# Patient Record
Sex: FEMALE | Race: ASIAN | NOT HISPANIC OR LATINO | URBAN - METROPOLITAN AREA
[De-identification: names, ages, dates, MRNs, and addresses within clinical notes are randomized per-mention and may not be internally consistent; named-entity substitution may affect disease eponyms.]

---

## 2018-05-24 VITALS
WEIGHT: 197.09 LBS | SYSTOLIC BLOOD PRESSURE: 116 MMHG | HEART RATE: 111 BPM | RESPIRATION RATE: 18 BRPM | HEIGHT: 64 IN | DIASTOLIC BLOOD PRESSURE: 59 MMHG | TEMPERATURE: 100 F | OXYGEN SATURATION: 97 %

## 2018-05-24 PROBLEM — Z00.00 ENCOUNTER FOR PREVENTIVE HEALTH EXAMINATION: Status: ACTIVE | Noted: 2018-05-24

## 2018-05-24 LAB
ALBUMIN SERPL ELPH-MCNC: 3.6 G/DL — SIGNIFICANT CHANGE UP (ref 3.3–5)
ALP SERPL-CCNC: 141 U/L — HIGH (ref 40–120)
ALT FLD-CCNC: 110 U/L — HIGH (ref 10–45)
ANION GAP SERPL CALC-SCNC: 15 MMOL/L — SIGNIFICANT CHANGE UP (ref 5–17)
APTT BLD: 29.9 SEC — SIGNIFICANT CHANGE UP (ref 27.5–37.4)
AST SERPL-CCNC: 92 U/L — HIGH (ref 10–40)
BILIRUB SERPL-MCNC: 3.4 MG/DL — HIGH (ref 0.2–1.2)
BUN SERPL-MCNC: 19 MG/DL — SIGNIFICANT CHANGE UP (ref 7–23)
CALCIUM SERPL-MCNC: 9.4 MG/DL — SIGNIFICANT CHANGE UP (ref 8.4–10.5)
CHLORIDE SERPL-SCNC: 96 MMOL/L — SIGNIFICANT CHANGE UP (ref 96–108)
CO2 SERPL-SCNC: 25 MMOL/L — SIGNIFICANT CHANGE UP (ref 22–31)
CREAT SERPL-MCNC: 0.77 MG/DL — SIGNIFICANT CHANGE UP (ref 0.5–1.3)
GLUCOSE SERPL-MCNC: 152 MG/DL — HIGH (ref 70–99)
HCT VFR BLD CALC: 27.1 % — LOW (ref 34.5–45)
HGB BLD-MCNC: 8.3 G/DL — LOW (ref 11.5–15.5)
INR BLD: 1.59 — HIGH (ref 0.88–1.16)
LACTATE SERPL-SCNC: 2.2 MMOL/L — HIGH (ref 0.5–2)
LIDOCAIN IGE QN: 30 U/L — SIGNIFICANT CHANGE UP (ref 7–60)
LYMPHOCYTES # BLD AUTO: 3 % — LOW (ref 13–44)
MCHC RBC-ENTMCNC: 21.2 PG — LOW (ref 27–34)
MCHC RBC-ENTMCNC: 30.6 G/DL — LOW (ref 32–36)
MCV RBC AUTO: 69.3 FL — LOW (ref 80–100)
MONOCYTES NFR BLD AUTO: 7 % — SIGNIFICANT CHANGE UP (ref 2–14)
NEUTROPHILS NFR BLD AUTO: 85 % — HIGH (ref 43–77)
PLATELET # BLD AUTO: 407 K/UL — HIGH (ref 150–400)
POTASSIUM SERPL-MCNC: 3.4 MMOL/L — LOW (ref 3.5–5.3)
POTASSIUM SERPL-SCNC: 3.4 MMOL/L — LOW (ref 3.5–5.3)
PROT SERPL-MCNC: 7.2 G/DL — SIGNIFICANT CHANGE UP (ref 6–8.3)
PROTHROM AB SERPL-ACNC: 17.8 SEC — HIGH (ref 9.8–12.7)
RBC # BLD: 3.91 M/UL — SIGNIFICANT CHANGE UP (ref 3.8–5.2)
RBC # FLD: 26.1 % — HIGH (ref 10.3–16.9)
SODIUM SERPL-SCNC: 136 MMOL/L — SIGNIFICANT CHANGE UP (ref 135–145)
WBC # BLD: 15 K/UL — HIGH (ref 3.8–10.5)
WBC # FLD AUTO: 15 K/UL — HIGH (ref 3.8–10.5)

## 2018-05-24 PROCEDURE — 93010 ELECTROCARDIOGRAM REPORT: CPT

## 2018-05-24 PROCEDURE — 99285 EMERGENCY DEPT VISIT HI MDM: CPT | Mod: 25

## 2018-05-24 PROCEDURE — 74177 CT ABD & PELVIS W/CONTRAST: CPT | Mod: 26

## 2018-05-24 PROCEDURE — 76705 ECHO EXAM OF ABDOMEN: CPT | Mod: 26

## 2018-05-24 RX ORDER — SODIUM CHLORIDE 9 MG/ML
1000 INJECTION INTRAMUSCULAR; INTRAVENOUS; SUBCUTANEOUS ONCE
Qty: 0 | Refills: 0 | Status: COMPLETED | OUTPATIENT
Start: 2018-05-24 | End: 2018-05-24

## 2018-05-24 RX ORDER — MORPHINE SULFATE 50 MG/1
4 CAPSULE, EXTENDED RELEASE ORAL ONCE
Qty: 0 | Refills: 0 | Status: DISCONTINUED | OUTPATIENT
Start: 2018-05-24 | End: 2018-05-24

## 2018-05-24 RX ORDER — IOHEXOL 300 MG/ML
50 INJECTION, SOLUTION INTRAVENOUS ONCE
Qty: 0 | Refills: 0 | Status: COMPLETED | OUTPATIENT
Start: 2018-05-24 | End: 2018-05-24

## 2018-05-24 RX ADMIN — SODIUM CHLORIDE 2000 MILLILITER(S): 9 INJECTION INTRAMUSCULAR; INTRAVENOUS; SUBCUTANEOUS at 17:45

## 2018-05-24 RX ADMIN — MORPHINE SULFATE 4 MILLIGRAM(S): 50 CAPSULE, EXTENDED RELEASE ORAL at 19:52

## 2018-05-24 RX ADMIN — MORPHINE SULFATE 4 MILLIGRAM(S): 50 CAPSULE, EXTENDED RELEASE ORAL at 21:54

## 2018-05-24 RX ADMIN — MORPHINE SULFATE 4 MILLIGRAM(S): 50 CAPSULE, EXTENDED RELEASE ORAL at 20:59

## 2018-05-24 RX ADMIN — IOHEXOL 50 MILLILITER(S): 300 INJECTION, SOLUTION INTRAVENOUS at 17:45

## 2018-05-24 NOTE — CONSULT NOTE ADULT - SUBJECTIVE AND OBJECTIVE BOX
HPI:  67F recently diagnosed (via colonoscopy 2 weeks prior) with near obstructing ascending colon cancer with bi-lobe liver and peritoneal metastases (diagnosed with PET-CT earlier this week). Otherwise hx of anemia. Now presenting to the [facility] ED from her medical oncologist’s office with worsening persistent RLQ sharp abdominal pain and fever. Pain has worsened over the past 3 days and last evening was associated with one episode of vomiting (nonbloody, questionable bilious content). She reports ample flatus with no belching.  Last BM was 2 days prior. She denies any associated chills, chest pain/discomfort/pressure, SOB/dyspnea, dysuria, diarrhea, hematochezia.     PMH: Metastatic colon cancer, anemia  Meds: None, however recently had an IV Fe infusion at heme office  NKDA  PSH: No prior intra-abdominal surgery.  Social: Remote hx of cigarette use, social ETOH, denies IVDA  Family: Sister with Breast Ca.    Vital Signs Last 24 Hrs  T(C): 37.1 (24 May 2018 19:54), Max: 37.9 (24 May 2018 16:09)  T(F): 98.7 (24 May 2018 19:54), Max: 100.3 (24 May 2018 16:09)  HR: 98 (24 May 2018 19:54) (96 - 111)  BP: 134/76 (24 May 2018 19:54) (116/59 - 134/76)  BP(mean): --  RR: 16 (24 May 2018 19:54) (16 - 18)  SpO2: 96% (24 May 2018 19:54) (96% - 97%)    PHYSICAL EXAM:    Gen: Age appropriate obese female in NAD  CV: tachycardic  Resp: No increased work of breathing  Abd: Softly distended, diffusely tender with a focus in the lower abdomen, associated with involuntary guarding, questionable rebound, no rigidity.  Ext: WWP with mild b/l LE edema      LABS:                        8.3    15.0  )-----------( 407      ( 24 May 2018 17:22 )             27.1     05-24    136  |  96  |  19  ----------------------------<  152<H>  3.4<L>   |  25  |  0.77    Ca    9.4      24 May 2018 17:22    TPro  7.2  /  Alb  3.6  /  TBili  3.4<H>  /  DBili  x   /  AST  92<H>  /  ALT  110<H>  /  AlkPhos  141<H>  05-24    PT/INR - ( 24 May 2018 17:24 )   PT: 17.8 sec;   INR: 1.59          PTT - ( 24 May 2018 17:24 )  PTT:29.9 sec      RADIOLOGY & ADDITIONAL STUDIES:  < from: CT Abdomen and Pelvis w/ Oral Cont and w/ IV Cont (05.24.18 @ 20:52) >    ******PRELIMINARY REPORT******    ******PRELIMINARY REPORT******            EXAM:  CT ABDOMEN AND PELVIS OC IC                          PROCEDURE DATE:  05/24/2018    ******PRELIMINARY REPORT******    ******PRELIMINARY REPORT******              INTERPRETATION:  CT of the ABDOMEN and PELVIS with intravenous contrast   dated 5/24/2018 8:52 PM    INDICATION: Abdominal pain. N/V, hx metastatic colon ca w/ liver mets and   peritoneal carcinomatosis.       TECHNIQUE: CT of the abdomen and pelvis was performed using oral and   intravenous contrast. Axial, coronal, and sagittal images were produced   and reviewed.    PRIOR STUDIES: None.    FINDINGS: Images of the lower chest demonstrate dependent atelectasis   bilaterally.    There is hepatic steatosis. Multiple hypodense lesions are seen in the   liver, consistent with the patient's known liver metastases. There is   cholelithiasis. The gallbladder wall is thickened and there is trace   pericholecystic fluid. These findings are consistent with acute   cholecystitis. The pancreas is normal in appearance.  No splenic   abnormalities are seen. There is a central filling defect in the portal   vein, best seen on axial image 52 series 3, consistent with portal venous   thrombosis.    The adrenal glands are unremarkable. There are a few subcentimeter   hypodensities which are too small to characterize in each kidney. There   is no hydronephrosis or hydroureter. No renal stones are seen.     There is a small amount of calcified atherosclerotic plaque in the   abdominal aorta. No abdominal aortic aneurysm is seen. No lymphadenopathy   is seen. Multiple nonenlarged mesenteric lymph nodes are noted.    Evaluation of the bowel demonstrates colonic diverticulosis without   evidence of acute diverticulitis. There may be suture material in the   ascending colon from prior surgery. Correlate with surgical history.   There is no bowel obstruction or free air. The appendix is not identified   with certainty however there are no inflammatory changes in the right   lower quadrant to suggest acute appendicitis. There is a small amount of   ascites. There are a few soft tissue density peritoneal implants seen   along the anterior abdominal wall, particularly in the right mid abdomen   (as seenon axial images 61 through 67, series 3), consistent with   peritoneal carcinomatosis.    Images of the pelvis demonstrate the uterus and adnexa to be normal in   appearance. There are no filling defects in the urinary bladder.    Evaluation of the osseous structures demonstrates mild degenerative   changes of the spine. No lytic or sclerotic osseous lesions are seen.      IMPRESSION:  1.  Findings consistent with acute cholecystitis.  2.  Portal vein thrombosis.  3.  Hepatic metastatic disease.  4.  Peritoneal carcinomatosis.  5.  Small ascites.              "Thank you for the opportunity to participate in the care of this   patient."  ******PRELIMINARY REPORT******    ******PRELIMINARY REPORT******          EULALIO VARELA M.D., RADIOLOGY RESIDENT

## 2018-05-24 NOTE — ED ADULT NURSE REASSESSMENT NOTE - NS ED NURSE REASSESS COMMENT FT1
Patient care and endorsement received from previous RN.  Patient c/o of body aches, Morphine 4mg IVP adminsitered.  NSS bolus ongoing.  Vital signs stable,  Brought to CT scan in stable condition.

## 2018-05-24 NOTE — ED CLERICAL - NS ED CLERK NOTE PRE-ARRIVAL INFORMATION; ADDITIONAL PRE-ARRIVAL INFORMATION
PILLO JACKMAN 67F FROM [facility] WITH SEPSIS.  FEVER 102, .  VOMITING.  HAS METASTATIC COLON CANCER.  NO TREATMENT YET.  DR. GARZA [phone] W QUESTIONS (DR. KAMARA)

## 2018-05-24 NOTE — ED PROVIDER NOTE - CARE PLAN
Principal Discharge DX:	Portal vein thrombosis  Secondary Diagnosis:	Colon cancer  Secondary Diagnosis:	Cholecystitis

## 2018-05-24 NOTE — ED PROVIDER NOTE - PROGRESS NOTE DETAILS
surgery feels reative biliary pathology and has no surgical plans -> may get IR care if LFT's increase -> added Hep GTT for thrombosis and antibiotics at  this time and admission placed

## 2018-05-24 NOTE — ED ADULT NURSE REASSESSMENT NOTE - NS ED NURSE REASSESS COMMENT FT1
Patient back from CT scan, c/o of return of abdominal pain,  2nd dose Morphine 4mg IVP adminsitered w/ good effects.  No nausea/vomitting.  Vital signs stable. Results and disposition pending.

## 2018-05-24 NOTE — ED PROVIDER NOTE - PHYSICAL EXAMINATION
CONSTITUTIONAL: tired appearing, in moderate distress  HEAD: Normocephalic; atraumatic.   EYES:  conjunctiva and sclera clear  ENT: normal nose; no rhinorrhea; normal pharynx with no erythema or lesions. dry lips and tongue  NECK: Supple; non-tender;   CARDIOVASCULAR: Normal S1, S2; no murmurs, rubs, or gallops. Regular rate and rhythm.   RESPIRATORY: Breathing easily; breath sounds clear and equal bilaterally; no wheezes, rhonchi, or rales.  GI: Soft; non-distended; +diffuse tenderness,  no fluid wave.   EXT: No cyanosis or edema; N/V intact  SKIN: Normal for age and race; warm; dry; good turgor; no apparent lesions or rash.   NEURO: A & O x 3; face symmetric; grossly unremarkable.   PSYCHOLOGICAL: The patient’s mood and manner are appropriate.

## 2018-05-24 NOTE — ED ADULT NURSE NOTE - OBJECTIVE STATEMENT
PT came to ED complaining of bilateral abd pain for several days. Pt has colon CA with mets to liver. Pt reports nausea and vomiting over the last several days.  Pt denies chest pain, SOB,  symptoms.  Pt denies fever, chills. Pt is alert and oriented x3.

## 2018-05-24 NOTE — ED PROVIDER NOTE - OBJECTIVE STATEMENT
68yo F hx of newly diagnosed colon ca w/ liver mets and peritoneal carcinomatosis (follows w/ dr colon but not yet started on any therapy), here w/ concern for intra abdominal infection from clinic. Pt had fever to 101, complaining of N/V and diffuse abdominal pain, so sent to the ED for workup. Dr. Marci Narvaez was made aware of surgery.

## 2018-05-24 NOTE — ED PROVIDER NOTE - MEDICAL DECISION MAKING DETAILS
here w/ symptoms concerning for sepsis, plan for ct scan w/ po and iv contrast to assess. covered w/ zosyn while awaiting workup. surgery consult saw pt and following

## 2018-05-24 NOTE — ED ADULT NURSE NOTE - CHPI ED SYMPTOMS NEG
no abdominal distension/no diarrhea/no burning urination/no hematuria/no fever/no blood in stool/no chills/no dysuria

## 2018-05-25 ENCOUNTER — INPATIENT (INPATIENT)
Facility: HOSPITAL | Age: 68
LOS: 17 days | Discharge: NURSING, REHAB OR LONG-TERM CARE | DRG: 871 | End: 2018-06-12
Attending: STUDENT IN AN ORGANIZED HEALTH CARE EDUCATION/TRAINING PROGRAM | Admitting: STUDENT IN AN ORGANIZED HEALTH CARE EDUCATION/TRAINING PROGRAM
Payer: MEDICARE

## 2018-05-25 DIAGNOSIS — C18.2 MALIGNANT NEOPLASM OF ASCENDING COLON: ICD-10-CM

## 2018-05-25 DIAGNOSIS — K81.9 CHOLECYSTITIS, UNSPECIFIED: ICD-10-CM

## 2018-05-25 DIAGNOSIS — I81 PORTAL VEIN THROMBOSIS: ICD-10-CM

## 2018-05-25 DIAGNOSIS — Z29.9 ENCOUNTER FOR PROPHYLACTIC MEASURES, UNSPECIFIED: ICD-10-CM

## 2018-05-25 DIAGNOSIS — R63.8 OTHER SYMPTOMS AND SIGNS CONCERNING FOOD AND FLUID INTAKE: ICD-10-CM

## 2018-05-25 DIAGNOSIS — R06.02 SHORTNESS OF BREATH: ICD-10-CM

## 2018-05-25 DIAGNOSIS — A41.9 SEPSIS, UNSPECIFIED ORGANISM: ICD-10-CM

## 2018-05-25 LAB
ALBUMIN SERPL ELPH-MCNC: 3.1 G/DL — LOW (ref 3.3–5)
ALP SERPL-CCNC: 105 U/L — SIGNIFICANT CHANGE UP (ref 40–120)
ALT FLD-CCNC: 70 U/L — HIGH (ref 10–45)
ANION GAP SERPL CALC-SCNC: 13 MMOL/L — SIGNIFICANT CHANGE UP (ref 5–17)
ANION GAP SERPL CALC-SCNC: 15 MMOL/L — SIGNIFICANT CHANGE UP (ref 5–17)
ANION GAP SERPL CALC-SCNC: 16 MMOL/L — SIGNIFICANT CHANGE UP (ref 5–17)
APTT BLD: 130.5 SEC — CRITICAL HIGH (ref 27.5–37.4)
AST SERPL-CCNC: 43 U/L — HIGH (ref 10–40)
BASE EXCESS BLDA CALC-SCNC: -1.8 MMOL/L — SIGNIFICANT CHANGE UP (ref -2–3)
BILIRUB SERPL-MCNC: 2.5 MG/DL — HIGH (ref 0.2–1.2)
BUN SERPL-MCNC: 13 MG/DL — SIGNIFICANT CHANGE UP (ref 7–23)
BUN SERPL-MCNC: 14 MG/DL — SIGNIFICANT CHANGE UP (ref 7–23)
BUN SERPL-MCNC: 14 MG/DL — SIGNIFICANT CHANGE UP (ref 7–23)
CALCIUM SERPL-MCNC: 8 MG/DL — LOW (ref 8.4–10.5)
CALCIUM SERPL-MCNC: 8.2 MG/DL — LOW (ref 8.4–10.5)
CALCIUM SERPL-MCNC: 8.4 MG/DL — SIGNIFICANT CHANGE UP (ref 8.4–10.5)
CHLORIDE SERPL-SCNC: 94 MMOL/L — LOW (ref 96–108)
CHLORIDE SERPL-SCNC: 96 MMOL/L — SIGNIFICANT CHANGE UP (ref 96–108)
CHLORIDE SERPL-SCNC: 97 MMOL/L — SIGNIFICANT CHANGE UP (ref 96–108)
CO2 SERPL-SCNC: 20 MMOL/L — LOW (ref 22–31)
CO2 SERPL-SCNC: 22 MMOL/L — SIGNIFICANT CHANGE UP (ref 22–31)
CO2 SERPL-SCNC: 22 MMOL/L — SIGNIFICANT CHANGE UP (ref 22–31)
CREAT SERPL-MCNC: 0.47 MG/DL — LOW (ref 0.5–1.3)
CREAT SERPL-MCNC: 0.5 MG/DL — SIGNIFICANT CHANGE UP (ref 0.5–1.3)
CREAT SERPL-MCNC: 0.53 MG/DL — SIGNIFICANT CHANGE UP (ref 0.5–1.3)
GAS PNL BLDA: SIGNIFICANT CHANGE UP
GLUCOSE SERPL-MCNC: 136 MG/DL — HIGH (ref 70–99)
GLUCOSE SERPL-MCNC: 143 MG/DL — HIGH (ref 70–99)
GLUCOSE SERPL-MCNC: 161 MG/DL — HIGH (ref 70–99)
HCO3 BLDA-SCNC: 23 MMOL/L — SIGNIFICANT CHANGE UP (ref 21–28)
HCT VFR BLD CALC: 22.3 % — LOW (ref 34.5–45)
HCT VFR BLD CALC: 25.5 % — LOW (ref 34.5–45)
HCT VFR BLD CALC: 27.6 % — LOW (ref 34.5–45)
HGB BLD-MCNC: 7 G/DL — CRITICAL LOW (ref 11.5–15.5)
HGB BLD-MCNC: 8.2 G/DL — LOW (ref 11.5–15.5)
HGB BLD-MCNC: 8.5 G/DL — LOW (ref 11.5–15.5)
LACTATE SERPL-SCNC: 1.6 MMOL/L — SIGNIFICANT CHANGE UP (ref 0.5–2)
LACTATE SERPL-SCNC: 1.7 MMOL/L — SIGNIFICANT CHANGE UP (ref 0.5–2)
MAGNESIUM SERPL-MCNC: 1.9 MG/DL — SIGNIFICANT CHANGE UP (ref 1.6–2.6)
MCHC RBC-ENTMCNC: 21.4 PG — LOW (ref 27–34)
MCHC RBC-ENTMCNC: 21.9 PG — LOW (ref 27–34)
MCHC RBC-ENTMCNC: 22.2 PG — LOW (ref 27–34)
MCHC RBC-ENTMCNC: 30.8 G/DL — LOW (ref 32–36)
MCHC RBC-ENTMCNC: 31.4 G/DL — LOW (ref 32–36)
MCHC RBC-ENTMCNC: 32.2 G/DL — SIGNIFICANT CHANGE UP (ref 32–36)
MCV RBC AUTO: 68.2 FL — LOW (ref 80–100)
MCV RBC AUTO: 68.9 FL — LOW (ref 80–100)
MCV RBC AUTO: 71.1 FL — LOW (ref 80–100)
PCO2 BLDA: 38 MMHG — SIGNIFICANT CHANGE UP (ref 32–45)
PH BLDA: 7.4 — SIGNIFICANT CHANGE UP (ref 7.35–7.45)
PLATELET # BLD AUTO: 312 K/UL — SIGNIFICANT CHANGE UP (ref 150–400)
PLATELET # BLD AUTO: 329 K/UL — SIGNIFICANT CHANGE UP (ref 150–400)
PLATELET # BLD AUTO: 360 K/UL — SIGNIFICANT CHANGE UP (ref 150–400)
PO2 BLDA: 56 MMHG — CRITICAL LOW (ref 83–108)
POTASSIUM SERPL-MCNC: 3 MMOL/L — LOW (ref 3.5–5.3)
POTASSIUM SERPL-MCNC: 3.2 MMOL/L — LOW (ref 3.5–5.3)
POTASSIUM SERPL-MCNC: 3.4 MMOL/L — LOW (ref 3.5–5.3)
POTASSIUM SERPL-SCNC: 3 MMOL/L — LOW (ref 3.5–5.3)
POTASSIUM SERPL-SCNC: 3.2 MMOL/L — LOW (ref 3.5–5.3)
POTASSIUM SERPL-SCNC: 3.4 MMOL/L — LOW (ref 3.5–5.3)
PROT SERPL-MCNC: 6.1 G/DL — SIGNIFICANT CHANGE UP (ref 6–8.3)
RBC # BLD: 3.24 M/UL — LOW (ref 3.8–5.2)
RBC # BLD: 3.27 M/UL — LOW (ref 3.8–5.2)
RBC # BLD: 3.7 M/UL — LOW (ref 3.8–5.2)
RBC # BLD: 3.88 M/UL — SIGNIFICANT CHANGE UP (ref 3.8–5.2)
RBC # FLD: 25.8 % — HIGH (ref 10.3–16.9)
RBC # FLD: 26 % — HIGH (ref 10.3–16.9)
RBC # FLD: 26.2 % — HIGH (ref 10.3–16.9)
RETICS/RBC NFR: 3.6 % — HIGH (ref 0.5–2.5)
SAO2 % BLDA: 90 % — LOW (ref 95–100)
SODIUM SERPL-SCNC: 130 MMOL/L — LOW (ref 135–145)
SODIUM SERPL-SCNC: 132 MMOL/L — LOW (ref 135–145)
SODIUM SERPL-SCNC: 133 MMOL/L — LOW (ref 135–145)
WBC # BLD: 11.5 K/UL — HIGH (ref 3.8–10.5)
WBC # BLD: 8.4 K/UL — SIGNIFICANT CHANGE UP (ref 3.8–10.5)
WBC # BLD: 8.6 K/UL — SIGNIFICANT CHANGE UP (ref 3.8–10.5)
WBC # FLD AUTO: 11.5 K/UL — HIGH (ref 3.8–10.5)
WBC # FLD AUTO: 8.4 K/UL — SIGNIFICANT CHANGE UP (ref 3.8–10.5)
WBC # FLD AUTO: 8.6 K/UL — SIGNIFICANT CHANGE UP (ref 3.8–10.5)

## 2018-05-25 PROCEDURE — 86078 PHYS BLOOD BANK SERV REACTJ: CPT

## 2018-05-25 PROCEDURE — 47490 INCISION OF GALLBLADDER: CPT

## 2018-05-25 PROCEDURE — 99152 MOD SED SAME PHYS/QHP 5/>YRS: CPT

## 2018-05-25 PROCEDURE — 99223 1ST HOSP IP/OBS HIGH 75: CPT | Mod: GC

## 2018-05-25 PROCEDURE — 71045 X-RAY EXAM CHEST 1 VIEW: CPT | Mod: 26

## 2018-05-25 PROCEDURE — 99291 CRITICAL CARE FIRST HOUR: CPT

## 2018-05-25 RX ORDER — ACETAMINOPHEN 500 MG
650 TABLET ORAL EVERY 6 HOURS
Qty: 0 | Refills: 0 | Status: DISCONTINUED | OUTPATIENT
Start: 2018-05-25 | End: 2018-06-12

## 2018-05-25 RX ORDER — HYDROMORPHONE HYDROCHLORIDE 2 MG/ML
1 INJECTION INTRAMUSCULAR; INTRAVENOUS; SUBCUTANEOUS ONCE
Qty: 0 | Refills: 0 | Status: DISCONTINUED | OUTPATIENT
Start: 2018-05-25 | End: 2018-05-25

## 2018-05-25 RX ORDER — IPRATROPIUM/ALBUTEROL SULFATE 18-103MCG
3 AEROSOL WITH ADAPTER (GRAM) INHALATION ONCE
Qty: 0 | Refills: 0 | Status: COMPLETED | OUTPATIENT
Start: 2018-05-25 | End: 2018-05-25

## 2018-05-25 RX ORDER — ACETAMINOPHEN 500 MG
325 TABLET ORAL ONCE
Qty: 0 | Refills: 0 | Status: COMPLETED | OUTPATIENT
Start: 2018-05-25 | End: 2018-05-25

## 2018-05-25 RX ORDER — PIPERACILLIN AND TAZOBACTAM 4; .5 G/20ML; G/20ML
3.38 INJECTION, POWDER, LYOPHILIZED, FOR SOLUTION INTRAVENOUS EVERY 6 HOURS
Qty: 0 | Refills: 0 | Status: DISCONTINUED | OUTPATIENT
Start: 2018-05-25 | End: 2018-05-25

## 2018-05-25 RX ORDER — HEPARIN SODIUM 5000 [USP'U]/ML
7000 INJECTION INTRAVENOUS; SUBCUTANEOUS ONCE
Qty: 0 | Refills: 0 | Status: COMPLETED | OUTPATIENT
Start: 2018-05-25 | End: 2018-05-25

## 2018-05-25 RX ORDER — IPRATROPIUM/ALBUTEROL SULFATE 18-103MCG
3 AEROSOL WITH ADAPTER (GRAM) INHALATION EVERY 4 HOURS
Qty: 0 | Refills: 0 | Status: DISCONTINUED | OUTPATIENT
Start: 2018-05-25 | End: 2018-06-03

## 2018-05-25 RX ORDER — SODIUM CHLORIDE 9 MG/ML
1000 INJECTION INTRAMUSCULAR; INTRAVENOUS; SUBCUTANEOUS
Qty: 0 | Refills: 0 | Status: DISCONTINUED | OUTPATIENT
Start: 2018-05-25 | End: 2018-05-25

## 2018-05-25 RX ORDER — HYDROMORPHONE HYDROCHLORIDE 2 MG/ML
1 INJECTION INTRAMUSCULAR; INTRAVENOUS; SUBCUTANEOUS EVERY 4 HOURS
Qty: 0 | Refills: 0 | Status: DISCONTINUED | OUTPATIENT
Start: 2018-05-25 | End: 2018-05-29

## 2018-05-25 RX ORDER — POTASSIUM CHLORIDE 20 MEQ
10 PACKET (EA) ORAL
Qty: 0 | Refills: 0 | Status: COMPLETED | OUTPATIENT
Start: 2018-05-25 | End: 2018-05-25

## 2018-05-25 RX ORDER — HEPARIN SODIUM 5000 [USP'U]/ML
1300 INJECTION INTRAVENOUS; SUBCUTANEOUS
Qty: 25000 | Refills: 0 | Status: DISCONTINUED | OUTPATIENT
Start: 2018-05-25 | End: 2018-05-25

## 2018-05-25 RX ORDER — KETOROLAC TROMETHAMINE 30 MG/ML
15 SYRINGE (ML) INJECTION ONCE
Qty: 0 | Refills: 0 | Status: DISCONTINUED | OUTPATIENT
Start: 2018-05-25 | End: 2018-05-25

## 2018-05-25 RX ORDER — CEFTRIAXONE 500 MG/1
1000 INJECTION, POWDER, FOR SOLUTION INTRAMUSCULAR; INTRAVENOUS EVERY 12 HOURS
Qty: 0 | Refills: 0 | Status: DISCONTINUED | OUTPATIENT
Start: 2018-05-26 | End: 2018-05-26

## 2018-05-25 RX ORDER — METRONIDAZOLE 500 MG
500 TABLET ORAL EVERY 8 HOURS
Qty: 0 | Refills: 0 | Status: DISCONTINUED | OUTPATIENT
Start: 2018-05-25 | End: 2018-05-26

## 2018-05-25 RX ORDER — HEPARIN SODIUM 5000 [USP'U]/ML
3500 INJECTION INTRAVENOUS; SUBCUTANEOUS EVERY 6 HOURS
Qty: 0 | Refills: 0 | Status: DISCONTINUED | OUTPATIENT
Start: 2018-05-25 | End: 2018-05-25

## 2018-05-25 RX ORDER — CEFTRIAXONE 500 MG/1
1 INJECTION, POWDER, FOR SOLUTION INTRAMUSCULAR; INTRAVENOUS EVERY 12 HOURS
Qty: 0 | Refills: 0 | Status: DISCONTINUED | OUTPATIENT
Start: 2018-05-25 | End: 2018-05-25

## 2018-05-25 RX ORDER — PIPERACILLIN AND TAZOBACTAM 4; .5 G/20ML; G/20ML
3.38 INJECTION, POWDER, LYOPHILIZED, FOR SOLUTION INTRAVENOUS ONCE
Qty: 0 | Refills: 0 | Status: COMPLETED | OUTPATIENT
Start: 2018-05-25 | End: 2018-05-25

## 2018-05-25 RX ORDER — POTASSIUM CHLORIDE 20 MEQ
40 PACKET (EA) ORAL EVERY 4 HOURS
Qty: 0 | Refills: 0 | Status: DISCONTINUED | OUTPATIENT
Start: 2018-05-25 | End: 2018-05-25

## 2018-05-25 RX ORDER — POLYETHYLENE GLYCOL 3350 17 G/17G
17 POWDER, FOR SOLUTION ORAL DAILY
Qty: 0 | Refills: 0 | Status: DISCONTINUED | OUTPATIENT
Start: 2018-05-25 | End: 2018-05-29

## 2018-05-25 RX ORDER — ENOXAPARIN SODIUM 100 MG/ML
90 INJECTION SUBCUTANEOUS EVERY 12 HOURS
Qty: 0 | Refills: 0 | Status: DISCONTINUED | OUTPATIENT
Start: 2018-05-25 | End: 2018-05-28

## 2018-05-25 RX ORDER — SODIUM CHLORIDE 9 MG/ML
500 INJECTION INTRAMUSCULAR; INTRAVENOUS; SUBCUTANEOUS ONCE
Qty: 0 | Refills: 0 | Status: COMPLETED | OUTPATIENT
Start: 2018-05-25 | End: 2018-05-25

## 2018-05-25 RX ORDER — FUROSEMIDE 40 MG
20 TABLET ORAL ONCE
Qty: 0 | Refills: 0 | Status: COMPLETED | OUTPATIENT
Start: 2018-05-25 | End: 2018-05-25

## 2018-05-25 RX ORDER — SENNA PLUS 8.6 MG/1
2 TABLET ORAL AT BEDTIME
Qty: 0 | Refills: 0 | Status: DISCONTINUED | OUTPATIENT
Start: 2018-05-25 | End: 2018-05-29

## 2018-05-25 RX ORDER — HEPARIN SODIUM 5000 [USP'U]/ML
7000 INJECTION INTRAVENOUS; SUBCUTANEOUS EVERY 6 HOURS
Qty: 0 | Refills: 0 | Status: DISCONTINUED | OUTPATIENT
Start: 2018-05-25 | End: 2018-05-25

## 2018-05-25 RX ORDER — HYDROMORPHONE HYDROCHLORIDE 2 MG/ML
0.5 INJECTION INTRAMUSCULAR; INTRAVENOUS; SUBCUTANEOUS EVERY 4 HOURS
Qty: 0 | Refills: 0 | Status: DISCONTINUED | OUTPATIENT
Start: 2018-05-25 | End: 2018-05-29

## 2018-05-25 RX ORDER — HEPARIN SODIUM 5000 [USP'U]/ML
INJECTION INTRAVENOUS; SUBCUTANEOUS
Qty: 25000 | Refills: 0 | Status: DISCONTINUED | OUTPATIENT
Start: 2018-05-25 | End: 2018-05-25

## 2018-05-25 RX ADMIN — HYDROMORPHONE HYDROCHLORIDE 0.5 MILLIGRAM(S): 2 INJECTION INTRAMUSCULAR; INTRAVENOUS; SUBCUTANEOUS at 23:48

## 2018-05-25 RX ADMIN — Medication 100 MILLIEQUIVALENT(S): at 15:46

## 2018-05-25 RX ADMIN — Medication 3 MILLILITER(S): at 18:22

## 2018-05-25 RX ADMIN — Medication 100 MILLIEQUIVALENT(S): at 21:04

## 2018-05-25 RX ADMIN — Medication 15 MILLIGRAM(S): at 08:55

## 2018-05-25 RX ADMIN — HYDROMORPHONE HYDROCHLORIDE 0.5 MILLIGRAM(S): 2 INJECTION INTRAMUSCULAR; INTRAVENOUS; SUBCUTANEOUS at 23:34

## 2018-05-25 RX ADMIN — SODIUM CHLORIDE 100 MILLILITER(S): 9 INJECTION INTRAMUSCULAR; INTRAVENOUS; SUBCUTANEOUS at 07:45

## 2018-05-25 RX ADMIN — Medication 100 MILLIGRAM(S): at 15:46

## 2018-05-25 RX ADMIN — PIPERACILLIN AND TAZOBACTAM 25 GRAM(S): 4; .5 INJECTION, POWDER, LYOPHILIZED, FOR SOLUTION INTRAVENOUS at 07:25

## 2018-05-25 RX ADMIN — HYDROMORPHONE HYDROCHLORIDE 1 MILLIGRAM(S): 2 INJECTION INTRAMUSCULAR; INTRAVENOUS; SUBCUTANEOUS at 19:48

## 2018-05-25 RX ADMIN — Medication 15 MILLIGRAM(S): at 09:32

## 2018-05-25 RX ADMIN — SODIUM CHLORIDE 1000 MILLILITER(S): 9 INJECTION INTRAMUSCULAR; INTRAVENOUS; SUBCUTANEOUS at 19:24

## 2018-05-25 RX ADMIN — HEPARIN SODIUM 7000 UNIT(S): 5000 INJECTION INTRAVENOUS; SUBCUTANEOUS at 01:55

## 2018-05-25 RX ADMIN — Medication 100 MILLIEQUIVALENT(S): at 16:31

## 2018-05-25 RX ADMIN — HYDROMORPHONE HYDROCHLORIDE 1 MILLIGRAM(S): 2 INJECTION INTRAMUSCULAR; INTRAVENOUS; SUBCUTANEOUS at 20:32

## 2018-05-25 RX ADMIN — Medication 100 MILLIEQUIVALENT(S): at 22:00

## 2018-05-25 RX ADMIN — Medication 650 MILLIGRAM(S): at 19:00

## 2018-05-25 RX ADMIN — ENOXAPARIN SODIUM 90 MILLIGRAM(S): 100 INJECTION SUBCUTANEOUS at 18:22

## 2018-05-25 RX ADMIN — Medication 100 MILLIGRAM(S): at 21:06

## 2018-05-25 RX ADMIN — Medication 20 MILLIGRAM(S): at 16:42

## 2018-05-25 RX ADMIN — CEFTRIAXONE 100 GRAM(S): 500 INJECTION, POWDER, FOR SOLUTION INTRAMUSCULAR; INTRAVENOUS at 16:45

## 2018-05-25 RX ADMIN — HYDROMORPHONE HYDROCHLORIDE 1 MILLIGRAM(S): 2 INJECTION INTRAMUSCULAR; INTRAVENOUS; SUBCUTANEOUS at 05:27

## 2018-05-25 RX ADMIN — HEPARIN SODIUM 1600 UNIT(S)/HR: 5000 INJECTION INTRAVENOUS; SUBCUTANEOUS at 02:29

## 2018-05-25 RX ADMIN — PIPERACILLIN AND TAZOBACTAM 200 GRAM(S): 4; .5 INJECTION, POWDER, LYOPHILIZED, FOR SOLUTION INTRAVENOUS at 01:10

## 2018-05-25 RX ADMIN — Medication 3 MILLILITER(S): at 16:42

## 2018-05-25 RX ADMIN — Medication 100 MILLIEQUIVALENT(S): at 09:31

## 2018-05-25 RX ADMIN — Medication 3 MILLILITER(S): at 22:06

## 2018-05-25 RX ADMIN — Medication 3 MILLILITER(S): at 16:24

## 2018-05-25 NOTE — H&P ADULT - PROBLEM SELECTOR PLAN 1
-patient with acute onset RUQ abdominal pain and CT A/P findings c/w acute cholecytisis; RUQ U/S showing mobile stones which is indicative of cholecystitis being reactive sequelae of colon ca?   -patient currently being medically managed with zosyn 3.375 q6  -plan for percutaneous cholecystostomy if LFTs continue to uptrend  -ctm  -morphine for pain control  -on mIVF at 100 ccs/hr -patient with acute onset RUQ abdominal pain and CT A/P findings c/w acute cholecytisis; RUQ U/S showing mobile stones which is indicative of cholecystitis being reactive sequelae of colon ca?   -patient currently being medically managed with zosyn 3.375 q6  -plan for percutaneous cholecystostomy if LFTs continue to uptrend  -ctm  -morphine for pain control  -on mIVF at 100 ccs/hr  -if LFTs worsening, or patient is clinically worsening, please consider ICU consult and reconsulting surgery for acute surgical intervention vs IR for percutaenous cholecystostomy placement

## 2018-05-25 NOTE — CHART NOTE - NSCHARTNOTEFT_GEN_A_CORE
Around 4pm, called to bedside as pt returned from IR s/p perc cholecystostomy with removal of 80 cc purulent fluid aspirated and sent for culture. Pt had increased WOB with SOB and tachypnea to RR 30s. Other vitals showing afebrile at T 98.8, tachy to 110s, SBP 180s, and hypoxic to 83% on NC. On exam, pt with wheezing and tight airways. No crackles or rhonchi auscultated. Of note, pt had Hb of 7 this AM likely 2/2 large tumor burden of asencding colon and was transfused 1 unit PRBC. Transfusion initiated on RMF and completed while patient in IR. This was patient's first transfusion in her life.      Given tight airways, pt given duonebs x2. Concern for possible fluid overload vs. transfusion reaction given within 4-hr window of transfusion completion. CXR showing no pleural effusions but some pulmonary vascular congestion. Given lasix 20 IVP. Pt reporting improvement in breathing. BiPAP placed. WOB improving and pt satting 100% on BiPAP. Wheezing resolved. Transfusion reaction labs sent to blood bank.    Around 6pm, notified by RN about T 103.5 rectally. Rectal tylenol given. Pt assessed at bedside. No increased WOB, rigors, chills. On exam pt on BiPAP with no wheezing, crackles, or rhonchi. Pt reporting improvement in her breathing and denying subjective fevers. Noting continued pain at perc choley site. Vitals significant for tachy to low 100s. Still 100% on BiPAP, SBP 140s, RR 20s-30s. Surgery resident also at bedside.    ICU consulted for tachypnea and possible severe sepsis requiring high level of monitoring. Blood cultures, lactate, BMP, CBC drawn. 500 cc bolus NS given. ICU consult team examined patient and deemed not requiring high level of care at this time. Continued on current ABX regimen and BiPAP. Pt reporting understanding and cooperation. Pt reporting she is comfortable and feels her breathing is much improved.    Will continue to closely monitor overnight. Around 4pm, called to bedside as pt returned from IR s/p perc cholecystostomy with removal of 80 cc purulent fluid aspirated and sent for culture. Pt had increased WOB with SOB and tachypnea to RR 30s. Other vitals showing afebrile at T 98.8, tachy to 110s, SBP 180s, and hypoxic to 83% on NC. On exam, pt with wheezing and tight airways. No crackles or rhonchi auscultated. Of note, pt had Hb of 7 this AM likely 2/2 large tumor burden of asencding colon and was transfused 1 unit PRBC. Transfusion initiated on RMF and completed while patient in IR. This was patient's first transfusion in her life.      Given tight airways, pt given duonebs x2. Concern for possible fluid overload vs. transfusion reaction given within 4-hr window of transfusion completion. CXR showing no pleural effusions but some pulmonary vascular congestion. ABG showing pH 7.4, PCO2 38, PO2 56, SaO2 90. Given lasix 20 IVP. Pt reporting improvement in breathing. BiPAP placed. WOB improving and pt satting 100% on BiPAP. Wheezing resolved. Transfusion reaction labs sent to blood bank.    Around 6pm, notified by RN about T 103.5 rectally. Rectal tylenol given. Pt assessed at bedside. No increased WOB, rigors, chills. On exam pt on BiPAP with no wheezing, crackles, or rhonchi. Pt reporting improvement in her breathing and denying subjective fevers. Noting continued pain at perc choley site. Vitals significant for tachy to low 100s. Still 100% on BiPAP, SBP 140s, RR 20s-30s. Surgery resident also at bedside.    ICU consulted for tachypnea and possible severe sepsis requiring high level of monitoring. Blood cultures, lactate, BMP, CBC drawn. 500 cc bolus NS given. ICU consult team examined patient and deemed not requiring high level of care at this time. Continued on current ABX regimen and BiPAP. Pt reporting understanding and cooperation. Pt reporting she is comfortable and feels her breathing is much improved.    Will continue to closely monitor overnight.

## 2018-05-25 NOTE — H&P ADULT - PROBLEM SELECTOR PLAN 4
-on hepari gtt -acute anemia most likely 2/2 active colon malignancy  -was getting IV iron infusions o/p  -active type and screen in face of potential impending surgery vs need for blood transfusion

## 2018-05-25 NOTE — H&P ADULT - PROBLEM SELECTOR PLAN 3
- stage 4 metastatic colon cancer with liver mets and peritoneal carcinomatosis  - per patient, plan per Dr Landry is to put chemoport in [place] -CTA A/P s/f hepatic vein thrombosis  -most likely 2/2 hypercoagulable state in the setting of active untreated malignancy  -initiated on heparin gtt at 16/hr  -f/u 5:30 am PTT

## 2018-05-25 NOTE — PROGRESS NOTE ADULT - SUBJECTIVE AND OBJECTIVE BOX
Patient seen and examined at bedside. C/o RUQ pain for the last 3 days with nausea, no vomiting, controlled with pain medication. This morning she is uncomfortable and anxious, although pleasant, she would like pain medication and wants to just feel better. +anorexia/1 episode of vomiting last night non-bilious non-bloody. admits to still passing flatus, no bowel movement since admission.      Overnight no acute events, patient has remained afebrile, HD stable.     cefTRIAXone   IVPB 1  metroNIDAZOLE  IVPB 500  cefTRIAXone   IVPB 1  enoxaparin Injectable 90  metroNIDAZOLE  IVPB 500        Vital Signs Last 24 Hrs  T(C): 37 (25 May 2018 15:40), Max: 37.8 (25 May 2018 00:34)  T(F): 98.6 (25 May 2018 15:40), Max: 100 (25 May 2018 00:34)  HR: 103 (25 May 2018 17:27) (83 - 105)  BP: 181/100 (25 May 2018 15:40) (114/76 - 181/100)  BP(mean): --  RR: 30 (25 May 2018 17:27) (16 - 30)  SpO2: 100% (25 May 2018 17:27) (90% - 100%)  I&O's Detail    24 May 2018 07:01  -  25 May 2018 07:00  --------------------------------------------------------  IN:    heparin  Infusion.: 32 mL    sodium chloride 0.9%: 250 mL    Solution: 100 mL  Total IN: 382 mL    OUT:  Total OUT: 0 mL    Total NET: 382 mL      25 May 2018 07:01  -  25 May 2018 17:40  --------------------------------------------------------  IN:    sodium chloride 0.9%: 100 mL  Total IN: 100 mL    OUT:  Total OUT: 0 mL    Total NET: 100 mL          Physical Exam:  General: Pleasant but anxious appearing, asking for pain medication and noticeably uncomfortably.   Pulmonary: Nonlabored, rapid breathing.   Cardiovascular: NSR  Abdominal: soft, ND, moderately TTP in RUQ with slight RUQ guarding, no rebound.   Extremities: WWP  Pulses: 2+ DP and radial pulses bilaterally       LABS:                        7.0    11.5  )-----------( 312      ( 25 May 2018 06:24 )             22.3     05-25    132<L>  |  97  |  13  ----------------------------<  143<H>  3.0<L>   |  22  |  0.50    Ca    8.0<L>      25 May 2018 06:23  Mg     1.9     05-25    TPro  6.1  /  Alb  3.1<L>  /  TBili  2.5<H>  /  DBili  x   /  AST  43<H>  /  ALT  70<H>  /  AlkPhos  105  05-25    PT/INR - ( 24 May 2018 17:24 )   PT: 17.8 sec;   INR: 1.59          PTT - ( 25 May 2018 06:23 )  PTT:130.5 sec    Radiology and Additional Studies:      < from: CT Abdomen and Pelvis w/ Oral Cont and w/ IV Cont (05.24.18 @ 20:52) >    EXAM:  CT ABDOMEN AND PELVIS OC IC                          PROCEDURE DATE:  05/24/2018          INTERPRETATION:  CT of the ABDOMEN and PELVIS with intravenous contrast   dated 5/24/2018 8:52 PM    INDICATION: Abdominal pain. N/V, hx metastatic colon ca w/ liver mets and   peritoneal carcinomatosis.       TECHNIQUE: CT of the abdomen and pelvis was performed using oral and   intravenous contrast. Axial, coronal, and sagittal images were produced   and reviewed.    PRIOR STUDIES: None.    FINDINGS: Images of the lower chest demonstrate dependent atelectasis   bilaterally.    There is hepatic steatosis. Few hypodense lesions are seen in the liver,   consistent with the patient's known liver metastases. In addition, a cyst   is seen in the right hepatic lobe measuring approximately 3.7 cm. There   is cholelithiasis. The gallbladder wall is thickened and there is trace   pericholecystic fluid. These findings are consistent with acute   cholecystitis. The pancreas is normal in appearance.  No splenic   abnormalities are seen. There is a central filling defect in the superior   mesenteric vein, best seen on axial image 52 series 3, consistent with   thrombosis. Thrombosis is also suspected in the main portal vein in the   region of the josh hepatis. Several probable collateralized vessels are   seen in this region.    The adrenal glands are unremarkable. There are a few subcentimeter   hypodensities which are too small to characterize in each kidney. There   is no hydronephrosis or hydroureter. No renal stones are seen.     There is a small amount of calcified atherosclerotic plaque in the   abdominal aorta. No abdominal aortic aneurysm is seen. No lymphadenopathy   is seen. Multiple nonenlarged mesenteric lymph nodes are noted.    Evaluation of the bowel demonstrates colonic diverticulosis without   evidence of acute diverticulitis. There may be suture material in the   ascending colon from prior surgery. Correlate with surgical history.   Several mildly distended loops of small bowel are seen in the left mid   abdomen without a defined transition point; however, several relatively   collapsed loops of small bowel are seen in the lower abdomen and pelvis   and therefore an early partial small bowel obstruction cannot be entirely   excluded. The appendix is not identified with certainty however there are   no inflammatory changes in the right lower quadrant to suggest acute   appendicitis. There is a small amount of ascites. There are scattered   areas of wall thickening noted throughout the colon which may be related   to underdistention versus colitis. There are a few soft tissue density   peritoneal implants seen along the anterior abdominal wall, particularly   in the right mid abdomen (as seen on axial [id number] through 67, series   3), consistent with peritoneal carcinomatosis. No pneumatosis or free air.    Images of the pelvis demonstrate the uterus and adnexa to be normal in   appearance. There are no filling defects in the urinary bladder.    Evaluation of the osseous structures demonstrates mild degenerative   changes of the spine. No lytic or sclerotic osseous lesions are seen.      IMPRESSION:  1.  Findings consistent with acute cholecystitis.  2.  Several mildly dilated loops of small bowel in the left midabdomen   without defined transition point. Early partial small bowel obstruction   cannot be excluded.  3.  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  4.  Hepatic metastatic disease and cyst.  5.  Peritoneal carcinomatosis.  6.  Scattered areas of colonic wall thickening which may be related to   underdistention versus colitis.  7.  Small ascites.              "Thank you for the opportunity to participate in the care of this   patient."    EULALIO VARELA M.D., RADIOLOGY RESIDENT  This document has been electronically signed.  ANSELMO GILES M.D., ATTENDING RADIOLOGIST  This document has been electronically signed. May 25 2018 12:16AM                  < end of copied text >      Assessment and Plan:     67F with newly diagnosed metastatic colon cancer.  - CT A/P with evidence of nearly obstructing ascending colon lesion with liver and peritoneal mets and evidence of acute cholecystitis as well as SMV thrombosis.   - RUQ US correlates with CT findings of acute cholecystitis, however, extent of inflammation and patients symptoms is also likely attributed to carcinomatosis.   - Patient is a poor surgical candidate but with evidence of GB inflammation/infection would likely benefit from IR percutaneous cholecystostomy drainage.   - Anticoagulation for SMV thrombosis.   - Continue IV Zosyn  - Discussed with Chief and Dr. Narvaez

## 2018-05-25 NOTE — H&P ADULT - NSHPPHYSICALEXAM_GEN_ALL_CORE
.  VITAL SIGNS:  T(F): 100 (05-25-18 @ 00:34), Max: 100.3 (05-24-18 @ 16:09)  HR: 97 (05-25-18 @ 00:34) (96 - 111)  BP: 116/73 (05-25-18 @ 00:34) (116/59 - 134/76)  BP(mean): --  RR: 16 (05-25-18 @ 00:34) (16 - 18)  SpO2: 96% (05-25-18 @ 00:34) (96% - 97%)    PHYSICAL EXAM:    Constitutional: WDWN resting comfortably in bed; NAD  HEENT: NC/AT, PERRL, EOMI, anicteric sclera, no nasal discharge; uvula midline, no oropharyngeal erythema or exudates; MMM  Neck: supple; no JVD or thyromegaly  Respiratory: CTA B/L; no W/R/R, no retractions  Cardiac: +S1/S2; RRR; no M/R/G; PMI non-displaced  Gastrointestinal: soft, TTP in RUQ; hypoactive bowel sounds  Back: spine midline, no bony tenderness or step-offs; no CVAT B/L  Extremities: WWP, no clubbing or cyanosis; no peripheral edema  Musculoskeletal: NROM x4; no joint swelling, tenderness or erythema  Vascular: 2+ radial, femoral, DP/PT pulses B/L  Dermatologic: skin warm, dry and intact; no rashes, wounds, or scars  Lymphatic: no submandibular or cervical LAD  Neurologic: AAOx3; CNII-XII grossly intact; no focal deficits  Psychiatric: affect and characteristics of appearance, verbalizations, behaviors are appropriate, denies SI/HI/AH/VH

## 2018-05-25 NOTE — CONSULT NOTE ADULT - SUBJECTIVE AND OBJECTIVE BOX
ICU CONSULT NOTE    HPI: 67F PMHx metastatic colon cancer diagnosed 2 weeks ago presented with acute cholecystitis. Deemed not a surgical candidate by the surgical service, so admitted to the medicine service and de-escalated from Zosyn to ceftriaxone and Flagyl. Patient went for percutaneous cholecystostomy today, and upon arrival back to the floor was tachypneic and tachycardic. She was placed on BiPap, given Duonebs x2, and 20mg IV Lasix. Patient's respiratory status improved, however she then developed a fever to 103.5 with tachycardia to the 110s. ICU consulted for possible need for a higher level of care.     ROS: +exhaustion, RUQ pain that is much improved from admission. Denies any confusion, lightheadedness, cough, chest pressure, nausea, vomiting since returning from IR.     PMH:     PSH:    SOCIAL HISTORY:    ALLERGIES:    HOME MEDICATIONS:    VITAL SIGNS:  T(C): 39.7 (05-25-18 @ 18:19), Max: 39.7 (05-25-18 @ 18:19)  T(F): 103.5 (05-25-18 @ 18:19), Max: 103.5 (05-25-18 @ 18:19)  HR: 109 (05-25-18 @ 18:19) (83 - 109)  BP: 146/81 (05-25-18 @ 18:19) (114/76 - 181/100)  BP(mean): --  RR: 24 (05-25-18 @ 18:19) (16 - 30)  SpO2: 100% (05-25-18 @ 18:19) (90% - 100%)  Wt(kg): --    PHYSICAL EXAM:    Constitutional: WDWN resting comfortably in bed; NAD  Head: NC/AT  Eyes: PERRL, EOMI, clear conjunctiva  ENT: no nasal discharge; uvula midline, no oropharyngeal erythema or exudates; MMM  Neck: supple; no JVD or thyromegaly  Respiratory: CTA B/L; no W/R/R, no retractions  Cardiac: +S1/S2; RRR; no M/R/G; PMI non-displaced  Gastrointestinal: soft, NT/ND; no rebound or guarding; +BSx4  Genitourinary: normal external genitalia  Back: spine midline, no bony tenderness or step-offs; no CVAT B/L  Extremities: WWP, no clubbing or cyanosis; no peripheral edema  Musculoskeletal: NROM x4; no joint swelling, tenderness or erythema  Vascular: 2+ radial, femoral, DP/PT pulses B/L  Dermatologic: skin warm, dry and intact; no rashes, wounds, or scars  Lymphatic: no submandibular or cervical LAD  Neurologic: AAOx3; CNII-XII grossly intact; no focal deficits  Psychiatric: affect and characteristics of appearance, verbalizations, behaviors are appropriate    .  LABS:                         8.5    8.6   )-----------( 360      ( 25 May 2018 17:56 )             27.6     05-25    132<L>  |  97  |  13  ----------------------------<  143<H>  3.0<L>   |  22  |  0.50    Ca    8.0<L>      25 May 2018 06:23  Mg     1.9     05-25    TPro  6.1  /  Alb  3.1<L>  /  TBili  2.5<H>  /  DBili  x   /  AST  43<H>  /  ALT  70<H>  /  AlkPhos  105  05-25    PT/INR - ( 24 May 2018 17:24 )   PT: 17.8 sec;   INR: 1.59          PTT - ( 25 May 2018 06:23 )  PTT:130.5 sec              RADIOLOGY, EKG & ADDITIONAL TESTS: Reviewed. ICU CONSULT NOTE    HPI: 67F PMHx metastatic colon cancer diagnosed 2 weeks ago presented with acute cholecystitis. Deemed not a surgical candidate by the surgical service, so admitted to the medicine service and de-escalated from Zosyn to ceftriaxone and Flagyl. Patient went for percutaneous cholecystostomy today, and upon arrival back to the floor was tachypneic and tachycardic. She was placed on BiPap, given Duonebs x2, and 20mg IV Lasix. Patient's respiratory status improved, however she then developed a fever to 103.5 with tachycardia to the 110s. ICU consulted for possible need for a higher level of care.     ROS: +exhaustion, RUQ pain that is much improved from admission. Denies any confusion, lightheadedness, cough, chest pressure, nausea, vomiting since returning from IR.     PMH: metastatic colon cancer diagnosed 2 weeks ago    PSH: None    SOCIAL HISTORY: Denies alcohol, illicit drugs, smoking    ALLERGIES: NKDA    HOME MEDICATIONS: None    VITAL SIGNS:  T(C): 39.7 (05-25-18 @ 18:19), Max: 39.7 (05-25-18 @ 18:19)  T(F): 103.5 (05-25-18 @ 18:19), Max: 103.5 (05-25-18 @ 18:19)  HR: 109 (05-25-18 @ 18:19) (83 - 109)  BP: 146/81 (05-25-18 @ 18:19) (114/76 - 181/100)  BP(mean): --  RR: 24 (05-25-18 @ 18:19) (16 - 30)  SpO2: 100% (05-25-18 @ 18:19) (90% - 100%)  Wt(kg): --    PHYSICAL EXAM:  Constitutional: WDWN resting comfortably in bed; NAD  Head: NC/AT  Eyes: PERRL, clear conjunctiva  ENT: no nasal discharge; uvula midline, no oropharyngeal erythema or exudates; dry mucus membranes   Neck: supple; no JVD or thyromegaly  Respiratory: Decreased breath sounds b/l, no crackles or wheezes  Cardiac: +S1/S2; Regular rhythm, tachycardic; no M/R/G; PMI non-displaced  Gastrointestinal: soft, +BS, mildly distended, tender to palpation in the RUQ and epigastric region, cholecystostomy tube in place with serosanguinous drainage   Back: spine midline, no bony tenderness or step-offs; no CVAT B/L  Extremities: WWP, no clubbing or cyanosis; no peripheral edema  Musculoskeletal: NROM x4; no joint swelling, tenderness or erythema  Lymphatic: no submandibular or cervical LAD  Neurologic: AAOx3; CNII-XII grossly intact; no focal deficits  Psychiatric: affect and characteristics of appearance, verbalizations, behaviors are appropriate    LABS:                         8.5    8.6   )-----------( 360      ( 25 May 2018 17:56 )             27.6     05-25    132<L>  |  97  |  13  ----------------------------<  143<H>  3.0<L>   |  22  |  0.50    Ca    8.0<L>      25 May 2018 06:23  Mg     1.9     05-25    TPro  6.1  /  Alb  3.1<L>  /  TBili  2.5<H>  /  DBili  x   /  AST  43<H>  /  ALT  70<H>  /  AlkPhos  105  05-25    PT/INR - ( 24 May 2018 17:24 )   PT: 17.8 sec;   INR: 1.59          PTT - ( 25 May 2018 06:23 )  PTT:130.5 sec      RADIOLOGY, EKG & ADDITIONAL TESTS:   EKG: Sinus tachycardia  CXR: mild left lower lobe haziness

## 2018-05-25 NOTE — PROVIDER CONTACT NOTE (CHANGE IN STATUS NOTIFICATION) - SITUATION
Patient returned from IR after laparoscopic procedure cholecystectomy, VS temp. 98.6 F, , RR 29, oxyg.  90% on 2L NC, visible tachypneic and wheezing.

## 2018-05-25 NOTE — PROGRESS NOTE ADULT - SUBJECTIVE AND OBJECTIVE BOX
Hem/Onc    PILLO PEDRAZA  MRN-[id number]    HPI: 67 y.o woman with newly diagnosed metastatic colon cancer- we were completing outpatient work up and were planning systemic treatment- the patient received IV iron last week, she had staging PET/CT this week and was scheduled for Infusaport placement. She presented to our office on 5/24 with fever/vomiting and abdominal pain> She was sent to the ER with suspicion for an obstruction (known large R sided/obstructing lesion).  Work up at [facility] with CT scan showed findings c/w acute cholecystitis and SMV and portal vein thrombosis. Acute cholecystitis confirmed by US.     ROS:  + nausea/vomiting  + fevers and chills  No night sweats.   + fatigue, no adaches/dizziness.    + abdominal pain/no diarrhea  No melena or hematochezia.    No dysuria/hematuria.  No history of easy bruising/bleeding.     No leg pain or leg swelling.    ROS is otherwise negative.    PMH/PSH:  PAST MEDICAL & SURGICAL HISTORY:  Colon cancer      Medications:  MEDICATIONS  (STANDING):  heparin  Infusion.  Unit(s)/Hr (16 mL/Hr) IV Continuous <Continuous>  piperacillin/tazobactam IVPB. 3.375 Gram(s) IV Intermittent every 6 hours  sodium chloride 0.9%. 1000 milliLiter(s) (100 mL/Hr) IV Continuous <Continuous>    MEDICATIONS  (PRN):  heparin  Injectable 7000 Unit(s) IV Push every 6 hours PRN For aPTT less than 40  heparin  Injectable 3500 Unit(s) IV Push every 6 hours PRN For aPTT between 40 - 57    heparin  Infusion.  Unit(s)/Hr IV Continuous <Continuous>  heparin  Injectable 7000 Unit(s) IV Push every 6 hours PRN  heparin  Injectable 3500 Unit(s) IV Push every 6 hours PRN          Allergies    allergic to cats (Rash)  No Known Drug Allergies    Intolerances        Exam:  Vital Signs Last 24 Hrs  T(C): 37.2 (05-25-18 @ 05:02), Max: 37.9 (05-24-18 @ 16:09)  T(F): 99 (05-25-18 @ 05:02), Max: 100.3 (05-24-18 @ 16:09)  HR: 100 (05-25-18 @ 05:02) (92 - 111)  BP: 151/90 (05-25-18 @ 05:02) (116/59 - 151/90)  BP(mean): --  RR: 16 (05-25-18 @ 05:02) (16 - 18)  SpO2: 98% (05-25-18 @ 05:02) (96% - 98%)  HEENT: pale mucosae, anicteric sclerae  No palpable peripheral lymphadenopathy  COR: regular rhythm rate, no murmurs, rubs or gallops  PULMO: clear to auscultation B/L  ABD: soft, distended, + RUQ pain to palpation    EXT: no edema  NEURO: intact  SKIN: no lesions on visible skin    Labs:  CBC Full  -  ( 24 May 2018 17:22 )  WBC Count : 15.0 K/uL  Hemoglobin : 8.3 g/dL  Hematocrit : 27.1 %  Platelet Count - Automated : 407 K/uL  Mean Cell Volume : 69.3 fL  Mean Cell Hemoglobin : 21.2 pg  Mean Cell Hemoglobin Concentration : 30.6 g/dL  Auto Neutrophil # : x  Auto Lymphocyte # : x  Auto Monocyte # : x  Auto Eosinophil # : x  Auto Basophil # : x  Auto Neutrophil % : 85.0 %  Auto Lymphocyte % : 3.0 %  Auto Monocyte % : 7.0 %  Auto Eosinophil % : x  Auto Basophil % : x    PT/INR - ( 24 May 2018 17:24 )   PT: 17.8 sec;   INR: 1.59          PTT - ( 24 May 2018 17:24 )  PTT:29.9 sec    05-24    136  |  96  |  19  ----------------------------<  152<H>  3.4<L>   |  25  |  0.77    Ca    9.4      24 May 2018 17:22    TPro  7.2  /  Alb  3.6  /  TBili  3.4<H>  /  DBili  x   /  AST  92<H>  /  ALT  110<H>  /  AlkPhos  141<H>  05-24      Pertinent imaging studies: reviewed    Assessment:    Metastatic colon cancer  Severe iron deficiency anemia  Acute cholecystitis  SMV/portal vein thrombosis    Plan:    Findings c/w acute cholecystitis  On IV abx, will d/w Dr. Narvaez  SMV/Portal vein thrombosis- on IV Heparin, to eventually be transitioned to oral anticoagulant  Newly diagnosed colon cancer-we are still awaiting molecular studies  Systemic treatment to follow when she recovers from this acute episode  She will need Infusaport placed- this will be done as outpatient  Severe YESSICA-she received parenteral iron as outpatient  PRBC as clinically indicated      Thank you    Estefania Lundy MD  [phone]

## 2018-05-25 NOTE — CONSULT NOTE ADULT - PROBLEM SELECTOR RECOMMENDATION 9
Possible inflammatory response v bacteremia 2/2 gallbladder instrumentation.   - Cautious fluid repletion, would start with 500cc NS  - Obtain blood cultures, lactate  - Continue with ceftriaxone and Flagyl

## 2018-05-25 NOTE — H&P ADULT - ATTENDING COMMENTS
Pt seen and examined at bedside on 5/25/2018 @ 5 am    Agree with HPI, ROS as above.     VS, Labs, FH, SH, allergies, medications, imaging reviewed. Agree with physical exam as above     A/P: 67 yr old male with a PMHx of recently diagnosed colon cancer presenting from oncologist's office with acute onset abdominal pain and fever, found to have acute cholecystitis and hepatic vein thrombosis, deemed poor surgical candidate 2/2 active metastic colon Ca, admitted to [facility] for medical management.     **Sepsis  -Pt meeting 3/4 SIRS criteria on admission with cholecystitis on CT a/p as well as RUQ U/S  -Surgery consulted in ED - no acute surgical intervention at this time - think patient is a poor surgical candidate regardless and will need IR percutaneous drainage  -Will c/w Zosyn at this time  -F/u cultures  -LA resolved s/p 30 cc/kg of IVF resuscitation    **Thrombosis  -Likely in the setting malignancy  -C/w heparin gtt    Plan otherwise as outlined above.....

## 2018-05-25 NOTE — H&P ADULT - PROBLEM SELECTOR PLAN 5
-regular diet - stage 4 metastatic colon cancer with liver mets and peritoneal carcinomatosis  - per patient, plan per Dr Landry is to put chemoport in [place] - stage 4 metastatic colon cancer with liver mets and peritoneal carcinomatosis  - per patient, plan per Dr Landry is to put chemoport in June  -if oncology has not spoken with patient regarding prognosis, would recommend palliative care consult   -f/u heme onc recs

## 2018-05-25 NOTE — H&P ADULT - HISTORY OF PRESENT ILLNESS
68 y/o female with a PMHx of recently dx metastatic colon cancer (dx 2 weeks ago) that presents from Dr. Lundy's office with acute onset RUQ abdominal pain, fever and weakness for the past 2 days. Patient has known liver mets from her colon cancer 68 y/o female with a PMHx of recently dx metastatic ascending colon cancer (dx 2 weeks ago w/ mets to the liver and peritoneal carcinomatosis) that presents from Dr. Lundy's office with acute onset RUQ abdominal pain, fever and weakness for the past 3 days. Pain has worsened over the past 3 days and last evening was associated with one episode of vomiting (nonbloody, questionable bilious content).  Last BM was 2 days prior to presentation. She denies any associated chills, chest pain/discomfort/pressure, SOB/dyspnea, dysuria, diarrhea, hematochezia. Had CT A/P in ED which was s/f acute cholecystitis and hepatic vein thrombosis. 66 y/o female with a PMHx of recently dx metastatic ascending colon cancer (dx 2 weeks ago w/ mets to the liver and peritoneal carcinomatosis) that presents from Dr. Lundy's office with acute onset RUQ abdominal pain, fever and weakness for the past 3 days. Pain has worsened over the past 3 days and last evening was associated with one episode of vomiting (nonbloody, questionable bilious content).  Last BM was 2 days prior to presentation. She denies any associated chills, chest pain/discomfort/pressure, SOB/dyspnea, dysuria, diarrhea, hematochezia. Had CT A/P in ED which was s/f acute cholecystitis and hepatic vein thrombosis.  ED Course  Vital Signs   TMax: 100.3 HR: 97 BP: 116/73 RR: 16 SpO2: 96%   CT a/p findings as above   s/p zosyn 3.375 q6  s/p initiation of heparin gtt 66 y/o female with a PMHx of recently dx metastatic ascending colon cancer (dx 2 weeks ago w/ mets to the liver and peritoneal carcinomatosis) that presents from Dr. Lundy's office with acute onset RUQ abdominal pain, fever and weakness for the past 3 days. Pain has worsened over the past 3 days and last evening was associated with one episode of vomiting (nonbloody, questionable bilious content).  Last BM was 2 days prior to presentation. She denies any associated chills, chest pain/discomfort/pressure, SOB/dyspnea, dysuria, diarrhea, hematochezia. Had CT A/P in ED which was s/f acute cholecystitis and hepatic vein thrombosis.    Cancer Hx: pt had a normal colonoscopy 2 years ago-had a polypectomy with benign pathology per pt. States she started getting non-specific abdominal pain starting last summer which she self-medicated with rolaids. States she ad 4 total episodes of abdominal pain over the summer and eventually went to see GI who did colonoscopy 2 weeks ago and diagnosed her with sub-total obstructing ascending colon cancer; had PET scan on Tuesday which showed liver mets and peritoneal carcinomatosis; plan for chemoport placement in June; patient also endorsing intermittent fevers, chills, and unintentional 25 lbs weight loss since Janurary. Denies any family hx of colon cancer.  ED Course  Vital Signs   TMax: 100.3 HR: 97 BP: 116/73 RR: 16 SpO2: 96%   CT a/p findings as above   s/p zosyn 3.375 q6  s/p initiation of heparin gtt for hepatic vein thrombosis

## 2018-05-25 NOTE — H&P ADULT - PROBLEM SELECTOR PLAN 2
-CTA A/P s/f hepatic vein thrombosis  -most likely 2/2 hypercoagulable state in the setting of active untreated malignancy  -initiated on heparin gtt at 16/hr  -f/u 5:30 am PTT -patient with tachycardia, fever, leukocytosis with gall bladder as suspected source  -lactate 2.2 on admission, now 1.6 s/o IVF  -s/p 30 ccs/kg IVF   -empiric tx for cholecystitis as outlined above

## 2018-05-25 NOTE — H&P ADULT - ASSESSMENT
67 yr old male with a PMHx of recently diagnosed colon cancer presenting from oncologist's office with acute onset abdominal pain and fever, found to have acute cholecystitis and hepatic vein thrombosis, deemed poor surgical candidate 2/2 active metastic colon Ca, admitted to [facility] for medical management.

## 2018-05-25 NOTE — H&P ADULT - NSHPLABSRESULTS_GEN_ALL_CORE
.  LABS:                         8.3    15.0  )-----------( 407      ( 24 May 2018 17:22 )             27.1     05-24    136  |  96  |  19  ----------------------------<  152<H>  3.4<L>   |  25  |  0.77    Ca    9.4      24 May 2018 17:22    TPro  7.2  /  Alb  3.6  /  TBili  3.4<H>  /  DBili  x   /  AST  92<H>  /  ALT  110<H>  /  AlkPhos  141<H>  05-24    PT/INR - ( 24 May 2018 17:24 )   PT: 17.8 sec;   INR: 1.59          PTT - ( 24 May 2018 17:24 )  PTT:29.9 sec          Lactate, Blood: 1.6 mmoL/L (05-25 @ 01:20)  Lactate, Blood: 2.2 mmoL/L (05-24 @ 17:22)      RADIOLOGY, EKG & ADDITIONAL TESTS:     CT Abdomen and Pelvis w/ Oral Cont and w/ IV Cont   IMPRESSION:  1.  Findings consistent with acute cholecystitis.  2.  Several mildly dilated loops of small bowel in the left midabdomen   without defined transition point. Early partial small bowel obstruction   cannot be excluded.  3.  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  4.  Hepatic metastatic disease and cyst.  5.  Peritoneal carcinomatosis.  6.  Scattered areas of colonic wall thickening which may be related to   underdistention versus colitis.  7.  Small ascites.

## 2018-05-25 NOTE — CONSULT NOTE ADULT - ATTENDING COMMENTS
[facility] ATTENDING    Patient seen and discussed with House Officer/Resident  Chart and history reviewed  Exam, labs, and radiology as noted above   Assessment and Plans as outlined  Patient currently on noninvasive mechanical ventilator support  Breathing is non-labored without increased work of breathing --- no intercostal accessory muscle use and no paradoxical abdominal motion evident   Currently patient's mentation is appropriate   Protecting airway and no evidence of significant increased respiratory secretions in oropharynx   Ventilating and oxygenating adequately without increased work of breathing   Hemodynamically stable---clinically perfusing adequately  Aim to maintain MAP >= 65 mmHg, U/O >= 0.5 ml/kg/hr, pulse oximetry OxSat >= 92%   Metabolic demands along with any abnormal blood chemistries, and fluid requirements being addressed    Sedation and/or pain meds as needed to reduce metabolic demand and maintain comfort   GI/DVT prophylaxis

## 2018-05-26 PROBLEM — C18.9 MALIGNANT NEOPLASM OF COLON, UNSPECIFIED: Chronic | Status: ACTIVE | Noted: 2018-05-24

## 2018-05-26 LAB
ALBUMIN SERPL ELPH-MCNC: 3.1 G/DL — LOW (ref 3.3–5)
ALP SERPL-CCNC: 102 U/L — SIGNIFICANT CHANGE UP (ref 40–120)
ALT FLD-CCNC: 50 U/L — HIGH (ref 10–45)
ANION GAP SERPL CALC-SCNC: 11 MMOL/L — SIGNIFICANT CHANGE UP (ref 5–17)
AST SERPL-CCNC: 22 U/L — SIGNIFICANT CHANGE UP (ref 10–40)
BILIRUB SERPL-MCNC: 1.5 MG/DL — HIGH (ref 0.2–1.2)
BUN SERPL-MCNC: 14 MG/DL — SIGNIFICANT CHANGE UP (ref 7–23)
CALCIUM SERPL-MCNC: 8.5 MG/DL — SIGNIFICANT CHANGE UP (ref 8.4–10.5)
CHLORIDE SERPL-SCNC: 98 MMOL/L — SIGNIFICANT CHANGE UP (ref 96–108)
CO2 SERPL-SCNC: 25 MMOL/L — SIGNIFICANT CHANGE UP (ref 22–31)
CREAT SERPL-MCNC: 0.44 MG/DL — LOW (ref 0.5–1.3)
CULTURE RESULTS: NO GROWTH — SIGNIFICANT CHANGE UP
GLUCOSE SERPL-MCNC: 119 MG/DL — HIGH (ref 70–99)
GRAM STN FLD: SIGNIFICANT CHANGE UP
HCT VFR BLD CALC: 26.1 % — LOW (ref 34.5–45)
HGB BLD-MCNC: 8.1 G/DL — LOW (ref 11.5–15.5)
MAGNESIUM SERPL-MCNC: 2.2 MG/DL — SIGNIFICANT CHANGE UP (ref 1.6–2.6)
MCHC RBC-ENTMCNC: 22 PG — LOW (ref 27–34)
MCHC RBC-ENTMCNC: 31 G/DL — LOW (ref 32–36)
MCV RBC AUTO: 70.9 FL — LOW (ref 80–100)
PHOSPHATE SERPL-MCNC: 1.4 MG/DL — LOW (ref 2.5–4.5)
PLATELET # BLD AUTO: 311 K/UL — SIGNIFICANT CHANGE UP (ref 150–400)
POTASSIUM SERPL-MCNC: 3.8 MMOL/L — SIGNIFICANT CHANGE UP (ref 3.5–5.3)
POTASSIUM SERPL-SCNC: 3.8 MMOL/L — SIGNIFICANT CHANGE UP (ref 3.5–5.3)
PROT SERPL-MCNC: 6.4 G/DL — SIGNIFICANT CHANGE UP (ref 6–8.3)
RBC # BLD: 3.68 M/UL — LOW (ref 3.8–5.2)
RBC # FLD: 26.1 % — HIGH (ref 10.3–16.9)
SODIUM SERPL-SCNC: 134 MMOL/L — LOW (ref 135–145)
SPECIMEN SOURCE: SIGNIFICANT CHANGE UP
SPECIMEN SOURCE: SIGNIFICANT CHANGE UP
WBC # BLD: 7.2 K/UL — SIGNIFICANT CHANGE UP (ref 3.8–10.5)
WBC # FLD AUTO: 7.2 K/UL — SIGNIFICANT CHANGE UP (ref 3.8–10.5)

## 2018-05-26 PROCEDURE — 71045 X-RAY EXAM CHEST 1 VIEW: CPT | Mod: 26

## 2018-05-26 PROCEDURE — 99232 SBSQ HOSP IP/OBS MODERATE 35: CPT

## 2018-05-26 RX ORDER — POTASSIUM PHOSPHATE, MONOBASIC POTASSIUM PHOSPHATE, DIBASIC 236; 224 MG/ML; MG/ML
30 INJECTION, SOLUTION INTRAVENOUS ONCE
Qty: 0 | Refills: 0 | Status: COMPLETED | OUTPATIENT
Start: 2018-05-26 | End: 2018-05-26

## 2018-05-26 RX ORDER — PIPERACILLIN AND TAZOBACTAM 4; .5 G/20ML; G/20ML
3.38 INJECTION, POWDER, LYOPHILIZED, FOR SOLUTION INTRAVENOUS EVERY 6 HOURS
Qty: 0 | Refills: 0 | Status: DISCONTINUED | OUTPATIENT
Start: 2018-05-26 | End: 2018-05-26

## 2018-05-26 RX ORDER — PIPERACILLIN AND TAZOBACTAM 4; .5 G/20ML; G/20ML
3.38 INJECTION, POWDER, LYOPHILIZED, FOR SOLUTION INTRAVENOUS EVERY 6 HOURS
Qty: 0 | Refills: 0 | Status: DISCONTINUED | OUTPATIENT
Start: 2018-05-26 | End: 2018-05-28

## 2018-05-26 RX ORDER — ACETAMINOPHEN 500 MG
650 TABLET ORAL EVERY 6 HOURS
Qty: 0 | Refills: 0 | Status: DISCONTINUED | OUTPATIENT
Start: 2018-05-26 | End: 2018-06-03

## 2018-05-26 RX ADMIN — Medication 3 MILLILITER(S): at 06:49

## 2018-05-26 RX ADMIN — Medication 3 MILLILITER(S): at 21:33

## 2018-05-26 RX ADMIN — Medication 3 MILLILITER(S): at 17:57

## 2018-05-26 RX ADMIN — Medication 3 MILLILITER(S): at 10:43

## 2018-05-26 RX ADMIN — PIPERACILLIN AND TAZOBACTAM 200 GRAM(S): 4; .5 INJECTION, POWDER, LYOPHILIZED, FOR SOLUTION INTRAVENOUS at 23:30

## 2018-05-26 RX ADMIN — PIPERACILLIN AND TAZOBACTAM 200 GRAM(S): 4; .5 INJECTION, POWDER, LYOPHILIZED, FOR SOLUTION INTRAVENOUS at 11:14

## 2018-05-26 RX ADMIN — HYDROMORPHONE HYDROCHLORIDE 1 MILLIGRAM(S): 2 INJECTION INTRAMUSCULAR; INTRAVENOUS; SUBCUTANEOUS at 16:45

## 2018-05-26 RX ADMIN — HYDROMORPHONE HYDROCHLORIDE 0.5 MILLIGRAM(S): 2 INJECTION INTRAMUSCULAR; INTRAVENOUS; SUBCUTANEOUS at 04:01

## 2018-05-26 RX ADMIN — CEFTRIAXONE 1000 MILLIGRAM(S): 500 INJECTION, POWDER, FOR SOLUTION INTRAMUSCULAR; INTRAVENOUS at 03:26

## 2018-05-26 RX ADMIN — POLYETHYLENE GLYCOL 3350 17 GRAM(S): 17 POWDER, FOR SOLUTION ORAL at 10:44

## 2018-05-26 RX ADMIN — ENOXAPARIN SODIUM 90 MILLIGRAM(S): 100 INJECTION SUBCUTANEOUS at 06:49

## 2018-05-26 RX ADMIN — SENNA PLUS 2 TABLET(S): 8.6 TABLET ORAL at 10:44

## 2018-05-26 RX ADMIN — HYDROMORPHONE HYDROCHLORIDE 0.5 MILLIGRAM(S): 2 INJECTION INTRAMUSCULAR; INTRAVENOUS; SUBCUTANEOUS at 03:44

## 2018-05-26 RX ADMIN — HYDROMORPHONE HYDROCHLORIDE 1 MILLIGRAM(S): 2 INJECTION INTRAMUSCULAR; INTRAVENOUS; SUBCUTANEOUS at 11:23

## 2018-05-26 RX ADMIN — POTASSIUM PHOSPHATE, MONOBASIC POTASSIUM PHOSPHATE, DIBASIC 85 MILLIMOLE(S): 236; 224 INJECTION, SOLUTION INTRAVENOUS at 11:14

## 2018-05-26 RX ADMIN — Medication 100 MILLIGRAM(S): at 06:50

## 2018-05-26 RX ADMIN — HYDROMORPHONE HYDROCHLORIDE 1 MILLIGRAM(S): 2 INJECTION INTRAMUSCULAR; INTRAVENOUS; SUBCUTANEOUS at 10:43

## 2018-05-26 RX ADMIN — HYDROMORPHONE HYDROCHLORIDE 1 MILLIGRAM(S): 2 INJECTION INTRAMUSCULAR; INTRAVENOUS; SUBCUTANEOUS at 17:02

## 2018-05-26 RX ADMIN — Medication 650 MILLIGRAM(S): at 11:03

## 2018-05-26 RX ADMIN — Medication 100 MILLIEQUIVALENT(S): at 00:28

## 2018-05-26 RX ADMIN — ENOXAPARIN SODIUM 90 MILLIGRAM(S): 100 INJECTION SUBCUTANEOUS at 17:56

## 2018-05-26 RX ADMIN — PIPERACILLIN AND TAZOBACTAM 200 GRAM(S): 4; .5 INJECTION, POWDER, LYOPHILIZED, FOR SOLUTION INTRAVENOUS at 17:56

## 2018-05-26 RX ADMIN — HYDROMORPHONE HYDROCHLORIDE 1 MILLIGRAM(S): 2 INJECTION INTRAMUSCULAR; INTRAVENOUS; SUBCUTANEOUS at 22:45

## 2018-05-26 RX ADMIN — HYDROMORPHONE HYDROCHLORIDE 1 MILLIGRAM(S): 2 INJECTION INTRAMUSCULAR; INTRAVENOUS; SUBCUTANEOUS at 22:18

## 2018-05-26 NOTE — PROGRESS NOTE ADULT - SUBJECTIVE AND OBJECTIVE BOX
SUBJECTIVE: Patient seen and examined bedside, last BM on wednesday, +flatus, no nausea/vomiting, tolerating diet    enoxaparin Injectable 90 milliGRAM(s) SubCutaneous every 12 hours  piperacillin/tazobactam IVPB. 3.375 Gram(s) IV Intermittent every 6 hours      Vital Signs Last 24 Hrs  T(C): 38.5 (26 May 2018 10:50), Max: 39.7 (25 May 2018 18:19)  T(F): 101.3 (26 May 2018 10:50), Max: 103.5 (25 May 2018 18:19)  HR: 93 (26 May 2018 10:50) (81 - 109)  BP: 152/98 (26 May 2018 10:50) (102/61 - 181/100)  BP(mean): --  RR: 22 (26 May 2018 10:50) (18 - 30)  SpO2: 98% (26 May 2018 10:50) (90% - 100%)  I&O's Detail    25 May 2018 07:01  -  26 May 2018 07:00  --------------------------------------------------------  IN:    Packed Red Blood Cells: 400 mL    sodium chloride 0.9%: 900 mL    Solution: 300 mL    Solution: 200 mL  Total IN: 1800 mL    OUT:    Drain: 125 mL    Voided: 1150 mL  Total OUT: 1275 mL    Total NET: 525 mL          General: NAD, resting comfortably in bed  C/V: NSR  Pulm: Nonlabored breathing, no respiratory distress  Abd: soft, distended, IR drain in place - bilious, bowel sound hypoactive, non tender  Extrem: WWP, no edema, SCDs in place        LABS:                        8.1    7.2   )-----------( 311      ( 26 May 2018 07:30 )             26.1     05-26    134<L>  |  98  |  14  ----------------------------<  119<H>  3.8   |  25  |  0.44<L>    Ca    8.5      26 May 2018 07:30  Phos  1.4     05-26  Mg     2.2     05-26    TPro  6.4  /  Alb  3.1<L>  /  TBili  1.5<H>  /  DBili  x   /  AST  22  /  ALT  50<H>  /  AlkPhos  102  05-26    PT/INR - ( 24 May 2018 17:24 )   PT: 17.8 sec;   INR: 1.59          PTT - ( 25 May 2018 06:23 )  PTT:130.5 sec      RADIOLOGY & ADDITIONAL STUDIES:

## 2018-05-26 NOTE — PROGRESS NOTE ADULT - ASSESSMENT
67F with newly diagnosed metastatic colon cancer, with SMV thrombosis on AV  s/p IR perc choly for cholecystitis. Clinically not obstructed.    Plan :  cont CLD  OOB with assistance   serial abdominal; exam   plan as per medical team

## 2018-05-26 NOTE — PROGRESS NOTE ADULT - SUBJECTIVE AND OBJECTIVE BOX
Patient is a 67y old  Female who presents with a chief complaint of abdominal pain, found to have acute cholecystitis and portal vein thrombosis  s/p IR percutaneous cholesysotomy yesterday with drain  in place   draining serous fluid    INTERVAL HPI/OVERNIGHT EVENTS:    Review of Systems: 12 point review of systems otherwise negative  has constipation, abdominal surgical site pain, has good appetite, no dizziness/lightheaded, no chest pain, slight SOB with wheezing (placed on NC), no dysuria, no joint pain, feels generally well    MEDICATIONS  (STANDING):  ALBUTerol/ipratropium for Nebulization 3 milliLiter(s) Nebulizer every 4 hours  enoxaparin Injectable 90 milliGRAM(s) SubCutaneous every 12 hours  piperacillin/tazobactam IVPB. 3.375 Gram(s) IV Intermittent every 6 hours    MEDICATIONS  (PRN):  acetaminophen   Tablet 650 milliGRAM(s) Oral every 6 hours PRN For Temp greater than 38 C (100.4 F)  acetaminophen  Suppository 650 milliGRAM(s) Rectal every 6 hours PRN For Temp greater than 38 C (100.4 F)  HYDROmorphone  Injectable 1 milliGRAM(s) IV Push every 4 hours PRN Severe Pain (7 - 10)  HYDROmorphone  Injectable 0.5 milliGRAM(s) IV Push every 4 hours PRN Moderate Pain (4 - 6)  polyethylene glycol 3350 17 Gram(s) Oral daily PRN Constipation  senna 2 Tablet(s) Oral at bedtime PRN Constipation      Allergies    allergic to cats (Rash)  No Known Drug Allergies    Intolerances          Vital Signs Last 24 Hrs  T(C): 37.1 (26 May 2018 12:00), Max: 39.7 (25 May 2018 18:19)  T(F): 98.7 (26 May 2018 12:00), Max: 103.5 (25 May 2018 18:19)  HR: 86 (26 May 2018 12:00) (81 - 109)  BP: 120/79 (26 May 2018 12:00) (102/61 - 181/100)  BP(mean): --  RR: 20 (26 May 2018 12:00) (18 - 30)  SpO2: 99% (26 May 2018 12:00) (90% - 100%)  CAPILLARY BLOOD GLUCOSE          05-25 @ 07:01  -  05-26 @ 07:00  --------------------------------------------------------  IN: 1800 mL / OUT: 1275 mL / NET: 525 mL        Physical Exam:      Daily   General:  Well appearing, NAD, obese  HEENT:  Nonicteric, PERRLA  CV:  RRR, no murmur, no JVD  Lungs:  CTA B/L, no wheezes, rales, rhonchi  Abdomen:  Soft, non-tender, no distended, positive BS, no hepatosplenomegaly  Extremities:  2+ pulses, no c/c, no edema  Skin:  Warm and dry, no rashes  :  No sutton  Neuro:  AAOx3, non-focal, CN II-XII grossly intact  No Restraints    LABS:                        8.1    7.2   )-----------( 311      ( 26 May 2018 07:30 )             26.1     05-26    134<L>  |  98  |  14  ----------------------------<  119<H>  3.8   |  25  |  0.44<L>    Ca    8.5      26 May 2018 07:30  Phos  1.4     05-26  Mg     2.2     05-26    TPro  6.4  /  Alb  3.1<L>  /  TBili  1.5<H>  /  DBili  x   /  AST  22  /  ALT  50<H>  /  AlkPhos  102  05-26    PT/INR - ( 24 May 2018 17:24 )   PT: 17.8 sec;   INR: 1.59          PTT - ( 25 May 2018 06:23 )  PTT:130.5 sec        RADIOLOGY & ADDITIONAL TESTS:

## 2018-05-26 NOTE — PROGRESS NOTE ADULT - ASSESSMENT
67 yr old male with a PMHx of recently diagnosed colon cancer presenting from oncologist's office with acute onset abdominal pain and fever, found to have acute cholecystitis and hepatic vein thrombosis, now s/p IR percutaneous cholecysostomy, hemodynamically stable on abs

## 2018-05-27 DIAGNOSIS — D64.9 ANEMIA, UNSPECIFIED: ICD-10-CM

## 2018-05-27 LAB
-  AMPICILLIN: SIGNIFICANT CHANGE UP
-  VANCOMYCIN: SIGNIFICANT CHANGE UP
ANION GAP SERPL CALC-SCNC: 15 MMOL/L — SIGNIFICANT CHANGE UP (ref 5–17)
BASOPHILS NFR BLD AUTO: 0.3 % — SIGNIFICANT CHANGE UP (ref 0–2)
BUN SERPL-MCNC: 11 MG/DL — SIGNIFICANT CHANGE UP (ref 7–23)
CALCIUM SERPL-MCNC: 8.6 MG/DL — SIGNIFICANT CHANGE UP (ref 8.4–10.5)
CHLORIDE SERPL-SCNC: 98 MMOL/L — SIGNIFICANT CHANGE UP (ref 96–108)
CO2 SERPL-SCNC: 23 MMOL/L — SIGNIFICANT CHANGE UP (ref 22–31)
CREAT SERPL-MCNC: 0.45 MG/DL — LOW (ref 0.5–1.3)
EOSINOPHIL NFR BLD AUTO: 1.8 % — SIGNIFICANT CHANGE UP (ref 0–6)
GLUCOSE SERPL-MCNC: 99 MG/DL — SIGNIFICANT CHANGE UP (ref 70–99)
HCT VFR BLD CALC: 25.3 % — LOW (ref 34.5–45)
HGB BLD-MCNC: 7.9 G/DL — LOW (ref 11.5–15.5)
LYMPHOCYTES # BLD AUTO: 10.6 % — LOW (ref 13–44)
MAGNESIUM SERPL-MCNC: 2.2 MG/DL — SIGNIFICANT CHANGE UP (ref 1.6–2.6)
MCHC RBC-ENTMCNC: 22.4 PG — LOW (ref 27–34)
MCHC RBC-ENTMCNC: 31.2 G/DL — LOW (ref 32–36)
MCV RBC AUTO: 71.7 FL — LOW (ref 80–100)
METHOD TYPE: SIGNIFICANT CHANGE UP
MONOCYTES NFR BLD AUTO: 10.6 % — SIGNIFICANT CHANGE UP (ref 2–14)
NEUTROPHILS NFR BLD AUTO: 76.7 % — SIGNIFICANT CHANGE UP (ref 43–77)
PLATELET # BLD AUTO: 363 K/UL — SIGNIFICANT CHANGE UP (ref 150–400)
POTASSIUM SERPL-MCNC: 3.4 MMOL/L — LOW (ref 3.5–5.3)
POTASSIUM SERPL-SCNC: 3.4 MMOL/L — LOW (ref 3.5–5.3)
RBC # BLD: 3.53 M/UL — LOW (ref 3.8–5.2)
RBC # FLD: 26.7 % — HIGH (ref 10.3–16.9)
SODIUM SERPL-SCNC: 136 MMOL/L — SIGNIFICANT CHANGE UP (ref 135–145)
WBC # BLD: 6 K/UL — SIGNIFICANT CHANGE UP (ref 3.8–10.5)
WBC # FLD AUTO: 6 K/UL — SIGNIFICANT CHANGE UP (ref 3.8–10.5)

## 2018-05-27 PROCEDURE — 99232 SBSQ HOSP IP/OBS MODERATE 35: CPT

## 2018-05-27 RX ORDER — POTASSIUM CHLORIDE 20 MEQ
20 PACKET (EA) ORAL
Qty: 0 | Refills: 0 | Status: COMPLETED | OUTPATIENT
Start: 2018-05-27 | End: 2018-05-27

## 2018-05-27 RX ADMIN — HYDROMORPHONE HYDROCHLORIDE 1 MILLIGRAM(S): 2 INJECTION INTRAMUSCULAR; INTRAVENOUS; SUBCUTANEOUS at 21:53

## 2018-05-27 RX ADMIN — HYDROMORPHONE HYDROCHLORIDE 1 MILLIGRAM(S): 2 INJECTION INTRAMUSCULAR; INTRAVENOUS; SUBCUTANEOUS at 13:14

## 2018-05-27 RX ADMIN — Medication 3 MILLILITER(S): at 06:00

## 2018-05-27 RX ADMIN — HYDROMORPHONE HYDROCHLORIDE 1 MILLIGRAM(S): 2 INJECTION INTRAMUSCULAR; INTRAVENOUS; SUBCUTANEOUS at 06:28

## 2018-05-27 RX ADMIN — Medication 3 MILLILITER(S): at 10:08

## 2018-05-27 RX ADMIN — SENNA PLUS 2 TABLET(S): 8.6 TABLET ORAL at 10:07

## 2018-05-27 RX ADMIN — HYDROMORPHONE HYDROCHLORIDE 1 MILLIGRAM(S): 2 INJECTION INTRAMUSCULAR; INTRAVENOUS; SUBCUTANEOUS at 13:56

## 2018-05-27 RX ADMIN — Medication 20 MILLIEQUIVALENT(S): at 11:56

## 2018-05-27 RX ADMIN — PIPERACILLIN AND TAZOBACTAM 200 GRAM(S): 4; .5 INJECTION, POWDER, LYOPHILIZED, FOR SOLUTION INTRAVENOUS at 17:38

## 2018-05-27 RX ADMIN — Medication 3 MILLILITER(S): at 17:38

## 2018-05-27 RX ADMIN — HYDROMORPHONE HYDROCHLORIDE 1 MILLIGRAM(S): 2 INJECTION INTRAMUSCULAR; INTRAVENOUS; SUBCUTANEOUS at 06:07

## 2018-05-27 RX ADMIN — POLYETHYLENE GLYCOL 3350 17 GRAM(S): 17 POWDER, FOR SOLUTION ORAL at 10:08

## 2018-05-27 RX ADMIN — Medication 20 MILLIEQUIVALENT(S): at 10:08

## 2018-05-27 RX ADMIN — Medication 650 MILLIGRAM(S): at 13:24

## 2018-05-27 RX ADMIN — HYDROMORPHONE HYDROCHLORIDE 1 MILLIGRAM(S): 2 INJECTION INTRAMUSCULAR; INTRAVENOUS; SUBCUTANEOUS at 22:30

## 2018-05-27 RX ADMIN — PIPERACILLIN AND TAZOBACTAM 200 GRAM(S): 4; .5 INJECTION, POWDER, LYOPHILIZED, FOR SOLUTION INTRAVENOUS at 11:56

## 2018-05-27 RX ADMIN — Medication 20 MILLIEQUIVALENT(S): at 08:52

## 2018-05-27 RX ADMIN — ENOXAPARIN SODIUM 90 MILLIGRAM(S): 100 INJECTION SUBCUTANEOUS at 05:59

## 2018-05-27 RX ADMIN — Medication 3 MILLILITER(S): at 13:57

## 2018-05-27 RX ADMIN — HYDROMORPHONE HYDROCHLORIDE 0.5 MILLIGRAM(S): 2 INJECTION INTRAMUSCULAR; INTRAVENOUS; SUBCUTANEOUS at 18:41

## 2018-05-27 RX ADMIN — ENOXAPARIN SODIUM 90 MILLIGRAM(S): 100 INJECTION SUBCUTANEOUS at 17:38

## 2018-05-27 RX ADMIN — HYDROMORPHONE HYDROCHLORIDE 0.5 MILLIGRAM(S): 2 INJECTION INTRAMUSCULAR; INTRAVENOUS; SUBCUTANEOUS at 17:50

## 2018-05-27 RX ADMIN — Medication 3 MILLILITER(S): at 21:46

## 2018-05-27 RX ADMIN — PIPERACILLIN AND TAZOBACTAM 200 GRAM(S): 4; .5 INJECTION, POWDER, LYOPHILIZED, FOR SOLUTION INTRAVENOUS at 23:58

## 2018-05-27 RX ADMIN — PIPERACILLIN AND TAZOBACTAM 200 GRAM(S): 4; .5 INJECTION, POWDER, LYOPHILIZED, FOR SOLUTION INTRAVENOUS at 05:59

## 2018-05-27 NOTE — PROGRESS NOTE ADULT - PROBLEM SELECTOR PLAN 6
F: No IVF  E: Replete PRN  N: full liquid, advance as tolerated F: No IVF  E: Replete PRN  N: regular diet

## 2018-05-27 NOTE — PROGRESS NOTE ADULT - PROBLEM SELECTOR PLAN 4
Acute anemia most likely 2/2 active colon malignancy s/p IV iron infusions o/p  -s/p 1U PRBC after IR procedure 5/25  -transfuse hgb<7  -maintain active type and screen

## 2018-05-27 NOTE — PROGRESS NOTE ADULT - PROBLEM SELECTOR PLAN 1
- Continue drain care per IR.   - Continue antibiotics.   - Diet as tolerated.   - Continue Lovenox for SMV thrombosis.  - Discussion with oncology regarding port placement pending.   - Surgery to follow.

## 2018-05-27 NOTE — PROGRESS NOTE ADULT - PROBLEM SELECTOR PLAN 1
Patient with acute onset RUQ abdominal pain and CT A/P findings c/w acute cholecytisis; RUQ U/S showing mobile stones which is indicative of cholecystitis being reactive sequelae of colon ca?   -c/w zosyn 3.375 q6  -s/p IR percutaenous cholecystostomy placement 5/25  -morphine for pain control

## 2018-05-27 NOTE — PROGRESS NOTE ADULT - ASSESSMENT
67 yr old male with a PMHx of recently diagnosed colon cancer presenting from oncologist's office with acute onset abdominal pain and fever, found to have acute cholecystitis and hepatic vein thrombosis, deemed poor surgical candidate 2/2 active metastic colon Ca, admitted to [facility] for medical management.

## 2018-05-27 NOTE — PROGRESS NOTE ADULT - PROBLEM SELECTOR PLAN 5
Stage 4 metastatic colon cancer with liver mets and peritoneal carcinomatosis  - per patient, plan per Dr Landry is to put chemoport in June  - consider palliative care consult   - f/u heme onc recs

## 2018-05-27 NOTE — PROGRESS NOTE ADULT - SUBJECTIVE AND OBJECTIVE BOX
S: 67YOF with newly diagnosed metastatic colon cancer and acute cholecystitis now s/p percutaneous cholecystostomy (5/25) shows improvement in abdominal pain, respiratory difficulty, and fever encountered post-procedurally. Patient reports tolerance of diet.      O:     Vital Signs Last 24 Hrs  T(C): 36.7 (27 May 2018 20:42), Max: 38.4 (27 May 2018 13:22)  T(F): 98.1 (27 May 2018 20:42), Max: 101.1 (27 May 2018 13:22)  HR: 99 (27 May 2018 20:42) (83 - 105)  BP: 128/75 (27 May 2018 20:42) (127/82 - 146/84)  BP(mean): --  RR: 18 (27 May 2018 20:42) (18 - 22)  SpO2: 99% (27 May 2018 20:42) (97% - 100%)  I&O's Summary    26 May 2018 07:01  -  27 May 2018 07:00  --------------------------------------------------------  IN: 790 mL / OUT: 1800 mL / NET: -1010 mL    27 May 2018 07:01  -  27 May 2018 21:45  --------------------------------------------------------  IN: 0 mL / OUT: 1260 mL / NET: -1260 mL        Gen - NAD  Neuro - A&Ox3  CV - RRR  Resp - CTABL  GI - Soft, ND, TTP RUQ @ Drain Site, Biliary Drain (Bile)                            7.9    6.0   )-----------( 363      ( 27 May 2018 06:39 )             25.3   05-27    136  |  98  |  11  ----------------------------<  99  3.4<L>   |  23  |  0.45<L>    Ca    8.6      27 May 2018 06:39  Phos  1.4     05-26  Mg     2.2     05-27    TPro  6.4  /  Alb  3.1<L>  /  TBili  1.5<H>  /  DBili  x   /  AST  22  /  ALT  50<H>  /  AlkPhos  102  05-26  LIVER FUNCTIONS - ( 26 May 2018 07:30 )  Alb: 3.1 g/dL / Pro: 6.4 g/dL / ALK PHOS: 102 U/L / ALT: 50 U/L / AST: 22 U/L / GGT: x

## 2018-05-27 NOTE — PROGRESS NOTE ADULT - PROBLEM SELECTOR PLAN 2
RESOLVED. Patient with tachycardia, fever, leukocytosis with gall bladder as suspected source; lactate 2.2 on admission, now 1.6 s/p IVF  -empiric tx for cholecystitis as outlined above

## 2018-05-27 NOTE — PROGRESS NOTE ADULT - SUBJECTIVE AND OBJECTIVE BOX
OVERNIGHT EVENTS: ANGELICA    SUBJECTIVE / INTERVAL HPI: Patient seen and examined at bedside. Pt reported continued constipation, last BM Wednesday. Otherwise, pain is reasonably controlled. ROS neg.    VITAL SIGNS:  Vital Signs Last 24 Hrs  T(C): 37.1 (27 May 2018 06:00), Max: 38.5 (26 May 2018 10:50)  T(F): 98.7 (27 May 2018 06:00), Max: 101.3 (26 May 2018 10:50)  HR: 105 (27 May 2018 06:00) (86 - 105)  BP: 146/84 (27 May 2018 06:00) (115/74 - 152/98)  RR: 20 (27 May 2018 06:00) (20 - 22)  SpO2: 100% (27 May 2018 06:00) (98% - 100%)    PHYSICAL EXAM:  General: NAD, laying in bed  HEENT: NC/AT; PERRL, anicteric sclera; MMM  Neck: supple  Cardiovascular: +S1/S2; RR; no m/r/g  Respiratory: CTAB; no W/R/R  Gastrointestinal: soft, NT/ND; +BSx4, IR drain in place - non tender, dressing c/d/i  Extremities: WWP; no edema, clubbing or cyanosis  Vascular: 2+ radial, DP/PT pulses B/L  Neurological: AAOx3; no focal deficits    MEDICATIONS:  MEDICATIONS  (STANDING):  ALBUTerol/ipratropium for Nebulization 3 milliLiter(s) Nebulizer every 4 hours  enoxaparin Injectable 90 milliGRAM(s) SubCutaneous every 12 hours  piperacillin/tazobactam IVPB. 3.375 Gram(s) IV Intermittent every 6 hours    MEDICATIONS  (PRN):  acetaminophen   Tablet 650 milliGRAM(s) Oral every 6 hours PRN For Temp greater than 38 C (100.4 F)  acetaminophen  Suppository 650 milliGRAM(s) Rectal every 6 hours PRN For Temp greater than 38 C (100.4 F)  HYDROmorphone  Injectable 1 milliGRAM(s) IV Push every 4 hours PRN Severe Pain (7 - 10)  HYDROmorphone  Injectable 0.5 milliGRAM(s) IV Push every 4 hours PRN Moderate Pain (4 - 6)  polyethylene glycol 3350 17 Gram(s) Oral daily PRN Constipation  senna 2 Tablet(s) Oral at bedtime PRN Constipation      ALLERGIES:  Allergies    allergic to cats (Rash)  No Known Drug Allergies    Intolerances        LABS:                        8.1    7.2   )-----------( 311      ( 26 May 2018 07:30 )             26.1     05-26    134<L>  |  98  |  14  ----------------------------<  119<H>  3.8   |  25  |  0.44<L>    Ca    8.5      26 May 2018 07:30  Phos  1.4     05-26  Mg     2.2     05-26    TPro  6.4  /  Alb  3.1<L>  /  TBili  1.5<H>  /  DBili  x   /  AST  22  /  ALT  50<H>  /  AlkPhos  102  05-26        CAPILLARY BLOOD GLUCOSE          RADIOLOGY & ADDITIONAL TESTS: Reviewed. OVERNIGHT EVENTS: ANGELICA    SUBJECTIVE / INTERVAL HPI: Patient seen and examined at bedside. Pt reported continued constipation, last BM Wednesday. Otherwise, pain is reasonably controlled. ROS neg.    VITAL SIGNS:  Vital Signs Last 24 Hrs  T(C): 37.1 (27 May 2018 06:00), Max: 38.5 (26 May 2018 10:50)  T(F): 98.7 (27 May 2018 06:00), Max: 101.3 (26 May 2018 10:50)  HR: 105 (27 May 2018 06:00) (86 - 105)  BP: 146/84 (27 May 2018 06:00) (115/74 - 152/98)  RR: 20 (27 May 2018 06:00) (20 - 22)  SpO2: 100% (27 May 2018 06:00) (98% - 100%)    PHYSICAL EXAM:  General: NAD, laying comfortably in bed  HEENT: NC/AT; PERRL, anicteric sclera; MMM  Cardiovascular: +S1/S2; RR; no m/r/g  Respiratory: CTAB; no W/R/R  Gastrointestinal: soft, NT/ND; +BSx4, IR drain in place - non tender, dressing c/d/i  Extremities: WWP; no edema, clubbing or cyanosis  Vascular: 2+ distal pulses B/L  Neurological: AAOx3; no focal deficits    MEDICATIONS:  MEDICATIONS  (STANDING):  ALBUTerol/ipratropium for Nebulization 3 milliLiter(s) Nebulizer every 4 hours  enoxaparin Injectable 90 milliGRAM(s) SubCutaneous every 12 hours  piperacillin/tazobactam IVPB. 3.375 Gram(s) IV Intermittent every 6 hours    MEDICATIONS  (PRN):  acetaminophen   Tablet 650 milliGRAM(s) Oral every 6 hours PRN For Temp greater than 38 C (100.4 F)  acetaminophen  Suppository 650 milliGRAM(s) Rectal every 6 hours PRN For Temp greater than 38 C (100.4 F)  HYDROmorphone  Injectable 1 milliGRAM(s) IV Push every 4 hours PRN Severe Pain (7 - 10)  HYDROmorphone  Injectable 0.5 milliGRAM(s) IV Push every 4 hours PRN Moderate Pain (4 - 6)  polyethylene glycol 3350 17 Gram(s) Oral daily PRN Constipation  senna 2 Tablet(s) Oral at bedtime PRN Constipation      ALLERGIES:  Allergies    allergic to cats (Rash)  No Known Drug Allergies    Intolerances        LABS:                        8.1    7.2   )-----------( 311      ( 26 May 2018 07:30 )             26.1     05-26    134<L>  |  98  |  14  ----------------------------<  119<H>  3.8   |  25  |  0.44<L>    Ca    8.5      26 May 2018 07:30  Phos  1.4     05-26  Mg     2.2     05-26    TPro  6.4  /  Alb  3.1<L>  /  TBili  1.5<H>  /  DBili  x   /  AST  22  /  ALT  50<H>  /  AlkPhos  102  05-26        CAPILLARY BLOOD GLUCOSE          RADIOLOGY & ADDITIONAL TESTS: Reviewed. OVERNIGHT EVENTS: ANGELICA    SUBJECTIVE / INTERVAL HPI: Patient seen and examined at bedside. Pt reported continued constipation, last BM Wednesday. Pt also hungry and would like to try regular diet. Otherwise, pain is reasonably controlled. ROS neg.    VITAL SIGNS:  Vital Signs Last 24 Hrs  T(C): 37.1 (27 May 2018 06:00), Max: 38.5 (26 May 2018 10:50)  T(F): 98.7 (27 May 2018 06:00), Max: 101.3 (26 May 2018 10:50)  HR: 105 (27 May 2018 06:00) (86 - 105)  BP: 146/84 (27 May 2018 06:00) (115/74 - 152/98)  RR: 20 (27 May 2018 06:00) (20 - 22)  SpO2: 100% (27 May 2018 06:00) (98% - 100%)    PHYSICAL EXAM:  General: NAD, laying comfortably in bed  HEENT: NC/AT; PERRL, anicteric sclera; MMM  Cardiovascular: +S1/S2; RR; no m/r/g  Respiratory: CTAB; no W/R/R  Gastrointestinal: soft, NT/ND; +BSx4, IR drain in place - non tender, dressing c/d/i  Extremities: WWP; no edema, clubbing or cyanosis  Vascular: 2+ distal pulses B/L  Neurological: AAOx3; no focal deficits    MEDICATIONS:  MEDICATIONS  (STANDING):  ALBUTerol/ipratropium for Nebulization 3 milliLiter(s) Nebulizer every 4 hours  enoxaparin Injectable 90 milliGRAM(s) SubCutaneous every 12 hours  piperacillin/tazobactam IVPB. 3.375 Gram(s) IV Intermittent every 6 hours    MEDICATIONS  (PRN):  acetaminophen   Tablet 650 milliGRAM(s) Oral every 6 hours PRN For Temp greater than 38 C (100.4 F)  acetaminophen  Suppository 650 milliGRAM(s) Rectal every 6 hours PRN For Temp greater than 38 C (100.4 F)  HYDROmorphone  Injectable 1 milliGRAM(s) IV Push every 4 hours PRN Severe Pain (7 - 10)  HYDROmorphone  Injectable 0.5 milliGRAM(s) IV Push every 4 hours PRN Moderate Pain (4 - 6)  polyethylene glycol 3350 17 Gram(s) Oral daily PRN Constipation  senna 2 Tablet(s) Oral at bedtime PRN Constipation      ALLERGIES:  Allergies    allergic to cats (Rash)  No Known Drug Allergies    Intolerances        LABS:                        8.1    7.2   )-----------( 311      ( 26 May 2018 07:30 )             26.1     05-26    134<L>  |  98  |  14  ----------------------------<  119<H>  3.8   |  25  |  0.44<L>    Ca    8.5      26 May 2018 07:30  Phos  1.4     05-26  Mg     2.2     05-26    TPro  6.4  /  Alb  3.1<L>  /  TBili  1.5<H>  /  DBili  x   /  AST  22  /  ALT  50<H>  /  AlkPhos  102  05-26        CAPILLARY BLOOD GLUCOSE          RADIOLOGY & ADDITIONAL TESTS: Reviewed.

## 2018-05-27 NOTE — PROGRESS NOTE ADULT - ATTENDING COMMENTS
Pt seen and examined.   Agree with above.   Pt has been afebrile since yesterday morning,   will follow up cultures which shown sparse gram negative rods to date, plan for 10 days abx.   chemo port placement next week and possible start of inpatient chemo regimen pending heme onc  SOB is likely exacerbated by anxiety, will titrate off of NC oxygen if possible.   otherwise monitor for tolerating PO.

## 2018-05-27 NOTE — PROGRESS NOTE ADULT - PROBLEM SELECTOR PLAN 3
CTA A/P s/f hepatic vein thrombosis; most likely 2/2 hypercoagulable state in the setting of active untreated malignancy  -s/p hep gtt, not on Lovenox BID

## 2018-05-28 LAB
-  AMPICILLIN/SULBACTAM: SIGNIFICANT CHANGE UP
-  AMPICILLIN: SIGNIFICANT CHANGE UP
-  CEFAZOLIN: SIGNIFICANT CHANGE UP
-  CEFTRIAXONE: SIGNIFICANT CHANGE UP
-  CIPROFLOXACIN: SIGNIFICANT CHANGE UP
-  GENTAMICIN: SIGNIFICANT CHANGE UP
-  PIPERACILLIN/TAZOBACTAM: SIGNIFICANT CHANGE UP
-  TOBRAMYCIN: SIGNIFICANT CHANGE UP
-  TRIMETHOPRIM/SULFAMETHOXAZOLE: SIGNIFICANT CHANGE UP
ALBUMIN SERPL ELPH-MCNC: 2.8 G/DL — LOW (ref 3.3–5)
ALP SERPL-CCNC: 145 U/L — HIGH (ref 40–120)
ALT FLD-CCNC: 30 U/L — SIGNIFICANT CHANGE UP (ref 10–45)
ANION GAP SERPL CALC-SCNC: 13 MMOL/L — SIGNIFICANT CHANGE UP (ref 5–17)
AST SERPL-CCNC: 21 U/L — SIGNIFICANT CHANGE UP (ref 10–40)
BASOPHILS NFR BLD AUTO: 1.2 % — SIGNIFICANT CHANGE UP (ref 0–2)
BILIRUB DIRECT SERPL-MCNC: 0.3 MG/DL — HIGH (ref 0–0.2)
BILIRUB INDIRECT FLD-MCNC: 0.5 MG/DL — SIGNIFICANT CHANGE UP (ref 0.2–1)
BILIRUB SERPL-MCNC: 0.8 MG/DL — SIGNIFICANT CHANGE UP (ref 0.2–1.2)
BUN SERPL-MCNC: 11 MG/DL — SIGNIFICANT CHANGE UP (ref 7–23)
CALCIUM SERPL-MCNC: 9 MG/DL — SIGNIFICANT CHANGE UP (ref 8.4–10.5)
CHLORIDE SERPL-SCNC: 101 MMOL/L — SIGNIFICANT CHANGE UP (ref 96–108)
CO2 SERPL-SCNC: 26 MMOL/L — SIGNIFICANT CHANGE UP (ref 22–31)
CREAT SERPL-MCNC: 0.4 MG/DL — LOW (ref 0.5–1.3)
EOSINOPHIL NFR BLD AUTO: 3.2 % — SIGNIFICANT CHANGE UP (ref 0–6)
GLUCOSE SERPL-MCNC: 111 MG/DL — HIGH (ref 70–99)
HCT VFR BLD CALC: 25.9 % — LOW (ref 34.5–45)
HGB BLD-MCNC: 8.1 G/DL — LOW (ref 11.5–15.5)
LYMPHOCYTES # BLD AUTO: 16.1 % — SIGNIFICANT CHANGE UP (ref 13–44)
MAGNESIUM SERPL-MCNC: 2.2 MG/DL — SIGNIFICANT CHANGE UP (ref 1.6–2.6)
MCHC RBC-ENTMCNC: 22 PG — LOW (ref 27–34)
MCHC RBC-ENTMCNC: 31.3 G/DL — LOW (ref 32–36)
MCV RBC AUTO: 70.4 FL — LOW (ref 80–100)
METHOD TYPE: SIGNIFICANT CHANGE UP
MONOCYTES NFR BLD AUTO: 12 % — SIGNIFICANT CHANGE UP (ref 2–14)
NEUTROPHILS NFR BLD AUTO: 67.5 % — SIGNIFICANT CHANGE UP (ref 43–77)
PLATELET # BLD AUTO: 430 K/UL — HIGH (ref 150–400)
POTASSIUM SERPL-MCNC: 4.2 MMOL/L — SIGNIFICANT CHANGE UP (ref 3.5–5.3)
POTASSIUM SERPL-SCNC: 4.2 MMOL/L — SIGNIFICANT CHANGE UP (ref 3.5–5.3)
PROT SERPL-MCNC: 6.4 G/DL — SIGNIFICANT CHANGE UP (ref 6–8.3)
RBC # BLD: 3.68 M/UL — LOW (ref 3.8–5.2)
RBC # FLD: 27 % — HIGH (ref 10.3–16.9)
SODIUM SERPL-SCNC: 140 MMOL/L — SIGNIFICANT CHANGE UP (ref 135–145)
WBC # BLD: 5.6 K/UL — SIGNIFICANT CHANGE UP (ref 3.8–10.5)
WBC # FLD AUTO: 5.6 K/UL — SIGNIFICANT CHANGE UP (ref 3.8–10.5)

## 2018-05-28 PROCEDURE — 99232 SBSQ HOSP IP/OBS MODERATE 35: CPT

## 2018-05-28 RX ORDER — AMPICILLIN TRIHYDRATE 250 MG
CAPSULE ORAL
Qty: 0 | Refills: 0 | Status: DISCONTINUED | OUTPATIENT
Start: 2018-05-28 | End: 2018-05-28

## 2018-05-28 RX ORDER — AMPICILLIN TRIHYDRATE 250 MG
500 CAPSULE ORAL EVERY 6 HOURS
Qty: 0 | Refills: 0 | Status: DISCONTINUED | OUTPATIENT
Start: 2018-05-28 | End: 2018-05-28

## 2018-05-28 RX ORDER — SIMETHICONE 80 MG/1
80 TABLET, CHEWABLE ORAL DAILY
Qty: 0 | Refills: 0 | Status: DISCONTINUED | OUTPATIENT
Start: 2018-05-28 | End: 2018-05-29

## 2018-05-28 RX ORDER — PANTOPRAZOLE SODIUM 20 MG/1
40 TABLET, DELAYED RELEASE ORAL
Qty: 0 | Refills: 0 | Status: DISCONTINUED | OUTPATIENT
Start: 2018-05-28 | End: 2018-06-03

## 2018-05-28 RX ORDER — PIPERACILLIN AND TAZOBACTAM 4; .5 G/20ML; G/20ML
3.38 INJECTION, POWDER, LYOPHILIZED, FOR SOLUTION INTRAVENOUS EVERY 6 HOURS
Qty: 0 | Refills: 0 | Status: DISCONTINUED | OUTPATIENT
Start: 2018-05-28 | End: 2018-05-30

## 2018-05-28 RX ORDER — AMPICILLIN TRIHYDRATE 250 MG
500 CAPSULE ORAL ONCE
Qty: 0 | Refills: 0 | Status: COMPLETED | OUTPATIENT
Start: 2018-05-28 | End: 2018-05-28

## 2018-05-28 RX ADMIN — PIPERACILLIN AND TAZOBACTAM 200 GRAM(S): 4; .5 INJECTION, POWDER, LYOPHILIZED, FOR SOLUTION INTRAVENOUS at 21:38

## 2018-05-28 RX ADMIN — HYDROMORPHONE HYDROCHLORIDE 1 MILLIGRAM(S): 2 INJECTION INTRAMUSCULAR; INTRAVENOUS; SUBCUTANEOUS at 14:34

## 2018-05-28 RX ADMIN — ENOXAPARIN SODIUM 90 MILLIGRAM(S): 100 INJECTION SUBCUTANEOUS at 17:17

## 2018-05-28 RX ADMIN — HYDROMORPHONE HYDROCHLORIDE 1 MILLIGRAM(S): 2 INJECTION INTRAMUSCULAR; INTRAVENOUS; SUBCUTANEOUS at 19:37

## 2018-05-28 RX ADMIN — HYDROMORPHONE HYDROCHLORIDE 0.5 MILLIGRAM(S): 2 INJECTION INTRAMUSCULAR; INTRAVENOUS; SUBCUTANEOUS at 16:17

## 2018-05-28 RX ADMIN — HYDROMORPHONE HYDROCHLORIDE 0.5 MILLIGRAM(S): 2 INJECTION INTRAMUSCULAR; INTRAVENOUS; SUBCUTANEOUS at 17:06

## 2018-05-28 RX ADMIN — SIMETHICONE 80 MILLIGRAM(S): 80 TABLET, CHEWABLE ORAL at 10:07

## 2018-05-28 RX ADMIN — Medication 3 MILLILITER(S): at 13:17

## 2018-05-28 RX ADMIN — HYDROMORPHONE HYDROCHLORIDE 1 MILLIGRAM(S): 2 INJECTION INTRAMUSCULAR; INTRAVENOUS; SUBCUTANEOUS at 17:58

## 2018-05-28 RX ADMIN — ENOXAPARIN SODIUM 90 MILLIGRAM(S): 100 INJECTION SUBCUTANEOUS at 05:32

## 2018-05-28 RX ADMIN — Medication 3 MILLILITER(S): at 05:32

## 2018-05-28 RX ADMIN — HYDROMORPHONE HYDROCHLORIDE 1 MILLIGRAM(S): 2 INJECTION INTRAMUSCULAR; INTRAVENOUS; SUBCUTANEOUS at 06:42

## 2018-05-28 RX ADMIN — PIPERACILLIN AND TAZOBACTAM 200 GRAM(S): 4; .5 INJECTION, POWDER, LYOPHILIZED, FOR SOLUTION INTRAVENOUS at 05:32

## 2018-05-28 RX ADMIN — PIPERACILLIN AND TAZOBACTAM 200 GRAM(S): 4; .5 INJECTION, POWDER, LYOPHILIZED, FOR SOLUTION INTRAVENOUS at 15:59

## 2018-05-28 RX ADMIN — Medication 3 MILLILITER(S): at 10:06

## 2018-05-28 RX ADMIN — HYDROMORPHONE HYDROCHLORIDE 1 MILLIGRAM(S): 2 INJECTION INTRAMUSCULAR; INTRAVENOUS; SUBCUTANEOUS at 21:38

## 2018-05-28 RX ADMIN — Medication 104 MILLIGRAM(S): at 08:47

## 2018-05-28 RX ADMIN — Medication 3 MILLILITER(S): at 21:47

## 2018-05-28 RX ADMIN — Medication 3 MILLILITER(S): at 01:35

## 2018-05-28 RX ADMIN — Medication 104 MILLIGRAM(S): at 13:16

## 2018-05-28 RX ADMIN — HYDROMORPHONE HYDROCHLORIDE 1 MILLIGRAM(S): 2 INJECTION INTRAMUSCULAR; INTRAVENOUS; SUBCUTANEOUS at 05:41

## 2018-05-28 RX ADMIN — PANTOPRAZOLE SODIUM 40 MILLIGRAM(S): 20 TABLET, DELAYED RELEASE ORAL at 10:06

## 2018-05-28 RX ADMIN — HYDROMORPHONE HYDROCHLORIDE 1 MILLIGRAM(S): 2 INJECTION INTRAMUSCULAR; INTRAVENOUS; SUBCUTANEOUS at 13:17

## 2018-05-28 RX ADMIN — HYDROMORPHONE HYDROCHLORIDE 1 MILLIGRAM(S): 2 INJECTION INTRAMUSCULAR; INTRAVENOUS; SUBCUTANEOUS at 22:16

## 2018-05-28 RX ADMIN — HYDROMORPHONE HYDROCHLORIDE 0.5 MILLIGRAM(S): 2 INJECTION INTRAMUSCULAR; INTRAVENOUS; SUBCUTANEOUS at 23:23

## 2018-05-28 NOTE — PROGRESS NOTE ADULT - PROBLEM SELECTOR PLAN 3
CTA A/P s/f hepatic vein thrombosis; most likely 2/2 hypercoagulable state in the setting of active untreated malignancy  -c/w lovenox 90mg BID

## 2018-05-28 NOTE — PROGRESS NOTE ADULT - PROBLEM SELECTOR PLAN 4
Acute anemia most likely 2/2 active colon malignancy s/p IV iron infusions o/p  -s/p 1U PRBC before IR procedure 5/25  -transfuse hgb<7  -maintain active type and screen

## 2018-05-28 NOTE — PROGRESS NOTE ADULT - ATTENDING COMMENTS
Pt seen and examined at bedside.   Agree with above.   Will modify abx given culture results  plan for port placement this week and discussion of chemotherapeutic options  d/w patient need for assigning healthcare proxy and to consider goals of care.   dispo pending port placement and plans regarding chemotherapy and completion of abx course.

## 2018-05-28 NOTE — PROGRESS NOTE ADULT - PROBLEM SELECTOR PLAN 5
Stage 4 metastatic colon cancer with liver mets and peritoneal carcinomatosis  - plan per Dr. Nick   - for chemoport placement Tuesday 5/29  - f/u heme onc recs

## 2018-05-28 NOTE — PROGRESS NOTE ADULT - PROBLEM SELECTOR PLAN 1
Patient with acute onset RUQ abdominal pain and CT A/P findings c/w acute cholecytisis; RUQ U/S showing mobile stones possibly causing transient obstruction vs. extrinsic compression from tumor burden given hepatic mets.  -biliary fluid growing E. faecium sensitive to ampicillin  -switched to ampicillin 500mg q6h  -s/p IR percutaneous cholecystostomy placement 5/25  -c/w dilaudid for pain control  -now on regular diet tolerating well Patient with acute onset RUQ abdominal pain and CT A/P findings c/w acute cholecytisis; RUQ U/S showing mobile stones possibly causing transient obstruction vs. extrinsic compression from tumor burden given hepatic mets.  -biliary fluid growing E. faecium sensitive to ampicillin  -switched to ampicillin 500mg q6h  -s/p IR percutaneous cholecystostomy placement 5/25  -c/w dilaudid for pain control  -now on regular diet tolerating well  -bl cx NGTD

## 2018-05-28 NOTE — PROGRESS NOTE ADULT - SUBJECTIVE AND OBJECTIVE BOX
SUBJECTIVE: Patient seen and examined bedside by surgery team. Patient reports feeling better since fever yesterday afternoon. Blood cultures were collected yesterday. Eating when she can and tolerating food. Pain well-controlled. Denies nausea, vomiting, chest pain, shortness of breath, fevers, or chills.      ampicillin  IVPB      ampicillin  IVPB 500 milliGRAM(s) IV Intermittent every 6 hours  enoxaparin Injectable 90 milliGRAM(s) SubCutaneous every 12 hours      Vital Signs Last 24 Hrs  T(C): 37.4 (28 May 2018 10:15), Max: 38.4 (27 May 2018 13:22)  T(F): 99.4 (28 May 2018 10:15), Max: 101.1 (27 May 2018 13:22)  HR: 88 (28 May 2018 10:15) (83 - 99)  BP: 135/89 (28 May 2018 10:15) (127/82 - 147/88)  BP(mean): --  RR: 18 (28 May 2018 10:15) (18 - 22)  SpO2: 97% (28 May 2018 10:15) (97% - 99%)  I&O's Detail    27 May 2018 07:01  -  28 May 2018 07:00  --------------------------------------------------------  IN:    Solution: 200 mL  Total IN: 200 mL    OUT:    Drain: 35 mL    Voided: 1650 mL  Total OUT: 1685 mL    Total NET: -1485 mL            Physical Exam  Gen - NAD  Neuro - A&Ox3  CV - RRR  Resp - CTABL  GI - Soft, ND, TTP RUQ @ Drain Site, Biliary Drain (Bile)        LABS:                        8.1    5.6   )-----------( 430      ( 28 May 2018 06:06 )             25.9     05-28    140  |  101  |  11  ----------------------------<  111<H>  4.2   |  26  |  0.40<L>    Ca    9.0      28 May 2018 06:06  Mg     2.2     05-28            RADIOLOGY & ADDITIONAL STUDIES:

## 2018-05-28 NOTE — PROGRESS NOTE ADULT - ASSESSMENT
67YOF with metastatic colon cancer and acute cholecystitis s/p percutaneous cholecystostomy    - febrile yesterday, blood cx collected; wbc cont to trend down  - blood cx negative after 12 hours  - HD stable, satting well on NC  - cont. abx for E faecalis in bile cx  - f/u blood cx  - cont. lovenox for SMV thrombosis  - discussion regarding port placement, will wait for cx to remain neg for 48 hours

## 2018-05-28 NOTE — PROGRESS NOTE ADULT - SUBJECTIVE AND OBJECTIVE BOX
CC: Patient is a 67y old  Female who presents with a chief complaint of abdominal pain (25 May 2018 02:05)    OVERNIGHT EVENTS: NAEO    SUBJECTIVE / INTERVAL HPI: Patient seen and examined at bedside. Laying comfortably in bed. NAD. No respiratory distress. Pt reporting pain in her RUQ. She has a good appetite and reports tolerating eating about 25% of her trays. Denies subjective fevers, chills, SOB, CP, palpitations, constipation n/v, urinary symptoms. Reports she is breathing comfortably and otherwise has no acute complaints.     ROS: negative unless otherwise stated above.    VITAL SIGNS:  Vital Signs Last 24 Hrs  T(C): 37.4 (28 May 2018 10:15), Max: 38.4 (27 May 2018 13:22)  T(F): 99.4 (28 May 2018 10:15), Max: 101.1 (27 May 2018 13:22)  HR: 88 (28 May 2018 10:15) (83 - 99)  BP: 135/89 (28 May 2018 10:15) (127/82 - 147/88)  BP(mean): --  RR: 18 (28 May 2018 10:15) (18 - 22)  SpO2: 97% (28 May 2018 10:15) (97% - 99%)    PHYSICAL EXAM:    General: WDWN, NAD, no respiratory distress, laying comfortably in bed, pleasant   HEENT: NC/AT; PERRL, mildly icteric sclera; MMM  Neck: supple  Cardiovascular: +S1/S2; RRR  Respiratory: CTA B/L; no W/R/R  Gastrointestinal: soft, TTP over epigastrium and RUQ, R-sided per choley drain in place draining serous fluid, c/d/i, +BS throughout   Extremities: WWP; trace b/l edema, clubbing or cyanosis  Vascular: 2+ radial, DP/PT pulses B/L  Neurological: AAOx3; no focal deficits    MEDICATIONS:  MEDICATIONS  (STANDING):  ALBUTerol/ipratropium for Nebulization 3 milliLiter(s) Nebulizer every 4 hours  ampicillin  IVPB      ampicillin  IVPB 500 milliGRAM(s) IV Intermittent every 6 hours  enoxaparin Injectable 90 milliGRAM(s) SubCutaneous every 12 hours  pantoprazole    Tablet 40 milliGRAM(s) Oral before breakfast    MEDICATIONS  (PRN):  acetaminophen   Tablet 650 milliGRAM(s) Oral every 6 hours PRN For Temp greater than 38 C (100.4 F)  acetaminophen  Suppository 650 milliGRAM(s) Rectal every 6 hours PRN For Temp greater than 38 C (100.4 F)  HYDROmorphone  Injectable 1 milliGRAM(s) IV Push every 4 hours PRN Severe Pain (7 - 10)  HYDROmorphone  Injectable 0.5 milliGRAM(s) IV Push every 4 hours PRN Moderate Pain (4 - 6)  polyethylene glycol 3350 17 Gram(s) Oral daily PRN Constipation  senna 2 Tablet(s) Oral at bedtime PRN Constipation  simethicone 80 milliGRAM(s) Chew daily PRN Heartburn      ALLERGIES:  Allergies    allergic to cats (Rash)  No Known Drug Allergies    Intolerances        LABS:                        8.1    5.6   )-----------( 430      ( 28 May 2018 06:06 )             25.9     05-28    140  |  101  |  11  ----------------------------<  111<H>  4.2   |  26  |  0.40<L>    Ca    9.0      28 May 2018 06:06  Mg     2.2     05-28          CAPILLARY BLOOD GLUCOSE          RADIOLOGY & ADDITIONAL TESTS: Reviewed.

## 2018-05-28 NOTE — PROGRESS NOTE ADULT - ASSESSMENT
67 yr old male with a PMHx of recently diagnosed colon cancer presenting from oncologist's office with acute onset abdominal pain and fever, found to have acute cholecystitis and hepatic vein thrombosis, deemed poor surgical candidate 2/2 active metastic colon Ca, admitted to [facility] for medical management.

## 2018-05-29 DIAGNOSIS — Z71.89 OTHER SPECIFIED COUNSELING: ICD-10-CM

## 2018-05-29 DIAGNOSIS — Z78.9 OTHER SPECIFIED HEALTH STATUS: ICD-10-CM

## 2018-05-29 DIAGNOSIS — Z51.5 ENCOUNTER FOR PALLIATIVE CARE: ICD-10-CM

## 2018-05-29 DIAGNOSIS — R14.0 ABDOMINAL DISTENSION (GASEOUS): ICD-10-CM

## 2018-05-29 DIAGNOSIS — C18.9 MALIGNANT NEOPLASM OF COLON, UNSPECIFIED: ICD-10-CM

## 2018-05-29 DIAGNOSIS — R10.84 GENERALIZED ABDOMINAL PAIN: ICD-10-CM

## 2018-05-29 DIAGNOSIS — R10.11 RIGHT UPPER QUADRANT PAIN: ICD-10-CM

## 2018-05-29 DIAGNOSIS — K59.03 DRUG INDUCED CONSTIPATION: ICD-10-CM

## 2018-05-29 LAB
ALBUMIN SERPL ELPH-MCNC: 2.8 G/DL — LOW (ref 3.3–5)
ALP SERPL-CCNC: 166 U/L — HIGH (ref 40–120)
ALT FLD-CCNC: 38 U/L — SIGNIFICANT CHANGE UP (ref 10–45)
ANION GAP SERPL CALC-SCNC: 16 MMOL/L — SIGNIFICANT CHANGE UP (ref 5–17)
APTT BLD: 33.6 SEC — SIGNIFICANT CHANGE UP (ref 27.5–37.4)
AST SERPL-CCNC: 48 U/L — HIGH (ref 10–40)
BILIRUB SERPL-MCNC: 0.9 MG/DL — SIGNIFICANT CHANGE UP (ref 0.2–1.2)
BLD GP AB SCN SERPL QL: NEGATIVE — SIGNIFICANT CHANGE UP
BUN SERPL-MCNC: 15 MG/DL — SIGNIFICANT CHANGE UP (ref 7–23)
CALCIUM SERPL-MCNC: 9 MG/DL — SIGNIFICANT CHANGE UP (ref 8.4–10.5)
CHLORIDE SERPL-SCNC: 98 MMOL/L — SIGNIFICANT CHANGE UP (ref 96–108)
CO2 SERPL-SCNC: 25 MMOL/L — SIGNIFICANT CHANGE UP (ref 22–31)
CREAT SERPL-MCNC: 0.51 MG/DL — SIGNIFICANT CHANGE UP (ref 0.5–1.3)
CULTURE RESULTS: SIGNIFICANT CHANGE UP
CULTURE RESULTS: SIGNIFICANT CHANGE UP
GLUCOSE SERPL-MCNC: 112 MG/DL — HIGH (ref 70–99)
HCT VFR BLD CALC: 32.8 % — LOW (ref 34.5–45)
HGB BLD-MCNC: 10 G/DL — LOW (ref 11.5–15.5)
INR BLD: 1.13 — SIGNIFICANT CHANGE UP (ref 0.88–1.16)
MAGNESIUM SERPL-MCNC: 2.4 MG/DL — SIGNIFICANT CHANGE UP (ref 1.6–2.6)
MCHC RBC-ENTMCNC: 21.8 PG — LOW (ref 27–34)
MCHC RBC-ENTMCNC: 30.5 G/DL — LOW (ref 32–36)
MCV RBC AUTO: 71.6 FL — LOW (ref 80–100)
PHOSPHATE SERPL-MCNC: 3.9 MG/DL — SIGNIFICANT CHANGE UP (ref 2.5–4.5)
PLATELET # BLD AUTO: 617 K/UL — HIGH (ref 150–400)
POTASSIUM SERPL-MCNC: 4.2 MMOL/L — SIGNIFICANT CHANGE UP (ref 3.5–5.3)
POTASSIUM SERPL-SCNC: 4.2 MMOL/L — SIGNIFICANT CHANGE UP (ref 3.5–5.3)
PROT SERPL-MCNC: 6.5 G/DL — SIGNIFICANT CHANGE UP (ref 6–8.3)
PROTHROM AB SERPL-ACNC: 12.6 SEC — SIGNIFICANT CHANGE UP (ref 9.8–12.7)
RBC # BLD: 4.58 M/UL — SIGNIFICANT CHANGE UP (ref 3.8–5.2)
RBC # FLD: 27.6 % — HIGH (ref 10.3–16.9)
RH IG SCN BLD-IMP: POSITIVE — SIGNIFICANT CHANGE UP
SODIUM SERPL-SCNC: 139 MMOL/L — SIGNIFICANT CHANGE UP (ref 135–145)
SPECIMEN SOURCE: SIGNIFICANT CHANGE UP
SPECIMEN SOURCE: SIGNIFICANT CHANGE UP
WBC # BLD: 7.5 K/UL — SIGNIFICANT CHANGE UP (ref 3.8–10.5)
WBC # FLD AUTO: 7.5 K/UL — SIGNIFICANT CHANGE UP (ref 3.8–10.5)

## 2018-05-29 PROCEDURE — 71045 X-RAY EXAM CHEST 1 VIEW: CPT | Mod: 26

## 2018-05-29 PROCEDURE — 93306 TTE W/DOPPLER COMPLETE: CPT | Mod: 26

## 2018-05-29 PROCEDURE — 99497 ADVNCD CARE PLAN 30 MIN: CPT | Mod: 25

## 2018-05-29 PROCEDURE — 99233 SBSQ HOSP IP/OBS HIGH 50: CPT | Mod: GC

## 2018-05-29 PROCEDURE — 99223 1ST HOSP IP/OBS HIGH 75: CPT

## 2018-05-29 RX ORDER — SIMETHICONE 80 MG/1
80 TABLET, CHEWABLE ORAL EVERY 8 HOURS
Qty: 0 | Refills: 0 | Status: COMPLETED | OUTPATIENT
Start: 2018-05-29 | End: 2018-06-01

## 2018-05-29 RX ORDER — OXYCODONE HYDROCHLORIDE 5 MG/1
5 TABLET ORAL EVERY 4 HOURS
Qty: 0 | Refills: 0 | Status: DISCONTINUED | OUTPATIENT
Start: 2018-05-29 | End: 2018-06-01

## 2018-05-29 RX ORDER — ACETAMINOPHEN 500 MG
1000 TABLET ORAL EVERY 8 HOURS
Qty: 0 | Refills: 0 | Status: COMPLETED | OUTPATIENT
Start: 2018-05-29 | End: 2018-05-30

## 2018-05-29 RX ORDER — FUROSEMIDE 40 MG
20 TABLET ORAL ONCE
Qty: 0 | Refills: 0 | Status: COMPLETED | OUTPATIENT
Start: 2018-05-29 | End: 2018-05-29

## 2018-05-29 RX ORDER — SENNA PLUS 8.6 MG/1
2 TABLET ORAL
Qty: 0 | Refills: 0 | Status: DISCONTINUED | OUTPATIENT
Start: 2018-05-29 | End: 2018-06-01

## 2018-05-29 RX ORDER — ENOXAPARIN SODIUM 100 MG/ML
90 INJECTION SUBCUTANEOUS EVERY 12 HOURS
Qty: 0 | Refills: 0 | Status: DISCONTINUED | OUTPATIENT
Start: 2018-05-29 | End: 2018-05-29

## 2018-05-29 RX ORDER — POLYETHYLENE GLYCOL 3350 17 G/17G
17 POWDER, FOR SOLUTION ORAL
Qty: 0 | Refills: 0 | Status: DISCONTINUED | OUTPATIENT
Start: 2018-05-29 | End: 2018-06-01

## 2018-05-29 RX ORDER — HYDROMORPHONE HYDROCHLORIDE 2 MG/ML
0.5 INJECTION INTRAMUSCULAR; INTRAVENOUS; SUBCUTANEOUS EVERY 4 HOURS
Qty: 0 | Refills: 0 | Status: DISCONTINUED | OUTPATIENT
Start: 2018-05-29 | End: 2018-06-01

## 2018-05-29 RX ADMIN — Medication 650 MILLIGRAM(S): at 16:57

## 2018-05-29 RX ADMIN — Medication 3 MILLILITER(S): at 17:06

## 2018-05-29 RX ADMIN — Medication 1000 MILLIGRAM(S): at 22:23

## 2018-05-29 RX ADMIN — Medication 3 MILLILITER(S): at 13:02

## 2018-05-29 RX ADMIN — HYDROMORPHONE HYDROCHLORIDE 1 MILLIGRAM(S): 2 INJECTION INTRAMUSCULAR; INTRAVENOUS; SUBCUTANEOUS at 16:40

## 2018-05-29 RX ADMIN — HYDROMORPHONE HYDROCHLORIDE 1 MILLIGRAM(S): 2 INJECTION INTRAMUSCULAR; INTRAVENOUS; SUBCUTANEOUS at 06:50

## 2018-05-29 RX ADMIN — Medication 3 MILLILITER(S): at 21:51

## 2018-05-29 RX ADMIN — HYDROMORPHONE HYDROCHLORIDE 1 MILLIGRAM(S): 2 INJECTION INTRAMUSCULAR; INTRAVENOUS; SUBCUTANEOUS at 18:23

## 2018-05-29 RX ADMIN — HYDROMORPHONE HYDROCHLORIDE 1 MILLIGRAM(S): 2 INJECTION INTRAMUSCULAR; INTRAVENOUS; SUBCUTANEOUS at 05:48

## 2018-05-29 RX ADMIN — Medication 10 MILLIGRAM(S): at 13:01

## 2018-05-29 RX ADMIN — Medication 3 MILLILITER(S): at 09:17

## 2018-05-29 RX ADMIN — PIPERACILLIN AND TAZOBACTAM 200 GRAM(S): 4; .5 INJECTION, POWDER, LYOPHILIZED, FOR SOLUTION INTRAVENOUS at 15:25

## 2018-05-29 RX ADMIN — HYDROMORPHONE HYDROCHLORIDE 0.5 MILLIGRAM(S): 2 INJECTION INTRAMUSCULAR; INTRAVENOUS; SUBCUTANEOUS at 14:18

## 2018-05-29 RX ADMIN — POLYETHYLENE GLYCOL 3350 17 GRAM(S): 17 POWDER, FOR SOLUTION ORAL at 17:05

## 2018-05-29 RX ADMIN — ENOXAPARIN SODIUM 90 MILLIGRAM(S): 100 INJECTION SUBCUTANEOUS at 17:05

## 2018-05-29 RX ADMIN — HYDROMORPHONE HYDROCHLORIDE 0.5 MILLIGRAM(S): 2 INJECTION INTRAMUSCULAR; INTRAVENOUS; SUBCUTANEOUS at 00:18

## 2018-05-29 RX ADMIN — Medication 400 MILLIGRAM(S): at 21:50

## 2018-05-29 RX ADMIN — Medication 20 MILLIGRAM(S): at 15:25

## 2018-05-29 RX ADMIN — SIMETHICONE 80 MILLIGRAM(S): 80 TABLET, CHEWABLE ORAL at 13:03

## 2018-05-29 RX ADMIN — PIPERACILLIN AND TAZOBACTAM 200 GRAM(S): 4; .5 INJECTION, POWDER, LYOPHILIZED, FOR SOLUTION INTRAVENOUS at 08:56

## 2018-05-29 RX ADMIN — PIPERACILLIN AND TAZOBACTAM 200 GRAM(S): 4; .5 INJECTION, POWDER, LYOPHILIZED, FOR SOLUTION INTRAVENOUS at 21:51

## 2018-05-29 RX ADMIN — PIPERACILLIN AND TAZOBACTAM 200 GRAM(S): 4; .5 INJECTION, POWDER, LYOPHILIZED, FOR SOLUTION INTRAVENOUS at 03:47

## 2018-05-29 RX ADMIN — SENNA PLUS 2 TABLET(S): 8.6 TABLET ORAL at 17:05

## 2018-05-29 RX ADMIN — SIMETHICONE 80 MILLIGRAM(S): 80 TABLET, CHEWABLE ORAL at 21:51

## 2018-05-29 RX ADMIN — HYDROMORPHONE HYDROCHLORIDE 1 MILLIGRAM(S): 2 INJECTION INTRAMUSCULAR; INTRAVENOUS; SUBCUTANEOUS at 01:05

## 2018-05-29 RX ADMIN — PANTOPRAZOLE SODIUM 40 MILLIGRAM(S): 20 TABLET, DELAYED RELEASE ORAL at 06:43

## 2018-05-29 RX ADMIN — HYDROMORPHONE HYDROCHLORIDE 1 MILLIGRAM(S): 2 INJECTION INTRAMUSCULAR; INTRAVENOUS; SUBCUTANEOUS at 04:17

## 2018-05-29 RX ADMIN — ENOXAPARIN SODIUM 90 MILLIGRAM(S): 100 INJECTION SUBCUTANEOUS at 09:17

## 2018-05-29 RX ADMIN — Medication 3 MILLILITER(S): at 05:48

## 2018-05-29 RX ADMIN — HYDROMORPHONE HYDROCHLORIDE 0.5 MILLIGRAM(S): 2 INJECTION INTRAMUSCULAR; INTRAVENOUS; SUBCUTANEOUS at 13:01

## 2018-05-29 NOTE — CONSULT NOTE ADULT - PROBLEM SELECTOR RECOMMENDATION 5
Plan for infuse A port placement, currently on hold. Has not received any cancer targeted treatments. Evidence of liver and abdominal carcinomatosis spread.

## 2018-05-29 NOTE — PROGRESS NOTE ADULT - ASSESSMENT
67YOF with metastatic colon cancer and acute cholecystitis s/p percutaneous cholecystostomy    - afebrile in last 24 hours  - blood cx collected noon on 5/27;  - wbc 7.5    - blood cx negative after 48 hours  - HD stable, satting well on NC  - cont. abx for E faecalis in bile cx  - f/u blood cx  - cont. lovenox for SMV thrombosis  - discussion regarding port placement; will touch base with her oncologist and schedule for chemoport placement after further discussion

## 2018-05-29 NOTE — PROGRESS NOTE ADULT - PROBLEM SELECTOR PLAN 2
Stage 4 metastatic colon cancer with liver mets and peritoneal carcinomatosis  - plan per Dr. Nick   - for chemoport placement Wed 5/20 (to be afebrile x48 hrs)  - f/u heme onc recs  -palliative consulted given metastatic CRC, pt aware of 1-2 yr prognosis and actively filling out MOLST Stage 4 metastatic colon cancer with liver mets and peritoneal carcinomatosis  - plan per Dr. Nick   - for chemoport placement Wed 5/30 (to be afebrile x48 hrs)  - f/u heme onc recs  -palliative consulted given metastatic CRC, pt aware of 1-2 yr prognosis and actively filling out MOLST

## 2018-05-29 NOTE — CONSULT NOTE ADULT - PROBLEM SELECTOR RECOMMENDATION 8
Had a lengthy conversation with the patient today and her friend at bedside. Several topics discussed including her disease, plan of care, family and friend support, coping strategies, overall wishes, and needs for supportive services when discussing with her family and friends. Current plan is to have chemo port placed and start treatment. Likely will need to have HCP form completed during this admission, provided patient with empty form to review.

## 2018-05-29 NOTE — CONSULT NOTE ADULT - SUBJECTIVE AND OBJECTIVE BOX
PILLO PEDRAZA          MRN-[id number]            (1950)    HPI:  68 y/o female with a PMHx of recently dx metastatic ascending colon cancer (dx 2 weeks ago w/ mets to the liver and peritoneal carcinomatosis) that presents from Dr. Lundy's office with acute onset RUQ abdominal pain, fever and weakness for the past 3 days. Pain has worsened over the past 3 days and last evening was associated with one episode of vomiting (nonbloody, questionable bilious content).  Last BM was 2 days prior to presentation. She denies any associated chills, chest pain/discomfort/pressure, SOB/dyspnea, dysuria, diarrhea, hematochezia. Had CT A/P in ED which was s/f acute cholecystitis and hepatic vein thrombosis.    Cancer Hx: pt had a normal colonoscopy 2 years ago-had a polypectomy with benign pathology per pt. States she started getting non-specific abdominal pain starting last summer which she self-medicated with rolaids. States she ad 4 total episodes of abdominal pain over the summer and eventually went to see GI who did colonoscopy 2 weeks ago and diagnosed her with sub-total obstructing ascending colon cancer; had PET scan on Tuesday which showed liver mets and peritoneal carcinomatosis; plan for chemoport placement in ; patient also endorsing intermittent fevers, chills, and unintentional 25 lbs weight loss since . Denies any family hx of colon cancer.  ED Course  Vital Signs   TMax: 100.3 HR: 97 BP: 116/73 RR: 16 SpO2: 96%   CT a/p findings as above   s/p zosyn 3.375 q6  s/p initiation of heparin gtt for hepatic vein thrombosis (25 May 2018 02:05)    PAST MEDICAL & SURGICAL HISTORY:  Colon cancer, recent Dx.  Colonoscopy with polypectomy  Biliary drain    FAMILY HISTORY:  No pertinent family history in first degree relatives    SOCIAL HISTORY: Single. Never  no children. Has 2 brother and 2 sisters and her mother living in [place]. 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 [facility]. Medicare.    ROS:                      Dyspnea (Bk 0-10):   0                     N/V (Y/N): No                             Secretions (Y/N) : No                Agitation(Y/N): No  Pain (Y/N): Yes      -Provocation/Palliation: Movement exacerbates her pain, medication/belching/flatus improves it.  -Quality/Quantity: Acute visceral pain currently uncontrolled. Chronic malignant somatic hepatic pain currently controlled.  -Radiating: Flank/R back, epigastrium and upper quadrants.  -Severity: Mod-Severe  -Timing/Frequency: Prevents her from resting at night.   -Impact on ADLs: Remains mostly in bed due to pain.     General:  Denied  HEENT:    Dry mouth, thirst.  Neck:  Denied  CVS:  Denied  Resp:  PALENCIA  GI:  Distended, belching, pain  :  Denied  Musc:  Denied  Neuro:  Denied  Psych:  Denied  Skin:  Denied  Lymph:  Denied    Allergies: No Known Drug Allergies  Intolerances: allergic to cats (Rash)    Opiate Naive (Y/N): Yes  -iStop reviewed (Y/N): Yes. No Rx found on iStop review (Ref#:[id number])    Medications:      MEDICATIONS  (STANDING):  ALBUTerol/ipratropium for Nebulization 3 milliLiter(s) Nebulizer every 4 hours  enoxaparin Injectable 90 milliGRAM(s) SubCutaneous every 12 hours  pantoprazole    Tablet 40 milliGRAM(s) Oral before breakfast  piperacillin/tazobactam IVPB. 3.375 Gram(s) IV Intermittent every 6 hours    MEDICATIONS  (PRN):  acetaminophen   Tablet 650 milliGRAM(s) Oral every 6 hours PRN For Temp greater than 38 C (100.4 F)  acetaminophen  Suppository 650 milliGRAM(s) Rectal every 6 hours PRN For Temp greater than 38 C (100.4 F)  HYDROmorphone  Injectable 1 milliGRAM(s) IV Push every 4 hours PRN Severe Pain (7 - 10)  HYDROmorphone  Injectable 0.5 milliGRAM(s) IV Push every 4 hours PRN Moderate Pain (4 - 6)    Labs:    CBC:                        10.0   7.5   )-----------( 617      ( 29 May 2018 06:41 )             32.8     CMP:        139  |  98  |  15  ----------------------------<  112<H>  4.2   |  25  |  0.51    Ca    9.0      29 May 2018 06:41  Phos  3.9       Mg     2.4         TPro  6.5  /  Alb  2.8<L>  /  TBili  0.9  /  DBili  x   /  AST  48<H>  /  ALT  38  /  AlkPhos  166<H>  05-29  PT/INR - ( 29 May 2018 06:41 )   PT: 12.6 sec;   INR: 1.13     PTT - ( 29 May 2018 06:41 )  PTT:33.6 sec    Type + Screen (18 @ 05:30)    ABO Interpretation: B    Rh Interpretation: Positive    Antibody Screen: Negative    Culture - Blood (18 @ 13:58)    Specimen Source: .Blood Blood    Culture Results:   No growth at 1 day.    Culture - Blood (18 @ 21:45)    Specimen Source: .Blood None    Culture Results:   No growth at 3 days.    Lactate, Blood: 1.7 mmoL/L (18 @ 19:52)  Lactate, Blood: 1.6 mmoL/L (18 @ 01:20)  Lactate, Blood: 2.2 mmoL/L (18 @ 17:22)    Direct Lien Poly: Negative (18 @ 19:10)    Culture - Body Fluid with Gram Stain (18 @ 17:28)    Gram Stain:   Rare Gram Negative Rods  Too numerous to count White blood cells    -  Ampicillin: S <=2    -  Ampicillin: R <=8 These ampicillin results predict results for amoxicillin    -  Ampicillin/Sulbactam: S <=8/4    -  Cefazolin: R <=8    -  Ceftriaxone: S <=1 Enterobacter, Citrobacter, and Serratia may develop resistance during prolonged therapy    -  Ciprofloxacin: S <=1    -  Gentamicin: S <=4    -  Piperacillin/Tazobactam: S <=16    -  Tobramycin: S <=4    -  Trimethoprim/Sulfamethoxazole: S <=2/38    -  Vancomycin: S 1    Specimen Source: Bile bile    Culture Results:   Moderate Enterococcus faecium  Numerous Citrobacter braakii  Numerous Klebsiella pneumoniae  Moderate Escherichia coli  More than three types of organisms recovered may indicate colonization  and/or contamination.  Please call Microbiology immediately if identification and/or  suceptibility is indicated.    Organism Identification: Citrobacter braakii  Klebsiella pneumoniae  Escherichia coli  Enterococcus faecium    Organism: Enterococcus faecium    Organism: Citrobacter braakii    Organism: Klebsiella pneumoniae    Organism: Escherichia coli    Method Type: NINFA    Method Type: NINFA    Method Type: NINFA    Method Type: NINFA    Blood Gas Profile - Arterial (18 @ 16:29)    pH, Arterial: 7.40    pCO2, Arterial: 38 mmHg    pO2, Arterial: 56: Critical value reported to read back by RACHEL Lowry 18 16:37 mmHg    HCO3, Arterial: 23 mmol/L    Base Excess, Arterial: -1.8 mmol/L    Oxygen Saturation, Arterial: 90 %    Blood Gas Source Arterial: BLDA    Direct Lien Poly: Negative (18 @ 17:25)    Culture - Urine (18 @ 09:13)    Specimen Source: .Urine Clean Catch (Midstream)    Culture Results:   No growth    Lipase, Serum: 30 U/L (18 @ 17:22)    Imaging:  Reviewed    EXAM:  XR CHEST PORTABLE ROUTINE 1V                        PROCEDURE DATE:  2018    INTERPRETATION:  Clinical History: Shortness of breath  Portable examination is demonstrates limited inspiration. The heart is   within normal limits in transverse diameter. Discoid change left lung   base. Mild levoscoliosis thoracic spine.  Impression: No acute infiltrates    EXAM:  CT ABDOMEN AND PELVIS OC IC                        PROCEDURE DATE:  2018    INTERPRETATION:  CT of the ABDOMEN and PELVIS with intravenous contrast   dated 2018 8:52 PM  INDICATION: Abdominal pain. N/V, hx metastatic colon ca w/ liver mets and   peritoneal carcinomatosis.   TECHNIQUE: CT of the abdomen and pelvis was performed using oral and   intravenous contrast. Axial, coronal, and sagittal images were produced   and reviewed.  PRIOR STUDIES: None.  FINDINGS: Images of the lower chest demonstrate dependent atelectasis   bilaterally.  There is hepatic steatosis. Few hypodense lesions are seen in the liver,   consistent with the patient's known liver metastases. In addition, a cyst   is seen in the right hepatic lobe measuring approximately 3.7 cm. There   is cholelithiasis. The gallbladder wall is thickened and there is trace   pericholecystic fluid. These findings are consistent with acute   cholecystitis. The pancreas is normal in appearance.  No splenic   abnormalities are seen. There is a central filling defect in the superior   mesenteric vein, best seen on axial image 52 series 3, consistent with   thrombosis. Thrombosis is also suspected in the main portal vein in the   region of the josh hepatis. Several probable collateralized vessels are   seen in this region.  The adrenal glands are unremarkable. There are a few subcentimeter   hypodensities which are too small to characterize in each kidney. There   is no hydronephrosis or hydroureter. No renal stones are seen.   There is a small amount of calcified atherosclerotic plaque in the   abdominal aorta. No abdominal aortic aneurysm is seen. No lymphadenopathy   is seen. Multiple nonenlarged mesenteric lymph nodes are noted.  Evaluation of the bowel demonstrates colonic diverticulosis without   evidence of acute diverticulitis. There may be suture material in the   ascending colon from prior surgery. Correlate with surgical history.   Several mildly distended loops of small bowel are seen in the left mid   abdomen without a defined transition point; however, several relatively   collapsed loops of small bowel are seen in the lower abdomen and pelvis   and therefore an early partial small bowel obstruction cannot be entirely   excluded. The appendix is not identified with certainty however there are   no inflammatory changes in the right lower quadrant to suggest acute   appendicitis. There is a small amount of ascites. There are scattered   areas of wall thickening noted throughout the colon which may be related   to underdistention versus colitis. There are a few soft tissue density   peritoneal implants seen along the anterior abdominal wall, particularly   in the right mid abdomen (as seen on axial [id number] through 67, series   3), consistent with peritoneal carcinomatosis. No pneumatosis or free air.  Images of the pelvis demonstrate the uterus and adnexa to be normal in   appearance. There are no filling defects in the urinary bladder.  Evaluation of the osseous structures demonstrates mild degenerative   changes of the spine. No lytic or sclerotic osseous lesions are seen.  IMPRESSION:  1.  Findings consistent with acute cholecystitis.  2.  Several mildly dilated loops of small bowel in the left midabdomen   without defined transition point. Early partial small bowel obstruction   cannot be excluded.  3.  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  4.  Hepatic metastatic disease and cyst.  5.  Peritoneal carcinomatosis.  6.  Scattered areas of colonic wall thickening which may be related to   underdistention versus colitis.  7.  Small ascites.    EXAM:  US ABDOMEN LIMITED               PROCEDURE DATE:  2018    INTERPRETATION:  RIGHT UPPER QUADRANT ABDOMINAL ULTRASOUND dated   2018 10:57 PM  INDICATION: Concern for cholecystitis on CT.  PRIOR STUDIES: CT abdomen pelvis performed earlier the same date.  FINDINGS:   Liver: Normal size. There is a 2.9 x 2.9 x 2.8 cm cyst in the right lobe   of the liver. Other ill-defined masses are seen in the liver, consistent   with metastatic disease as seen on the prior CT.  Intrahepatic ducts: Not dilated.  Common bile duct: Normal diameter, measuring 0.4 cm.  Gallbladder: There is cholelithiasis. There is borderline gallbladder   wall thickening. There is a small amount of pericholecystic fluid. The   gallbladder is mildly distended measuring up to 5.1 cm in transverse   dimension. Sonographic Hernandez sign is positive.  Pancreas: The visualized portions are normal in appearance.    Abdominal aorta: No abdominal aortic aneurysm is seen.  Inferior vena cava: The visualized portions are normal in appearance.  Right kidney: No hydronephrosis. Normal echogenicity. No focal lesions.   Length of 12.6 cm.  Ascites: Trace perihepatic ascites is seen.  IMPRESSION:  1.  Findings consistent with acute cholecystitis.  2.  Heterogeneous liver with ill-defined masses consistent with the   patient's known metastatic disease.    PEx:  T(C): 36.8 (18 @ 12:31), Max: 37.1 (18 @ 17:22)  HR: 97 (18 @ 12:31) (96 - 100)  BP: 126/85 (18 @ 12:31) (126/85 - 146/92)  RR: 18 (18 @ 12:31) (18 - 18)  SpO2: 97% (18 @ 12:31) (95% - 100%)  Wt(kg): 88    I&O's Summary  28 May 2018 07:01  -  29 May 2018 07:00  --------------------------------------------------------  IN: 800 mL / OUT: 1150 mL / NET: -350 mL    General: AAOx3 Woman in mild distress due to pain.   HEENT: NCAT PERRL EOMI Dry lips and mucosa Occasional grimacing of pain.   Neck: Supple No masses  CVS: RR S1S2 No M/G/R  Resp: Unlabored CTAB anteriorly  GI: Distended, Soft, TTP of RUQ, epigastrium, and lower quadrants. Very diminished BS, Drain+  : Normal   Musc: No C/C/E    Neuro: No focal deficits. N  Psych:  Concerned but with good spirits.  Skin: Xerosis  Lymph: Normal  Preadmit Karnofsky: 70 %           Current Karnofsky: 40%  Cachexia (Y/N): No  BMI: 35.6    Advanced Directives:      Full code          MOLST - Ongoing     Decision maker: The patient is able to participate in complex medical decision making conversations  Legal surrogate: No designated HCP, but the patient is thinking of assigning her sister in [place] and her friend in [facility].    GOALS OF CARE DISCUSSION       Palliative care info/counseling provided	           Advanced Directives addressed please see Advance Care Planning Note Below           Documentation of GOC:  Wants to pursue cancer targeted treatments.          REFERRALS	        Unit SW/Case Mgmt        - To see the patient       Patient/Family Support - To see the patient       Massage Therapy - To see the patient       Music Therapy - To see the patient

## 2018-05-29 NOTE — PROGRESS NOTE ADULT - SUBJECTIVE AND OBJECTIVE BOX
Hem/Onc    PILLO PEDRAZA  MRN-[id number]    INTERVAL Hx: S/p percutaneous cholecystotomy . Doing better. Still weak. Afebrile this am. On IV Abx coverage. Abdominal discomfort improved. No nausea or vomiting. Able to tolerate PO intake    HPI: 67 y.o woman with newly diagnosed metastatic colon cancer- we were completing outpatient work up and were planning systemic treatment- the patient received IV iron last week, she had staging PET/CT this week and was scheduled for Infusaport placement. She presented to our office on 5/24 with fever/vomiting and abdominal pain> She was sent to the ER with suspicion for an obstruction (known large R sided/obstructing lesion).  Work up at [facility] with CT scan showed findings c/w acute cholecystitis and SMV and portal vein thrombosis. Acute cholecystitis confirmed by US.     ROS:  + nausea/vomiting  + fevers and chills  No night sweats.   + fatigue, no adaches/dizziness.    + abdominal pain/no diarrhea  No melena or hematochezia.    No dysuria/hematuria.  No history of easy bruising/bleeding.     No leg pain or leg swelling.    ROS is otherwise negative.    PMH/PSH:  PAST MEDICAL & SURGICAL HISTORY:  Colon cancer      Medications:  MEDICATIONS  (STANDING):  heparin  Infusion.  Unit(s)/Hr (16 mL/Hr) IV Continuous <Continuous>  piperacillin/tazobactam IVPB. 3.375 Gram(s) IV Intermittent every 6 hours  sodium chloride 0.9%. 1000 milliLiter(s) (100 mL/Hr) IV Continuous <Continuous>    MEDICATIONS  (PRN):  heparin  Injectable 7000 Unit(s) IV Push every 6 hours PRN For aPTT less than 40  heparin  Injectable 3500 Unit(s) IV Push every 6 hours PRN For aPTT between 40 - 57    heparin  Infusion.  Unit(s)/Hr IV Continuous <Continuous>  heparin  Injectable 7000 Unit(s) IV Push every 6 hours PRN  heparin  Injectable 3500 Unit(s) IV Push every 6 hours PRN          Allergies    allergic to cats (Rash)  No Known Drug Allergies    Intolerances        Exam:  Vital Signs Last 24 Hrs  T(C): 36.8 (29 May 2018 05:24), Max: 37.4 (28 May 2018 10:15)  T(F): 98.3 (29 May 2018 05:24), Max: 99.4 (28 May 2018 10:15)  HR: 100 (29 May 2018 05:24) (88 - 100)  BP: 146/92 (29 May 2018 05:24) (127/85 - 146/92)  BP(mean): --  RR: 18 (29 May 2018 05:24) (18 - 18)  SpO2: 100% (29 May 2018 05:24) (95% - 100%)HEENT: pale mucosae, anicteric sclerae  No palpable peripheral lymphadenopathy  COR: regular rhythm rate, no murmurs, rubs or gallops  PULMO: clear to auscultation B/L  ABD: soft, distended, + mild RUQ pain to palpation  , + drain in place  EXT: no edema  NEURO: intact  SKIN: no lesions on visible skin    Labs:                        10.0   7.5   )-----------( 617      ( 29 May 2018 06:41 )             32.8         CBC Full  -  ( 29 May 2018 06:41 )  WBC Count : 7.5 K/uL  Hemoglobin : 10.0 g/dL  Hematocrit : 32.8 %  Platelet Count - Automated : 617 K/uL  Mean Cell Volume : 71.6 fL  Mean Cell Hemoglobin : 21.8 pg  Mean Cell Hemoglobin Concentration : 30.5 g/dL  Auto Neutrophil # : x  Auto Lymphocyte # : x  Auto Monocyte # : x  Auto Eosinophil # : x  Auto Basophil # : x  Auto Neutrophil % : x  Auto Lymphocyte % : x  Auto Monocyte % : x  Auto Eosinophil % : x  Auto Basophil % : x        05-29    139  |  98  |  15  ----------------------------<  112<H>  4.2   |  25  |  0.51    Ca    9.0      29 May 2018 06:41  Phos  3.9     05-29  Mg     2.4     05-29    TPro  6.5  /  Alb  2.8<L>  /  TBili  0.9  /  DBili  x   /  AST  48<H>  /  ALT  38  /  AlkPhos  166<H>  05-29        PT/INR - ( 29 May 2018 06:41 )   PT: 12.6 sec;   INR: 1.13          PTT - ( 29 May 2018 06:41 )  PTT:33.6 sec      Pertinent imaging studies: reviewed    Assessment:    Metastatic colon cancer  Severe iron deficiency anemia  Acute cholecystitis  SMV/portal vein thrombosis    Plan:    Clinically better  S/p percutaneous cholecystotomy  Bili is improving  Low grade fever, but better  To continue IV Abx coverage    Infusaport placement when afebrile x 48 hours    SMV/Portal vein thrombosis- Continue Lovenox to eventually be transitioned to oral anticoagulant    Newly diagnosed colon cancer-we are still awaiting molecular studies  Systemic treatment to follow when she recovers    Severe YESSICA-she received parenteral iron as outpatient  PRBC as clinically indicated      Thank you    Estefania Lundy MD  [phone]

## 2018-05-29 NOTE — PROGRESS NOTE ADULT - PROBLEM SELECTOR PLAN 5
Hb stable. Acute anemia most likely 2/2 active colon malignancy s/p IV iron infusions o/p  -s/p 1U PRBC before IR procedure 5/25  -transfuse hgb<7  -maintain active type and screen

## 2018-05-29 NOTE — CONSULT NOTE ADULT - PROBLEM SELECTOR RECOMMENDATION 6
Advance care planning meeting  Start time: 11:35am  End time: 12:00pm  Total time: 25min  A face to face meeting to discuss advance care planning was held today regarding: PILLO PEDRAZA  Primary decision maker: Patient  Discussed advance directives including, but not limited to, healthcare proxy and code status.  Decision regarding code status: Full code (Trial of CPR and Intubation)  Documentation completed today: ELIDA

## 2018-05-29 NOTE — PROGRESS NOTE ADULT - ATTENDING COMMENTS
DIAGNOSIS:  ACUTE CHOLECYSTITIS -f/u bile cultures. narrow abx, cont zosyn for now. s/p percutaneous cholecystostomy    SEVERE SEPSIS - as above  HYPOXEMIC RESPIRATORY FAILURE/ PULMONARY EDEMA - lasix PRN. chest xray today. wean supplemental oxygen.   SMV THROMBUS/PORTAL VEIN THROMBUS -  lovenox   STAGE IV COLON CA w/ LIVER METASTASES and PERITONEAL CARCINOMATOSIS- f/u onc recs  HYPONATREMIA -resolved .   ANEMIA - trend hb. no active GI bleeding currently. monitor closely. transfuse to keep hb >7

## 2018-05-29 NOTE — CHART NOTE - NSCHARTNOTEFT_GEN_A_CORE
67 yr old male with a PMHx of recently diagnosed colon cancer presenting from oncologist's office with acute onset abdominal pain and fever, found to have acute cholecystitis and hepatic vein thrombosis, deemed poor surgical candidate 2/2 active metastatic colon Ca, admitted to [facility] for medical management. Pt started on zosyn for broad spectrum coverage and IVF maintenance. IR consulted and performed perc cholecystostomy with drainage of 80 cc purulent fluid. Course c/b anemia to Hb 7 s/p 1 unit PRBC with appropriate response in Hb. Post-IR procedure 5/25, pt arrived back to floor and noted to be tachycardic to 110s, tachypneic and dessatting to 80s on NC. Fever to 103. Symptoms likely 2/2 transient bacteremia 2/2 recent manipulation for perc choley vs. transfusion reaction (blood work sent for w/u). ICU consulted called and pt deemed stable for [facility]. Placed on BiPAP, duonebs. CXR showing some pulm congestion and pt given lasix 20 mg IVP with improvement in symptoms. Repeat bl cx with NGTD. Pt remaining afebrile. Biliary fluid cx growing citrobacter, kleb, e. coli, and E. faecium. Awaiting sensitivities prior to deescalating ABX. Pt also pending chemoport placement on this admission once afebrile x48 hrs (by 5/29). Scheduled for IR tomorrow for chemoport placement. Pt to f/u Dr. Nick outpatient for systemic chemo. 67 yr old male with a PMHx of recently diagnosed colon cancer presenting from oncologist's office with acute onset abdominal pain and fever, found to have acute cholecystitis and hepatic vein thrombosis, deemed poor surgical candidate 2/2 active metastatic colon Ca, admitted to [facility] for medical management. Pt started on zosyn for broad spectrum coverage and IVF maintenance. IR consulted and performed perc cholecystostomy with drainage of 80 cc purulent fluid. Course c/b anemia to Hb 7 s/p 1 unit PRBC with appropriate response in Hb. Post-IR procedure 5/25, pt arrived back to floor and noted to be tachycardic to 110s, tachypneic and dessatting to 80s on NC. Fever to 103. Symptoms likely 2/2 transient bacteremia 2/2 recent manipulation for perc choley vs. transfusion reaction (blood work sent for w/u). ICU consulted called and pt deemed stable for [facility]. Placed on BiPAP, duonebs. CXR showing some pulm congestion and pt given lasix 20 mg IVP with improvement in symptoms. Repeat bl cx with NGTD. Pt remaining afebrile. Biliary fluid cx growing citrobacter, kleb, e. coli, and E. faecium. Confirmed with micro lab that all GNR species sensitive to bactrim. Enterococcus sensitive to ampicillin. Can likely d/c on this regimen. Pt also pending chemoport placement on this admission once afebrile x48 hrs (by 5/29). Scheduled for IR tomorrow for chemoport placement. Pt to f/u Dr. Nick outpatient for systemic chemo.

## 2018-05-29 NOTE — PROGRESS NOTE ADULT - PROBLEM SELECTOR PLAN 1
Improving pain. Patient with acute onset RUQ abdominal pain and CT A/P findings c/w acute cholecystitis; RUQ U/S showing mobile stones possibly causing transient obstruction vs. extrinsic compression from tumor burden given hepatic mets. s/p IR percutaneous cholecystostomy placement 5/25  -biliary fluid growing E. faecium, citrobacter, e. coli  -on zoysyn given multiple GNR on bili cx, awaiting final culture and sensitivities to deescalate  -c/w dilaudid for pain control, will transition to oral meds  -now on regular diet tolerating well  -bl cx NGTD

## 2018-05-29 NOTE — PROGRESS NOTE ADULT - ASSESSMENT
67 yr old male with a PMHx of recently diagnosed colon cancer presenting from oncologist's office with acute onset abdominal pain and fever, found to have acute cholecystitis and hepatic vein thrombosis, deemed poor surgical candidate 2/2 active metastic colon Ca, admitted to [facility] for medical management.

## 2018-05-29 NOTE — CONSULT NOTE ADULT - ASSESSMENT
67 year old female with acute cholecystitis associated symptoms s/p biliary drain and improvement since admission. History of recently diagnosed Stage 4 colon cancer pending start of chemotherapy. Consult for symptom assessments and supportive services.
67F with newly diagnosed metastatic colon cancer.    - CT concerning for acute cholecystitis, however inflammation of gallbladder may be reactive from abutting colon cancer.  - Would follow up with U/S to visualize if large stone is mobile, if so less likely acute cholecystitis.  - Abnormal LFTs most likely from hepatic metastases  - Would continue IV Zosyn  - Dispo pending ultrasound  - Discussed with Chief and Dr. Narvaez
67F PMHx metastatic colon cancer admitted to [facility] medicine for acute cholecystitis and portal and superior mesenteric vein thromboses, s/p percutaneous cholecystostomy today, now with sepsis, likely 2/2 instrumentation of the gallbladder, low suspicion for aspiration PNA at this time.

## 2018-05-29 NOTE — PROGRESS NOTE ADULT - SUBJECTIVE AND OBJECTIVE BOX
SUBJECTIVE: Patient seen and examined bedside by surgery team. Doing well. No events overnight. Patient denies abdominal pain, nausea, vomiting. Tolerating regular diet, passing flatus, and had large bowel movement today, first BM since Sunday.     Blood cultures negative for last 48 hours. Discussed with patient plan for chemoport placement. Patient mentioned her oncologist wanted to hold off on chemoport. Will call Dr. Lundy for more information.       enoxaparin Injectable 90 milliGRAM(s) SubCutaneous every 12 hours  piperacillin/tazobactam IVPB. 3.375 Gram(s) IV Intermittent every 6 hours      Vital Signs Last 24 Hrs  T(C): 36.8 (29 May 2018 12:31), Max: 37.1 (28 May 2018 17:22)  T(F): 98.2 (29 May 2018 12:31), Max: 98.7 (28 May 2018 17:22)  HR: 97 (29 May 2018 12:31) (96 - 100)  BP: 126/85 (29 May 2018 12:31) (126/85 - 146/92)  BP(mean): --  RR: 18 (29 May 2018 12:31) (18 - 18)  SpO2: 97% (29 May 2018 12:31) (95% - 100%)  I&O's Detail    28 May 2018 07:01  -  29 May 2018 07:00  --------------------------------------------------------  IN:    Oral Fluid: 500 mL    Solution: 200 mL    Solution: 100 mL  Total IN: 800 mL    OUT:    Drain: 150 mL    Voided: 1000 mL  Total OUT: 1150 mL    Total NET: -350 mL          Physical Exam  Gen - NAD, resting comfortably  Neuro - A&Ox3  CV - RRR, normotensive  Resp - nonlabored breathing  GI - Soft, ND, RUQ mild tenderness to palpation, but improved; biliary site dressing CDI. clear bilious fluid in leg bag     LABS:                        10.0   7.5   )-----------( 617      ( 29 May 2018 06:41 )             32.8     05-29    139  |  98  |  15  ----------------------------<  112<H>  4.2   |  25  |  0.51    Ca    9.0      29 May 2018 06:41  Phos  3.9     05-29  Mg     2.4     05-29    TPro  6.5  /  Alb  2.8<L>  /  TBili  0.9  /  DBili  x   /  AST  48<H>  /  ALT  38  /  AlkPhos  166<H>  05-29    PT/INR - ( 29 May 2018 06:41 )   PT: 12.6 sec;   INR: 1.13          PTT - ( 29 May 2018 06:41 )  PTT:33.6 sec      RADIOLOGY & ADDITIONAL STUDIES:

## 2018-05-29 NOTE — CONSULT NOTE ADULT - PROBLEM SELECTOR RECOMMENDATION 9
Chronic RUQ pain likely associated to liver metastasis currently exacerbated by acute events. In the setting of multiple liver lesions and liver cyst please consider adjuvant regimen with steroids since capsular pain may be at play.   Recommend Dexamethasone 4mg PO/IV q12h standing  Recommend APAP 1g IVPB q8h for 1 day then PRN Mild pain  Recommend Oxycodone IR 5mg PO q4h PRN Moderate pain  Recommend Dilaudid 0.5mg IV q4h PRN Severe pain Support provided to patient and family. Patient to have access to supportive services during rest of hospital stay as the patient/family deemed necessary ie. Chaplaincy, Massage therapy, Music therapy, Patient and family supportive services, Palliative SW, etc.    As discussed during the palliative IDT meeting and as identified during the patients PSSA screening the patient would benefit from patient and family support, chaplaincy, music therapy, massage therapy, and palliative SW.

## 2018-05-29 NOTE — CONSULT NOTE ADULT - PROBLEM SELECTOR RECOMMENDATION 7
Started process of MOLST completion with primary team. Discussed nuances of advance directives in the setting of her wanting a  "trial of CPR and Intubation" but had wishes of not being prolonged in life support. Will continue to have ongoing conversations regarding her advance directives and will adjust MOLST wishes prior to her discharge.

## 2018-05-29 NOTE — CONSULT NOTE ADULT - PROBLEM SELECTOR RECOMMENDATION 2
Very distended. Currently mostly in bed since not tolerating ambulation.   Recommend OOB to chair with assistance.  Recommend standing Simethicone q8h x 3 days
Suspect 2/2 possible aspiration pneumonitis, possible fluid overload, or possible sedation from PCT today.  - Continue with BiPap for now  - Unlikely to be aspiration PNA, though appropriate coverage already being given  - Obtain repeat CXR in AM  Dispo to F

## 2018-05-29 NOTE — PROGRESS NOTE ADULT - PROBLEM SELECTOR PLAN 7
DVT PPX: Lovenox BID  FULL CODE  DISPO: RMF pending chemoport and f/u Dr. Nick outpatient [phone]

## 2018-05-29 NOTE — PROGRESS NOTE ADULT - SUBJECTIVE AND OBJECTIVE BOX
CC: Patient is a 67y old  Female who presents with a chief complaint of abdominal pain (25 May 2018 02:05)    OVERNIGHT EVENTS: NAEO    SUBJECTIVE / INTERVAL HPI: Patient seen and examined at bedside. Sitting up in chair. Comfortably appearing. NAD. No respiratory distress. Pt reporting improvement in her breathing and her pain. She denies fevers/chills, SOB, cough, CP, palpitations, abdominal pain, n/v/d/c, urinary symptoms. She does endorse continued mild-moderate tenderness of perc choley site and some PALENCIA, although improved overall. Otherwise, pt has good appetite and denying acute complaints.     ROS: negative unless otherwise stated above.    VITAL SIGNS:  Vital Signs Last 24 Hrs  T(C): 36.8 (29 May 2018 05:24), Max: 37.1 (28 May 2018 17:22)  T(F): 98.3 (29 May 2018 05:24), Max: 98.7 (28 May 2018 17:22)  HR: 100 (29 May 2018 05:24) (96 - 100)  BP: 146/92 (29 May 2018 05:24) (127/85 - 146/92)  BP(mean): --  RR: 18 (29 May 2018 05:24) (18 - 18)  SpO2: 100% (29 May 2018 05:24) (95% - 100%)    PHYSICAL EXAM:    General: WDWN, NAD, no respiratory distress, sitting up comfortably in chair, pleasant   HEENT: NC/AT; PERRL, anicteric sclera; MMM  Neck: supple  Cardiovascular: +S1/S2; RRR  Respiratory: scattered wheezing, no rhonchi or rales, no retractions or labored breathing  Gastrointestinal: soft, TTP over epigastrium and RUQ improved from prior exam, R-sided per choley drain in place draining serous fluid, c/d/i, +BS throughout, no rebound or guarding   Extremities: WWP; trace b/l edema, clubbing or cyanosis  Vascular: 2+ radial, DP/PT pulses B/L  Neurological: AAOx3; no focal deficits    MEDICATIONS:  MEDICATIONS  (STANDING):  ALBUTerol/ipratropium for Nebulization 3 milliLiter(s) Nebulizer every 4 hours  enoxaparin Injectable 90 milliGRAM(s) SubCutaneous every 12 hours  pantoprazole    Tablet 40 milliGRAM(s) Oral before breakfast  piperacillin/tazobactam IVPB. 3.375 Gram(s) IV Intermittent every 6 hours    MEDICATIONS  (PRN):  acetaminophen   Tablet 650 milliGRAM(s) Oral every 6 hours PRN For Temp greater than 38 C (100.4 F)  acetaminophen  Suppository 650 milliGRAM(s) Rectal every 6 hours PRN For Temp greater than 38 C (100.4 F)  HYDROmorphone  Injectable 1 milliGRAM(s) IV Push every 4 hours PRN Severe Pain (7 - 10)  HYDROmorphone  Injectable 0.5 milliGRAM(s) IV Push every 4 hours PRN Moderate Pain (4 - 6)  polyethylene glycol 3350 17 Gram(s) Oral daily PRN Constipation  senna 2 Tablet(s) Oral at bedtime PRN Constipation  simethicone 80 milliGRAM(s) Chew daily PRN Heartburn      ALLERGIES:  Allergies    allergic to cats (Rash)  No Known Drug Allergies    Intolerances        LABS:                        10.0   7.5   )-----------( 617      ( 29 May 2018 06:41 )             32.8     05-29    139  |  98  |  15  ----------------------------<  112<H>  4.2   |  25  |  0.51    Ca    9.0      29 May 2018 06:41  Phos  3.9     05-29  Mg     2.4     05-29    TPro  6.5  /  Alb  2.8<L>  /  TBili  0.9  /  DBili  x   /  AST  48<H>  /  ALT  38  /  AlkPhos  166<H>  05-29    PT/INR - ( 29 May 2018 06:41 )   PT: 12.6 sec;   INR: 1.13          PTT - ( 29 May 2018 06:41 )  PTT:33.6 sec    CAPILLARY BLOOD GLUCOSE          RADIOLOGY & ADDITIONAL TESTS: Reviewed.

## 2018-05-29 NOTE — CONSULT NOTE ADULT - PROBLEM SELECTOR RECOMMENDATION 3
Likely related to constipation and gas buildup.  Consider Scopolamine patch x1 if not relieved after having BM.

## 2018-05-29 NOTE — CONSULT NOTE ADULT - PROBLEM SELECTOR RECOMMENDATION 4
Currently no BM since Saturday (day 4). Has not received any PRN for bowel regimen. Goal of 1 BM daily.  Recommend Standing Miralax and Senna 2tab BID  Recommend x1 Dulcolax supp now.  Recommend Lactulose if no BM by tomorrow.

## 2018-05-29 NOTE — PROGRESS NOTE ADULT - PROBLEM SELECTOR PLAN 3
CTA A/P s/f hepatic vein thrombosis; most likely 2/2 hypercoagulable state in the setting of active untreated malignancy  -c/w lovenox 90mg BID, to transition to NOAC outpatient   -f/u Dr. Nick outpatient

## 2018-05-30 DIAGNOSIS — R06.9 UNSPECIFIED ABNORMALITIES OF BREATHING: ICD-10-CM

## 2018-05-30 LAB
-  AMIKACIN: SIGNIFICANT CHANGE UP
-  AMPICILLIN/SULBACTAM: SIGNIFICANT CHANGE UP
-  AMPICILLIN/SULBACTAM: SIGNIFICANT CHANGE UP
-  AMPICILLIN: SIGNIFICANT CHANGE UP
-  AMPICILLIN: SIGNIFICANT CHANGE UP
-  AZTREONAM: SIGNIFICANT CHANGE UP
-  CEFAZOLIN: SIGNIFICANT CHANGE UP
-  CEFAZOLIN: SIGNIFICANT CHANGE UP
-  CEFEPIME: SIGNIFICANT CHANGE UP
-  CEFOTAXIME: SIGNIFICANT CHANGE UP
-  CEFOXITIN: SIGNIFICANT CHANGE UP
-  CEFTAZIDIME: SIGNIFICANT CHANGE UP
-  CEFTRIAXONE: SIGNIFICANT CHANGE UP
-  CEFTRIAXONE: SIGNIFICANT CHANGE UP
-  CEFUROXIME: SIGNIFICANT CHANGE UP
-  CIPROFLOXACIN: SIGNIFICANT CHANGE UP
-  CIPROFLOXACIN: SIGNIFICANT CHANGE UP
-  ERTAPENEM: SIGNIFICANT CHANGE UP
-  GENTAMICIN: SIGNIFICANT CHANGE UP
-  GENTAMICIN: SIGNIFICANT CHANGE UP
-  IMIPENEM: SIGNIFICANT CHANGE UP
-  LEVOFLOXACIN: SIGNIFICANT CHANGE UP
-  MEROPENEM: SIGNIFICANT CHANGE UP
-  PIPERACILLIN/TAZOBACTAM: SIGNIFICANT CHANGE UP
-  PIPERACILLIN/TAZOBACTAM: SIGNIFICANT CHANGE UP
-  TIGECYCLINE: SIGNIFICANT CHANGE UP
-  TOBRAMYCIN: SIGNIFICANT CHANGE UP
-  TOBRAMYCIN: SIGNIFICANT CHANGE UP
-  TRIMETHOPRIM/SULFAMETHOXAZOLE: SIGNIFICANT CHANGE UP
-  TRIMETHOPRIM/SULFAMETHOXAZOLE: SIGNIFICANT CHANGE UP
ALBUMIN SERPL ELPH-MCNC: 3 G/DL — LOW (ref 3.3–5)
ALP SERPL-CCNC: 157 U/L — HIGH (ref 40–120)
ALT FLD-CCNC: 40 U/L — SIGNIFICANT CHANGE UP (ref 10–45)
ANION GAP SERPL CALC-SCNC: 10 MMOL/L — SIGNIFICANT CHANGE UP (ref 5–17)
ANION GAP SERPL CALC-SCNC: 14 MMOL/L — SIGNIFICANT CHANGE UP (ref 5–17)
APTT BLD: 34.5 SEC — SIGNIFICANT CHANGE UP (ref 27.5–37.4)
AST SERPL-CCNC: 53 U/L — HIGH (ref 10–40)
BILIRUB SERPL-MCNC: 0.6 MG/DL — SIGNIFICANT CHANGE UP (ref 0.2–1.2)
BLD GP AB SCN SERPL QL: NEGATIVE — SIGNIFICANT CHANGE UP
BUN SERPL-MCNC: 17 MG/DL — SIGNIFICANT CHANGE UP (ref 7–23)
BUN SERPL-MCNC: 19 MG/DL — SIGNIFICANT CHANGE UP (ref 7–23)
CALCIUM SERPL-MCNC: 8.7 MG/DL — SIGNIFICANT CHANGE UP (ref 8.4–10.5)
CALCIUM SERPL-MCNC: 8.8 MG/DL — SIGNIFICANT CHANGE UP (ref 8.4–10.5)
CHLORIDE SERPL-SCNC: 92 MMOL/L — LOW (ref 96–108)
CHLORIDE SERPL-SCNC: 97 MMOL/L — SIGNIFICANT CHANGE UP (ref 96–108)
CO2 SERPL-SCNC: 28 MMOL/L — SIGNIFICANT CHANGE UP (ref 22–31)
CO2 SERPL-SCNC: 29 MMOL/L — SIGNIFICANT CHANGE UP (ref 22–31)
CREAT SERPL-MCNC: 0.46 MG/DL — LOW (ref 0.5–1.3)
CREAT SERPL-MCNC: 0.47 MG/DL — LOW (ref 0.5–1.3)
CULTURE RESULTS: SIGNIFICANT CHANGE UP
CULTURE RESULTS: SIGNIFICANT CHANGE UP
GLUCOSE SERPL-MCNC: 120 MG/DL — HIGH (ref 70–99)
GLUCOSE SERPL-MCNC: 122 MG/DL — HIGH (ref 70–99)
HCT VFR BLD CALC: 29.9 % — LOW (ref 34.5–45)
HGB BLD-MCNC: 9.1 G/DL — LOW (ref 11.5–15.5)
INR BLD: 1.16 — SIGNIFICANT CHANGE UP (ref 0.88–1.16)
MAGNESIUM SERPL-MCNC: 2.3 MG/DL — SIGNIFICANT CHANGE UP (ref 1.6–2.6)
MCHC RBC-ENTMCNC: 21.8 PG — LOW (ref 27–34)
MCHC RBC-ENTMCNC: 30.4 G/DL — LOW (ref 32–36)
MCV RBC AUTO: 71.5 FL — LOW (ref 80–100)
ORGANISM # SPEC MICROSCOPIC CNT: SIGNIFICANT CHANGE UP
OSMOLALITY SERPL: 290 MOSM/KG — SIGNIFICANT CHANGE UP (ref 280–301)
PLATELET # BLD AUTO: 640 K/UL — HIGH (ref 150–400)
POTASSIUM SERPL-MCNC: 3.6 MMOL/L — SIGNIFICANT CHANGE UP (ref 3.5–5.3)
POTASSIUM SERPL-MCNC: 3.6 MMOL/L — SIGNIFICANT CHANGE UP (ref 3.5–5.3)
POTASSIUM SERPL-SCNC: 3.6 MMOL/L — SIGNIFICANT CHANGE UP (ref 3.5–5.3)
POTASSIUM SERPL-SCNC: 3.6 MMOL/L — SIGNIFICANT CHANGE UP (ref 3.5–5.3)
PROT SERPL-MCNC: 6.2 G/DL — SIGNIFICANT CHANGE UP (ref 6–8.3)
PROTHROM AB SERPL-ACNC: 12.9 SEC — HIGH (ref 9.8–12.7)
RBC # BLD: 4.18 M/UL — SIGNIFICANT CHANGE UP (ref 3.8–5.2)
RBC # FLD: 27.8 % — HIGH (ref 10.3–16.9)
RH IG SCN BLD-IMP: POSITIVE — SIGNIFICANT CHANGE UP
SODIUM SERPL-SCNC: 130 MMOL/L — LOW (ref 135–145)
SODIUM SERPL-SCNC: 140 MMOL/L — SIGNIFICANT CHANGE UP (ref 135–145)
SPECIMEN SOURCE: SIGNIFICANT CHANGE UP
SPECIMEN SOURCE: SIGNIFICANT CHANGE UP
WBC # BLD: 7.6 K/UL — SIGNIFICANT CHANGE UP (ref 3.8–10.5)
WBC # FLD AUTO: 7.6 K/UL — SIGNIFICANT CHANGE UP (ref 3.8–10.5)

## 2018-05-30 PROCEDURE — 99356: CPT

## 2018-05-30 PROCEDURE — 99233 SBSQ HOSP IP/OBS HIGH 50: CPT

## 2018-05-30 PROCEDURE — 99233 SBSQ HOSP IP/OBS HIGH 50: CPT | Mod: GC

## 2018-05-30 PROCEDURE — 71045 X-RAY EXAM CHEST 1 VIEW: CPT | Mod: 26

## 2018-05-30 RX ORDER — FUROSEMIDE 40 MG
20 TABLET ORAL ONCE
Qty: 0 | Refills: 0 | Status: COMPLETED | OUTPATIENT
Start: 2018-05-30 | End: 2018-05-30

## 2018-05-30 RX ORDER — POTASSIUM CHLORIDE 20 MEQ
20 PACKET (EA) ORAL ONCE
Qty: 0 | Refills: 0 | Status: COMPLETED | OUTPATIENT
Start: 2018-05-30 | End: 2018-05-30

## 2018-05-30 RX ADMIN — PANTOPRAZOLE SODIUM 40 MILLIGRAM(S): 20 TABLET, DELAYED RELEASE ORAL at 07:11

## 2018-05-30 RX ADMIN — HYDROMORPHONE HYDROCHLORIDE 0.5 MILLIGRAM(S): 2 INJECTION INTRAMUSCULAR; INTRAVENOUS; SUBCUTANEOUS at 17:01

## 2018-05-30 RX ADMIN — SENNA PLUS 2 TABLET(S): 8.6 TABLET ORAL at 18:11

## 2018-05-30 RX ADMIN — OXYCODONE HYDROCHLORIDE 5 MILLIGRAM(S): 5 TABLET ORAL at 10:00

## 2018-05-30 RX ADMIN — HYDROMORPHONE HYDROCHLORIDE 0.5 MILLIGRAM(S): 2 INJECTION INTRAMUSCULAR; INTRAVENOUS; SUBCUTANEOUS at 03:11

## 2018-05-30 RX ADMIN — PIPERACILLIN AND TAZOBACTAM 200 GRAM(S): 4; .5 INJECTION, POWDER, LYOPHILIZED, FOR SOLUTION INTRAVENOUS at 09:31

## 2018-05-30 RX ADMIN — HYDROMORPHONE HYDROCHLORIDE 0.5 MILLIGRAM(S): 2 INJECTION INTRAMUSCULAR; INTRAVENOUS; SUBCUTANEOUS at 03:53

## 2018-05-30 RX ADMIN — HYDROMORPHONE HYDROCHLORIDE 0.5 MILLIGRAM(S): 2 INJECTION INTRAMUSCULAR; INTRAVENOUS; SUBCUTANEOUS at 11:32

## 2018-05-30 RX ADMIN — OXYCODONE HYDROCHLORIDE 5 MILLIGRAM(S): 5 TABLET ORAL at 19:42

## 2018-05-30 RX ADMIN — Medication 1000 MILLIGRAM(S): at 16:03

## 2018-05-30 RX ADMIN — HYDROMORPHONE HYDROCHLORIDE 0.5 MILLIGRAM(S): 2 INJECTION INTRAMUSCULAR; INTRAVENOUS; SUBCUTANEOUS at 07:21

## 2018-05-30 RX ADMIN — OXYCODONE HYDROCHLORIDE 5 MILLIGRAM(S): 5 TABLET ORAL at 09:30

## 2018-05-30 RX ADMIN — Medication 3 MILLILITER(S): at 02:12

## 2018-05-30 RX ADMIN — Medication 400 MILLIGRAM(S): at 07:11

## 2018-05-30 RX ADMIN — PIPERACILLIN AND TAZOBACTAM 200 GRAM(S): 4; .5 INJECTION, POWDER, LYOPHILIZED, FOR SOLUTION INTRAVENOUS at 03:12

## 2018-05-30 RX ADMIN — SIMETHICONE 80 MILLIGRAM(S): 80 TABLET, CHEWABLE ORAL at 21:50

## 2018-05-30 RX ADMIN — HYDROMORPHONE HYDROCHLORIDE 0.5 MILLIGRAM(S): 2 INJECTION INTRAMUSCULAR; INTRAVENOUS; SUBCUTANEOUS at 16:00

## 2018-05-30 RX ADMIN — Medication 3 MILLILITER(S): at 21:50

## 2018-05-30 RX ADMIN — POLYETHYLENE GLYCOL 3350 17 GRAM(S): 17 POWDER, FOR SOLUTION ORAL at 18:11

## 2018-05-30 RX ADMIN — HYDROMORPHONE HYDROCHLORIDE 0.5 MILLIGRAM(S): 2 INJECTION INTRAMUSCULAR; INTRAVENOUS; SUBCUTANEOUS at 07:52

## 2018-05-30 RX ADMIN — HYDROMORPHONE HYDROCHLORIDE 0.5 MILLIGRAM(S): 2 INJECTION INTRAMUSCULAR; INTRAVENOUS; SUBCUTANEOUS at 11:51

## 2018-05-30 RX ADMIN — Medication 20 MILLIGRAM(S): at 09:31

## 2018-05-30 RX ADMIN — OXYCODONE HYDROCHLORIDE 5 MILLIGRAM(S): 5 TABLET ORAL at 19:15

## 2018-05-30 RX ADMIN — SIMETHICONE 80 MILLIGRAM(S): 80 TABLET, CHEWABLE ORAL at 07:11

## 2018-05-30 RX ADMIN — Medication 1 TABLET(S): at 18:11

## 2018-05-30 RX ADMIN — Medication 3 MILLILITER(S): at 11:32

## 2018-05-30 RX ADMIN — HYDROMORPHONE HYDROCHLORIDE 0.5 MILLIGRAM(S): 2 INJECTION INTRAMUSCULAR; INTRAVENOUS; SUBCUTANEOUS at 22:15

## 2018-05-30 RX ADMIN — Medication 400 MILLIGRAM(S): at 14:34

## 2018-05-30 RX ADMIN — HYDROMORPHONE HYDROCHLORIDE 0.5 MILLIGRAM(S): 2 INJECTION INTRAMUSCULAR; INTRAVENOUS; SUBCUTANEOUS at 21:50

## 2018-05-30 RX ADMIN — Medication 1000 MILLIGRAM(S): at 08:52

## 2018-05-30 RX ADMIN — Medication 20 MILLIEQUIVALENT(S): at 18:11

## 2018-05-30 RX ADMIN — SENNA PLUS 2 TABLET(S): 8.6 TABLET ORAL at 07:11

## 2018-05-30 RX ADMIN — SIMETHICONE 80 MILLIGRAM(S): 80 TABLET, CHEWABLE ORAL at 14:34

## 2018-05-30 RX ADMIN — Medication 3 MILLILITER(S): at 07:11

## 2018-05-30 NOTE — DIETITIAN INITIAL EVALUATION ADULT. - PROBLEM SELECTOR PLAN 5
- stage 4 metastatic colon cancer with liver mets and peritoneal carcinomatosis  - per patient, plan per Dr Landry is to put chemoport in June  -if oncology has not spoken with patient regarding prognosis, would recommend palliative care consult   -f/u heme onc recs

## 2018-05-30 NOTE — DIETITIAN INITIAL EVALUATION ADULT. - ENERGY NEEDS
Height: 62" Weight: 194lbs, IBW 110lbs+/-10%, %%, BMI - 35  IBW used to calculate energy needs due to pt's current body weight exceeding 120% of IBW  Nutrient needs based on [facility] standards of care for repletion in adults 2/2 wt loss

## 2018-05-30 NOTE — PROGRESS NOTE ADULT - PROBLEM SELECTOR PLAN 7
Advance care planning meeting  Start time:   End time:   Total time:     Primary decision maker: Patient  Discussed advance directives including healthcare proxy.  Decision regarding code status: Full code (Trial of CPR and Intubation)  Documentation completed: ELIDA. Patient was provided the health care proxy form yesterday. She is planning to have further discussion with her close friend, Robyn, this afternoon.   Current plan is for chemotherapy, IR to place a chemo port

## 2018-05-30 NOTE — PROGRESS NOTE ADULT - SUBJECTIVE AND OBJECTIVE BOX
INTERVAL HPI/OVERNIGHT EVENTS:  Patient was seen and examined at bedside. As per nurse and patient, no o/n events, patient resting comfortably. No complaints at this time. Patient denies: fever, chills, dizziness, weakness, HA, Changes in vision, CP, palpitations, SOB, cough, N/V/D/C, dysuria, changes in bowel movements, LE edema. ROS otherwise negative.    VITAL SIGNS:  T(F): 98.3 (05-30-18 @ 05:29)  HR: 86 (05-30-18 @ 05:29)  BP: 126/84 (05-30-18 @ 05:29)  RR: 18 (05-30-18 @ 05:29)  SpO2: 92% (05-30-18 @ 05:29)  Wt(kg): --    PHYSICAL EXAM:    General: WDWN, NAD, no respiratory distress, sitting up comfortably in bed, pleasant   HEENT: NC/AT; PERRL, anicteric sclera; MMM  Neck: supple  Cardiovascular: +S1/S2; RRR  Respiratory: scattered wheezing, no rhonchi or rales, no retractions or labored breathing  Gastrointestinal: soft, TTP over epigastrium and RUQ, R-sided per choley drain in place draining serous fluid, c/d/i, +BS throughout, no rebound or guarding   Extremities: WWP; trace b/l edema, clubbing or cyanosis  Vascular: 2+ radial, DP/PT pulses B/L  Neurological: AAOx3; no focal deficits    MEDICATIONS  (STANDING):  acetaminophen  IVPB. 1000 milliGRAM(s) IV Intermittent every 8 hours  ALBUTerol/ipratropium for Nebulization 3 milliLiter(s) Nebulizer every 4 hours  pantoprazole    Tablet 40 milliGRAM(s) Oral before breakfast  piperacillin/tazobactam IVPB. 3.375 Gram(s) IV Intermittent every 6 hours  polyethylene glycol 3350 17 Gram(s) Oral two times a day  senna 2 Tablet(s) Oral two times a day  simethicone 80 milliGRAM(s) Chew every 8 hours    MEDICATIONS  (PRN):  acetaminophen   Tablet 650 milliGRAM(s) Oral every 6 hours PRN For Temp greater than 38 C (100.4 F)  acetaminophen  Suppository 650 milliGRAM(s) Rectal every 6 hours PRN For Temp greater than 38 C (100.4 F)  HYDROmorphone  Injectable 0.5 milliGRAM(s) IV Push every 4 hours PRN Severe Pain (7 - 10)  oxyCODONE    IR 5 milliGRAM(s) Oral every 4 hours PRN Moderate Pain (4 - 6)      Allergies    allergic to cats (Rash)  No Known Drug Allergies    Intolerances        LABS:                        9.1    7.6   )-----------( 640      ( 30 May 2018 05:52 )             29.9     05-30    130<L>  |  92<L>  |  19  ----------------------------<  122<H>  3.6   |  28  |  0.46<L>    Ca    8.7      30 May 2018 05:52  Phos  3.9     05-29  Mg     2.3     05-30    TPro  6.2  /  Alb  3.0<L>  /  TBili  0.6  /  DBili  x   /  AST  53<H>  /  ALT  40  /  AlkPhos  157<H>  05-30    PT/INR - ( 30 May 2018 05:52 )   PT: 12.9 sec;   INR: 1.16          PTT - ( 30 May 2018 05:52 )  PTT:34.5 sec      RADIOLOGY & ADDITIONAL TESTS:  Reviewed INTERVAL HPI/OVERNIGHT EVENTS:  Patient seen and examined at bedside. Lying in bed. Comfortable appearing. NAD. No respiratory distress. Pt reports continued RUQ pain. She denies fevers/chills, SOB, cough, CP, palpitations, urinary symptoms. She does endorse continued mild-moderate tenderness of perc choley site. Otherwise, pt has good appetite and denying acute complaints.     VITAL SIGNS:  T(F): 98.3 (05-30-18 @ 05:29)  HR: 86 (05-30-18 @ 05:29)  BP: 126/84 (05-30-18 @ 05:29)  RR: 18 (05-30-18 @ 05:29)  SpO2: 92% (05-30-18 @ 05:29)  Wt(kg): --    PHYSICAL EXAM:    General: WDWN, NAD, no respiratory distress, sitting up comfortably in bed, pleasant   HEENT: NC/AT; PERRL, anicteric sclera; MMM  Neck: supple  Cardiovascular: +S1/S2; RRR  Respiratory: scattered wheezing, no rhonchi or rales, no retractions or labored breathing  Gastrointestinal: soft, TTP over epigastrium and RUQ, R-sided per choley drain in place draining serous fluid, c/d/i, +BS throughout, no rebound or guarding   Extremities: WWP; trace b/l edema, clubbing or cyanosis  Vascular: 2+ radial, DP/PT pulses B/L  Neurological: AAOx3; no focal deficits    MEDICATIONS  (STANDING):  acetaminophen  IVPB. 1000 milliGRAM(s) IV Intermittent every 8 hours  ALBUTerol/ipratropium for Nebulization 3 milliLiter(s) Nebulizer every 4 hours  pantoprazole    Tablet 40 milliGRAM(s) Oral before breakfast  piperacillin/tazobactam IVPB. 3.375 Gram(s) IV Intermittent every 6 hours  polyethylene glycol 3350 17 Gram(s) Oral two times a day  senna 2 Tablet(s) Oral two times a day  simethicone 80 milliGRAM(s) Chew every 8 hours    MEDICATIONS  (PRN):  acetaminophen   Tablet 650 milliGRAM(s) Oral every 6 hours PRN For Temp greater than 38 C (100.4 F)  acetaminophen  Suppository 650 milliGRAM(s) Rectal every 6 hours PRN For Temp greater than 38 C (100.4 F)  HYDROmorphone  Injectable 0.5 milliGRAM(s) IV Push every 4 hours PRN Severe Pain (7 - 10)  oxyCODONE    IR 5 milliGRAM(s) Oral every 4 hours PRN Moderate Pain (4 - 6)      Allergies    allergic to cats (Rash)  No Known Drug Allergies    Intolerances        LABS:                        9.1    7.6   )-----------( 640      ( 30 May 2018 05:52 )             29.9     05-30    130<L>  |  92<L>  |  19  ----------------------------<  122<H>  3.6   |  28  |  0.46<L>    Ca    8.7      30 May 2018 05:52  Phos  3.9     05-29  Mg     2.3     05-30    TPro  6.2  /  Alb  3.0<L>  /  TBili  0.6  /  DBili  x   /  AST  53<H>  /  ALT  40  /  AlkPhos  157<H>  05-30    PT/INR - ( 30 May 2018 05:52 )   PT: 12.9 sec;   INR: 1.16          PTT - ( 30 May 2018 05:52 )  PTT:34.5 sec      RADIOLOGY & ADDITIONAL TESTS:  Reviewed

## 2018-05-30 NOTE — PHYSICAL THERAPY INITIAL EVALUATION ADULT - CRITERIA FOR SKILLED THERAPEUTIC INTERVENTIONS
functional limitations in following categories/risk reduction/prevention/therapy frequency/anticipated equipment needs at discharge/anticipated discharge recommendation/rehab potential/impairments found

## 2018-05-30 NOTE — DIETITIAN INITIAL EVALUATION ADULT. - NS AS NUTRI INTERV ED CONTENT
Purpose of the nutrition education/Encouraged intake and discussed food preferences to optimize PO intake.

## 2018-05-30 NOTE — PROGRESS NOTE ADULT - PROBLEM SELECTOR PLAN 3
Improved. s/p 2 large BMs yesterday.  - Continue with aggressive bowel regimen to maintain at least one BM daily Improved. s/p 2 large BMs yesterday.

## 2018-05-30 NOTE — PROGRESS NOTE ADULT - PROBLEM SELECTOR PLAN 2
Stage 4 metastatic colon cancer with liver mets and peritoneal carcinomatosis  - plan per Dr. Nick   - for chemoport placement Wed 5/30 (to be afebrile x48 hrs)  - f/u heme onc recs  -palliative consulted given metastatic CRC, pt aware of 1-2 yr prognosis and actively filling out MOLST

## 2018-05-30 NOTE — PROGRESS NOTE ADULT - PROBLEM SELECTOR PLAN 6
Pending chemo port placement with IR, will likely happen tomorrow.   Has not received any cancer targeted treatments. Evidence of liver and abdominal carcinomatosis spread.

## 2018-05-30 NOTE — PROGRESS NOTE ADULT - PROBLEM SELECTOR PLAN 5
Exacerbated by lack of ambulation. Abdominal pain also playing a role. No signs of acute respiratory distress.  - Recommend using incentive spirometer to prevent atelectasis (Patient taught how to use IC)  - Pain control as described above.  - Recommend OOB to chair with assistance and physical therapy follow-up.  - Oxygen via nasal cannula as needed

## 2018-05-30 NOTE — PROGRESS NOTE ADULT - PROBLEM SELECTOR PLAN 9
Support provided to patient and family. Patient was seen by the  yesterday. Patient to continue to have access to supportive services during rest of hospital stay as the patient/family deemed necessary ie. Chaplaincy, Massage therapy, Music therapy, Patient and family supportive services, Palliative SW, etc.

## 2018-05-30 NOTE — DIETITIAN INITIAL EVALUATION ADULT. - PROBLEM SELECTOR PLAN 3
-CTA A/P s/f hepatic vein thrombosis  -most likely 2/2 hypercoagulable state in the setting of active untreated malignancy  -initiated on heparin gtt at 16/hr  -f/u 5:30 am PTT

## 2018-05-30 NOTE — PROGRESS NOTE ADULT - SUBJECTIVE AND OBJECTIVE BOX
PILLO PEDRAZA             MRN-[id number]    CC: Patient reports that she had two significant and large BMs overnight. She endorses mild improvement of her bloating however she still doesn't have much of an appetite. Denies any nausea or vomiting. She also reports that her RUQ and back pain has improved and better controlled.   Patient saw the  yesterday and is requesting Music and Massage therapy. Patient was given the Health Care Proxy form and is planning to review it with her friend later.     HPI:  68 y/o female with a PMHx of recently dx metastatic ascending colon cancer (dx 2 weeks ago w/ mets to the liver and peritoneal carcinomatosis) that presents from Dr. Lundy's office with acute onset RUQ abdominal pain, fever and weakness for the past 3 days. Pain has worsened over the past 3 days and last evening was associated with one episode of vomiting (nonbloody, questionable bilious content).  Last BM was 2 days prior to presentation. She denies any associated chills, chest pain/discomfort/pressure, SOB/dyspnea, dysuria, diarrhea, hematochezia. Had CT A/P in ED which was s/f acute cholecystitis and hepatic vein thrombosis.    Cancer Hx: pt had a normal colonoscopy 2 years ago-had a polypectomy with benign pathology per pt. States she started getting non-specific abdominal pain starting last summer which she self-medicated with rolaids. States she ad 4 total episodes of abdominal pain over the summer and eventually went to see GI who did colonoscopy 2 weeks ago and diagnosed her with sub-total obstructing ascending colon cancer; had PET scan on Tuesday which showed liver mets and peritoneal carcinomatosis; plan for chemoport placement in June; patient also endorsing intermittent fevers, chills, and unintentional 25 lbs weight loss since Janurary. Denies any family hx of colon cancer.  ED Course  Vital Signs   TMax: 100.3 HR: 97 BP: 116/73 RR: 16 SpO2: 96%   CT a/p findings as above   s/p zosyn 3.375 q6  s/p initiation of heparin gtt for hepatic vein thrombosis (25 May 2018 02:05)    SUBJECTIVE:    ROS:  DYSPNEA: Y / N	  NAUS/VOM: Y / N	  SECRETIONS: Y / N	  AGITATION: Y / N  Pain (Y/N):       -Provocation/Palliation:  -Quality/Quantity:  -Radiating:  -Severity:  -Timing/Frequency:  -Impact on ADLs:    OTHER REVIEW OF SYSTEMS:  UNABLE TO OBTAIN  due to:    PEx:  T(C): 36.8 (05-30-18 @ 09:38), Max: 37.8 (05-29-18 @ 16:54)  HR: 97 (05-30-18 @ 09:38) (86 - 97)  BP: 137/85 (05-30-18 @ 09:38) (123/67 - 137/85)  RR: 18 (05-30-18 @ 09:38) (18 - 20)  SpO2: 93% (05-30-18 @ 09:38) (91% - 97%)  Wt(kg): --    GENERAL:    HEENT:      	  NECK:           CVS:           	  RESP:        	  GI:             	  :            	  MUSC:       	  NEURO:     	  PSYCH:          SKIN:         	   LYMPH:      	     ALLERGIES: allergic to cats (Rash)  No Known Drug Allergies      OPIATE NAÏVE (Y/N):    MEDICATIONS: REVIEWED  MEDICATIONS  (STANDING):  acetaminophen  IVPB. 1000 milliGRAM(s) IV Intermittent every 8 hours  ALBUTerol/ipratropium for Nebulization 3 milliLiter(s) Nebulizer every 4 hours  pantoprazole    Tablet 40 milliGRAM(s) Oral before breakfast  piperacillin/tazobactam IVPB. 3.375 Gram(s) IV Intermittent every 6 hours  polyethylene glycol 3350 17 Gram(s) Oral two times a day  senna 2 Tablet(s) Oral two times a day  simethicone 80 milliGRAM(s) Chew every 8 hours    MEDICATIONS  (PRN):  acetaminophen   Tablet 650 milliGRAM(s) Oral every 6 hours PRN For Temp greater than 38 C (100.4 F)  acetaminophen  Suppository 650 milliGRAM(s) Rectal every 6 hours PRN For Temp greater than 38 C (100.4 F)  HYDROmorphone  Injectable 0.5 milliGRAM(s) IV Push every 4 hours PRN Severe Pain (7 - 10)  oxyCODONE    IR 5 milliGRAM(s) Oral every 4 hours PRN Moderate Pain (4 - 6)      LABS: REVIEWED        IMAGING: REVIEWED    ADVANCED DIRECTIVES:     FULL CODE     DNR     DNI     LIVING WILL     MOLST    DECISION MAKER:   LEGAL SURROGATE:    PSYCHOSOCIAL-SPIRITUAL ASSESSMENT:       Reviewed       Care plan unchanged       Care plan adjusted as above	    GOALS OF CARE DISCUSSION       Palliative care info/counseling provided	           Family meeting       Advanced Directives addressed please see Advance Care Planning Note	           See previous Palliative Medicine Note       Documentation of GOC:   	      AGENCY CHOICE DISCUSSED:           Homecare        Hospice        [facility]        Other:    REFERRALS	        Palliative Med        Unit SW/Case Mgmt              Speech/Swallow       Patient/Family Support       Massage Therapy       Music Therapy       Hospice       Nutrition       Ethics       PT/OT        CRITICAL CARE TIME PROVIDED TO UNSTABLE PT W/ ORGAN FAILURE	   Start:               End:  	       Minutes:              > 50% OF THE TIME SPENT IN COUNSELING AND COORDINATING CARE 	   Start:               End:  	       Minutes:      PROLONGED SERVICE             FACE TO FACE:    PT            PT & FAMILY	   Start:               End:  	       Minutes:      Advance Care Planning Time: PILLO PEDRAZA             MRN-[id number]    CC: Patient reports that she had two significant and large BMs overnight. She endorses mild improvement of her bloating however she still doesn't have much of an appetite. Denies any nausea or vomiting. She also reports that her RUQ and back pain has improved and better controlled on pain regimen.   Patient saw the  yesterday and is requesting Music and Massage therapy. Patient was given the Health Care Proxy form and is planning to review it with her friend later.     HPI:  68 y/o female with a PMHx of recently dx metastatic ascending colon cancer (dx 2 weeks ago w/ mets to the liver and peritoneal carcinomatosis) that presents from Dr. Lundy's office with acute onset RUQ abdominal pain, fever and weakness for the past 3 days. Pain has worsened over the past 3 days and last evening was associated with one episode of vomiting (nonbloody, questionable bilious content).  Last BM was 2 days prior to presentation. She denies any associated chills, chest pain/discomfort/pressure, SOB/dyspnea, dysuria, diarrhea, hematochezia. Had CT A/P in ED which was s/f acute cholecystitis and hepatic vein thrombosis.    Cancer Hx: pt had a normal colonoscopy 2 years ago-had a polypectomy with benign pathology per pt. States she started getting non-specific abdominal pain starting last summer which she self-medicated with rolaids. States she ad 4 total episodes of abdominal pain over the summer and eventually went to see GI who did colonoscopy 2 weeks ago and diagnosed her with sub-total obstructing ascending colon cancer; had PET scan on Tuesday which showed liver mets and peritoneal carcinomatosis; plan for chemoport placement in June; patient also endorsing intermittent fevers, chills, and unintentional 25 lbs weight loss since Janurary. Denies any family hx of colon cancer.  ED Course  Vital Signs   TMax: 100.3 HR: 97 BP: 116/73 RR: 16 SpO2: 96%   CT a/p findings as above   s/p zosyn 3.375 q6  s/p initiation of heparin gtt for hepatic vein thrombosis (25 May 2018 02:05)    SUBJECTIVE:    ROS:  DYSPNEA: No  NAUS/VOM: No	  SECRETIONS: No	  AGITATION: No  Pain (Y/N):       -Provocation/Palliation: RUQ and back pain exacerbated with movement  -Quality/Quantity: visceral pain and malignant somatic pain both controlled   -Radiating: Epigastric, RUQ and right flank  -Severity: Moderate to severe   -Timing/Frequency: At night  -Impact on ADLs: Patient is been mostly in bed    OTHER REVIEW OF SYSTEMS:  UNABLE TO OBTAIN  due to:    PEx:  T(C): 36.8 (05-30-18 @ 09:38), Max: 37.8 (05-29-18 @ 16:54)  HR: 97 (05-30-18 @ 09:38) (86 - 97)  BP: 137/85 (05-30-18 @ 09:38) (123/67 - 137/85)  RR: 18 (05-30-18 @ 09:38) (18 - 20)  SpO2: 93% (05-30-18 @ 09:38) (91% - 97%)  Wt(kg): --    General:  Denied  HEENT:    Dry mouth, thirst.  Neck:  Denied  CVS:  Denied  Resp:  PALENCIA, mild bilateral crackles  GI:  Distended, belching, pain  :  Denied  Musc:  Denied  Neuro:  Denied  Psych:  Denied  Skin:  Denied  Lymph:  Denied    	     ALLERGIES: allergic to cats (Rash)  No Known Drug Allergies      OPIATE NAÏVE (Y/N): YES    MEDICATIONS: REVIEWED  MEDICATIONS  (STANDING):  acetaminophen  IVPB. 1000 milliGRAM(s) IV Intermittent every 8 hours  ALBUTerol/ipratropium for Nebulization 3 milliLiter(s) Nebulizer every 4 hours  pantoprazole    Tablet 40 milliGRAM(s) Oral before breakfast  piperacillin/tazobactam IVPB. 3.375 Gram(s) IV Intermittent every 6 hours  polyethylene glycol 3350 17 Gram(s) Oral two times a day  senna 2 Tablet(s) Oral two times a day  simethicone 80 milliGRAM(s) Chew every 8 hours    MEDICATIONS  (PRN):  acetaminophen   Tablet 650 milliGRAM(s) Oral every 6 hours PRN For Temp greater than 38 C (100.4 F)  acetaminophen  Suppository 650 milliGRAM(s) Rectal every 6 hours PRN For Temp greater than 38 C (100.4 F)  HYDROmorphone  Injectable 0.5 milliGRAM(s) IV Push every 4 hours PRN Severe Pain (7 - 10)  oxyCODONE    IR 5 milliGRAM(s) Oral every 4 hours PRN Moderate Pain (4 - 6)      LABS: REVIEWED        IMAGING: REVIEWED    ADVANCED DIRECTIVES:     FULL CODE     DNR     DNI     LIVING WILL     MOLST    DECISION MAKER:   LEGAL SURROGATE:    PSYCHOSOCIAL-SPIRITUAL ASSESSMENT:       Reviewed       Care plan unchanged       Care plan adjusted as above	    GOALS OF CARE DISCUSSION       Palliative care info/counseling provided	           Family meeting       Advanced Directives addressed please see Advance Care Planning Note	           See previous Palliative Medicine Note       Documentation of GOC:   	      AGENCY CHOICE DISCUSSED:           Homecare        Hospice        [facility]        Other:    REFERRALS	        Palliative Med        Unit SW/Case Mgmt              Speech/Swallow       Patient/Family Support       Massage Therapy       Music Therapy       Hospice       Nutrition       Ethics       PT/OT        CRITICAL CARE TIME PROVIDED TO UNSTABLE PT W/ ORGAN FAILURE	   Start:               End:  	       Minutes:              > 50% OF THE TIME SPENT IN COUNSELING AND COORDINATING CARE 	   Start:               End:  	       Minutes:      PROLONGED SERVICE             FACE TO FACE:    PT            PT & FAMILY	   Start:               End:  	       Minutes:      Advance Care Planning Time: PILLO PEDRAZA             MRN-[id number]    CC: RUQ pain, bloating, flatulence    HPI:  68 y/o female with a PMHx of recently dx metastatic ascending colon cancer (dx 2 weeks ago w/ mets to the liver and peritoneal carcinomatosis) that presents from Dr. Lundy's office with acute onset RUQ abdominal pain, fever and weakness for the past 3 days. Pain has worsened over the past 3 days and last evening was associated with one episode of vomiting (nonbloody, questionable bilious content).  Last BM was 2 days prior to presentation. She denies any associated chills, chest pain/discomfort/pressure, SOB/dyspnea, dysuria, diarrhea, hematochezia. Had CT A/P in ED which was s/f acute cholecystitis and hepatic vein thrombosis.    Cancer Hx: pt had a normal colonoscopy 2 years ago-had a polypectomy with benign pathology per pt. States she started getting non-specific abdominal pain starting last summer which she self-medicated with rolaids. States she ad 4 total episodes of abdominal pain over the summer and eventually went to see GI who did colonoscopy 2 weeks ago and diagnosed her with sub-total obstructing ascending colon cancer; had PET scan on Tuesday which showed liver mets and peritoneal carcinomatosis; plan for chemoport placement in June; patient also endorsing intermittent fevers, chills, and unintentional 25 lbs weight loss since Janurary. Denies any family hx of colon cancer.  ED Course  Vital Signs   TMax: 100.3 HR: 97 BP: 116/73 RR: 16 SpO2: 96%   CT a/p findings as above   s/p zosyn 3.375 q6  s/p initiation of heparin gtt for hepatic vein thrombosis (25 May 2018 02:05)    SUBJECTIVE: Patient seen and examined at bedside. Patient reports that she had two significant and large BMs overnight. She endorses mild improvement of her bloating however she still doesn't have much of an appetite. Denies any nausea or vomiting. She also reports that her RUQ and back pain has improved and better controlled on pain regimen. Patient denies any chest pain or dyspnea at rest, however she most takes shallow breaths.   Patient saw the  yesterday and is requesting Music and Massage therapy. Patient was given the Health Care Proxy form and is planning to review it with her friend later.     ROS:  DYSPNEA: No  NAUS/VOM: No	  SECRETIONS: No	  AGITATION: No  Pain (Y/N):       -Provocation/Palliation: RUQ and back pain exacerbated with movement  -Quality/Quantity: visceral pain and malignant somatic pain both controlled   -Radiating: Epigastric, RUQ and right flank  -Severity: Moderate to severe   -Timing/Frequency: At night  -Impact on ADLs: Patient is been mostly in bed    OTHER REVIEW OF SYSTEMS:  UNABLE TO OBTAIN  due to:    PEx:  T(C): 36.8 (05-30-18 @ 09:38), Max: 37.8 (05-29-18 @ 16:54)  HR: 97 (05-30-18 @ 09:38) (86 - 97)  BP: 137/85 (05-30-18 @ 09:38) (123/67 - 137/85)  RR: 18 (05-30-18 @ 09:38) (18 - 20)  SpO2: 93% (05-30-18 @ 09:38) (91% - 97%)  Wt(kg): --    General:  lying in bed, no acute distress  HEENT:    Dry mucous membrane .  Neck:  Supple, no masses  CVS: Normal S1, S2, No M/R/G  Resp: Unlabored, shallow breaths, mild basilar crackles  GI: Distended, Soft, TTP of RUQ, epigastrium, and lower quadrants. Very diminished BS, perc drain in place  : Normal   Musc: No C/C/E    Neuro: No focal deficits. N  Psych:  Normal affect. Concerned about potential side effects of chemotherapy but with good spirits.  Skin: Xerosis  Lymph: Normal      	     ALLERGIES: allergic to cats (Rash)  No Known Drug Allergies      OPIATE NAÏVE (Y/N): YES    MEDICATIONS: REVIEWED  MEDICATIONS  (STANDING):  acetaminophen  IVPB. 1000 milliGRAM(s) IV Intermittent every 8 hours  ALBUTerol/ipratropium for Nebulization 3 milliLiter(s) Nebulizer every 4 hours  pantoprazole    Tablet 40 milliGRAM(s) Oral before breakfast  piperacillin/tazobactam IVPB. 3.375 Gram(s) IV Intermittent every 6 hours  polyethylene glycol 3350 17 Gram(s) Oral two times a day  senna 2 Tablet(s) Oral two times a day  simethicone 80 milliGRAM(s) Chew every 8 hours    MEDICATIONS  (PRN):  acetaminophen   Tablet 650 milliGRAM(s) Oral every 6 hours PRN For Temp greater than 38 C (100.4 F)  acetaminophen  Suppository 650 milliGRAM(s) Rectal every 6 hours PRN For Temp greater than 38 C (100.4 F)  HYDROmorphone  Injectable 0.5 milliGRAM(s) IV Push every 4 hours PRN Severe Pain (7 - 10)  oxyCODONE    IR 5 milliGRAM(s) Oral every 4 hours PRN Moderate Pain (4 - 6)      LABS: REVIEWED        IMAGING: REVIEWED    ADVANCED DIRECTIVES:     FULL CODE     DNR     DNI     LIVING WILL     MOLST    DECISION MAKER:   LEGAL SURROGATE:    PSYCHOSOCIAL-SPIRITUAL ASSESSMENT:       Reviewed       Care plan unchanged       Care plan adjusted as above	    GOALS OF CARE DISCUSSION       Palliative care info/counseling provided	           Family meeting       Advanced Directives addressed please see Advance Care Planning Note	           See previous Palliative Medicine Note       Documentation of GOC:   	      AGENCY CHOICE DISCUSSED:           Homecare        Hospice        [facility]        Other:    REFERRALS	        Palliative Med        Unit SW/Case Mgmt              Speech/Swallow       Patient/Family Support       Massage Therapy       Music Therapy       Hospice       Nutrition       Ethics       PT/OT        CRITICAL CARE TIME PROVIDED TO UNSTABLE PT W/ ORGAN FAILURE	   Start:               End:  	       Minutes:              > 50% OF THE TIME SPENT IN COUNSELING AND COORDINATING CARE 	   Start:               End:  	       Minutes:      PROLONGED SERVICE             FACE TO FACE:    PT            PT & FAMILY	   Start:               End:  	       Minutes:      Advance Care Planning Time: PILLO PEDRAZA             MRN-[id number]    CC: RUQ pain, bloating, flatulence, constipation,     HPI:  68 y/o female with a PMHx of recently dx metastatic ascending colon cancer (dx 2 weeks ago w/ mets to the liver and peritoneal carcinomatosis) that presents from Dr. Lundy's office with acute onset RUQ abdominal pain, fever and weakness for the past 3 days. Pain has worsened over the past 3 days and last evening was associated with one episode of vomiting (nonbloody, questionable bilious content).  Last BM was 2 days prior to presentation. She denies any associated chills, chest pain/discomfort/pressure, SOB/dyspnea, dysuria, diarrhea, hematochezia. Had CT A/P in ED which was s/f acute cholecystitis and hepatic vein thrombosis.    SUBJECTIVE: Patient seen and examined at bedside. Patient reports that she had two significant and large BMs overnight. She endorses mild improvement of her bloating however she still doesn't have much of an appetite. Denies any nausea or vomiting. She also reports that her RUQ and back pain has improved and better controlled on pain regimen. Patient denies any chest pain or dyspnea at rest, however she most takes shallow breaths.   Patient saw the  yesterday and is requesting Music and Massage therapy. Patient was given the Health Care Proxy form and is planning to review it with her friend later.     ROS:  DYSPNEA: No  NAUS/VOM: No	  SECRETIONS: No	  AGITATION: No  Pain (Y/N):  Yes     -Provocation/Palliation: RUQ and back pain exacerbated with movement  -Quality/Quantity: visceral pain and malignant somatic pain both controlled   -Radiating: Epigastric, RUQ and right flank  -Severity: Moderate  -Timing/Frequency: At night is worse  -Impact on ADLs: Patient is been mostly in bed    OTHER REVIEW OF SYSTEMS: Constipation resolved. Gas improving, but still distended. Appetite decreased.     PEx:  T(C): 36.8 (05-30-18 @ 09:38), Max: 37.8 (05-29-18 @ 16:54)  HR: 97 (05-30-18 @ 09:38) (86 - 97)  BP: 137/85 (05-30-18 @ 09:38) (123/67 - 137/85)  RR: 18 (05-30-18 @ 09:38) (18 - 20)  SpO2: 93% (05-30-18 @ 09:38) (91% - 97%)  Wt(kg): 88    General: AAOx3 lying in bed, no acute distress  HEENT: NCAT Dry mucous membranes Non icteric.   Neck:  Supple, no masses  CVS: Normal S1, S2, No M/R/G  Resp: Unlabored, shallow breaths, mild basilar crackles  GI: Distended, Soft, TTP of RUQ, epigastrium, and lower quadrants. Very diminished BS, perc drain in place  : Normal   Musc: No C/C/E    Neuro: No focal deficits  Psych:  Normal affect. Concerned about potential side effects of chemotherapy but with good spirits.  Skin: Xerosis  Lymph: Normal    ALLERGIES: allergic to cats (Rash)  No Known Drug Allergies    OPIATE NAÏVE (Y/N): YES    MEDICATIONS: REVIEWED  MEDICATIONS  (STANDING):  acetaminophen  IVPB. 1000 milliGRAM(s) IV Intermittent every 8 hours  ALBUTerol/ipratropium for Nebulization 3 milliLiter(s) Nebulizer every 4 hours  pantoprazole    Tablet 40 milliGRAM(s) Oral before breakfast  piperacillin/tazobactam IVPB. 3.375 Gram(s) IV Intermittent every 6 hours  polyethylene glycol 3350 17 Gram(s) Oral two times a day  senna 2 Tablet(s) Oral two times a day  simethicone 80 milliGRAM(s) Chew every 8 hours    MEDICATIONS  (PRN):  acetaminophen   Tablet 650 milliGRAM(s) Oral every 6 hours PRN For Temp greater than 38 C (100.4 F)  acetaminophen  Suppository 650 milliGRAM(s) Rectal every 6 hours PRN For Temp greater than 38 C (100.4 F)  HYDROmorphone  Injectable 0.5 milliGRAM(s) IV Push every 4 hours PRN Severe Pain (7 - 10)  oxyCODONE    IR 5 milliGRAM(s) Oral every 4 hours PRN Moderate Pain (4 - 6)    LABS: REVIEWED                        9.1    7.6   )-----------( 640      ( 30 May 2018 05:52 )             29.9   05-30    140  |  97  |  17  ----------------------------<  120<H>  3.6   |  29  |  0.47<L>    Ca    8.8      30 May 2018 11:29  Phos  3.9     05-29  Mg     2.3     05-30    TPro  6.2  /  Alb  3.0<L>  /  TBili  0.6  /  DBili  x   /  AST  53<H>  /  ALT  40  /  AlkPhos  157<H>  05-30    Osmolality, Serum: 290 mosm/kg (05.30.18 @ 11:29)    Type + Screen (05.30.18 @ 05:30)    ABO Interpretation: B    Rh Interpretation: Positive    Antibody Screen: Negative    IMAGING: REVIEWED    EXAM:  XR CHEST PORTABLE URGENT 1V                        PROCEDURE DATE:  05/30/2018    INTERPRETATION:  Chest x-ray  Indication: Right pleural effusion  A portable frontal view of the chest is compared to the prior study of   the sameday. There is again a small to moderate right pleural effusion.   There is a small left pleural effusion. Atelectatic changes are seen in   the lung bases. Distended gas-filled loops of bowel are seen in the left   upper quadrant.  IMPRESSION: Small to moderate right pleural effusion and small left   pleural effusion. Distended loops of bowel in the upper quadrant.    Advanced Directives:      Full code          MOLST - Ongoing     Decision maker: The patient is able to participate in complex medical decision making conversations  Legal surrogate: No designated HCP, but the patient is thinking of assigning her sister in [place] and her [facility].    GOALS OF CARE DISCUSSION       Palliative care info/counseling provided	           Documentation of GOC:  Wants to pursue cancer targeted treatments.          REFERRALS	        Unit SW/Case Mgmt        - To see the patient       Patient/Family Support - To see the patient       Massage Therapy - To see the patient       Music Therapy - To see the patient       Geriatric case manager    Follow up same day prolonged service visit. A face to face encounter was performed on PILLO PEDRAZA  Active issue: Pain, flatulence, gas distension pain, coordination of care  Start time: 11:10am  End time: 11:40am  Time spent: +30min  Total time spent: 65min  Management: Reassessed pain, bloating, and constipation symptoms, had primary nurse give additional PRN and discussed the goal of the bowel regimen which the patient will track and we will adjust to daily dosing if the patient has more than 2 BM daily. Encouraged patient to be OOB to chair. Discussed ongoing plan of care with the patient and briefly with her friend, Robyn, who she will likely named as her HCP.

## 2018-05-30 NOTE — PROGRESS NOTE ADULT - SUBJECTIVE AND OBJECTIVE BOX
SUBJECTIVE: Patient seen and examined bedside by surgery team.  Low grade fever of 100 overnight. Patient denies abdominal pain, nausea, vomiting. Tolerating regular diet, passing flatus, and reports 2 large bowel movements yesterday. No BMs yet today.    Blood cultures negative for 48 hours. Discussed with patient plan for chemoport placement. Reports Dr. Lundy saw her this morning and would like to hold off on chemoport placement for at least another day.    piperacillin/tazobactam IVPB. 3.375 Gram(s) IV Intermittent every 6 hours      Vital Signs Last 24 Hrs  T(C): 36.8 (30 May 2018 09:38), Max: 37.8 (29 May 2018 16:54)  T(F): 98.2 (30 May 2018 09:38), Max: 100 (29 May 2018 16:54)  HR: 97 (30 May 2018 09:38) (86 - 97)  BP: 137/85 (30 May 2018 09:38) (123/67 - 137/85)  BP(mean): --  RR: 18 (30 May 2018 09:38) (18 - 20)  SpO2: 93% (30 May 2018 09:38) (91% - 93%)  I&O's Detail    29 May 2018 07:01  -  30 May 2018 07:00  --------------------------------------------------------  IN:    Oral Fluid: 400 mL    Solution: 100 mL    Solution: 400 mL  Total IN: 900 mL    OUT:    Drain: 50 mL    Voided: 550 mL  Total OUT: 600 mL    Total NET: 300 mL      30 May 2018 07:01  -  30 May 2018 13:46  --------------------------------------------------------  IN:  Total IN: 0 mL    OUT:    Voided: 400 mL  Total OUT: 400 mL    Total NET: -400 mL          Physical Exam  Gen - NAD, resting comfortably  Neuro - A&Ox3  CV - RRR, normotensive  Resp - nonlabored breathing  GI - Soft, ND, RUQ mild tenderness to palpation, but improved; biliary site dressing CDI. clear bilious fluid in leg bag         LABS:                        9.1    7.6   )-----------( 640      ( 30 May 2018 05:52 )             29.9     05-30    140  |  97  |  17  ----------------------------<  120<H>  3.6   |  29  |  0.47<L>    Ca    8.8      30 May 2018 11:29  Phos  3.9     05-29  Mg     2.3     05-30    TPro  6.2  /  Alb  3.0<L>  /  TBili  0.6  /  DBili  x   /  AST  53<H>  /  ALT  40  /  AlkPhos  157<H>  05-30    PT/INR - ( 30 May 2018 05:52 )   PT: 12.9 sec;   INR: 1.16          PTT - ( 30 May 2018 05:52 )  PTT:34.5 sec      RADIOLOGY & ADDITIONAL STUDIES:

## 2018-05-30 NOTE — PROGRESS NOTE ADULT - PROBLEM SELECTOR PLAN 4
Patient had 2 BMs yesterday. Goal is at least one BM daily.  - Recommend to continue with Miralax BID and Senna.  - Use Dulcolax suppository if no BMs Patient had 2 BMs yesterday. Goal is at least one BM daily. May decrease Miralax and Senna to once a day if patient having more than 2 BM in a day with current regimen.   - Recommend to continue with Miralax BID and Senna.  - Use Dulcolax suppository if no BMs after 3 days.

## 2018-05-30 NOTE — PROGRESS NOTE ADULT - ASSESSMENT
67 year old female with acute cholecystitis associated symptoms s/p biliary drain and improvement since admission. History of recently diagnosed Stage 4 colon cancer pending start of chemotherapy. Palliative following for symptom assessments and supportive services.

## 2018-05-30 NOTE — DIETITIAN INITIAL EVALUATION ADULT. - PROBLEM SELECTOR PLAN 4
-acute anemia most likely 2/2 active colon malignancy  -was getting IV iron infusions o/p  -active type and screen in face of potential impending surgery vs need for blood transfusion

## 2018-05-30 NOTE — PROGRESS NOTE ADULT - PROBLEM SELECTOR PROBLEM 7
Patient has active medical orders for life-sustaining treatment (MOLST) form Advance care planning Goals of care, counseling/discussion

## 2018-05-30 NOTE — PROGRESS NOTE ADULT - ATTENDING COMMENTS
DX     ACUTE CHOLECYSTITIS -s/p percutaneous cholecystostomy, f/u bile cultures. narrow abx, cont zosyn for now.     SEVERE SEPSIS - as above  HYPOXEMIC RESPIRATORY FAILURE/ PULMONARY EDEMA - lasix PRN. chest xray today. wean supplemental oxygen.   SMV THROMBUS/PORTAL VEIN THROMBUS - cont  lovenox   STAGE IV COLON CA w/ LIVER METASTASES and PERITONEAL CARCINOMATOSIS- f/u onc recs. planning to have port placed prior to discharge.   HYPONATREMIA -resolved .   ANEMIA - trend hb. no active GI bleeding currently. monitor closely. transfuse to keep hb >7 .

## 2018-05-30 NOTE — DIETITIAN INITIAL EVALUATION ADULT. - OTHER INFO
68YO F with metastatic colon cancer and acute cholecystitis s/p percutaneous cholecystostomy. Pt was recently diagnosed Stage 4 colon cancer pending start of chemotherapy. Pt started on regular diet; with poor to fair PO intake (25-50%). Reports some pain pre and post prandial. Educated on following a low fat diet for management of symptoms. +BM. No n/v/d/c noted. Reports ~30 lb wt loss since January 2/2 being sick and decreased appetite over the past couple of months. Update pt's food preferences. NKFA. No pain at time of visit.

## 2018-05-30 NOTE — PROGRESS NOTE ADULT - ASSESSMENT
67 yr old male with a PMHx of recently diagnosed colon cancer presenting from oncologist's office with acute onset abdominal pain and fever, found to have acute cholecystitis and hepatic vein thrombosis, deemed poor surgical candidate 2/2 active metastic colon Ca, admitted to [facility] for medical management.

## 2018-05-30 NOTE — DIETITIAN INITIAL EVALUATION ADULT. - PROBLEM SELECTOR PLAN 2
-patient with tachycardia, fever, leukocytosis with gall bladder as suspected source  -lactate 2.2 on admission, now 1.6 s/o IVF  -s/p 30 ccs/kg IVF   -empiric tx for cholecystitis as outlined above

## 2018-05-30 NOTE — DIETITIAN INITIAL EVALUATION ADULT. - PROBLEM SELECTOR PLAN 1
-patient with acute onset RUQ abdominal pain and CT A/P findings c/w acute cholecytisis; RUQ U/S showing mobile stones which is indicative of cholecystitis being reactive sequelae of colon ca?   -patient currently being medically managed with zosyn 3.375 q6  -plan for percutaneous cholecystostomy if LFTs continue to uptrend  -ctm  -morphine for pain control  -on mIVF at 100 ccs/hr  -if LFTs worsening, or patient is clinically worsening, please consider ICU consult and reconsulting surgery for acute surgical intervention vs IR for percutaenous cholecystostomy placement

## 2018-05-30 NOTE — PROGRESS NOTE ADULT - PROBLEM SELECTOR PLAN 2
Abdomen a bit more distended today, likely exacerbated by lack of physical activities as patient has been mostly in bed.   - Recommend OOB to chair with assistance.  - Recommend standing Simethicone q8h x 3 days (day#2).   - Physical therapy followup Abdomen a bit more distended today, likely exacerbated by lack of physical activities as patient has been mostly in bed.    - Recommend OOB to chair with assistance.  - Continue standing Simethicone q8h x 3 days (day#2).   - Physical therapy followup

## 2018-05-30 NOTE — PROGRESS NOTE ADULT - PROBLEM SELECTOR PLAN 1
Improving pain. Patient with acute onset RUQ abdominal pain and CT A/P findings c/w acute cholecystitis; RUQ U/S showing mobile stones possibly causing transient obstruction vs. extrinsic compression from tumor burden given hepatic mets. s/p IR percutaneous cholecystostomy placement 5/25  -biliary fluid growing E. faecium, citrobacter, e. coli  -on zoysyn given multiple GNR on bili cx, awaiting final culture and sensitivities to deescalate  -c/w dilaudid for pain control, will transition to oral meds  -now on regular diet tolerating well  -blood cx NGTD

## 2018-05-30 NOTE — PROGRESS NOTE ADULT - PROBLEM SELECTOR PLAN 1
Chronic RUQ pain likely associated to liver metastasis currently exacerbated by acute events, s/p perc arleen drain. Also concerning for capsular pain due to multiple liver lesions and cysts. Pain meds requirement has now decreased from Morphine  mg equivalents to 50mg in the past 24 hrs.  Patient also received IV Tylenol 1000mg x3 and Oxycodone IR 5mg.  - Recommend to continue with Oxycodone IR 5mg PO q4h PRN Moderate pain  - Recommend to continue with Dilaudid 0.5mg IV q4h PRN Severe pain.  - Can also use IV Tylenol 1000 mg as needed Chronic RUQ pain likely associated to liver metastasis currently exacerbated by acute events, s/p perc arleen drain. Also concerning for capsular pain due to multiple liver lesions and cysts. Pain meds requirement has now decreased from Morphine  mg equivalents to 50mg in the past 24 hrs.  Patient also received IV Tylenol 1000mg x3 and Oxycodone IR 5mg.  - Recommend to continue with Oxycodone IR 5mg PO q4h PRN Moderate pain  - Recommend to continue with Dilaudid 0.5mg IV q4h PRN Severe pain.  - Can also use IV Tylenol 1000 mg as needed  - Recommend starting steroids as adjuvant for liver pain.

## 2018-05-30 NOTE — PHYSICAL THERAPY INITIAL EVALUATION ADULT - ADDITIONAL COMMENTS
Pt. lives alone in 3-story walk-up + unilateral handrail + one step to enter building without handrail. Prior to admission pt. was independent with all bed mobilities, transfers, ADLs, and community ambulation with use of assistive device.

## 2018-05-30 NOTE — PROGRESS NOTE ADULT - SUBJECTIVE AND OBJECTIVE BOX
Hem/Onc    PILLO PEDRAZA  MRN-[id number]    INTERVAL Hx: S/p percutaneous cholecystotomy ., The patient remains very weak. She is barely getting out of bed. No PT yet. T max 100. On IV Abx coverage.  + intermittent abdominal pain.  No nausea or vomiting. Able to tolerate PO intake    HPI: 67 y.o woman with newly diagnosed metastatic colon cancer- we were completing outpatient work up and were planning systemic treatment- the patient received IV iron last week, she had staging PET/CT this week and was scheduled for Infusaport placement. She presented to our office on 5/24 with fever/vomiting and abdominal pain> She was sent to the ER with suspicion for an obstruction (known large R sided/obstructing lesion).  Work up at [facility] with CT scan showed findings c/w acute cholecystitis and SMV and portal vein thrombosis. Acute cholecystitis confirmed by US.     ROS:  + nausea/vomiting  + fevers and chills  No night sweats.   + fatigue, no adaches/dizziness.    + abdominal pain/no diarrhea  No melena or hematochezia.    No dysuria/hematuria.  No history of easy bruising/bleeding.     No leg pain or leg swelling.    ROS is otherwise negative.    PMH/PSH:  PAST MEDICAL & SURGICAL HISTORY:  Colon cancer      Medications:  MEDICATIONS  (STANDING):  heparin  Infusion.  Unit(s)/Hr (16 mL/Hr) IV Continuous <Continuous>  piperacillin/tazobactam IVPB. 3.375 Gram(s) IV Intermittent every 6 hours  sodium chloride 0.9%. 1000 milliLiter(s) (100 mL/Hr) IV Continuous <Continuous>    MEDICATIONS  (PRN):  heparin  Injectable 7000 Unit(s) IV Push every 6 hours PRN For aPTT less than 40  heparin  Injectable 3500 Unit(s) IV Push every 6 hours PRN For aPTT between 40 - 57    heparin  Infusion.  Unit(s)/Hr IV Continuous <Continuous>  heparin  Injectable 7000 Unit(s) IV Push every 6 hours PRN  heparin  Injectable 3500 Unit(s) IV Push every 6 hours PRN          Allergies    allergic to cats (Rash)  No Known Drug Allergies    Intolerances        Exam:  Vital Signs Last 24 Hrs  T(C): 36.8 (30 May 2018 05:29), Max: 37.8 (29 May 2018 16:54)  T(F): 98.3 (30 May 2018 05:29), Max: 100 (29 May 2018 16:54)  HR: 86 (30 May 2018 05:29) (86 - 97)  BP: 126/84 (30 May 2018 05:29) (123/67 - 130/83)  BP(mean): --  RR: 18 (30 May 2018 05:29) (18 - 20)  SpO2: 92% (30 May 2018 05:29) (91% - 97%)   HEENT: pale mucosae, anicteric sclerae  No palpable peripheral lymphadenopathy  COR: regular rhythm rate, no murmurs, rubs or gallops  PULMO: clear to auscultation B/L  ABD: soft, distended, + mild RUQ pain to palpation  , + drain in place  EXT: no edema  NEURO: intact  SKIN: no lesions on visible skin    Labs:                        9.1    7.6   )-----------( 640      ( 30 May 2018 05:52 )             29.9         CBC Full  -  ( 30 May 2018 05:52 )  WBC Count : 7.6 K/uL  Hemoglobin : 9.1 g/dL  Hematocrit : 29.9 %  Platelet Count - Automated : 640 K/uL  Mean Cell Volume : 71.5 fL  Mean Cell Hemoglobin : 21.8 pg  Mean Cell Hemoglobin Concentration : 30.4 g/dL  Auto Neutrophil # : x  Auto Lymphocyte # : x  Auto Monocyte # : x  Auto Eosinophil # : x  Auto Basophil # : x  Auto Neutrophil % : x  Auto Lymphocyte % : x  Auto Monocyte % : x  Auto Eosinophil % : x  Auto Basophil % : x        05-30    130<L>  |  92<L>  |  19  ----------------------------<  122<H>  3.6   |  28  |  0.46<L>    Ca    8.7      30 May 2018 05:52  Phos  3.9     05-29  Mg     2.3     05-30    TPro  6.2  /  Alb  3.0<L>  /  TBili  0.6  /  DBili  x   /  AST  53<H>  /  ALT  40  /  AlkPhos  157<H>  05-30        PT/INR - ( 30 May 2018 05:52 )   PT: 12.9 sec;   INR: 1.16          PTT - ( 30 May 2018 05:52 )  PTT:34.5 sec    Pertinent imaging studies: reviewed    Assessment:    Metastatic colon cancer  Severe iron deficiency anemia  Acute cholecystitis  SMV/portal vein thrombosis    Plan:  S/p percutaneous cholecystotomy  Bili is normalized  Still with low grade fever,100 in the past 24hours  Afebrile this am  To continue IV Abx coverage    Infusaport placement when afebrile x 48 hours    SMV/Portal vein thrombosis- Continue Lovenox to eventually be transitioned to oral anticoagulant    She is profoundly weak.   Needs PT evaluation    Newly diagnosed colon cancer-we are still awaiting molecular studies  Systemic treatment to follow when she recovers    Severe YESSICA-she received parenteral iron as outpatient  PRBC as clinically indicated    Please have CSW see the patient re home arrangements   Thank you    Estefania Lundy MD  [phone]

## 2018-05-30 NOTE — PROGRESS NOTE ADULT - ASSESSMENT
66YO F with metastatic colon cancer and acute cholecystitis s/p percutaneous cholecystostomy    - low grade fever of 100 overnight; currently afebrile  - blood cx collected noon on 5/27; --- remains negative  - wbc 7.6, stable    - blood cx negative after 48 hours  - HD stable, satting well on NC  - cont. abx for E faecalis in bile cx  - f/u blood cx  - cont. lovenox for SMV thrombosis  - PT eval  - discussion regarding port placement; will touch base with her oncologist and schedule for chemoport placement after further discussion

## 2018-05-30 NOTE — PROGRESS NOTE ADULT - PROBLEM SELECTOR PLAN 8
Patient was provided the health care proxy form yesterday. She is planning to have further discussion with her close friend, Robyn, this afternoon.   Current plan is for chemotherapy, IR to place a chemo port Support provided to patient and family. Patient was seen by the  yesterday. Patient to continue to have access to supportive services during rest of hospital stay as the patient/family deemed necessary ie. Chaplaincy, Massage therapy, Music therapy, Patient and family supportive services, Palliative SW, etc.    As discussed during the palliative IDT meeting and as identified during the patients PSSA screening the patient would benefit from patient and family support, chaplaincy, music therapy, massage therapy, and palliative SW.

## 2018-05-30 NOTE — PHYSICAL THERAPY INITIAL EVALUATION ADULT - ASSISTIVE DEVICE FOR TRANSFER: STAND/SIT, REHAB EVAL
CC:  Joana Echavarria is here today for breast pain, sternum pain radiating to nipples.   Medications: medications verified and updated  Refills needed today?  NO  denies Latex allergy or sensitivity  Patient would like communication of their results via:      Cell Phone:   Telephone Information:   Mobile [phone]     Okay to leave a message containing results? Yes  Tobacco history: verified  Health Maintenance Due   Topic Date Due   • Depression Screening  06/09/2001            CHIEF COMPLAINT:   Patient presents with:  Physical       HPI:   Patient her with Guardian for school physical. Certificate of Child Health Examination form reviewed with Guardian and patient. Immunizations were reviewed and discussed with parent. No thyromegaly present. Cardiovascular: Normal rate, regular rhythm and normal heart sounds. No murmur or friction rub heard. PMI does not extend past mid-clavicular line. Simultaneous radial and inguinal pulses 3+/5 bilaterally.   Pulmonary/Chest: No c wear seat belt. Encouraged to exercise regularly, maintain healthy weight and to eat balanced diet rich in fruit and vegetables.      Guardian and patient verbalized understanding of instructions. All questions answered. No impediment to understanding. Tiki Lopez rolling walker

## 2018-05-31 DIAGNOSIS — R14.0 ABDOMINAL DISTENSION (GASEOUS): ICD-10-CM

## 2018-05-31 LAB
ALBUMIN SERPL ELPH-MCNC: 3.2 G/DL — LOW (ref 3.3–5)
ALP SERPL-CCNC: 164 U/L — HIGH (ref 40–120)
ALT FLD-CCNC: 38 U/L — SIGNIFICANT CHANGE UP (ref 10–45)
ANION GAP SERPL CALC-SCNC: 14 MMOL/L — SIGNIFICANT CHANGE UP (ref 5–17)
ANISOCYTOSIS BLD QL: SIGNIFICANT CHANGE UP
AST SERPL-CCNC: 37 U/L — SIGNIFICANT CHANGE UP (ref 10–40)
BASOPHILS NFR BLD AUTO: 1 % — SIGNIFICANT CHANGE UP (ref 0–2)
BILIRUB DIRECT SERPL-MCNC: <0.2 MG/DL — SIGNIFICANT CHANGE UP (ref 0–0.2)
BILIRUB INDIRECT FLD-MCNC: >0.4 MG/DL — SIGNIFICANT CHANGE UP (ref 0.2–1)
BILIRUB SERPL-MCNC: 0.6 MG/DL — SIGNIFICANT CHANGE UP (ref 0.2–1.2)
BUN SERPL-MCNC: 19 MG/DL — SIGNIFICANT CHANGE UP (ref 7–23)
CALCIUM SERPL-MCNC: 9.2 MG/DL — SIGNIFICANT CHANGE UP (ref 8.4–10.5)
CHLORIDE SERPL-SCNC: 94 MMOL/L — LOW (ref 96–108)
CO2 SERPL-SCNC: 29 MMOL/L — SIGNIFICANT CHANGE UP (ref 22–31)
CREAT SERPL-MCNC: 0.49 MG/DL — LOW (ref 0.5–1.3)
DACRYOCYTES BLD QL SMEAR: SLIGHT — SIGNIFICANT CHANGE UP
ELLIPTOCYTES BLD QL SMEAR: SLIGHT — SIGNIFICANT CHANGE UP
FERRITIN SERPL-MCNC: 323 NG/ML — HIGH (ref 15–150)
GLUCOSE SERPL-MCNC: 126 MG/DL — HIGH (ref 70–99)
HCT VFR BLD CALC: 34.4 % — LOW (ref 34.5–45)
HGB BLD-MCNC: 10.4 G/DL — LOW (ref 11.5–15.5)
HYPOCHROMIA BLD QL: SLIGHT — SIGNIFICANT CHANGE UP
IRON SATN MFR SERPL: 17 % — SIGNIFICANT CHANGE UP (ref 14–50)
IRON SATN MFR SERPL: 49 UG/DL — SIGNIFICANT CHANGE UP (ref 30–160)
LYMPHOCYTES # BLD AUTO: 10 % — LOW (ref 13–44)
MACROCYTES BLD QL: SLIGHT — SIGNIFICANT CHANGE UP
MAGNESIUM SERPL-MCNC: 2.4 MG/DL — SIGNIFICANT CHANGE UP (ref 1.6–2.6)
MANUAL SMEAR VERIFICATION: SIGNIFICANT CHANGE UP
MCHC RBC-ENTMCNC: 22.2 PG — LOW (ref 27–34)
MCHC RBC-ENTMCNC: 30.2 G/DL — LOW (ref 32–36)
MCV RBC AUTO: 73.3 FL — LOW (ref 80–100)
MICROCYTES BLD QL: SIGNIFICANT CHANGE UP
MONOCYTES NFR BLD AUTO: 8 % — SIGNIFICANT CHANGE UP (ref 2–14)
MYELOCYTES NFR BLD: 1 % — HIGH
NEUTROPHILS NFR BLD AUTO: 80 % — HIGH (ref 43–77)
OVALOCYTES BLD QL SMEAR: SLIGHT — SIGNIFICANT CHANGE UP
PHOSPHATE SERPL-MCNC: 3 MG/DL — SIGNIFICANT CHANGE UP (ref 2.5–4.5)
PLAT MORPH BLD: NORMAL — SIGNIFICANT CHANGE UP
PLATELET # BLD AUTO: 957 K/UL — HIGH (ref 150–400)
POIKILOCYTOSIS BLD QL AUTO: SLIGHT — SIGNIFICANT CHANGE UP
POLYCHROMASIA BLD QL SMEAR: SLIGHT — SIGNIFICANT CHANGE UP
POTASSIUM SERPL-MCNC: 3.6 MMOL/L — SIGNIFICANT CHANGE UP (ref 3.5–5.3)
POTASSIUM SERPL-SCNC: 3.6 MMOL/L — SIGNIFICANT CHANGE UP (ref 3.5–5.3)
PROT SERPL-MCNC: 6.9 G/DL — SIGNIFICANT CHANGE UP (ref 6–8.3)
RBC # BLD: 4.69 M/UL — SIGNIFICANT CHANGE UP (ref 3.8–5.2)
RBC # BLD: 4.82 M/UL — SIGNIFICANT CHANGE UP (ref 3.8–5.2)
RBC # FLD: 28.8 % — HIGH (ref 10.3–16.9)
RBC BLD AUTO: ABNORMAL
RETICS/RBC NFR: 3.1 % — HIGH (ref 0.5–2.5)
SODIUM SERPL-SCNC: 137 MMOL/L — SIGNIFICANT CHANGE UP (ref 135–145)
TIBC SERPL-MCNC: 283 UG/DL — SIGNIFICANT CHANGE UP (ref 220–430)
UIBC SERPL-MCNC: 234 UG/DL — SIGNIFICANT CHANGE UP (ref 110–370)
WBC # BLD: 8.6 K/UL — SIGNIFICANT CHANGE UP (ref 3.8–10.5)
WBC # FLD AUTO: 8.6 K/UL — SIGNIFICANT CHANGE UP (ref 3.8–10.5)

## 2018-05-31 PROCEDURE — 71045 X-RAY EXAM CHEST 1 VIEW: CPT | Mod: 26

## 2018-05-31 PROCEDURE — 36561 INSERT TUNNELED CV CATH: CPT | Mod: 79,RT

## 2018-05-31 PROCEDURE — 76937 US GUIDE VASCULAR ACCESS: CPT | Mod: 26

## 2018-05-31 PROCEDURE — 77001 FLUOROGUIDE FOR VEIN DEVICE: CPT | Mod: 26

## 2018-05-31 PROCEDURE — 99152 MOD SED SAME PHYS/QHP 5/>YRS: CPT

## 2018-05-31 PROCEDURE — 74019 RADEX ABDOMEN 2 VIEWS: CPT | Mod: 26

## 2018-05-31 PROCEDURE — 99233 SBSQ HOSP IP/OBS HIGH 50: CPT

## 2018-05-31 RX ORDER — DIATRIZOATE MEGLUMINE 180 MG/ML
30 INJECTION, SOLUTION INTRAVESICAL ONCE
Qty: 0 | Refills: 0 | Status: COMPLETED | OUTPATIENT
Start: 2018-05-31 | End: 2018-05-31

## 2018-05-31 RX ORDER — ENOXAPARIN SODIUM 100 MG/ML
90 INJECTION SUBCUTANEOUS
Qty: 0 | Refills: 0 | Status: DISCONTINUED | OUTPATIENT
Start: 2018-05-31 | End: 2018-06-05

## 2018-05-31 RX ORDER — APIXABAN 2.5 MG/1
10 TABLET, FILM COATED ORAL EVERY 12 HOURS
Qty: 0 | Refills: 0 | Status: DISCONTINUED | OUTPATIENT
Start: 2018-05-31 | End: 2018-05-31

## 2018-05-31 RX ORDER — POTASSIUM CHLORIDE 20 MEQ
40 PACKET (EA) ORAL ONCE
Qty: 0 | Refills: 0 | Status: COMPLETED | OUTPATIENT
Start: 2018-05-31 | End: 2018-05-31

## 2018-05-31 RX ORDER — ENOXAPARIN SODIUM 100 MG/ML
90 INJECTION SUBCUTANEOUS DAILY
Qty: 0 | Refills: 0 | Status: DISCONTINUED | OUTPATIENT
Start: 2018-05-31 | End: 2018-05-31

## 2018-05-31 RX ORDER — AMPICILLIN SODIUM AND SULBACTAM SODIUM 250; 125 MG/ML; MG/ML
3 INJECTION, POWDER, FOR SUSPENSION INTRAMUSCULAR; INTRAVENOUS EVERY 6 HOURS
Qty: 0 | Refills: 0 | Status: DISCONTINUED | OUTPATIENT
Start: 2018-05-31 | End: 2018-06-04

## 2018-05-31 RX ADMIN — HYDROMORPHONE HYDROCHLORIDE 0.5 MILLIGRAM(S): 2 INJECTION INTRAMUSCULAR; INTRAVENOUS; SUBCUTANEOUS at 11:00

## 2018-05-31 RX ADMIN — HYDROMORPHONE HYDROCHLORIDE 0.5 MILLIGRAM(S): 2 INJECTION INTRAMUSCULAR; INTRAVENOUS; SUBCUTANEOUS at 19:01

## 2018-05-31 RX ADMIN — Medication 3 MILLILITER(S): at 10:30

## 2018-05-31 RX ADMIN — HYDROMORPHONE HYDROCHLORIDE 0.5 MILLIGRAM(S): 2 INJECTION INTRAMUSCULAR; INTRAVENOUS; SUBCUTANEOUS at 06:58

## 2018-05-31 RX ADMIN — HYDROMORPHONE HYDROCHLORIDE 0.5 MILLIGRAM(S): 2 INJECTION INTRAMUSCULAR; INTRAVENOUS; SUBCUTANEOUS at 23:56

## 2018-05-31 RX ADMIN — ENOXAPARIN SODIUM 90 MILLIGRAM(S): 100 INJECTION SUBCUTANEOUS at 18:40

## 2018-05-31 RX ADMIN — OXYCODONE HYDROCHLORIDE 5 MILLIGRAM(S): 5 TABLET ORAL at 06:00

## 2018-05-31 RX ADMIN — Medication 3 MILLILITER(S): at 21:59

## 2018-05-31 RX ADMIN — HYDROMORPHONE HYDROCHLORIDE 0.5 MILLIGRAM(S): 2 INJECTION INTRAMUSCULAR; INTRAVENOUS; SUBCUTANEOUS at 19:30

## 2018-05-31 RX ADMIN — DIATRIZOATE MEGLUMINE 30 MILLILITER(S): 180 INJECTION, SOLUTION INTRAVESICAL at 18:40

## 2018-05-31 RX ADMIN — SIMETHICONE 80 MILLIGRAM(S): 80 TABLET, CHEWABLE ORAL at 21:59

## 2018-05-31 RX ADMIN — HYDROMORPHONE HYDROCHLORIDE 0.5 MILLIGRAM(S): 2 INJECTION INTRAMUSCULAR; INTRAVENOUS; SUBCUTANEOUS at 02:57

## 2018-05-31 RX ADMIN — Medication 3 MILLILITER(S): at 19:08

## 2018-05-31 RX ADMIN — Medication 3 MILLILITER(S): at 05:44

## 2018-05-31 RX ADMIN — AMPICILLIN SODIUM AND SULBACTAM SODIUM 200 GRAM(S): 250; 125 INJECTION, POWDER, FOR SUSPENSION INTRAMUSCULAR; INTRAVENOUS at 18:40

## 2018-05-31 RX ADMIN — Medication 1 TABLET(S): at 05:45

## 2018-05-31 RX ADMIN — HYDROMORPHONE HYDROCHLORIDE 0.5 MILLIGRAM(S): 2 INJECTION INTRAMUSCULAR; INTRAVENOUS; SUBCUTANEOUS at 02:15

## 2018-05-31 RX ADMIN — Medication 40 MILLIEQUIVALENT(S): at 07:51

## 2018-05-31 RX ADMIN — HYDROMORPHONE HYDROCHLORIDE 0.5 MILLIGRAM(S): 2 INJECTION INTRAMUSCULAR; INTRAVENOUS; SUBCUTANEOUS at 22:57

## 2018-05-31 RX ADMIN — OXYCODONE HYDROCHLORIDE 5 MILLIGRAM(S): 5 TABLET ORAL at 21:59

## 2018-05-31 RX ADMIN — HYDROMORPHONE HYDROCHLORIDE 0.5 MILLIGRAM(S): 2 INJECTION INTRAMUSCULAR; INTRAVENOUS; SUBCUTANEOUS at 10:30

## 2018-05-31 RX ADMIN — OXYCODONE HYDROCHLORIDE 5 MILLIGRAM(S): 5 TABLET ORAL at 02:57

## 2018-05-31 RX ADMIN — HYDROMORPHONE HYDROCHLORIDE 0.5 MILLIGRAM(S): 2 INJECTION INTRAMUSCULAR; INTRAVENOUS; SUBCUTANEOUS at 07:52

## 2018-05-31 RX ADMIN — OXYCODONE HYDROCHLORIDE 5 MILLIGRAM(S): 5 TABLET ORAL at 01:35

## 2018-05-31 RX ADMIN — OXYCODONE HYDROCHLORIDE 5 MILLIGRAM(S): 5 TABLET ORAL at 22:30

## 2018-05-31 RX ADMIN — OXYCODONE HYDROCHLORIDE 5 MILLIGRAM(S): 5 TABLET ORAL at 05:27

## 2018-05-31 RX ADMIN — SIMETHICONE 80 MILLIGRAM(S): 80 TABLET, CHEWABLE ORAL at 05:45

## 2018-05-31 NOTE — PROGRESS NOTE ADULT - ASSESSMENT
67 yr old male with a PMHx of recently diagnosed colon cancer presenting from oncologist's office with acute onset abdominal pain and fever, found to have acute cholecystitis and hepatic vein thrombosis, deemed poor surgical candidate 2/2 active metastic colon Ca, admitted to [facility] for medical management.

## 2018-05-31 NOTE — PROGRESS NOTE ADULT - PROBLEM SELECTOR PLAN 3
CTA A/P s/f hepatic vein thrombosis; most likely 2/2 hypercoagulable state in the setting of active untreated malignancy  -c/w lovenox 90mg BID, to transition to NOAC outpatient   -f/u Dr. Nick outpatient Stage 4 metastatic colon cancer with liver mets and peritoneal carcinomatosis  - plan per Dr. Nick   - for chemoport placement today (to be afebrile x48 hrs)  - heme onc following  - palliative consulted given metastatic CRC, pt aware of 1-2 yr prognosis and actively filling out MOLST: patient currently completing  -f/u CT abdomen/pelvis for evaluation of increased distention this morning with concern for

## 2018-05-31 NOTE — PROGRESS NOTE ADULT - PROBLEM SELECTOR PLAN 5
Hb stable. Acute anemia most likely 2/2 active colon malignancy s/p IV iron infusions o/p  -s/p 1U PRBC before IR procedure 5/25  -transfuse hgb<7  -maintain active type and screen RESOLVED. Patient with tachycardia, fever, leukocytosis with gall bladder as suspected source; lactate 2.2 on admission, now 1.6 s/p IVF  -empiric tx for cholecystitis as outlined above

## 2018-05-31 NOTE — PROGRESS NOTE ADULT - PROBLEM SELECTOR PLAN 2
Abdomen is more distended today, likely exacerbated by lack of physical activities as patient has been mostly in bed.    - Recommend continued OOB to chair with assistance.  - Continue Simethicone q8h  - Physical therapy followup

## 2018-05-31 NOTE — PROGRESS NOTE ADULT - PROBLEM SELECTOR PLAN 1
Improving pain. Patient with acute onset RUQ abdominal pain and CT A/P findings c/w acute cholecystitis; RUQ U/S showing mobile stones possibly causing transient obstruction vs. extrinsic compression from tumor burden given hepatic mets. s/p IR percutaneous cholecystostomy placement 5/25  -biliary fluid growing E. faecium, citrobacter, e. coli  -on zoysyn given multiple GNR on bili cx, awaiting final culture and sensitivities to deescalate  -c/w dilaudid for pain control, will transition to oral meds  -now on regular diet tolerating well  -blood cx NGTD Improving pain. Patient with acute onset RUQ abdominal pain and CT A/P findings c/w acute cholecystitis; RUQ U/S showing mobile stones possibly causing transient obstruction vs. extrinsic compression from tumor burden given hepatic mets. s/p IR percutaneous cholecystostomy placement 5/25  -biliary fluid growing E. faecium, citrobacter, e. coli  -on Augmentin BID for GNR on bili cx  -c/w dilaudid for pain control, will transition to oral meds  -now on regular diet tolerating well  -blood cx NGTD  -25-50cc currently draining in arleen drain

## 2018-05-31 NOTE — PROGRESS NOTE ADULT - PROBLEM SELECTOR PLAN 7
DVT PPX: Lovenox BID  FULL CODE  DISPO: RMF pending chemoport and f/u Dr. Nick outpatient [phone] F: No IVF  E: Replete PRN  N: regular diet

## 2018-05-31 NOTE — PROGRESS NOTE ADULT - SUBJECTIVE AND OBJECTIVE BOX
Patient seen and examined at bedside.   **incomplete note.   amoxicillin  875 milliGRAM(s)/clavulanate 1  amoxicillin  875 milliGRAM(s)/clavulanate 1        Vital Signs Last 24 Hrs  T(C): 36.6 (31 May 2018 10:14), Max: 36.9 (31 May 2018 05:58)  T(F): 97.8 (31 May 2018 10:14), Max: 98.5 (31 May 2018 05:58)  HR: 95 (31 May 2018 10:14) (95 - 105)  BP: 126/77 (31 May 2018 10:14) (126/77 - 147/89)  BP(mean): --  RR: 18 (31 May 2018 10:14) (18 - 18)  SpO2: 96% (31 May 2018 10:14) (93% - 96%)  I&O's Detail    30 May 2018 07:01  -  31 May 2018 07:00  --------------------------------------------------------  IN:  Total IN: 0 mL    OUT:    Drain: 30 mL    Voided: 400 mL  Total OUT: 430 mL    Total NET: -430 mL          Physical Exam:  General: NAD, resting comfortably in bed  Pulmonary: Nonlabored breathing, no respiratory distress  Cardiovascular: NSR  Abdominal: soft, NT/ND  Extremities: WWP  Pulses:   Right:                                                                          Left:  FEM [ ]2+ [ ]1+ [ ]doppler                                             FEM [ ]2+ [ ]1+ [ ]doppler    POP [ ]2+ [ ]1+ [ ]doppler                                             POP [ ]2+ [ ]1+ [ ]doppler    DP [ ]2+ [ ]1+ [ ]doppler                                                DP [ ]2+ [ ]1+ [ ]doppler  PT[ ]2+ [ ]1+ [ ]doppler                                                  PT [ ]2+ [ ]1+ [ ]doppler      LABS:                        10.4   8.6   )-----------( 957      ( 31 May 2018 05:34 )             34.4     05-31    137  |  94<L>  |  19  ----------------------------<  126<H>  3.6   |  29  |  0.49<L>    Ca    9.2      31 May 2018 05:34  Phos  3.0     05-31  Mg     2.4     05-31    TPro  6.2  /  Alb  3.0<L>  /  TBili  0.6  /  DBili  x   /  AST  53<H>  /  ALT  40  /  AlkPhos  157<H>  05-30    PT/INR - ( 30 May 2018 05:52 )   PT: 12.9 sec;   INR: 1.16          PTT - ( 30 May 2018 05:52 )  PTT:34.5 sec    Radiology and Additional Studies:    Assessment and Plan:     Recommend CT A/P with IV contrast.   Discussed with Dr. Narvaez. Patient seen and examined at bedside. Patient is noticeably more uncomfortable today than yesterday, she is currently NPO for IR port placement but her abdomen is moderately distended. SHe admits to passing flatus, no BM and states she is very uncomfortable, and that if maybe if she ate something it could help the pain. She was unable to sleep last night due to the pain     Overnight - patient remains afebrile, HD stable.     **incomplete note.   amoxicillin  875 milliGRAM(s)/clavulanate 1  amoxicillin  875 milliGRAM(s)/clavulanate 1        Vital Signs Last 24 Hrs  T(C): 36.6 (31 May 2018 10:14), Max: 36.9 (31 May 2018 05:58)  T(F): 97.8 (31 May 2018 10:14), Max: 98.5 (31 May 2018 05:58)  HR: 95 (31 May 2018 10:14) (95 - 105)  BP: 126/77 (31 May 2018 10:14) (126/77 - 147/89)  BP(mean): --  RR: 18 (31 May 2018 10:14) (18 - 18)  SpO2: 96% (31 May 2018 10:14) (93% - 96%)  I&O's Detail    30 May 2018 07:01  -  31 May 2018 07:00  --------------------------------------------------------  IN:  Total IN: 0 mL    OUT:    Drain: 30 mL    Voided: 400 mL  Total OUT: 430 mL    Total NET: -430 mL          Physical Exam:  General: NAD, resting comfortably in bed  Pulmonary: Nonlabored breathing, no respiratory distress  Cardiovascular: NSR  Abdominal: soft, moderately distended, moderately TTP RUQ and Left side generalized TTP, no guarding or rebound.  cholecystostomy tube in place.   Extremities: WWP      LABS:                        10.4   8.6   )-----------( 957      ( 31 May 2018 05:34 )             34.4     05-31    137  |  94<L>  |  19  ----------------------------<  126<H>  3.6   |  29  |  0.49<L>    Ca    9.2      31 May 2018 05:34  Phos  3.0     05-31  Mg     2.4     05-31    TPro  6.2  /  Alb  3.0<L>  /  TBili  0.6  /  DBili  x   /  AST  53<H>  /  ALT  40  /  AlkPhos  157<H>  05-30    PT/INR - ( 30 May 2018 05:52 )   PT: 12.9 sec;   INR: 1.16          PTT - ( 30 May 2018 05:52 )  PTT:34.5 sec    Radiology and Additional Studies:    Assessment and Plan:   68YO F with metastatic colon cancer and acute cholecystitis s/p percutaneous cholecystostomy    Patient remains afebrile, with no leukocytosis following perc cholecystostomy but in light of new, persistent abd distention and pain we recommend CT A/P with IV contrast, especially with previous CT scan evidence of ascending colonic mass with adjacent peritoneal mets - posing high risk for obstruction. WE would also like to rule out additional sources of her abdominal pain.    - cont. abx for E faecalis in bile cx  - f/u blood cx  - cont. lovenox for SMV thrombosis  - Surgery will continue to follow.   Discussed with Dr. Narvaez. Patient seen and examined at bedside. Patient is noticeably more uncomfortable today than yesterday, she is currently NPO for IR port placement but her abdomen is moderately distended. SHe admits to passing flatus, no BM and states she is very uncomfortable, and that if maybe if she ate something it could help the pain. She was unable to sleep last night due to the pain     Overnight - patient remains afebrile, HD stable.       amoxicillin  875 milliGRAM(s)/clavulanate 1  amoxicillin  875 milliGRAM(s)/clavulanate 1        Vital Signs Last 24 Hrs  T(C): 36.6 (31 May 2018 10:14), Max: 36.9 (31 May 2018 05:58)  T(F): 97.8 (31 May 2018 10:14), Max: 98.5 (31 May 2018 05:58)  HR: 95 (31 May 2018 10:14) (95 - 105)  BP: 126/77 (31 May 2018 10:14) (126/77 - 147/89)  BP(mean): --  RR: 18 (31 May 2018 10:14) (18 - 18)  SpO2: 96% (31 May 2018 10:14) (93% - 96%)  I&O's Detail    30 May 2018 07:01  -  31 May 2018 07:00  --------------------------------------------------------  IN:  Total IN: 0 mL    OUT:    Drain: 30 mL    Voided: 400 mL  Total OUT: 430 mL    Total NET: -430 mL          Physical Exam:  General: NAD, resting comfortably in bed  Pulmonary: Nonlabored breathing, no respiratory distress  Cardiovascular: NSR  Abdominal: soft, moderately distended, moderately TTP RUQ and Left side generalized TTP, no guarding or rebound.  cholecystostomy tube in place.   Extremities: P      LABS:                        10.4   8.6   )-----------( 957      ( 31 May 2018 05:34 )             34.4     05-31    137  |  94<L>  |  19  ----------------------------<  126<H>  3.6   |  29  |  0.49<L>    Ca    9.2      31 May 2018 05:34  Phos  3.0     05-31  Mg     2.4     05-31    TPro  6.2  /  Alb  3.0<L>  /  TBili  0.6  /  DBili  x   /  AST  53<H>  /  ALT  40  /  AlkPhos  157<H>  05-30    PT/INR - ( 30 May 2018 05:52 )   PT: 12.9 sec;   INR: 1.16          PTT - ( 30 May 2018 05:52 )  PTT:34.5 sec    Radiology and Additional Studies:    Assessment and Plan:   66YO F with metastatic colon cancer and acute cholecystitis s/p percutaneous cholecystostomy    Patient remains afebrile, with no leukocytosis following perc cholecystostomy but in light of new, persistent abd distention and pain we recommend CT A/P with IV contrast, especially with previous CT scan evidence of ascending colonic mass with adjacent peritoneal mets - posing high risk for obstruction. WE would also like to rule out additional sources of her abdominal pain.    - cont. abx for E faecalis in bile cx  - f/u blood cx  - cont. lovenox for SMV thrombosis  - Surgery will continue to follow.   Discussed with Dr. Narvaez.

## 2018-05-31 NOTE — PROGRESS NOTE ADULT - PROBLEM SELECTOR PLAN 6
F: No IVF  E: Replete PRN  N: regular diet Hb stable. Acute anemia most likely 2/2 active colon malignancy s/p IV iron infusions o/p  -s/p 1U PRBC before IR procedure 5/25  -transfuse hgb<7  -maintain active type and screen

## 2018-05-31 NOTE — PROGRESS NOTE ADULT - SUBJECTIVE AND OBJECTIVE BOX
OVERNIGHT EVENTS:    SUBJECTIVE / INTERVAL HPI: Patient seen and examined at bedside.     VITAL SIGNS:  Vital Signs Last 24 Hrs  T(C): 36.7 (30 May 2018 22:00), Max: 36.8 (30 May 2018 09:38)  T(F): 98 (30 May 2018 22:00), Max: 98.2 (30 May 2018 09:38)  HR: 98 (30 May 2018 22:00) (97 - 98)  BP: 142/90 (30 May 2018 22:00) (136/84 - 142/90)  BP(mean): --  RR: 18 (30 May 2018 22:00) (18 - 18)  SpO2: 93% (30 May 2018 22:00) (93% - 93%)    PHYSICAL EXAM:  General: WDWN  HEENT: NC/AT; PERRL, anicteric sclera; MMM  Neck: supple  Cardiovascular: +S1/S2; RRR  Respiratory: CTA B/L; no W/R/R  Gastrointestinal: soft, NT/ND; +BSx4  Extremities: WWP; no edema, clubbing or cyanosis  Vascular: 2+ radial, DP/PT pulses B/L  Neurological: AAOx3; no focal deficits    MEDICATIONS:  MEDICATIONS  (STANDING):  ALBUTerol/ipratropium for Nebulization 3 milliLiter(s) Nebulizer every 4 hours  amoxicillin  875 milliGRAM(s)/clavulanate 1 Tablet(s) Oral two times a day  pantoprazole    Tablet 40 milliGRAM(s) Oral before breakfast  polyethylene glycol 3350 17 Gram(s) Oral two times a day  senna 2 Tablet(s) Oral two times a day  simethicone 80 milliGRAM(s) Chew every 8 hours    MEDICATIONS  (PRN):  acetaminophen   Tablet 650 milliGRAM(s) Oral every 6 hours PRN For Temp greater than 38 C (100.4 F)  acetaminophen  Suppository 650 milliGRAM(s) Rectal every 6 hours PRN For Temp greater than 38 C (100.4 F)  HYDROmorphone  Injectable 0.5 milliGRAM(s) IV Push every 4 hours PRN Severe Pain (7 - 10)  oxyCODONE    IR 5 milliGRAM(s) Oral every 4 hours PRN Moderate Pain (4 - 6)    ALLERGIES:  allergic to cats (Rash)  No Known Drug Allergies    LABS:                        9.1    7.6   )-----------( 640      ( 30 May 2018 05:52 )             29.9     05-30    140  |  97  |  17  ----------------------------<  120<H>  3.6   |  29  |  0.47<L>    Ca    8.8      30 May 2018 11:29  Phos  3.9     05-29  Mg     2.3     05-30    TPro  6.2  /  Alb  3.0<L>  /  TBili  0.6  /  DBili  x   /  AST  53<H>  /  ALT  40  /  AlkPhos  157<H>  05-30    PT/INR - ( 30 May 2018 05:52 )   PT: 12.9 sec;   INR: 1.16          PTT - ( 30 May 2018 05:52 )  PTT:34.5 sec    CAPILLARY BLOOD GLUCOSE      RADIOLOGY & ADDITIONAL TESTS: Reviewed. OVERNIGHT EVENTS: No acute events.     SUBJECTIVE / INTERVAL HPI: Patient seen and examined at bedside. Says she's feeling better this morning, however espousing increased pain with mild palpation to newly distended belly. Stooled this morning.     VITAL SIGNS:  Vital Signs Last 24 Hrs  T(C): 36.7 (30 May 2018 22:00), Max: 36.8 (30 May 2018 09:38)  T(F): 98 (30 May 2018 22:00), Max: 98.2 (30 May 2018 09:38)  HR: 98 (30 May 2018 22:00) (97 - 98)  BP: 142/90 (30 May 2018 22:00) (136/84 - 142/90)  BP(mean): --  RR: 18 (30 May 2018 22:00) (18 - 18)  SpO2: 93% (30 May 2018 22:00) (93% - 93%)    PHYSICAL EXAM:  General: WDWN female wearing baseball cap sleeping in chair, NAD saturating well on room air  HEENT: NC/AT; PERRL, anicteric sclera; MMM  Neck: supple  Cardiovascular: +S1/S2; RRR  Respiratory: CTA B/L; no W/R/R  Gastrointestinal: distended with hypoactive bowel sounds, pain to superficial palpation. arleen drain in place - c/d/i. approx 25-50cc. (changed last night)  Extremities: WWP; no edema but tenderness to palpation in the medial left calf with no pain during left ankle flexion, no clubbing or cyanosis  Vascular: 2+ radial, DP/PT pulses B/L  Neurological: AAOx3; no focal deficits    MEDICATIONS:  MEDICATIONS  (STANDING):  ALBUTerol/ipratropium for Nebulization 3 milliLiter(s) Nebulizer every 4 hours  amoxicillin  875 milliGRAM(s)/clavulanate 1 Tablet(s) Oral two times a day  pantoprazole    Tablet 40 milliGRAM(s) Oral before breakfast  polyethylene glycol 3350 17 Gram(s) Oral two times a day  senna 2 Tablet(s) Oral two times a day  simethicone 80 milliGRAM(s) Chew every 8 hours    MEDICATIONS  (PRN):  acetaminophen   Tablet 650 milliGRAM(s) Oral every 6 hours PRN For Temp greater than 38 C (100.4 F)  acetaminophen  Suppository 650 milliGRAM(s) Rectal every 6 hours PRN For Temp greater than 38 C (100.4 F)  HYDROmorphone  Injectable 0.5 milliGRAM(s) IV Push every 4 hours PRN Severe Pain (7 - 10)  oxyCODONE    IR 5 milliGRAM(s) Oral every 4 hours PRN Moderate Pain (4 - 6)    ALLERGIES:  allergic to cats (Rash)  No Known Drug Allergies    LABS:                        9.1    7.6   )-----------( 640      ( 30 May 2018 05:52 )             29.9     05-30    140  |  97  |  17  ----------------------------<  120<H>  3.6   |  29  |  0.47<L>    Ca    8.8      30 May 2018 11:29  Phos  3.9     05-29  Mg     2.3     05-30    TPro  6.2  /  Alb  3.0<L>  /  TBili  0.6  /  DBili  x   /  AST  53<H>  /  ALT  40  /  AlkPhos  157<H>  05-30    PT/INR - ( 30 May 2018 05:52 )   PT: 12.9 sec;   INR: 1.16          PTT - ( 30 May 2018 05:52 )  PTT:34.5 sec    CAPILLARY BLOOD GLUCOSE      RADIOLOGY & ADDITIONAL TESTS: Reviewed.

## 2018-05-31 NOTE — PROGRESS NOTE ADULT - PROBLEM SELECTOR PLAN 4
RESOLVED. Patient with tachycardia, fever, leukocytosis with gall bladder as suspected source; lactate 2.2 on admission, now 1.6 s/p IVF  -empiric tx for cholecystitis as outlined above CTA A/P s/f hepatic vein thrombosis; most likely 2/2 hypercoagulable state in the setting of active untreated malignancy  -c/w lovenox 90mg BID, to transition to NOAC outpatient   -f/u Dr. Nick outpatient

## 2018-05-31 NOTE — PROGRESS NOTE ADULT - PROBLEM SELECTOR PLAN 7
Support provided to patient and family. Patient was seen by the  yesterday. Patient to continue to have access to supportive services during rest of hospital stay as the patient/family deemed necessary ie. Chaplaincy, Massage therapy, Music therapy, Patient and family supportive services, Palliative SW, etc.    As discussed during the palliative IDT meeting and as identified during the patients PSSA screening the patient would benefit from patient and family support, chaplaincy, music therapy, massage therapy, and palliative SW.

## 2018-05-31 NOTE — PROGRESS NOTE ADULT - ATTENDING COMMENTS
DX   ABD PAIN /DISTENSION - worsened. check ct a/p. follow up surgery recs.   ACUTE CHOLECYSTITIS -s/p percutaneous cholecystostomy, f/u bile cultures. narrow abx, change augmentin to unasyn.      SEVERE SEPSIS - as above  HYPOXEMIC RESPIRATORY FAILURE/ PULMONARY EDEMA - lasix PRN.   wean supplemental oxygen.   SMV THROMBUS/PORTAL VEIN THROMBUS - cont  lovenox   STAGE IV COLON CA w/ LIVER METASTASES and PERITONEAL CARCINOMATOSIS- f/u onc recs. planning to have port placed prior to discharge.   HYPONATREMIA -resolved .   ANEMIA - trend hb. no active GI bleeding currently. monitor closely. transfuse to keep hb >7 . DX   ABD PAIN /DISTENSION - worsened. make NPO. check ct a/p. follow up surgery recs.   ACUTE CHOLECYSTITIS -s/p percutaneous cholecystostomy, f/u bile cultures. narrow abx, change augmentin to unasyn.      SEVERE SEPSIS - as above  HYPOXEMIC RESPIRATORY FAILURE/ PULMONARY EDEMA - lasix PRN.   wean supplemental oxygen.   SMV THROMBUS/PORTAL VEIN THROMBUS - cont  lovenox   STAGE IV COLON CA w/ LIVER METASTASES and PERITONEAL CARCINOMATOSIS- f/u onc recs. planning to have port placed prior to discharge.   HYPONATREMIA -resolved .   ANEMIA - trend hb. no active GI bleeding currently. monitor closely. transfuse to keep hb >7 .

## 2018-05-31 NOTE — PROGRESS NOTE ADULT - PROBLEM SELECTOR PLAN 8
DVT PPX: Lovenox BID  FULL CODE  DISPO: RMF pending chemoport and f/u Dr. Nick outpatient [phone]

## 2018-05-31 NOTE — PROGRESS NOTE ADULT - PROBLEM SELECTOR PLAN 2
Stage 4 metastatic colon cancer with liver mets and peritoneal carcinomatosis  - plan per Dr. Nick   - for chemoport placement Wed 5/30 (to be afebrile x48 hrs)  - f/u heme onc recs  -palliative consulted given metastatic CRC, pt aware of 1-2 yr prognosis and actively filling out MOLST Abdominal distention with associated pain to superficial palpation throughout. Hypoactive bowel sounds. Patient currently stooling small amount but concern for ileus, metastatic advancement (though not likely to be so acute) v. gas v. perf (though afebrile) v. ascites 2/2 neoplasm  -upright abdominal xray   -f/u CT abdomen/pelvis with contrast  -surgery following

## 2018-05-31 NOTE — PROGRESS NOTE ADULT - PROBLEM SELECTOR PLAN 4
Patient had 1 BMs yesterday. Goal is at least one BM daily. May decrease Miralax and Senna to once a day if patient having more than 2 BM in a day with current regimen.   - Recommend to continue with Miralax BID and Senna.  - Use Dulcolax suppository if no BMs after 3 days.

## 2018-05-31 NOTE — PROGRESS NOTE ADULT - SUBJECTIVE AND OBJECTIVE BOX
Hem/Onc    PILLO PEDRAZA  MRN-[id number]    INTERVAL Hx:   The patient remains weak. She was able to ambulate. Pain is better controlled. On IV Abx coverage.  Afebrile >48 hours. No nausea or vomiting. Able to tolerate PO intake    HPI: 67 y.o woman with newly diagnosed metastatic colon cancer- we were completing outpatient work up and were planning systemic treatment- the patient received IV iron last week, she had staging PET/CT this week and was scheduled for Infusaport placement. She presented to our office on 5/24 with fever/vomiting and abdominal pain> She was sent to the ER with suspicion for an obstruction (known large R sided/obstructing lesion).  Work up at [facility] with CT scan showed findings c/w acute cholecystitis and SMV and portal vein thrombosis. Acute cholecystitis confirmed by US.     ROS:  + nausea/vomiting  + fevers and chills  No night sweats.   + fatigue, no headaches/dizziness.    + abdominal pain/no diarrhea  No melena or hematochezia.    No dysuria/hematuria.  No history of easy bruising/bleeding.     No leg pain or leg swelling.    ROS is otherwise negative.    PMH/PSH:  PAST MEDICAL & SURGICAL HISTORY:  Colon cancer      Medications:  MEDICATIONS  (STANDING):  heparin  Infusion.  Unit(s)/Hr (16 mL/Hr) IV Continuous <Continuous>  piperacillin/tazobactam IVPB. 3.375 Gram(s) IV Intermittent every 6 hours  sodium chloride 0.9%. 1000 milliLiter(s) (100 mL/Hr) IV Continuous <Continuous>    MEDICATIONS  (PRN):  heparin  Injectable 7000 Unit(s) IV Push every 6 hours PRN For aPTT less than 40  heparin  Injectable 3500 Unit(s) IV Push every 6 hours PRN For aPTT between 40 - 57    heparin  Infusion.  Unit(s)/Hr IV Continuous <Continuous>  heparin  Injectable 7000 Unit(s) IV Push every 6 hours PRN  heparin  Injectable 3500 Unit(s) IV Push every 6 hours PRN          Allergies    allergic to cats (Rash)  No Known Drug Allergies    Intolerances        Exam:  Vital Signs Last 24 Hrs  T(C): 36.9 (31 May 2018 05:58), Max: 36.9 (31 May 2018 05:58)  T(F): 98.5 (31 May 2018 05:58), Max: 98.5 (31 May 2018 05:58)  HR: 105 (31 May 2018 05:58) (97 - 105)  BP: 147/89 (31 May 2018 05:58) (136/84 - 147/89)  BP(mean): --  RR: 18 (31 May 2018 05:58) (18 - 18)  SpO2: 94% (31 May 2018 05:58) (93% - 94%)  HEENT: pale mucosae, anicteric sclerae  No palpable peripheral lymphadenopathy  COR: regular rhythm rate, no murmurs, rubs or gallops  PULMO: clear to auscultation B/L  ABD: soft, distended, + mild RUQ pain to palpation  , + drain in place  EXT: no edema  NEURO: intact  SKIN: no lesions on visible skin    Labs:                                10.4   8.6   )-----------( 957      ( 31 May 2018 05:34 )             34.4         CBC Full  -  ( 31 May 2018 05:34 )  WBC Count : 8.6 K/uL  Hemoglobin : 10.4 g/dL  Hematocrit : 34.4 %  Platelet Count - Automated : 957 K/uL  Mean Cell Volume : 73.3 fL  Mean Cell Hemoglobin : 22.2 pg  Mean Cell Hemoglobin Concentration : 30.2 g/dL  Auto Neutrophil # : x  Auto Lymphocyte # : x  Auto Monocyte # : x  Auto Eosinophil # : x  Auto Basophil # : x  Auto Neutrophil % : x  Auto Lymphocyte % : x  Auto Monocyte % : x  Auto Eosinophil % : x  Auto Basophil % : x        05-31    137  |  94<L>  |  19  ----------------------------<  126<H>  3.6   |  29  |  0.49<L>    Ca    9.2      31 May 2018 05:34  Phos  3.0     05-31  Mg     2.4     05-31    TPro  6.2  /  Alb  3.0<L>  /  TBili  0.6  /  DBili  x   /  AST  53<H>  /  ALT  40  /  AlkPhos  157<H>  05-30        PT/INR - ( 30 May 2018 05:52 )   PT: 12.9 sec;   INR: 1.16          PTT - ( 30 May 2018 05:52 )  PTT:34.5 sec  Pertinent imaging studies: reviewed    Assessment:    Metastatic colon cancer  Severe iron deficiency anemia  Acute cholecystitis  SMV/portal vein thrombosis    Plan:  S/p percutaneous cholecystotomy on 5/25  Clinically improving  On IV Abx coverage    Afebrile x 48 hours    OK for Infusaport placement     SMV/Portal vein thrombosis- Continue Lovenox,  to eventually be transitioned to oral anticoagulant    PT    Newly diagnosed colon cancer-we are still awaiting molecular studies  Systemic treatment to follow as outpatient when she recovers    Severe YESSICA-she received parenteral iron as outpatient  H/H is better  PRBC as clinically indicated    Thank you    Estefania Lundy MD  [phone]

## 2018-05-31 NOTE — PROGRESS NOTE ADULT - PROBLEM SELECTOR PLAN 1
Chronic pain likely associated to liver metastasis currently exacerbated by acute events, s/p perc arleen drain and constipation. Also concerning for capsular pain due to multiple liver lesions and cysts. Pain meds requirement has increased from Morphine PO 50mg equivalents to 72.5mg in the past 24 hrs.  Patient also received IV Tylenol 1000mg x1 and Oxycodone IR 5mg x3, and Dilaudid 0.5mg IV x5 doses.  - Recommend to continue with Oxycodone IR 5mg PO q4h PRN Moderate pain  - Recommend to continue with Dilaudid 0.5mg IV q4h PRN Severe pain.  - Recommend standing APAP 1g q8h  - Recommend starting steroids as adjuvant for liver pain and possible SBO worsening; Recommend Dexamethasone 4mg IV q6h.

## 2018-05-31 NOTE — PROGRESS NOTE ADULT - SUBJECTIVE AND OBJECTIVE BOX
PILLO MAR             MRN-[id number]    CC: RUQ pain, bloating, flatulence, constipation,     HPI:  68 y/o female with a PMHx of recently dx metastatic ascending colon cancer (dx 2 weeks ago w/ mets to the liver and peritoneal carcinomatosis) that presents from Dr. Lundy's office with acute onset RUQ abdominal pain, fever and weakness for the past 3 days. Pain has worsened over the past 3 days and last evening was associated with one episode of vomiting (nonbloody, questionable bilious content).  Last BM was 2 days prior to presentation. She denies any associated chills, chest pain/discomfort/pressure, SOB/dyspnea, dysuria, diarrhea, hematochezia. Had CT A/P in ED which was s/f acute cholecystitis and hepatic vein thrombosis.    SUBJECTIVE: Saw and evaluated Mrs Mar at bedside. She reports having had a BM at 4am, but reports increasing abdominal pain which she relates to being NPO for expected chemo port placement. She states that during the night the pain was sharp and reached a 10/10 level. No nausea or vomiting reported.     ROS:  DYSPNEA: No	  NAUS/VOM: No	  SECRETIONS: No  AGITATION: No  Pain (Y/N): Yes  -Provocation/Palliation: ovement and not eating increases her pain.   -Quality/Quantity: Acute visceral pain currently uncontrolled. Chronic malignant somatic hepatic pain currently unntrolled.  -Radiating: lower quadrants.  -Severity: Mod-Severe  -Timing/Frequency: Prevents her from resting at night or ambulating.  -Impact on ADLs: Remains mostly bedbound due to pain.     OTHER REVIEW OF SYSTEMS: Gas distension worsened.    PEx:  T(C): 36.6 (05-31-18 @ 10:14), Max: 36.9 (05-31-18 @ 05:58)  HR: 95 (05-31-18 @ 10:14) (95 - 105)  BP: 126/77 (05-31-18 @ 10:14) (126/77 - 147/89)  RR: 18 (05-31-18 @ 10:14) (18 - 18)  SpO2: 96% (05-31-18 @ 10:14) (93% - 96%)  Wt(kg): 88.2    General: AAOx3 lying in bedside chair, no acute distress.  HEENT: NCAT Dry mucous membranes Non icteric.   Neck:  Supple, no masses  CVS: Normal S1, S2, No M/R/G  Resp: Unlabored, shallow breaths  GI: Distended, Soft, TTP of lower quadrants. Very diminished BS, perc drain in place  : Normal   Musc: No C/C/E    Neuro: No focal deficits  Psych:  Concerned about pending chemo port placement.  Skin: Xerosis  Lymph: Normal     ALLERGIES: allergic to cats (Rash)  No Known Drug Allergies    OPIATE NAÏVE (Y/N): Yes    MEDICATIONS: REVIEWED  MEDICATIONS  (STANDING):  ALBUTerol/ipratropium for Nebulization 3 milliLiter(s) Nebulizer every 4 hours  amoxicillin  875 milliGRAM(s)/clavulanate 1 Tablet(s) Oral two times a day  pantoprazole    Tablet 40 milliGRAM(s) Oral before breakfast  polyethylene glycol 3350 17 Gram(s) Oral two times a day  senna 2 Tablet(s) Oral two times a day  simethicone 80 milliGRAM(s) Chew every 8 hours    MEDICATIONS  (PRN):  acetaminophen   Tablet 650 milliGRAM(s) Oral every 6 hours PRN For Temp greater than 38 C (100.4 F)  acetaminophen  Suppository 650 milliGRAM(s) Rectal every 6 hours PRN For Temp greater than 38 C (100.4 F)  HYDROmorphone  Injectable 0.5 milliGRAM(s) IV Push every 4 hours PRN Severe Pain (7 - 10)  oxyCODONE    IR 5 milliGRAM(s) Oral every 4 hours PRN Moderate Pain (4 - 6)    LABS: REVIEWED                        10.4   8.6   )-----------( 957      ( 31 May 2018 05:34 )             34.4   05-31    137  |  94<L>  |  19  ----------------------------<  126<H>  3.6   |  29  |  0.49<L>    Ca    9.2      31 May 2018 05:34  Phos  3.0     05-31  Mg     2.4     05-31  TPro  6.2  /  Alb  3.0<L>  /  TBili  0.6  /  DBili  x   /  AST  53<H>  /  ALT  40  /  AlkPhos  157<H>  05-30    Manual Differential (05.31.18 @ 05:34)    Tear Drops: Slight    Poikilocytosis: Slight    Polychromasia: Slight    Auto Neutrophil %: 80.0: Please note that absolute numbers are not reported at [facility]. %    Auto Monocyte %: 8.0: Please note that absolute numbers are not reported at [facility]. %    Ovalocytes: Slight    Microcytosis: Moderate    Hypochromia: Slight    Auto Lymphocyte %: 10.0: Please note that absolute numbers are not reported at [facility]. %    Macrocytosis: Slight    Anisocytosis: Moderate    Auto Basophil %: 1.0: Please note that absolute numbers are not reported at [facility]. %    Elliptocytes: Slight    Platelet Morphology: Normal    Red Cell Morphology: Abnormal    Manual Smear Verification: Performed    Myelocytes %: 1.0 %    Reticulocyte Count (05.31.18 @ 05:34)    RBC Count: 4.82 M/uL    Reticulocyte Percent: 3.1: Reference ranges updated as of September 25th, 2017. %    IMAGING: REVIEWED    Advanced Directives:      Full code          MOLST - Ongoing     Decision maker: The patient is able to participate in complex medical decision making conversations  Legal surrogate: No designated HCP, but the patient is thinking of assigning her sister in [place] and her friend in [place].    GOALS OF CARE DISCUSSION       Palliative care info/counseling provided	           Documentation of GOC:  Wants to pursue cancer targeted treatments.          REFERRALS	        Unit SW/Case Mgmt        - To see the patient       Patient/Family Support - To see the patient       Massage Therapy - To see the patient       Music Therapy - To see the patient       Geriatric case manager

## 2018-05-31 NOTE — PROGRESS NOTE ADULT - PROBLEM SELECTOR PLAN 5
Pending chemo port placement with IR, will likely happen tomorrow.   Has not received any cancer targeted treatments. Evidence of liver and abdominal carcinomatosis spread.  -Recommend re imaging abdomen in the setting of worsening distension and pain requirements.  If findings of malignant SBO are now present then would recommend to start Octreotide 100mg IV x1 and start Octreotide drip 500mcg @ 20ml/hr. Pending chemo port placement with IR, will likely happen tomorrow.   Has not received any cancer targeted treatments. Evidence of liver and abdominal carcinomatosis spread.  -Recommend re imaging abdomen in the setting of worsening distension and pain requirements.  If findings of malignant SBO are now present then would recommend to start Octreotide 100mcg IV x1 and start Octreotide drip 500mcg @ 20ml/hr.

## 2018-05-31 NOTE — PROGRESS NOTE ADULT - PROBLEM SELECTOR PLAN 6
Patient was provided the health care proxy form yesterday. She is planning to have further discussion with her close friend, Robyn, this afternoon.   Current plan is for chemotherapy, IR to place a chemo port expected that it would be done today.

## 2018-06-01 DIAGNOSIS — R11.0 NAUSEA: ICD-10-CM

## 2018-06-01 DIAGNOSIS — K56.600 PARTIAL INTESTINAL OBSTRUCTION, UNSPECIFIED AS TO CAUSE: ICD-10-CM

## 2018-06-01 LAB
ANION GAP SERPL CALC-SCNC: 12 MMOL/L — SIGNIFICANT CHANGE UP (ref 5–17)
ANION GAP SERPL CALC-SCNC: 15 MMOL/L — SIGNIFICANT CHANGE UP (ref 5–17)
BUN SERPL-MCNC: 19 MG/DL — SIGNIFICANT CHANGE UP (ref 7–23)
BUN SERPL-MCNC: 19 MG/DL — SIGNIFICANT CHANGE UP (ref 7–23)
CALCIUM SERPL-MCNC: 8.8 MG/DL — SIGNIFICANT CHANGE UP (ref 8.4–10.5)
CALCIUM SERPL-MCNC: 9 MG/DL — SIGNIFICANT CHANGE UP (ref 8.4–10.5)
CHLORIDE SERPL-SCNC: 94 MMOL/L — LOW (ref 96–108)
CHLORIDE SERPL-SCNC: 97 MMOL/L — SIGNIFICANT CHANGE UP (ref 96–108)
CO2 SERPL-SCNC: 27 MMOL/L — SIGNIFICANT CHANGE UP (ref 22–31)
CO2 SERPL-SCNC: 29 MMOL/L — SIGNIFICANT CHANGE UP (ref 22–31)
CREAT SERPL-MCNC: 0.42 MG/DL — LOW (ref 0.5–1.3)
CREAT SERPL-MCNC: 0.44 MG/DL — LOW (ref 0.5–1.3)
CULTURE RESULTS: SIGNIFICANT CHANGE UP
GLUCOSE SERPL-MCNC: 123 MG/DL — HIGH (ref 70–99)
GLUCOSE SERPL-MCNC: 131 MG/DL — HIGH (ref 70–99)
HCT VFR BLD CALC: 31 % — LOW (ref 34.5–45)
HGB BLD-MCNC: 9.5 G/DL — LOW (ref 11.5–15.5)
MAGNESIUM SERPL-MCNC: 2.2 MG/DL — SIGNIFICANT CHANGE UP (ref 1.6–2.6)
MAGNESIUM SERPL-MCNC: 2.3 MG/DL — SIGNIFICANT CHANGE UP (ref 1.6–2.6)
MCHC RBC-ENTMCNC: 22.4 PG — LOW (ref 27–34)
MCHC RBC-ENTMCNC: 30.6 G/DL — LOW (ref 32–36)
MCV RBC AUTO: 73.1 FL — LOW (ref 80–100)
PLATELET # BLD AUTO: 770 K/UL — HIGH (ref 150–400)
POTASSIUM SERPL-MCNC: 3.6 MMOL/L — SIGNIFICANT CHANGE UP (ref 3.5–5.3)
POTASSIUM SERPL-MCNC: 3.9 MMOL/L — SIGNIFICANT CHANGE UP (ref 3.5–5.3)
POTASSIUM SERPL-SCNC: 3.6 MMOL/L — SIGNIFICANT CHANGE UP (ref 3.5–5.3)
POTASSIUM SERPL-SCNC: 3.9 MMOL/L — SIGNIFICANT CHANGE UP (ref 3.5–5.3)
RBC # BLD: 4.24 M/UL — SIGNIFICANT CHANGE UP (ref 3.8–5.2)
RBC # FLD: 28.5 % — HIGH (ref 10.3–16.9)
SODIUM SERPL-SCNC: 136 MMOL/L — SIGNIFICANT CHANGE UP (ref 135–145)
SODIUM SERPL-SCNC: 138 MMOL/L — SIGNIFICANT CHANGE UP (ref 135–145)
SPECIMEN SOURCE: SIGNIFICANT CHANGE UP
WBC # BLD: 7.3 K/UL — SIGNIFICANT CHANGE UP (ref 3.8–10.5)
WBC # FLD AUTO: 7.3 K/UL — SIGNIFICANT CHANGE UP (ref 3.8–10.5)

## 2018-06-01 PROCEDURE — 99233 SBSQ HOSP IP/OBS HIGH 50: CPT

## 2018-06-01 PROCEDURE — 74177 CT ABD & PELVIS W/CONTRAST: CPT | Mod: 26

## 2018-06-01 RX ORDER — POTASSIUM CHLORIDE 20 MEQ
10 PACKET (EA) ORAL
Qty: 0 | Refills: 0 | Status: DISCONTINUED | OUTPATIENT
Start: 2018-06-01 | End: 2018-06-01

## 2018-06-01 RX ORDER — OCTREOTIDE ACETATE 200 UG/ML
20 INJECTION, SOLUTION INTRAVENOUS; SUBCUTANEOUS
Qty: 500 | Refills: 0 | Status: DISCONTINUED | OUTPATIENT
Start: 2018-06-01 | End: 2018-06-08

## 2018-06-01 RX ORDER — ACETAMINOPHEN 500 MG
1000 TABLET ORAL ONCE
Qty: 0 | Refills: 0 | Status: COMPLETED | OUTPATIENT
Start: 2018-06-01 | End: 2018-06-01

## 2018-06-01 RX ORDER — DIPHENHYDRAMINE HCL 50 MG
25 CAPSULE ORAL AT BEDTIME
Qty: 0 | Refills: 0 | Status: DISCONTINUED | OUTPATIENT
Start: 2018-06-01 | End: 2018-06-07

## 2018-06-01 RX ORDER — DEXAMETHASONE 0.5 MG/5ML
4 ELIXIR ORAL EVERY 6 HOURS
Qty: 0 | Refills: 0 | Status: DISCONTINUED | OUTPATIENT
Start: 2018-06-01 | End: 2018-06-08

## 2018-06-01 RX ORDER — ACETAMINOPHEN 500 MG
1000 TABLET ORAL EVERY 6 HOURS
Qty: 0 | Refills: 0 | Status: COMPLETED | OUTPATIENT
Start: 2018-06-01 | End: 2018-06-01

## 2018-06-01 RX ORDER — ONDANSETRON 8 MG/1
4 TABLET, FILM COATED ORAL EVERY 6 HOURS
Qty: 0 | Refills: 0 | Status: DISCONTINUED | OUTPATIENT
Start: 2018-06-01 | End: 2018-06-09

## 2018-06-01 RX ORDER — ONDANSETRON 8 MG/1
4 TABLET, FILM COATED ORAL EVERY 6 HOURS
Qty: 0 | Refills: 0 | Status: DISCONTINUED | OUTPATIENT
Start: 2018-06-01 | End: 2018-06-01

## 2018-06-01 RX ORDER — SODIUM CHLORIDE 9 MG/ML
1000 INJECTION INTRAMUSCULAR; INTRAVENOUS; SUBCUTANEOUS
Qty: 0 | Refills: 0 | Status: DISCONTINUED | OUTPATIENT
Start: 2018-06-01 | End: 2018-06-08

## 2018-06-01 RX ORDER — OCTREOTIDE ACETATE 200 UG/ML
100 INJECTION, SOLUTION INTRAVENOUS; SUBCUTANEOUS ONCE
Qty: 0 | Refills: 0 | Status: COMPLETED | OUTPATIENT
Start: 2018-06-01 | End: 2018-06-01

## 2018-06-01 RX ORDER — OXYCODONE HYDROCHLORIDE 5 MG/1
5 TABLET ORAL EVERY 6 HOURS
Qty: 0 | Refills: 0 | Status: DISCONTINUED | OUTPATIENT
Start: 2018-06-01 | End: 2018-06-05

## 2018-06-01 RX ADMIN — SODIUM CHLORIDE 80 MILLILITER(S): 9 INJECTION INTRAMUSCULAR; INTRAVENOUS; SUBCUTANEOUS at 07:03

## 2018-06-01 RX ADMIN — Medication 400 MILLIGRAM(S): at 07:29

## 2018-06-01 RX ADMIN — Medication 4 MILLIGRAM(S): at 18:00

## 2018-06-01 RX ADMIN — AMPICILLIN SODIUM AND SULBACTAM SODIUM 200 GRAM(S): 250; 125 INJECTION, POWDER, FOR SUSPENSION INTRAMUSCULAR; INTRAVENOUS at 11:38

## 2018-06-01 RX ADMIN — ENOXAPARIN SODIUM 90 MILLIGRAM(S): 100 INJECTION SUBCUTANEOUS at 18:00

## 2018-06-01 RX ADMIN — AMPICILLIN SODIUM AND SULBACTAM SODIUM 200 GRAM(S): 250; 125 INJECTION, POWDER, FOR SUSPENSION INTRAMUSCULAR; INTRAVENOUS at 00:45

## 2018-06-01 RX ADMIN — OCTREOTIDE ACETATE 4 MICROGRAM(S)/HR: 200 INJECTION, SOLUTION INTRAVENOUS; SUBCUTANEOUS at 14:29

## 2018-06-01 RX ADMIN — Medication 3 MILLILITER(S): at 03:03

## 2018-06-01 RX ADMIN — Medication 1000 MILLIGRAM(S): at 07:30

## 2018-06-01 RX ADMIN — SODIUM CHLORIDE 80 MILLILITER(S): 9 INJECTION INTRAMUSCULAR; INTRAVENOUS; SUBCUTANEOUS at 07:06

## 2018-06-01 RX ADMIN — Medication 3 MILLILITER(S): at 10:21

## 2018-06-01 RX ADMIN — Medication 25 MILLIGRAM(S): at 21:33

## 2018-06-01 RX ADMIN — OXYCODONE HYDROCHLORIDE 5 MILLIGRAM(S): 5 TABLET ORAL at 17:42

## 2018-06-01 RX ADMIN — Medication 3 MILLILITER(S): at 14:09

## 2018-06-01 RX ADMIN — Medication 1000 MILLIGRAM(S): at 11:50

## 2018-06-01 RX ADMIN — OXYCODONE HYDROCHLORIDE 5 MILLIGRAM(S): 5 TABLET ORAL at 18:20

## 2018-06-01 RX ADMIN — HYDROMORPHONE HYDROCHLORIDE 0.5 MILLIGRAM(S): 2 INJECTION INTRAMUSCULAR; INTRAVENOUS; SUBCUTANEOUS at 03:30

## 2018-06-01 RX ADMIN — AMPICILLIN SODIUM AND SULBACTAM SODIUM 200 GRAM(S): 250; 125 INJECTION, POWDER, FOR SUSPENSION INTRAMUSCULAR; INTRAVENOUS at 18:00

## 2018-06-01 RX ADMIN — ENOXAPARIN SODIUM 90 MILLIGRAM(S): 100 INJECTION SUBCUTANEOUS at 07:04

## 2018-06-01 RX ADMIN — OXYCODONE HYDROCHLORIDE 5 MILLIGRAM(S): 5 TABLET ORAL at 01:31

## 2018-06-01 RX ADMIN — OXYCODONE HYDROCHLORIDE 5 MILLIGRAM(S): 5 TABLET ORAL at 02:05

## 2018-06-01 RX ADMIN — Medication 3 MILLILITER(S): at 07:05

## 2018-06-01 RX ADMIN — Medication 3 MILLILITER(S): at 18:00

## 2018-06-01 RX ADMIN — HYDROMORPHONE HYDROCHLORIDE 0.5 MILLIGRAM(S): 2 INJECTION INTRAMUSCULAR; INTRAVENOUS; SUBCUTANEOUS at 03:04

## 2018-06-01 RX ADMIN — OCTREOTIDE ACETATE 100 MICROGRAM(S): 200 INJECTION, SOLUTION INTRAVENOUS; SUBCUTANEOUS at 18:00

## 2018-06-01 RX ADMIN — AMPICILLIN SODIUM AND SULBACTAM SODIUM 200 GRAM(S): 250; 125 INJECTION, POWDER, FOR SUSPENSION INTRAMUSCULAR; INTRAVENOUS at 07:04

## 2018-06-01 RX ADMIN — Medication 400 MILLIGRAM(S): at 11:38

## 2018-06-01 RX ADMIN — Medication 3 MILLILITER(S): at 21:33

## 2018-06-01 NOTE — PROGRESS NOTE ADULT - PROBLEM SELECTOR PLAN 8
DVT PPX: Lovenox BID  FULL CODE  DISPO: RMF pending resolution of ileus vAnjel MACEDO, f/u Dr. Nick outpatient [phone]

## 2018-06-01 NOTE — PROGRESS NOTE ADULT - SUBJECTIVE AND OBJECTIVE BOX
Patient seen and examined at bedside. Patient still complaining of diffuse/ L>R abdominal pain and RUQ abdominal pain that is constant and has been present since before her percutaneous cholecystostomy drainage. The pain is slightly better after drainage but it has not completely gone away, she is still passing flatus, +diarrhea.     Overnight - patient remains afebrile, HD stable with no acute events.     ampicillin/sulbactam  IVPB 3  ampicillin/sulbactam  IVPB 3  enoxaparin Injectable 90        Vital Signs Last 24 Hrs  T(C): 36.3 (01 Jun 2018 11:36), Max: 36.7 (31 May 2018 22:00)  T(F): 97.3 (01 Jun 2018 11:36), Max: 98 (31 May 2018 22:00)  HR: 88 (01 Jun 2018 11:36) (88 - 102)  BP: 138/83 (01 Jun 2018 11:36) (131/82 - 151/87)  BP(mean): --  RR: 16 (01 Jun 2018 11:36) (16 - 18)  SpO2: 95% (01 Jun 2018 11:36) (93% - 96%)  I&O's Detail    31 May 2018 07:01  -  01 Jun 2018 07:00  --------------------------------------------------------  IN:  Total IN: 0 mL    OUT:    Drain: 30 mL  Total OUT: 30 mL    Total NET: -30 mL          Physical Exam:  General: NAD, resting comfortably in bed  Pulmonary: Nonlabored breathing, no respiratory distress  Cardiovascular: NSR  Abdominal: soft, moderately distended, moderately TTP RUQ and Left side generalized TTP, no guarding or rebound.  cholecystostomy tube in place.   Extremities: WWP        LABS:                        9.5    7.3   )-----------( 770      ( 01 Jun 2018 05:53 )             31.0     06-01    138  |  97  |  19  ----------------------------<  123<H>  3.9   |  29  |  0.44<L>    Ca    9.0      01 Jun 2018 05:53  Phos  3.0     05-31  Mg     2.2     06-01    TPro  6.9  /  Alb  3.2<L>  /  TBili  0.6  /  DBili  <0.2  /  AST  37  /  ALT  38  /  AlkPhos  164<H>  05-31        Radiology and Additional Studies:    Assessment and Plan:      68YO F with metastatic colon cancer and acute cholecystitis s/p percutaneous cholecystostomy with worsening abdominal distention and generalized abdominal pain worse on the left than right.  Patient remains HD stable, afebrile with no leukocytosis.   While there are no clinical signs that she is acutely obstructed, the CT scan shows evidence of partial SBO and ileus due to the colonic masses.  - Recommend wound care for diverting loop ileostomy planning and education   - Recommend GI Consult to evaluate for possible palliative stenting.  - GI recommending slow prep Monday, into Tuesday with colonoscopy and possible stent Wednesday   - Appreciate Pallative Care recs (Octreotide, Dexamethasone, Benadryl, etc)  - Will consider loop ileostomy if GI is unable to stent and if patient is agreeable to procedure.   - Team 4 continue to follow.

## 2018-06-01 NOTE — PROGRESS NOTE ADULT - PROBLEM SELECTOR PLAN 3
Stage 4 metastatic colon cancer with liver mets and peritoneal carcinomatosis  - plan per Dr. Nick   - s/p chemoport placement 5/31   - heme onc following  - palliative consulted given metastatic CRC, pt aware of 1-2 yr prognosis and actively filling out MOLST: patient currently completing  -CT abdomen/pelvis positive for ascites secondary to abdominal mets. Unclear if mets causing possible SBO. Stage 4 metastatic colon cancer with liver mets and peritoneal carcinomatosis  - plan per Dr. Nick   - s/p chemoport placement 5/31   - heme onc following  - palliative consulted given metastatic CRC, pt aware of 1-2 yr prognosis; MOLST completed. NOT DNR/DNI. Would like compressions, intubation with time limitation.   -CT abdomen/pelvis positive for ascites secondary to abdominal mets. Unclear if mets causing possible SBO.

## 2018-06-01 NOTE — PROGRESS NOTE ADULT - ASSESSMENT
67yr old F with PMHx significant for metastatic colon cancer admitted on 5/25/18 for evaluation of abdominal pain, fever, weakness, and found to have acute cholecystitis. She was deemed not to be a good surgical candidate and had a cholecystostomy tube placed and is on abx. She has had worsening abdominal distention and CT showed a partial SBO and ileus.   GI consulted for possible colonic stent placement.    # Metastatic colonic cancer  - possible colonic stent placement  - needs to allow ileus to improve  - will plan for a slow prep - can start prep on monday - with 2 L golytely  - then full prep with 4L golytely on tuesday  - start patient on clears on Monday  - we will plan for the colonoscopy and stent attempt on Wednesday    Discussed with attending Dr Gwen LEWIS following

## 2018-06-01 NOTE — PROGRESS NOTE ADULT - ASSESSMENT
67 yr old male with a PMHx of recently diagnosed colon cancer presenting from oncologist's office with acute onset abdominal pain and fever, found to have acute cholecystitis and hepatic vein thrombosis, deemed poor surgical candidate 2/2 active metastic colon Ca, admitted to [facility] for medical management. Now s/p perc arleen with cholecystostomy drain in place and new imaging concerning for ileus v. low grade partial SBO.

## 2018-06-01 NOTE — PROGRESS NOTE ADULT - SUBJECTIVE AND OBJECTIVE BOX
OVERNIGHT EVENTS: CT abdomen/pelvis with contrast completed, concerning for ileus v. low grade partial SBO. Patient NPO.    SUBJECTIVE / INTERVAL HPI: Patient seen and examined at bedside.     VITAL SIGNS:  Vital Signs Last 24 Hrs  T(C): 36.7 (31 May 2018 22:00), Max: 36.9 (31 May 2018 05:58)  T(F): 98 (31 May 2018 22:00), Max: 98.5 (31 May 2018 05:58)  HR: 99 (31 May 2018 22:00) (94 - 105)  BP: 138/85 (31 May 2018 22:00) (126/77 - 147/89)  BP(mean): --  RR: 16 (31 May 2018 22:00) (16 - 18)  SpO2: 93% (31 May 2018 22:00) (93% - 96%)    PHYSICAL EXAM:  General: WDWN  HEENT: NC/AT; PERRL, anicteric sclera; MMM  Neck: supple  Cardiovascular: +S1/S2; RRR  Respiratory: CTA B/L; no W/R/R  Gastrointestinal: soft, NT/ND; +BSx4  Extremities: WWP; no edema, clubbing or cyanosis  Vascular: 2+ radial, DP/PT pulses B/L  Neurological: AAOx3; no focal deficits    MEDICATIONS:  MEDICATIONS  (STANDING):  ALBUTerol/ipratropium for Nebulization 3 milliLiter(s) Nebulizer every 4 hours  ampicillin/sulbactam  IVPB 3 Gram(s) IV Intermittent every 6 hours  enoxaparin Injectable 90 milliGRAM(s) SubCutaneous two times a day  pantoprazole    Tablet 40 milliGRAM(s) Oral before breakfast  polyethylene glycol 3350 17 Gram(s) Oral two times a day  senna 2 Tablet(s) Oral two times a day  simethicone 80 milliGRAM(s) Chew every 8 hours    MEDICATIONS  (PRN):  acetaminophen   Tablet 650 milliGRAM(s) Oral every 6 hours PRN For Temp greater than 38 C (100.4 F)  acetaminophen  Suppository 650 milliGRAM(s) Rectal every 6 hours PRN For Temp greater than 38 C (100.4 F)  HYDROmorphone  Injectable 0.5 milliGRAM(s) IV Push every 4 hours PRN Severe Pain (7 - 10)  oxyCODONE    IR 5 milliGRAM(s) Oral every 4 hours PRN Moderate Pain (4 - 6)      ALLERGIES:  allergic to cats (Rash)  No Known Drug Allergies or Intolerances    LABS:                        10.4   8.6   )-----------( 957      ( 31 May 2018 05:34 )             34.4     06-01    136  |  94<L>  |  19  ----------------------------<  131<H>  3.6   |  27  |  0.42<L>    Ca    8.8      01 Jun 2018 03:55  Phos  3.0     05-31  Mg     2.4     05-31    TPro  6.9  /  Alb  3.2<L>  /  TBili  0.6  /  DBili  <0.2  /  AST  37  /  ALT  38  /  AlkPhos  164<H>  05-31    PT/INR - ( 30 May 2018 05:52 )   PT: 12.9 sec;   INR: 1.16          PTT - ( 30 May 2018 05:52 )  PTT:34.5 sec    CAPILLARY BLOOD GLUCOSE      RADIOLOGY & ADDITIONAL TESTS: Reviewed.    CT Abdomen and Pelvis w/ IV Cont   (06.01.18 @ 01:43)    IMPRESSION:  1.  Since 5/24/2018, there has been interval placement of a percutaneous   cholecystostomy tube.  2.  The entire small bowel is dilated and oral contrast reaches the   rectum. Findings are suggestive of ileus versus low-grade partial small   bowel obstruction.  3.  New moderate ascites and small right and trace left pleural effusions.      Xray Abdomen 2 Views   (05.31.18 @ 14:32)  Impression: Findings suspicious for small bowel obstruction. OVERNIGHT EVENTS: CT abdomen/pelvis with contrast completed, concerning for ileus v. low grade partial SBO. Surgery Team 4 recommend elevated bed, ambulation and strict NPO.   SUBJECTIVE / INTERVAL HPI: Patient seen and examined at bedside. Patient had one small bowel movement last evening, endorses flatus. Patient also belching intermittently (unobserved) and feeling mildly nauseous, denying sensation of impending emesis. Patient endorses a sharp, stabbing central umbilical and epigastric pain that shoots to her back with movement. CT abdomen shows no indication of pancreatitis. Tender to superficial palpation/resting hand on belly. Discontinued opioid medications for now, switch to ofirmev to avoid opioid medications, will monitor closely to try to control pain. Ordered PT and OOB to encourage ambulation, discussed with nursing. Monitoring for emesis with anticipation of NG tube placement if patient vomits/becomes increasingly nauseous. Denying fevers/chills. Remains HD stable, afebrile.     VITAL SIGNS:  Vital Signs Last 24 Hrs  T(C): 36.7 (31 May 2018 22:00), Max: 36.9 (31 May 2018 05:58)  T(F): 98 (31 May 2018 22:00), Max: 98.5 (31 May 2018 05:58)  HR: 99 (31 May 2018 22:00) (94 - 105)  BP: 138/85 (31 May 2018 22:00) (126/77 - 147/89)  BP(mean): --  RR: 16 (31 May 2018 22:00) (16 - 18)  SpO2: 93% (31 May 2018 22:00) (93% - 96%)    PHYSICAL EXAM:  General: obese female laying in chair, eyes closed, nondiaphoretic but speaking softly, moving slowly due to pain  HEENT: NC/AT; PERRL, anicteric sclera; MMM  Neck: supple  Cardiovascular: +S1/S2; mildly tachycardic  Respiratory: CTA B/L; no W/R/R  Gastrointestinal: distended, tender to superficial palpation more pronounced over epigastric and umbilical regions, hypoactive bowel sounds. cholecystostomy tube in place - exit site c/d/i. Currently draining approximately 30cc of bilious fluid.   Extremities: WWP; mild edema in ankles bilaterally, clubbing or cyanosis  Vascular: 2+ radial, DP/PT pulses B/L  Neurological: AAOx3; no focal deficits    MEDICATIONS:  MEDICATIONS  (STANDING):  ALBUTerol/ipratropium for Nebulization 3 milliLiter(s) Nebulizer every 4 hours  ampicillin/sulbactam  IVPB 3 Gram(s) IV Intermittent every 6 hours  enoxaparin Injectable 90 milliGRAM(s) SubCutaneous two times a day  pantoprazole    Tablet 40 milliGRAM(s) Oral before breakfast  polyethylene glycol 3350 17 Gram(s) Oral two times a day  senna 2 Tablet(s) Oral two times a day  simethicone 80 milliGRAM(s) Chew every 8 hours    MEDICATIONS  (PRN):  acetaminophen   Tablet 650 milliGRAM(s) Oral every 6 hours PRN For Temp greater than 38 C (100.4 F)  acetaminophen  Suppository 650 milliGRAM(s) Rectal every 6 hours PRN For Temp greater than 38 C (100.4 F)  HYDROmorphone  Injectable 0.5 milliGRAM(s) IV Push every 4 hours PRN Severe Pain (7 - 10)  oxyCODONE    IR 5 milliGRAM(s) Oral every 4 hours PRN Moderate Pain (4 - 6)      ALLERGIES:  allergic to cats (Rash)  No Known Drug Allergies or Intolerances    LABS:                        10.4   8.6   )-----------( 957      ( 31 May 2018 05:34 )             34.4     06-01    136  |  94<L>  |  19  ----------------------------<  131<H>  3.6   |  27  |  0.42<L>    Ca    8.8      01 Jun 2018 03:55  Phos  3.0     05-31  Mg     2.4     05-31    TPro  6.9  /  Alb  3.2<L>  /  TBili  0.6  /  DBili  <0.2  /  AST  37  /  ALT  38  /  AlkPhos  164<H>  05-31    PT/INR - ( 30 May 2018 05:52 )   PT: 12.9 sec;   INR: 1.16          PTT - ( 30 May 2018 05:52 )  PTT:34.5 sec    CAPILLARY BLOOD GLUCOSE      RADIOLOGY & ADDITIONAL TESTS: Reviewed.    CT Abdomen and Pelvis w/ IV Cont   (06.01.18 @ 01:43)    IMPRESSION:  1.  Since 5/24/2018, there has been interval placement of a percutaneous   cholecystostomy tube.  2.  The entire small bowel is dilated and oral contrast reaches the   rectum. Findings are suggestive of ileus versus low-grade partial small   bowel obstruction.  3.  New moderate ascites and small right and trace left pleural effusions.      Xray Abdomen 2 Views   (05.31.18 @ 14:32)  Impression: Findings suspicious for small bowel obstruction.

## 2018-06-01 NOTE — PROGRESS NOTE ADULT - PROBLEM SELECTOR PLAN 1
Improving pain. Patient with acute onset RUQ abdominal pain and CT A/P findings c/w acute cholecystitis; RUQ U/S showing mobile stones possibly causing transient obstruction vs. extrinsic compression from tumor burden given hepatic mets. s/p IR percutaneous cholecystostomy placement 5/25  -biliary fluid growing E. faecium, citrobacter, e. coli  -on Augmentin BID for GNR on bili cx  -use opioid meds cautiously in setting of possible ileus v. SBO, NPO for now, give ofirmev for pain.  -now on regular diet tolerating well - NPO for now for bowel rest  -blood cx NGTD  -cholecystostomy tube continuing to drain Improving pain. Patient with acute onset RUQ abdominal pain and CT A/P findings c/w acute cholecystitis; RUQ U/S showing mobile stones possibly causing transient obstruction vs. extrinsic compression from tumor burden given hepatic mets. s/p IR percutaneous cholecystostomy placement 5/25  -biliary fluid growing E. faecium, citrobacter, e. coli  -on Augmentin BID for GNR on bili cx  -use opioid meds cautiously in setting of possible ileus v. SBO, NPO for now, give ofirmev for pain.  -NPO for now for bowel rest, may consider advance to clear liquids given passing flatus and contrast able to travel to rectum indicating only partial obstruction.  -blood cx NGTD  -cholecystostomy tube continuing to drain  -f/u GI recommendations  -f/u surgery recommendations

## 2018-06-01 NOTE — PROGRESS NOTE ADULT - ATTENDING COMMENTS
DX  ILEUS -to start clears.  follow up surgery recs.   ACUTE CHOLECYSTITIS -s/p percutaneous cholecystostomy, c/w  unasyn.      SEVERE SEPSIS - as above  HYPOXEMIC RESPIRATORY FAILURE/ PULMONARY EDEMA - lasix PRN.   wean supplemental oxygen.   SMV THROMBUS/PORTAL VEIN THROMBUS - cont  lovenox   STAGE IV COLON CA w/ LIVER METASTASES and PERITONEAL CARCINOMATOSIS- f/u onc recs. s/p chemo port . may need diverting ileostomy. surgery recs.    HYPONATREMIA -resolved .   ANEMIA - trend hb. no active GI bleeding currently. monitor closely. transfuse to keep hb >7 .

## 2018-06-01 NOTE — PROGRESS NOTE ADULT - PROBLEM SELECTOR PLAN 6
Chemo port placed. Has not received any cancer targeted treatments. Evidence of liver and abdominal carcinomatosis spread.

## 2018-06-01 NOTE — PROGRESS NOTE ADULT - PROBLEM SELECTOR PLAN 3
Patient Received IV Tylenol 1000mg x1 and Oxycodone IR 5mg x2, and Dilaudid 0.5mg IV x3 doses.  Pain requirement have decreased from Morphine PO 72.5mg equivalents to 45mg in the past 24 hrs.  - Continue Oxycodone IR 5mg PO q4h PRN Moderate pain  - Continue Dilaudid 0.5mg IV q4h PRN Severe pain.  - Recommend STANDING APAP 1g q8h  - Recommend steroids as above

## 2018-06-01 NOTE — PROGRESS NOTE ADULT - PROBLEM SELECTOR PLAN 1
CT findings consistent with partial SBO 2/2 POD carcinomatosis   -Recommend to start Octreotide 100mcg IV x1 and start Octreotide drip 500mcg @ 20ml/hr.   -Recommend Dexamethasone 4mg IV q6h ATC  -Recommend Benadryl 25mg IVPB qHS

## 2018-06-01 NOTE — PROGRESS NOTE ADULT - SUBJECTIVE AND OBJECTIVE BOX
Hem/Onc    PILLO PEDRAZA  MRN-[id number]    INTERVAL Hx:   Events over night noted. Worsening abdominal pain, CT A/P with partial SBO vs ileus, worsening ascites. S/p Port placement on 5/31. She was able to ambulate shortly. Pain is better this am, On Abx coverage for cholecystitis.  Afebrile >72 hours. + nausea, no vomiting.      HPI: 67 y.o woman with newly diagnosed metastatic colon cancer- we were completing outpatient work up and were planning systemic treatment- the patient received IV iron last week, she had staging PET/CT this week and was scheduled for Infusaport placement. She presented to our office on 5/24 with fever/vomiting and abdominal pain> She was sent to the ER with suspicion for an obstruction (known large R sided/obstructing lesion).  Work up at [facility] with CT scan showed findings c/w acute cholecystitis and SMV and portal vein thrombosis. Acute cholecystitis confirmed by US.     ROS:  + nausea/vomiting  + fevers and chills  No night sweats.   + fatigue, no headaches/dizziness.    + abdominal pain/no diarrhea  No melena or hematochezia.    No dysuria/hematuria.  No history of easy bruising/bleeding.     No leg pain or leg swelling.    ROS is otherwise negative.    PMH/PSH:  PAST MEDICAL & SURGICAL HISTORY:  Colon cancer      Medications:  MEDICATIONS  (STANDING):  heparin  Infusion.  Unit(s)/Hr (16 mL/Hr) IV Continuous <Continuous>  piperacillin/tazobactam IVPB. 3.375 Gram(s) IV Intermittent every 6 hours  sodium chloride 0.9%. 1000 milliLiter(s) (100 mL/Hr) IV Continuous <Continuous>    MEDICATIONS  (PRN):  heparin  Injectable 7000 Unit(s) IV Push every 6 hours PRN For aPTT less than 40  heparin  Injectable 3500 Unit(s) IV Push every 6 hours PRN For aPTT between 40 - 57    heparin  Infusion.  Unit(s)/Hr IV Continuous <Continuous>  heparin  Injectable 7000 Unit(s) IV Push every 6 hours PRN  heparin  Injectable 3500 Unit(s) IV Push every 6 hours PRN    Allergies    allergic to cats (Rash)  No Known Drug Allergies    Intolerances    Exam:  Vital Signs Last 24 Hrs  T(C): 36.7 (01 Jun 2018 05:50), Max: 36.7 (31 May 2018 22:00)  T(F): 98 (01 Jun 2018 05:50), Max: 98 (31 May 2018 22:00)  HR: 102 (01 Jun 2018 05:50) (94 - 102)  BP: 151/87 (01 Jun 2018 05:50) (126/77 - 151/87)  BP(mean): --  RR: 16 (01 Jun 2018 05:50) (16 - 18)  SpO2: 95% (01 Jun 2018 05:50) (93% - 96%)    HEENT: pale mucosae, anicteric sclerae  No palpable peripheral lymphadenopathy  COR: regular rhythm rate, no murmurs, rubs or gallops  PULMO: clear to auscultation B/L  ABD: soft, distended, + mild RUQ pain to palpation  , + drain in place  EXT: no edema  NEURO: intact  SKIN: no lesions on visible skin    Labs:                        9.5    7.3   )-----------( 770      ( 01 Jun 2018 05:53 )             31.0         CBC Full  -  ( 01 Jun 2018 05:53 )  WBC Count : 7.3 K/uL  Hemoglobin : 9.5 g/dL  Hematocrit : 31.0 %  Platelet Count - Automated : 770 K/uL  Mean Cell Volume : 73.1 fL  Mean Cell Hemoglobin : 22.4 pg  Mean Cell Hemoglobin Concentration : 30.6 g/dL  Auto Neutrophil # : x  Auto Lymphocyte # : x  Auto Monocyte # : x  Auto Eosinophil # : x  Auto Basophil # : x  Auto Neutrophil % : x  Auto Lymphocyte % : x  Auto Monocyte % : x  Auto Eosinophil % : x  Auto Basophil % : x        06-01    138  |  97  |  19  ----------------------------<  123<H>  3.9   |  29  |  0.44<L>    Ca    9.0      01 Jun 2018 05:53  Phos  3.0     05-31  Mg     2.2     06-01    TPro  6.9  /  Alb  3.2<L>  /  TBili  0.6  /  DBili  <0.2  /  AST  37  /  ALT  38  /  AlkPhos  164<H>  05-31    Assessment:    Metastatic colon cancer  Severe iron deficiency anemia  Acute cholecystitis  SMV/portal vein thrombosis    Plan:    Worsenign abd pain overnight, CT c/w partial SBO  Conservative management  Seems better this am    S/p percutaneous cholecystotomy on 5/25  On Abx coverage, remains afebrile      S/p Infusaport placement on 5/31    SMV/Portal vein thrombosis- Continue Lovenox,  she will eventually be transitioned to an oral anticoagulant     Needs PT    Newly diagnosed colon cancer- fairly rapidly progressive symptomatic disease   Will need systemic treatment soon     Severe YESSICA-she received parenteral iron as outpatient  H/H is better  PRBC as clinically indicated    Reactive thrombocytosis (YESSICA, malignancy) no specific intervention is indicated    Thank you    Estefania Lundy MD  [phone]

## 2018-06-01 NOTE — PROGRESS NOTE ADULT - PROBLEM SELECTOR PLAN 4
CTA A/P s/f hepatic vein thrombosis; most likely 2/2 hypercoagulable state in the setting of active untreated malignancy  -c/w lovenox 90mg BID with intention to transition to DOAC on discharge   -f/u Dr. Nick outpatient

## 2018-06-01 NOTE — PROGRESS NOTE ADULT - SUBJECTIVE AND OBJECTIVE BOX
PILLO MAR             MRN-[id number]    CC: Abdominal pain, bloating, flatulence, constipation, malignant sbo, nausea    HPI:  68 y/o female with a PMHx of recently dx metastatic ascending colon cancer (dx 2 weeks ago w/ mets to the liver and peritoneal carcinomatosis) that presents from Dr. Lundy's office with acute onset RUQ abdominal pain, fever and weakness for the past 3 days. Pain has worsened over the past 3 days and last evening was associated with one episode of vomiting (nonbloody, questionable bilious content).  Last BM was 2 days prior to presentation. She denies any associated chills, chest pain/discomfort/pressure, SOB/dyspnea, dysuria, diarrhea, hematochezia. Had CT A/P in ED which was s/f acute cholecystitis and hepatic vein thrombosis.    Cancer Hx: pt had a normal colonoscopy 2 years ago-had a polypectomy with benign pathology per pt. States she started getting non-specific abdominal pain starting last summer which she self-medicated with rolaids. States she ad 4 total episodes of abdominal pain over the summer and eventually went to see GI who did colonoscopy 2 weeks ago and diagnosed her with sub-total obstructing ascending colon cancer; had PET scan on Tuesday which showed liver mets and peritoneal carcinomatosis; plan for chemoport placement in June; patient also endorsing intermittent fevers, chills, and unintentional 25 lbs weight loss since Janurary. Denies any family hx of colon cancer.  ED Course  Vital Signs   TMax: 100.3 HR: 97 BP: 116/73 RR: 16 SpO2: 96%   CT a/p findings as above   s/p zosyn 3.375 q6  s/p initiation of heparin gtt for hepatic vein thrombosis (25 May 2018 02:05)    SUBJECTIVE: Saw and evaluated Mrs Mar at bedside. Reviewed the results of the abdominal ct scan and findings of malignant partial sbo and we discussed the conversation she had with previous providers which discussed the change in diet and the possibility of stent vs iliostomy. She is concerned of how this options may impact her quality of life and plan for chemotherapy.     ROS:  DYSPNEA: No  NAUS/VOM: Yes	  SECRETIONS: No	  AGITATION: No  Pain (Y/N):  Yes  -Provocation/Palliation:   -Quality/Quantity: Acute visceral pain currently uncontrolled. Chronic malignant somatic hepatic pain currently controlled.  -Radiating: Flank and lower abdomen  -Severity: Moderate  -Timing/Frequency: Constant with exacerbations related to movement  -Impact on ADLs: Prevents her from sleep and ADLs or ambulating.    OTHER REVIEW OF SYSTEMS: Distension, changes in BM consistency    PEx:  T(C): 36.7 (06-01-18 @ 05:50), Max: 36.7 (05-31-18 @ 22:00)  HR: 102 (06-01-18 @ 05:50) (94 - 102)  BP: 151/87 (06-01-18 @ 05:50) (131/82 - 151/87)  RR: 16 (06-01-18 @ 05:50) (16 - 18)  SpO2: 95% (06-01-18 @ 05:50) (93% - 96%)  Wt(kg): 88.2    General: AAOx3 lying in bedside chair, no acute distress. Somnolent  HEENT: NCAT Dry mucous membranes Non icteric PERRL   Neck:  Supple, no masses  CVS: Normal S1, S2, No M/R/G  Resp: Unlabored, shallow breaths  GI: Distended, Soft, TTP of lower quadrants. Very diminished BS, perc drain in place  : Normal   Musc: No C/C/E    Neuro: No focal deficits. Somnolence  Psych:  Concerned about finding of sbo  Skin: Xerosis. Right chest infuse a port scar c/d/i  Lymph: Normal   	   ALLERGIES: allergic to cats (Rash)    OPIATE NAÏVE (Y/N): Yes    MEDICATIONS: REVIEWED  MEDICATIONS  (STANDING):  acetaminophen  IVPB. 1000 milliGRAM(s) IV Intermittent once  ALBUTerol/ipratropium for Nebulization 3 milliLiter(s) Nebulizer every 4 hours  ampicillin/sulbactam  IVPB 3 Gram(s) IV Intermittent every 6 hours  enoxaparin Injectable 90 milliGRAM(s) SubCutaneous two times a day  pantoprazole    Tablet 40 milliGRAM(s) Oral before breakfast  sodium chloride 0.9%. 1000 milliLiter(s) (80 mL/Hr) IV Continuous <Continuous>    MEDICATIONS  (PRN):  acetaminophen   Tablet 650 milliGRAM(s) Oral every 6 hours PRN For Temp greater than 38 C (100.4 F)  acetaminophen  Suppository 650 milliGRAM(s) Rectal every 6 hours PRN For Temp greater than 38 C (100.4 F)  ondansetron Injectable 4 milliGRAM(s) IV Push every 6 hours PRN Nausea and/or Vomiting  oxyCODONE    IR 5 milliGRAM(s) Oral every 4 hours PRN Moderate Pain (4 - 6)    LABS: REVIEWED                        9.5    7.3   )-----------( 770      ( 01 Jun 2018 05:53 )             31.0   06-01    138  |  97  |  19  ----------------------------<  123<H>  3.9   |  29  |  0.44<L>    Ca    9.0      01 Jun 2018 05:53  Phos  3.0     05-31  Mg     2.2     06-01    TPro  6.9  /  Alb  3.2<L>  /  TBili  0.6  /  DBili  <0.2  /  AST  37  /  ALT  38  /  AlkPhos  164<H>  05-31    Ferritin, Serum: 323: Note: New reference ranges effective 04/16/2018. ng/mL (05.31.18 @ 11:13)    Iron with Total Binding Capacity (05.31.18 @ 11:13)    Iron - Total Binding Capacity.: 283 ug/dL    % Saturation, Iron: 17 %    Iron Total, Serum: 49 ug/dL    Unsaturated Iron Binding Capacity: 234 ug/dL    Reticulocyte Count (05.31.18 @ 05:34)    RBC Count: 4.82 M/uL    Reticulocyte Percent: 3.1: Reference ranges updated as of September 25th, 2017. %    IMAGING: REVIEWED    EXAM:  CT ABDOMEN AND PELVIS IC                        PROCEDURE DATE:  06/01/2018    INTERPRETATION:  CT of the ABDOMEN and PELVIS with intravenous contrast   dated 6/1/2018 1:43 AM  INDICATION: Metastatic lung cancer. New abdominal pain.  TECHNIQUE: CT of the abdomen and pelvis was performed using oral and   intravenous contrast. Axial, sagittal and coronal images were produced   and reviewed.  PRIOR STUDIES: CT abdomen and pelvis 5/24/2018.  FINDINGS: Images of the lower chest demonstrate small right and trace   left pleural effusions with overlying compressive atelectasis.  Redemonstrated are several hypodense liver lesions consistent metastatic   disease. 3.6 cm cyst in the right lobe of the liver. Interval placement   of a percutaneous cholecystostomy tube with a collapsed gallbladder.   Cholelithiasis. There is no dilatation of the intra or extrahepatic   biliary system. The pancreas is normal in appearance.  No splenic   abnormalities are seen.  The adrenal glands are unremarkable. Subcentimeter hypodensity in the   right kidney that is too small to characterize. No hydronephrosis.     No abdominal aortic aneurysm is seen. There is again thrombus within the   SMV. Multiple subcentimeter mesenteric lymphnodes are seen. Short   segment of narrowing or short segment occlusion of the proximal main   portal vein with collaterals in the josh hepatis consistent with   cavernous transformation of the portal vein. Intrahepatic portal venous   branches are patent.  Evaluation of the bowel demonstrates dilation of the stomach and small   bowel transition point in the terminal ileum is seen. Oral contrast   reaches the rectum. New moderate ascites. 6.4 x 4.0 x 1.6 cm fluid   collection in the right paracolic gutter anterior to the ascending colon.   There are areas of narrowing noted in the transverse colon. Mass at the   distal ascending colon/proximal transverse colon is consistent with   patient's known malignancy. There is adjacent peritoneal metastases. No   pneumatosis or free air.  Images of the pelvis demonstrate the uterus and adnexae to be normal in   appearance. The urinary bladder is decompressed.    Evaluation of the osseous structures demonstrates scattered degenerative   changes.  IMPRESSION:  1.  Since 5/24/2018, there has been interval placement of a percutaneous   cholecystostomy tube.  2.  The entire small bowel is dilated up to the terminal ileum were there   is a point of transition to normal caliber terminal ileum. However, oral   contrast reaches the rectum. Findings are consistent with partial small   bowel obstruction in the terminal ileum.   3.  New moderate ascites and small right and trace left pleural   effusions.   4.  6.4 x 4.0 x 1.6 cm fluid collection in the right paracolic gutter.   This could represent an abscess or loculated ascites. Correlation is   recommended.    Advanced Directives:      Full code          MOLST - Ongoing     Decision maker: The patient is able to participate in complex medical decision making conversations  Legal surrogate: No designated HCP, but the patient is thinking of assigning her sister in [place] and her friend in [facility].    GOALS OF CARE DISCUSSION       Palliative care info/counseling provided	           Documentation of GOC:  Wants to pursue cancer targeted treatments. Uncertain about which treatment she wants to pursue for the sbo.          REFERRALS	        Unit SW/Case Mgmt        - To see the patient       Patient/Family Support - To see the patient       Massage Therapy - To see the patient       Music Therapy - To see the patient       Geriatric case manager - Scheduled to meet patient

## 2018-06-01 NOTE — PROGRESS NOTE ADULT - PROBLEM SELECTOR PLAN 5
Patient had 1 soft/liquid BMs. Goal is at least one BM daily.   - Recommend to continue with Miralax and Senna.

## 2018-06-01 NOTE — PROGRESS NOTE ADULT - PROBLEM SELECTOR PLAN 7
Patient deciding which treatment approach she would like to pursue for her partial SBO. Discussed the possibility of medical intervention regardless of which surgical / procedural approach she chooses and is willing to try it during the weekend. Will discuss with primary team.

## 2018-06-01 NOTE — CHART NOTE - NSCHARTNOTEFT_GEN_A_CORE
PGY 1 Chart Note    CT Abdomen/Pelvis w/ oral contrast performed. Imaging reviewed with radiologist with prelim read as colitis vs low-grade obstruction with increased distention compared to previous CT imaging but no intraperitoneal free air; transition point in RLQ. Patient seen and examined at bedside. Patient endorsing mild abdominal pain with movement, but otherwise no nausea, vomiting, fevers, chills. Patient passing gas and had one watery bowel movement this evening and one large formed bowel movement the night prior. Exam notable for abdominal distention with hypoactive bowel sounds and tenderness to palpation mostly in RLQ but no rebound or guarding. Surgery Team 4 contacted who reviewed imaging with recommendation of no urgent surgical intervention, recommending bowel rest with strict NPO, head elevation and ambulation. Also recommended NGT if patient develops nausea or vomiting, however, since patient asymptomatic no need for NGT at this time.     Vital   T(F): 98 (31 May 2018 22:00), Max: 98.5 (31 May 2018 05:58)  HR: 99 (31 May 2018 22:00) (94 - 105)  BP: 138/85 (31 May 2018 22:00) (126/77 - 147/89)  RR: 16 (31 May 2018 22:00) (16 - 18)  SpO2: 93% (31 May 2018 22:00) (93% - 96%)    Imaging:  CT Abdomen and Pelvis w/ IV Cont (06.01.18 @ 01:43)   IMPRESSION:  1.  Since 5/24/2018, there has been interval placement of a percutaneous   cholecystostomy tube.  2.  The entire small bowel is dilated and oral contrast reaches the   rectum. Findings are suggestive of ileus versus low-grade partial small   bowel obstruction.  3.  New moderate ascites and small right and trace left pleural effusions.    Plan:  Abdominal Distention- colitis vs low-grade SBO.  - no surgical intervention per surgery  - strict NPO   - encouragement of ambulation  - head elevation; NGT if develops nausea or vomiting  - monitor for worsening of symptoms and for BMs/gas  - will monitor closely overnight and contact surgery with any acute changes  - surgery team 4 to see patient in the AM

## 2018-06-01 NOTE — PROGRESS NOTE ADULT - PROBLEM SELECTOR PLAN 7
F: Maintenance fluid while NPO  E: Replete PRN  N: NPO for bowel rest in setting of ileus v. SBO F: Maintenance fluid while NPO  E: Replete PRN  N: NPO for bowel rest in setting of ileus v. SBO, may advance to clear liquid diet pending clinical status

## 2018-06-01 NOTE — PROGRESS NOTE ADULT - SUBJECTIVE AND OBJECTIVE BOX
HPI:  67yr old F with PMHx significant for metastatic colon cancer admitted on 5/25/18 for evaluation of abdominal pain, fever, weakness, and found to have acute cholecystitis. She was deemed not to be a good surgical candidate and had a cholecystostomy tube placed and is on abx. She has had worsening abdominal distention and CT showed a partial SBO and ileus.   GI consulted for possible colonic stent placement.  Patient reports worsening abdominal distention, and pain (described as dull ache and sharp at times, 9/10 at times, worse with palpation), nausea. She denies emesis, fevers, chills, cough, sick contacts, recent travel, dysuria, change in color of eyes.    EGD - 05/2018 - normal  Colonoscopy - 05/2018 - adenocarcinoma hepatic flexure    PAST MEDICAL & SURGICAL HISTORY:  Colon cancer  No significant past surgical history      REVIEW OF SYSTEMS  Apart from items noted in the HPI a 10point ROS was negative      MEDICATIONS  (STANDING):  acetaminophen  IVPB. 1000 milliGRAM(s) IV Intermittent once  ALBUTerol/ipratropium for Nebulization 3 milliLiter(s) Nebulizer every 4 hours  ampicillin/sulbactam  IVPB 3 Gram(s) IV Intermittent every 6 hours  enoxaparin Injectable 90 milliGRAM(s) SubCutaneous two times a day  pantoprazole    Tablet 40 milliGRAM(s) Oral before breakfast  sodium chloride 0.9%. 1000 milliLiter(s) (80 mL/Hr) IV Continuous <Continuous>    MEDICATIONS  (PRN):  acetaminophen   Tablet 650 milliGRAM(s) Oral every 6 hours PRN For Temp greater than 38 C (100.4 F)  acetaminophen  Suppository 650 milliGRAM(s) Rectal every 6 hours PRN For Temp greater than 38 C (100.4 F)  ondansetron Injectable 4 milliGRAM(s) IV Push every 6 hours PRN Nausea and/or Vomiting  oxyCODONE    IR 5 milliGRAM(s) Oral every 4 hours PRN Moderate Pain (4 - 6)      Allergies  allergic to cats (Rash)  No Known Drug Allergies    Intolerances      SOCIAL HISTORY:  Quit tobacco use a few years ago, denies alcohol or illicit drug use    FAMILY HISTORY:  Denies FHx of stomach/colon/pancreatic cancer, IBD, or liver problems  No pertinent family history in first degree relatives      Vital Signs Last 24 Hrs  T(C): 36.7 (01 Jun 2018 05:50), Max: 36.7 (31 May 2018 22:00)  T(F): 98 (01 Jun 2018 05:50), Max: 98 (31 May 2018 22:00)  HR: 102 (01 Jun 2018 05:50) (94 - 102)  BP: 151/87 (01 Jun 2018 05:50) (131/82 - 151/87)  BP(mean): --  RR: 16 (01 Jun 2018 05:50) (16 - 18)  SpO2: 95% (01 Jun 2018 05:50) (93% - 96%)      PHYSICAL EXAM:  GEN: not in any distress, anicteric, afebrile, not pale  Respiratory: CTA  Cardiovascular: s1 s2 no M RRR  Gastrointestinal: distended, soft, BS+, +cholecystostomy tube draining bile, vague generalized tenderness all over abdomen, NR, NG, no masses or organs palpated  Rectal: deferred  Extremities: no edema  Neurological: AAOx3 non focal  Skin: intact        LABS:                    9.5    7.3   )-----------( 770      ( 01 Jun 2018 05:53 )             31.0     138  |  97  |  19  ----------------------------<  123<H>  3.9   |  29  |  0.44<L>    Ca    9.0      01 Jun 2018 05:53  Phos  3.0     05-31  Mg     2.2     06-01    TPro  6.9  /  Alb  3.2<L>  /  TBili  0.6  /  DBili  <0.2  /  AST  37  /  ALT  38  /  AlkPhos  164<H>  05-31          RADIOLOGY & ADDITIONAL STUDIES:  < from: CT Abdomen and Pelvis w/ IV Cont (06.01.18 @ 01:43) >    IMPRESSION:  1.  Since 5/24/2018, there has been interval placement of a percutaneous cholecystostomy tube.  2.  The entire small bowel is dilated up to the terminal ileum were there is a point of transition to normal caliber terminal ileum. However, oral contrast reaches the rectum. Findings are consistent with partial small bowel obstruction in the terminal ileum.   3.  New moderate ascites and small right and trace left pleural effusions.   4.  6.4 x 4.0 x 1.6 cm fluid collection in the right paracolic gutter. This could represent an abscess or loculated ascites. Correlation is recommended.

## 2018-06-01 NOTE — PROGRESS NOTE ADULT - PROBLEM SELECTOR PLAN 4
Abdomen continues to be distended.  - Recommend continued OOB to chair with assistance.  - Continue Simethicone q8h ATC  - Consider scopolamine patch for cramping pain.  - Physical therapy followup

## 2018-06-01 NOTE — PROGRESS NOTE ADULT - PROBLEM SELECTOR PLAN 2
Reported having nausea during our encounter. Currently on PRN Zofran.  -Recommend Olanzapine 5mg ODT daily

## 2018-06-01 NOTE — PROGRESS NOTE ADULT - PROBLEM SELECTOR PLAN 2
Abdominal distention with associated pain to superficial palpation throughout. Hypoactive bowel sounds. Patient currently stooling small amount but concern for ileus, metastatic advancement (though not likely to be so acute) v. gas v. perf (though afebrile) v. ascites 2/2 neoplasm  -upright abdominal xray 5/31 c/f SBO - patient made NPO to undergo further scanning   -CT abdomen/pelvis with contrast 6/1 shows dilated bowel loops with c/f ileus v. SBO, ascites. Bowel rest for now until follow up surgery recommendations.  -surgery following, follow up recommendations Abdominal distention with associated pain to superficial palpation throughout. Hypoactive bowel sounds. Patient currently stooling small amount but concern for ileus, metastatic advancement (though not likely to be so acute) v. gas v. perf (though afebrile) v. ascites 2/2 neoplasm  -upright abdominal xray 5/31 c/f SBO - patient made NPO to undergo further scanning   -CT abdomen/pelvis with contrast 6/1 shows dilated bowel loops with c/f ileus v. SBO, ascites. Bowel rest for now until follow up surgery recommendations.  -surgery following, follow up recommendations  -GI following, appreciate recommendations

## 2018-06-02 DIAGNOSIS — R09.02 HYPOXEMIA: ICD-10-CM

## 2018-06-02 DIAGNOSIS — K56.7 ILEUS, UNSPECIFIED: ICD-10-CM

## 2018-06-02 LAB
ANION GAP SERPL CALC-SCNC: 13 MMOL/L — SIGNIFICANT CHANGE UP (ref 5–17)
BUN SERPL-MCNC: 11 MG/DL — SIGNIFICANT CHANGE UP (ref 7–23)
CALCIUM SERPL-MCNC: 9.2 MG/DL — SIGNIFICANT CHANGE UP (ref 8.4–10.5)
CHLORIDE SERPL-SCNC: 96 MMOL/L — SIGNIFICANT CHANGE UP (ref 96–108)
CO2 SERPL-SCNC: 29 MMOL/L — SIGNIFICANT CHANGE UP (ref 22–31)
CREAT SERPL-MCNC: 0.38 MG/DL — LOW (ref 0.5–1.3)
GLUCOSE SERPL-MCNC: 174 MG/DL — HIGH (ref 70–99)
HCT VFR BLD CALC: 30.6 % — LOW (ref 34.5–45)
HGB BLD-MCNC: 9.1 G/DL — LOW (ref 11.5–15.5)
MAGNESIUM SERPL-MCNC: 2.4 MG/DL — SIGNIFICANT CHANGE UP (ref 1.6–2.6)
MCHC RBC-ENTMCNC: 22.1 PG — LOW (ref 27–34)
MCHC RBC-ENTMCNC: 29.7 G/DL — LOW (ref 32–36)
MCV RBC AUTO: 74.5 FL — LOW (ref 80–100)
PLATELET # BLD AUTO: 745 K/UL — HIGH (ref 150–400)
POTASSIUM SERPL-MCNC: 4 MMOL/L — SIGNIFICANT CHANGE UP (ref 3.5–5.3)
POTASSIUM SERPL-SCNC: 4 MMOL/L — SIGNIFICANT CHANGE UP (ref 3.5–5.3)
RBC # BLD: 4.11 M/UL — SIGNIFICANT CHANGE UP (ref 3.8–5.2)
RBC # FLD: 28.6 % — HIGH (ref 10.3–16.9)
SODIUM SERPL-SCNC: 138 MMOL/L — SIGNIFICANT CHANGE UP (ref 135–145)
WBC # BLD: 7.4 K/UL — SIGNIFICANT CHANGE UP (ref 3.8–10.5)
WBC # FLD AUTO: 7.4 K/UL — SIGNIFICANT CHANGE UP (ref 3.8–10.5)

## 2018-06-02 PROCEDURE — 99233 SBSQ HOSP IP/OBS HIGH 50: CPT | Mod: GC

## 2018-06-02 RX ADMIN — OXYCODONE HYDROCHLORIDE 5 MILLIGRAM(S): 5 TABLET ORAL at 23:00

## 2018-06-02 RX ADMIN — SODIUM CHLORIDE 80 MILLILITER(S): 9 INJECTION INTRAMUSCULAR; INTRAVENOUS; SUBCUTANEOUS at 23:57

## 2018-06-02 RX ADMIN — AMPICILLIN SODIUM AND SULBACTAM SODIUM 200 GRAM(S): 250; 125 INJECTION, POWDER, FOR SUSPENSION INTRAMUSCULAR; INTRAVENOUS at 06:45

## 2018-06-02 RX ADMIN — Medication 3 MILLILITER(S): at 18:00

## 2018-06-02 RX ADMIN — Medication 3 MILLILITER(S): at 10:14

## 2018-06-02 RX ADMIN — Medication 4 MILLIGRAM(S): at 12:00

## 2018-06-02 RX ADMIN — Medication 3 MILLILITER(S): at 14:00

## 2018-06-02 RX ADMIN — AMPICILLIN SODIUM AND SULBACTAM SODIUM 200 GRAM(S): 250; 125 INJECTION, POWDER, FOR SUSPENSION INTRAMUSCULAR; INTRAVENOUS at 00:10

## 2018-06-02 RX ADMIN — ENOXAPARIN SODIUM 90 MILLIGRAM(S): 100 INJECTION SUBCUTANEOUS at 18:00

## 2018-06-02 RX ADMIN — Medication 4 MILLIGRAM(S): at 00:10

## 2018-06-02 RX ADMIN — SODIUM CHLORIDE 80 MILLILITER(S): 9 INJECTION INTRAMUSCULAR; INTRAVENOUS; SUBCUTANEOUS at 12:00

## 2018-06-02 RX ADMIN — AMPICILLIN SODIUM AND SULBACTAM SODIUM 200 GRAM(S): 250; 125 INJECTION, POWDER, FOR SUSPENSION INTRAMUSCULAR; INTRAVENOUS at 12:00

## 2018-06-02 RX ADMIN — Medication 3 MILLILITER(S): at 06:45

## 2018-06-02 RX ADMIN — SODIUM CHLORIDE 80 MILLILITER(S): 9 INJECTION INTRAMUSCULAR; INTRAVENOUS; SUBCUTANEOUS at 00:13

## 2018-06-02 RX ADMIN — OXYCODONE HYDROCHLORIDE 5 MILLIGRAM(S): 5 TABLET ORAL at 12:25

## 2018-06-02 RX ADMIN — Medication 4 MILLIGRAM(S): at 23:57

## 2018-06-02 RX ADMIN — OCTREOTIDE ACETATE 4 MICROGRAM(S)/HR: 200 INJECTION, SOLUTION INTRAVENOUS; SUBCUTANEOUS at 11:37

## 2018-06-02 RX ADMIN — AMPICILLIN SODIUM AND SULBACTAM SODIUM 200 GRAM(S): 250; 125 INJECTION, POWDER, FOR SUSPENSION INTRAMUSCULAR; INTRAVENOUS at 23:57

## 2018-06-02 RX ADMIN — Medication 4 MILLIGRAM(S): at 18:00

## 2018-06-02 RX ADMIN — OXYCODONE HYDROCHLORIDE 5 MILLIGRAM(S): 5 TABLET ORAL at 21:59

## 2018-06-02 RX ADMIN — OXYCODONE HYDROCHLORIDE 5 MILLIGRAM(S): 5 TABLET ORAL at 13:05

## 2018-06-02 RX ADMIN — Medication 3 MILLILITER(S): at 22:00

## 2018-06-02 RX ADMIN — AMPICILLIN SODIUM AND SULBACTAM SODIUM 200 GRAM(S): 250; 125 INJECTION, POWDER, FOR SUSPENSION INTRAMUSCULAR; INTRAVENOUS at 18:00

## 2018-06-02 RX ADMIN — ENOXAPARIN SODIUM 90 MILLIGRAM(S): 100 INJECTION SUBCUTANEOUS at 06:46

## 2018-06-02 RX ADMIN — PANTOPRAZOLE SODIUM 40 MILLIGRAM(S): 20 TABLET, DELAYED RELEASE ORAL at 06:45

## 2018-06-02 RX ADMIN — Medication 25 MILLIGRAM(S): at 21:59

## 2018-06-02 RX ADMIN — Medication 4 MILLIGRAM(S): at 06:45

## 2018-06-02 NOTE — PROGRESS NOTE ADULT - PROBLEM SELECTOR PLAN 5
Stage 4 metastatic colon cancer with liver mets and peritoneal carcinomatosis    s/p chemoport placement 5/31    heme onc following

## 2018-06-02 NOTE — PROGRESS NOTE ADULT - PROBLEM SELECTOR PLAN 4
due to atelectasis/pleural effusions + mod -severe abd distension.   no e/o pneumonia or PE  cont supportive measures  lasix prn

## 2018-06-02 NOTE — PROGRESS NOTE ADULT - SUBJECTIVE AND OBJECTIVE BOX
Patient is a 67y old  Female who presents with a chief complaint of abdominal pain (25 May 2018 02:05)      INTERVAL HPI/OVERNIGHT EVENTS:    c/o abd pain (lower)  no vomiting  (+) flatus  no cough  mild dyspnea (unchanged)      ROS  (- ) headache  ( -  )fevers/chills     (  - ) cough  (  - ) chest pain  (  - ) palpatations  ( - ) dizziness/lightheadedness  (  - ) nausea/vomiting  (  - ) melena  (  - ) hematochezia  (  - ) dysuria   ( - ) hematuria  ( - ) calf tenderness  ( - ) extremity numbness  ( + ) BM  ROS: 12 point review of systems otherwise negative              MEDICATIONS  (STANDING):  ALBUTerol/ipratropium for Nebulization 3 milliLiter(s) Nebulizer every 4 hours  ampicillin/sulbactam  IVPB 3 Gram(s) IV Intermittent every 6 hours  dexamethasone  Injectable 4 milliGRAM(s) IV Push every 6 hours  diphenhydrAMINE   Injectable 25 milliGRAM(s) IV Push at bedtime  enoxaparin Injectable 90 milliGRAM(s) SubCutaneous two times a day  octreotide  Infusion 20 MICROgram(s)/Hr (4 mL/Hr) IV Continuous <Continuous>  pantoprazole    Tablet 40 milliGRAM(s) Oral before breakfast  sodium chloride 0.9%. 1000 milliLiter(s) (80 mL/Hr) IV Continuous <Continuous>    MEDICATIONS  (PRN):  acetaminophen   Tablet 650 milliGRAM(s) Oral every 6 hours PRN For Temp greater than 38 C (100.4 F)  acetaminophen  Suppository 650 milliGRAM(s) Rectal every 6 hours PRN For Temp greater than 38 C (100.4 F)  ondansetron Injectable 4 milliGRAM(s) IV Push every 6 hours PRN Nausea and/or Vomiting  oxyCODONE    IR 5 milliGRAM(s) Oral every 6 hours PRN Severe Pain (7 - 10)      Allergies    allergic to cats (Rash)  No Known Drug Allergies    Intolerances          Vital Signs Last 24 Hrs  T(C): 36.6 (02 Jun 2018 11:55), Max: 36.6 (01 Jun 2018 22:00)  T(F): 97.9 (02 Jun 2018 11:55), Max: 97.9 (01 Jun 2018 22:00)  HR: 80 (02 Jun 2018 11:55) (80 - 86)  BP: 127/75 (02 Jun 2018 11:55) (127/75 - 155/104)  BP(mean): --  RR: 16 (02 Jun 2018 11:55) (16 - 16)  SpO2: 95% (02 Jun 2018 11:55) (95% - 98%)  CAPILLARY BLOOD GLUCOSE          06-01 @ 07:01  -  06-02 @ 07:00  --------------------------------------------------------  IN: 1008 mL / OUT: 100 mL / NET: 908 mL    06-02 @ 07:01 - 06-02 @ 15:43  --------------------------------------------------------  IN: 84 mL / OUT: 0 mL / NET: 84 mL        Physical Exam:    Daily     Daily   General:  nad  HEENT:  Nonicteric, PERRLA, oral pharynx w/o erythema/exudate,  neck supple  CV:  [id number] , RRR,  no JVD  Lungs: decr bs at bases bl, no wheezes, rales, rhonchi  Abdomen:  hypoactive BS, distended, diffusley tender, no rebound or guarding, cholecystostomy tube.    Extremities:  2+ DP/radial pulses b/l, no cyanosis,  Skin:  Warm and dry   :  No sutton  Neuro:  AAOx3,  CN II-XII grossly intact, 4/5 str all 4 ext, sensation intact, (-) dysmetria b/l   No Restraints    LABS:                        9.1    7.4   )-----------( 745      ( 02 Jun 2018 06:54 )             30.6     06-02    138  |  96  |  11  ----------------------------<  174<H>  4.0   |  29  |  0.38<L>    Ca    9.2      02 Jun 2018 06:54  Mg     2.4     06-02              RADIOLOGY & ADDITIONAL TESTS:

## 2018-06-02 NOTE — PROGRESS NOTE ADULT - ASSESSMENT
67 yr old male with a PMHx of recently diagnosed colon cancer presenting from oncologist's office with acute onset abdominal pain and fever, found to have acute cholecystitis and hepatic vein thrombosis, deemed poor surgical candidate 2/2 active metastic colon Ca, admitted to [facility] for medical management. Now s/p perc arleen with cholecystostomy drain in place and new imaging concerning for ileus v. low grade partial SBO.

## 2018-06-03 DIAGNOSIS — R06.02 SHORTNESS OF BREATH: ICD-10-CM

## 2018-06-03 LAB
ANION GAP SERPL CALC-SCNC: 11 MMOL/L — SIGNIFICANT CHANGE UP (ref 5–17)
BUN SERPL-MCNC: 10 MG/DL — SIGNIFICANT CHANGE UP (ref 7–23)
CALCIUM SERPL-MCNC: 8.5 MG/DL — SIGNIFICANT CHANGE UP (ref 8.4–10.5)
CHLORIDE SERPL-SCNC: 99 MMOL/L — SIGNIFICANT CHANGE UP (ref 96–108)
CO2 SERPL-SCNC: 28 MMOL/L — SIGNIFICANT CHANGE UP (ref 22–31)
CREAT SERPL-MCNC: 0.36 MG/DL — LOW (ref 0.5–1.3)
GLUCOSE SERPL-MCNC: 132 MG/DL — HIGH (ref 70–99)
HCT VFR BLD CALC: 28.2 % — LOW (ref 34.5–45)
HGB BLD-MCNC: 8.4 G/DL — LOW (ref 11.5–15.5)
MAGNESIUM SERPL-MCNC: 2.2 MG/DL — SIGNIFICANT CHANGE UP (ref 1.6–2.6)
MCHC RBC-ENTMCNC: 23 PG — LOW (ref 27–34)
MCHC RBC-ENTMCNC: 29.8 G/DL — LOW (ref 32–36)
MCV RBC AUTO: 77 FL — LOW (ref 80–100)
PLATELET # BLD AUTO: 714 K/UL — HIGH (ref 150–400)
POTASSIUM SERPL-MCNC: 3.9 MMOL/L — SIGNIFICANT CHANGE UP (ref 3.5–5.3)
POTASSIUM SERPL-SCNC: 3.9 MMOL/L — SIGNIFICANT CHANGE UP (ref 3.5–5.3)
RBC # BLD: 3.66 M/UL — LOW (ref 3.8–5.2)
RBC # FLD: 29.2 % — HIGH (ref 10.3–16.9)
SODIUM SERPL-SCNC: 138 MMOL/L — SIGNIFICANT CHANGE UP (ref 135–145)
WBC # BLD: 7 K/UL — SIGNIFICANT CHANGE UP (ref 3.8–10.5)
WBC # FLD AUTO: 7 K/UL — SIGNIFICANT CHANGE UP (ref 3.8–10.5)

## 2018-06-03 PROCEDURE — 99233 SBSQ HOSP IP/OBS HIGH 50: CPT | Mod: GC

## 2018-06-03 RX ORDER — ALBUTEROL 90 UG/1
1 AEROSOL, METERED ORAL EVERY 4 HOURS
Qty: 0 | Refills: 0 | Status: DISCONTINUED | OUTPATIENT
Start: 2018-06-03 | End: 2018-06-12

## 2018-06-03 RX ORDER — SOD SULF/SODIUM/NAHCO3/KCL/PEG
4000 SOLUTION, RECONSTITUTED, ORAL ORAL ONCE
Qty: 0 | Refills: 0 | Status: COMPLETED | OUTPATIENT
Start: 2018-06-04 | End: 2018-06-04

## 2018-06-03 RX ORDER — TIOTROPIUM BROMIDE 18 UG/1
1 CAPSULE ORAL; RESPIRATORY (INHALATION) DAILY
Qty: 0 | Refills: 0 | Status: DISCONTINUED | OUTPATIENT
Start: 2018-06-03 | End: 2018-06-12

## 2018-06-03 RX ORDER — POTASSIUM CHLORIDE 20 MEQ
10 PACKET (EA) ORAL ONCE
Qty: 0 | Refills: 0 | Status: COMPLETED | OUTPATIENT
Start: 2018-06-03 | End: 2018-06-03

## 2018-06-03 RX ORDER — PANTOPRAZOLE SODIUM 20 MG/1
40 TABLET, DELAYED RELEASE ORAL DAILY
Qty: 0 | Refills: 0 | Status: DISCONTINUED | OUTPATIENT
Start: 2018-06-03 | End: 2018-06-06

## 2018-06-03 RX ORDER — IPRATROPIUM/ALBUTEROL SULFATE 18-103MCG
3 AEROSOL WITH ADAPTER (GRAM) INHALATION EVERY 4 HOURS
Qty: 0 | Refills: 0 | Status: DISCONTINUED | OUTPATIENT
Start: 2018-06-03 | End: 2018-06-04

## 2018-06-03 RX ORDER — POLYETHYLENE GLYCOL 3350 17 G/17G
17 POWDER, FOR SOLUTION ORAL
Qty: 0 | Refills: 0 | Status: DISCONTINUED | OUTPATIENT
Start: 2018-06-03 | End: 2018-06-05

## 2018-06-03 RX ADMIN — ENOXAPARIN SODIUM 90 MILLIGRAM(S): 100 INJECTION SUBCUTANEOUS at 06:38

## 2018-06-03 RX ADMIN — OCTREOTIDE ACETATE 4 MICROGRAM(S)/HR: 200 INJECTION, SOLUTION INTRAVENOUS; SUBCUTANEOUS at 16:39

## 2018-06-03 RX ADMIN — POLYETHYLENE GLYCOL 3350 17 GRAM(S): 17 POWDER, FOR SOLUTION ORAL at 17:13

## 2018-06-03 RX ADMIN — Medication 3 MILLILITER(S): at 06:38

## 2018-06-03 RX ADMIN — AMPICILLIN SODIUM AND SULBACTAM SODIUM 200 GRAM(S): 250; 125 INJECTION, POWDER, FOR SUSPENSION INTRAMUSCULAR; INTRAVENOUS at 06:38

## 2018-06-03 RX ADMIN — Medication 4 MILLIGRAM(S): at 06:38

## 2018-06-03 RX ADMIN — AMPICILLIN SODIUM AND SULBACTAM SODIUM 200 GRAM(S): 250; 125 INJECTION, POWDER, FOR SUSPENSION INTRAMUSCULAR; INTRAVENOUS at 12:00

## 2018-06-03 RX ADMIN — Medication 3 MILLILITER(S): at 02:11

## 2018-06-03 RX ADMIN — Medication 25 MILLIGRAM(S): at 21:11

## 2018-06-03 RX ADMIN — OXYCODONE HYDROCHLORIDE 5 MILLIGRAM(S): 5 TABLET ORAL at 05:00

## 2018-06-03 RX ADMIN — SODIUM CHLORIDE 80 MILLILITER(S): 9 INJECTION INTRAMUSCULAR; INTRAVENOUS; SUBCUTANEOUS at 16:43

## 2018-06-03 RX ADMIN — AMPICILLIN SODIUM AND SULBACTAM SODIUM 200 GRAM(S): 250; 125 INJECTION, POWDER, FOR SUSPENSION INTRAMUSCULAR; INTRAVENOUS at 18:00

## 2018-06-03 RX ADMIN — Medication 4 MILLIGRAM(S): at 18:00

## 2018-06-03 RX ADMIN — OXYCODONE HYDROCHLORIDE 5 MILLIGRAM(S): 5 TABLET ORAL at 21:45

## 2018-06-03 RX ADMIN — ENOXAPARIN SODIUM 90 MILLIGRAM(S): 100 INJECTION SUBCUTANEOUS at 18:00

## 2018-06-03 RX ADMIN — Medication 100 MILLIEQUIVALENT(S): at 09:05

## 2018-06-03 RX ADMIN — OXYCODONE HYDROCHLORIDE 5 MILLIGRAM(S): 5 TABLET ORAL at 04:10

## 2018-06-03 RX ADMIN — OXYCODONE HYDROCHLORIDE 5 MILLIGRAM(S): 5 TABLET ORAL at 21:12

## 2018-06-03 RX ADMIN — Medication 4 MILLIGRAM(S): at 12:00

## 2018-06-03 RX ADMIN — PANTOPRAZOLE SODIUM 40 MILLIGRAM(S): 20 TABLET, DELAYED RELEASE ORAL at 06:38

## 2018-06-03 NOTE — PROGRESS NOTE ADULT - PROBLEM SELECTOR PLAN 2
CT abdomen/pelvis with contrast 6/1 consistently concerning for ileus v. SBO.  -NPO advanced to clear liquid diet, tolerating well. NS at 80cc/hr.   -octreotide gtt at 4 cc/hr  -Continue to monitor for pain, emesis, bowel movements, flatus, fever.  -serial imaging if symptoms worsen

## 2018-06-03 NOTE — PROGRESS NOTE ADULT - PROBLEM SELECTOR PLAN 8
DVT PPX: Lovenox BID  FULL CODE  DISPO: RMF pending resolution of ileus v. SBO with GI stenting planned for 6/6 DVT PPX: Lovenox BID  FULL CODE  DISPO: RMF pending resolution of ileus v. SBO with GI stenting planned for 6/6. For possible ileostomy if stenting unsuccessful. F: NS at 80cc/hr  E: Replete PRN  N: Clear liquid diet with maintenance IVF

## 2018-06-03 NOTE — PROGRESS NOTE ADULT - PROBLEM SELECTOR PLAN 9
DVT PPX: Lovenox BID  FULL CODE  DISPO: RMF pending resolution of ileus v. SBO with GI stenting planned for 6/6. For possible ileostomy if stenting unsuccessful.

## 2018-06-03 NOTE — PROGRESS NOTE ADULT - ATTENDING COMMENTS
DX  PARTIAL SBO- tolerating clears. miralax bid. for slow bowel prep tomorrow and c scope early next week for placement of colonic stent.    ACUTE CHOLECYSTITIS -s/p percutaneous cholecystostomy, c/w  unasyn.      SEVERE SEPSIS - as above  HYPOXEMIC RESPIRATORY FAILURE/ PULMONARY EDEMA/PLEURAL EFFUSIONS - lasix PRN.   wean supplemental oxygen.   SMV THROMBUS/PORTAL VEIN THROMBUS - cont  lovenox   STAGE IV COLON CA w/ LIVER METASTASES and PERITONEAL CARCINOMATOSIS- f/u onc recs. s/p chemo port . may need diverting ileostomy if unable to have colonic stent placed.     HYPONATREMIA -resolved .   ANEMIA - trend hb. no active GI bleeding currently. monitor closely. transfuse to keep hb >7

## 2018-06-03 NOTE — PROGRESS NOTE ADULT - PROBLEM SELECTOR PLAN 6
Hb stable. Acute anemia most likely 2/2 active colon malignancy s/p IV iron infusions o/p  -s/p 1U PRBC before IR procedure 5/25  -transfuse hgb<7  -maintain active type and screen Patient continues to feel short of breath, likely secondary to abdominal distention and compression of diaphragm/lungs. Shallow breathing. Currently on 2L NC.   Lungs clear - no wheezing, rhonchi or crackles.   -monitor symptoms, wean supplemental oxygen as able  -continue with duonebs for now - transition to PRN  -walking with PT, desaturated to upper 80's with walking, likely secondary to deconditioning, incentive spirometer by bedside, will continue to encourage frequent use

## 2018-06-03 NOTE — PROGRESS NOTE ADULT - ASSESSMENT
66YO F with metastatic colon cancer and acute cholecystitis s/p percutaneous cholecystostomy with improving abdominal distention and generalized abdominal pain worse on the left than right.  Patient remains HD stable, afebrile with no leukocytosis.   While there are no clinical signs that she is acutely obstructed, the CT scan shows evidence of partial SBO and ileus due to the colonic masses.  - Recommend wound care for diverting loop ileostomy planning and education   - Recommend GI Consult to evaluate for possible palliative stenting.  - GI recommending slow prep Monday, into Tuesday with colonoscopy and possible stent Wednesday   - Appreciate Pallative Care recs (Octreotide, Dexamethasone, Benadryl, etc)  - Will consider loop ileostomy if GI is unable to stent and if patient is agreeable to procedure.   - Team 4 continue to follow.

## 2018-06-03 NOTE — PROGRESS NOTE ADULT - ASSESSMENT
67 yr old male with a PMHx of recently diagnosed colon cancer presenting from oncologist's office with acute onset abdominal pain and fever, found to have acute cholecystitis and hepatic vein thrombosis, deemed poor surgical candidate 2/2 active metastic colon Ca, admitted to [facility] for medical management. Now s/p perc arleen with cholecystostomy drain in place and new imaging concerning for ileus v. low grade partial SBO.  Currently stable on clear liquid diet anticipating slow prep for colonoscopy and colonic stent placement by GI.

## 2018-06-03 NOTE — PROGRESS NOTE ADULT - PROBLEM SELECTOR PLAN 1
Upright abdominal xray 5/31 c/f SBO, follow up CT abdomen/pelvis with contrast 6/1 consistently concerning for ileus v. SBO. Ascites visualized.   -Patient experiencing some mild nausea, no episodes of emesis: zofran 4mg IV PRN for symptomatic relief.  -Initially NPO, however given only partial obstruction, advanced to clear liquids with NS at 80cc/hr maintenance. Patient currently tolerating clear liquid diet well.  -GI following: concern for SBO secondary to advancing metastasis. Anticipating starting patient on slow prep for colonoscopy to visualize colon before hepatic flexure stent placement to re-establish full patency. Procedure planned for 6/6.    -surgery following, follow up recommendations  -Patient currently afebrile with mildly improving abdominal pain, though remains distended. Continue to monitor for pain, emesis, bowel movements, flatus, fever. Upright abdominal xray 5/31 c/f SBO, follow up CT abdomen/pelvis with contrast 6/1 consistently concerning for ileus v. SBO. Ascites visualized.   -Patient experiencing some mild nausea, no episodes of emesis: zofran 4mg IV PRN for symptomatic relief.  -Initially NPO, however given only partial obstruction, advanced to clear liquids with NS at 80cc/hr maintenance. Patient currently tolerating clear liquid diet well.  -GI following: partial SBO secondary to advancing metastasis. Anticipating starting patient on slow prep on for colonoscopy to visualize colon before hepatic flexure stent placement to re-establish full patency. Procedure planned for 6/6.   -surgery following: will consider ileostomy if GI unable to place stent.   -ambulation orders: walking with PT, encourage ambulation with assistance  -Patient currently afebrile with mildly improving abdominal pain, though remains distended. Continue to monitor for pain, emesis, bowel movements, flatus, fever.

## 2018-06-03 NOTE — PROGRESS NOTE ADULT - PROBLEM SELECTOR PLAN 5
CTA A/P s/f hepatic vein thrombosis; most likely 2/2 hypercoagulable state in the setting of active untreated malignancy  -c/w lovenox 90mg BID with intention to transition to DOAC following GI procedure  -f/u Dr. Nick outpatient

## 2018-06-03 NOTE — PROGRESS NOTE ADULT - ASSESSMENT
68 YO F with PMHx significant for metastatic colon cancer admitted on 5/25/18 for evaluation of abdominal pain, fever, weakness, and found to have acute cholecystitis. She was deemed not to be a good surgical candidate and had a cholecystostomy tube placed and is on abx. She has had worsening abdominal distention and CT showed a partial SBO and ileus, distal ascending/proximal transverse mass with narrowing of the transverse colon.   GI consulted for possible colonic stent placement.    # Constipation with partial SBO  - Recommend avoiding stimulant laxative  - Pt states that she is passing flatus  - Recommend Miralax 17 g PO BID can titrate up to 34 g PO BID    # Metastatic colonic cancer  - will plan for a slow prep - can start prep on Monday - with 2 L golytely  - then full prep with 4L golytely on Tuesday  - C/w clear liquid diet  - Plan for colonoscopy +/- stent placement on Wednesday  - NPO p MN Tuesday    Discussed with attending Dr Gwen LEWIS following

## 2018-06-03 NOTE — PROGRESS NOTE ADULT - SUBJECTIVE AND OBJECTIVE BOX
OVERNIGHT EVENTS:    SUBJECTIVE / INTERVAL HPI: Patient seen and examined at bedside.     VITAL SIGNS:  Vital Signs Last 24 Hrs  T(C): 36.4 (02 Jun 2018 21:17), Max: 36.8 (02 Jun 2018 17:43)  T(F): 97.6 (02 Jun 2018 21:17), Max: 98.2 (02 Jun 2018 17:43)  HR: 70 (02 Jun 2018 21:17) (70 - 85)  BP: 147/88 (02 Jun 2018 21:17) (127/75 - 155/104)  RR: 16 (02 Jun 2018 21:17) (16 - 16)  SpO2: 97% (02 Jun 2018 21:17) (95% - 98%)    PHYSICAL EXAM:  General: WDWN  HEENT: NC/AT; PERRL, anicteric sclera; MMM  Neck: supple  Cardiovascular: +S1/S2; RRR  Respiratory: CTA B/L; no W/R/R  Gastrointestinal: soft, NT/ND; +BSx4  Extremities: WWP; no edema, clubbing or cyanosis  Vascular: 2+ radial, DP/PT pulses B/L  Neurological: AAOx3; no focal deficits    MEDICATIONS:  MEDICATIONS  (STANDING):  ALBUTerol/ipratropium for Nebulization 3 milliLiter(s) Nebulizer every 4 hours  ampicillin/sulbactam  IVPB 3 Gram(s) IV Intermittent every 6 hours  dexamethasone  Injectable 4 milliGRAM(s) IV Push every 6 hours  diphenhydrAMINE   Injectable 25 milliGRAM(s) IV Push at bedtime  enoxaparin Injectable 90 milliGRAM(s) SubCutaneous two times a day  octreotide  Infusion 20 MICROgram(s)/Hr (4 mL/Hr) IV Continuous <Continuous>  pantoprazole    Tablet 40 milliGRAM(s) Oral before breakfast  sodium chloride 0.9%. 1000 milliLiter(s) (80 mL/Hr) IV Continuous <Continuous>    MEDICATIONS  (PRN):  acetaminophen   Tablet 650 milliGRAM(s) Oral every 6 hours PRN For Temp greater than 38 C (100.4 F)  acetaminophen  Suppository 650 milliGRAM(s) Rectal every 6 hours PRN For Temp greater than 38 C (100.4 F)  ondansetron Injectable 4 milliGRAM(s) IV Push every 6 hours PRN Nausea and/or Vomiting  oxyCODONE    IR 5 milliGRAM(s) Oral every 6 hours PRN Severe Pain (7 - 10)      ALLERGIES:  allergic to cats (Rash)  No Known Drug Allergies or Intolerances    LABS:                        9.1    7.4   )-----------( 745      ( 02 Jun 2018 06:54 )             30.6     06-02    138  |  96  |  11  ----------------------------<  174<H>  4.0   |  29  |  0.38<L>    Ca    9.2      02 Jun 2018 06:54  Mg     2.4     06-02      CAPILLARY BLOOD GLUCOSE    RADIOLOGY & ADDITIONAL TESTS: Reviewed. OVERNIGHT EVENTS: No acute events overnight.   SUBJECTIVE / INTERVAL HPI: Patient seen and examined at bedside. Patient continues to experience abdominal pain: subjectively about the same, not worsening. Belching persists with some mildly increased nausea - no emesis. Continues to pass flatus, denies bowel movement. Says that she is mildly more short of breath, wearing 2L NC. May be due to abdominal distention given clear lung exam with ongoing shallow breathing. Patient says that she has been ambulating with PT, will attempt up and out of bed again today.     VITAL SIGNS:  Vital Signs Last 24 Hrs  T(C): 36.4 (02 Jun 2018 21:17), Max: 36.8 (02 Jun 2018 17:43)  T(F): 97.6 (02 Jun 2018 21:17), Max: 98.2 (02 Jun 2018 17:43)  HR: 70 (02 Jun 2018 21:17) (70 - 85)  BP: 147/88 (02 Jun 2018 21:17) (127/75 - 155/104)  RR: 16 (02 Jun 2018 21:17) (16 - 16)  SpO2: 97% (02 Jun 2018 21:17) (95% - 98%)    PHYSICAL EXAM:  General: obese female laying in bed, eyes closed (seemingly in pain), in no acute distress, breathing a little shallow but comfortably on 2L NC  HEENT: NC/AT; PERRL, anicteric sclera; MMM  Neck: supple  Cardiovascular: +S1/S2; RRR  Respiratory: Clear to auscultation bilaterally with shallow breaths, 2L NC  Gastrointestinal: distended, firm. pain elicited with laying stethoscope on belly and gentle pressing with hand - pain diffuse. hypoactive bowel sounds throughout. cholecystostomy drain - draining about 75cc of bilious fluid. exit site c/d/i.  Extremities: WWP; no edema, clubbing or cyanosis  Vascular: 2+ radial, DP/PT pulses B/L  Neurological: AAOx3; no focal deficits    MEDICATIONS:  MEDICATIONS  (STANDING):  ALBUTerol/ipratropium for Nebulization 3 milliLiter(s) Nebulizer every 4 hours  ampicillin/sulbactam  IVPB 3 Gram(s) IV Intermittent every 6 hours  dexamethasone  Injectable 4 milliGRAM(s) IV Push every 6 hours  diphenhydrAMINE   Injectable 25 milliGRAM(s) IV Push at bedtime  enoxaparin Injectable 90 milliGRAM(s) SubCutaneous two times a day  octreotide  Infusion 20 MICROgram(s)/Hr (4 mL/Hr) IV Continuous <Continuous>  pantoprazole    Tablet 40 milliGRAM(s) Oral before breakfast  sodium chloride 0.9%. 1000 milliLiter(s) (80 mL/Hr) IV Continuous <Continuous>    MEDICATIONS  (PRN):  acetaminophen   Tablet 650 milliGRAM(s) Oral every 6 hours PRN For Temp greater than 38 C (100.4 F)  acetaminophen  Suppository 650 milliGRAM(s) Rectal every 6 hours PRN For Temp greater than 38 C (100.4 F)  ondansetron Injectable 4 milliGRAM(s) IV Push every 6 hours PRN Nausea and/or Vomiting  oxyCODONE    IR 5 milliGRAM(s) Oral every 6 hours PRN Severe Pain (7 - 10)      ALLERGIES:  allergic to cats (Rash)  No Known Drug Allergies or Intolerances    LABS:                        9.1    7.4   )-----------( 745      ( 02 Jun 2018 06:54 )             30.6     06-02    138  |  96  |  11  ----------------------------<  174<H>  4.0   |  29  |  0.38<L>    Ca    9.2      02 Jun 2018 06:54  Mg     2.4     06-02      CAPILLARY BLOOD GLUCOSE    RADIOLOGY & ADDITIONAL TESTS: Reviewed.

## 2018-06-03 NOTE — PROGRESS NOTE ADULT - PROBLEM SELECTOR PLAN 7
F: NS at 80cc/hr  E: Replete PRN  N: Clear liquid diet with maintenance IVF Hb stable. Acute anemia most likely 2/2 active colon malignancy s/p IV iron infusions o/p  -s/p 1U PRBC before IR procedure 5/25  -transfuse hgb<7  -maintain active type and screen

## 2018-06-03 NOTE — PROGRESS NOTE ADULT - PROBLEM SELECTOR PLAN 4
Stage 4 metastatic colon cancer with liver mets and peritoneal carcinomatosis  - plan per Dr. Nick   - s/p chemoport placement 5/31   - heme onc following  - palliative consulted given metastatic CRC, pt aware of 1-2 yr prognosis; MOLST completed. NOT DNR/DNI. Would like compressions, intubation with time limitation.   -CT abdomen/pelvis positive for ascites secondary to abdominal mets. Concern for mets causing SBO. Patient for stent placement to open relieve partial obstruction on 6/6.  -Palliative following, recommend pain regimen as follows: 4mg decadron IV q6h for capsular liver pain, benadryl 25mg Iv push at bedtime, oxycodone IR 5mg q6h PRN for severe pain.

## 2018-06-03 NOTE — PROGRESS NOTE ADULT - SUBJECTIVE AND OBJECTIVE BOX
Pt seen and examined at bedside. ANGELICA overnight. Pt reports passing flatus, however, has been constipated x 3 days. She also reports diffuse abdominal pain, denies nausea, vomiting, melena, hematochezia, diarrhea.     Allergies  allergic to cats (Rash)  No Known Drug Allergies    MEDICATIONS:  MEDICATIONS  (STANDING):  ALBUTerol    90 MICROgram(s) HFA Inhaler 1 Puff(s) Inhalation every 4 hours  ampicillin/sulbactam  IVPB 3 Gram(s) IV Intermittent every 6 hours  dexamethasone  Injectable 4 milliGRAM(s) IV Push every 6 hours  diphenhydrAMINE   Injectable 25 milliGRAM(s) IV Push at bedtime  enoxaparin Injectable 90 milliGRAM(s) SubCutaneous two times a day  octreotide  Infusion 20 MICROgram(s)/Hr (4 mL/Hr) IV Continuous <Continuous>  pantoprazole  Injectable 40 milliGRAM(s) IV Push daily  sodium chloride 0.9%. 1000 milliLiter(s) (80 mL/Hr) IV Continuous <Continuous>  tiotropium 18 MICROgram(s) Capsule 1 Capsule(s) Inhalation daily    MEDICATIONS  (PRN):  acetaminophen  Suppository 650 milliGRAM(s) Rectal every 6 hours PRN For Temp greater than 38 C (100.4 F)  ALBUTerol/ipratropium for Nebulization 3 milliLiter(s) Nebulizer every 4 hours PRN Shortness of Breath and/or Wheezing  ondansetron Injectable 4 milliGRAM(s) IV Push every 6 hours PRN Nausea and/or Vomiting  oxyCODONE    IR 5 milliGRAM(s) Oral every 6 hours PRN Severe Pain (7 - 10)    Vital Signs Last 24 Hrs  T(C): 36.4 (03 Jun 2018 05:14), Max: 36.8 (02 Jun 2018 17:43)  T(F): 97.5 (03 Jun 2018 05:14), Max: 98.2 (02 Jun 2018 17:43)  HR: 68 (03 Jun 2018 05:14) (68 - 77)  BP: 152/90 (03 Jun 2018 05:14) (137/79 - 152/90)  BP(mean): --  RR: 14 (03 Jun 2018 05:14) (14 - 16)  SpO2: 99% (03 Jun 2018 05:14) (95% - 99%)    06-02 @ 07:01  -  06-03 @ 07:00  --------------------------------------------------------  IN: 1008 mL / OUT: 550 mL / NET: 458 mL    PHYSICAL EXAM:    General: Well developed; well nourished; in no acute distress  HEENT: MMM, conjunctiva and sclera clear  Gastrointestinal: Soft mild diffuse TTP mildly distended; tympanic to percussion  No rebound or guarding  Skin: Warm and dry. No obvious rash    LABS:                        8.4    7.0   )-----------( 714      ( 03 Jun 2018 07:00 )             28.2     06-03    138  |  99  |  10  ----------------------------<  132<H>  3.9   |  28  |  0.36<L>    Ca    8.5      03 Jun 2018 07:00  Mg     2.2     06-03    RADIOLOGY & ADDITIONAL STUDIES:  CT Abdomen and Pelvis w/ IV Cont (06.01.18 @ 01:43)  FINDINGS: Images of the lower chest demonstrate small right and trace   left pleural effusions with overlying compressive atelectasis.    Redemonstrated are several hypodense liver lesions consistent metastatic   disease. 3.6 cm cyst in the right lobe of the liver. Interval placement   of a percutaneous cholecystostomy tube with a collapsed gallbladder.   Cholelithiasis. There is no dilatation of the intra or extrahepatic   biliary system. The pancreas is normal in appearance.  No splenic   abnormalities are seen.    The adrenal glands are unremarkable. Subcentimeter hypodensity in the   right kidney that is too small to characterize. No hydronephrosis.       No abdominal aortic aneurysm is seen. There is again thrombus within the   SMV. Multiple subcentimeter mesenteric lymphnodes are seen. Short   segment of narrowing or short segment occlusion of the proximal main   portal vein with collaterals in the josh hepatis consistent with   cavernous transformation of the portal vein. Intrahepatic portal venous   branches are patent.    Evaluation of the bowel demonstrates dilation of the stomach and small   bowel transition point in the terminal ileum is seen. Oral contrast   reaches the rectum. New moderate ascites. 6.4 x 4.0 x 1.6 cm fluid   collection in the right paracolic gutter anterior to the ascending colon.   There are areas of narrowing noted in the transverse colon. Mass at the   distal ascending colon/proximal transverse colon is consistent with   patient's known malignancy. There is adjacent peritoneal metastases. No   pneumatosis or free air.    Images of the pelvis demonstrate the uterus and adnexae to be normal in   appearance. The urinary bladder is decompressed.      Evaluation of the osseous structures demonstrates scattered degenerative   changes.    IMPRESSION:  1.  Since 5/24/2018, there has been interval placement of a percutaneous   cholecystostomy tube.  2.  The entire small bowel is dilated up to the terminal ileum were there   is a point of transition to normal caliber terminal ileum. However, oral   contrast reaches the rectum. Findings are consistent with partial small   bowel obstruction in the terminal ileum.   3.  New moderate ascites and small right and trace left pleural   effusions.   4.  6.4 x 4.0 x 1.6 cm fluid collection in the right paracolic gutter.   This could represent an abscess or loculated ascites. Correlation is   recommended.

## 2018-06-03 NOTE — PROGRESS NOTE ADULT - SUBJECTIVE AND OBJECTIVE BOX
GENERAL SURGERY CONSULT PROGRESS NOTE    SUBJECTIVE:   Pt seen & examined at bedside. Pain controlled. Bloating/ distention improving. +flatus. No complaints. No F/C, N/V, CP/SOB.    MEDICATIONS:  ---NEURO-  acetaminophen  Suppository 650 milliGRAM(s) Rectal every 6 hours PRN  ondansetron Injectable 4 milliGRAM(s) IV Push every 6 hours PRN  oxyCODONE    IR 5 milliGRAM(s) Oral every 6 hours PRN  ---CV-  ---PULM-  ALBUTerol    90 MICROgram(s) HFA Inhaler 1 Puff(s) Inhalation every 4 hours  ALBUTerol/ipratropium for Nebulization 3 milliLiter(s) Nebulizer every 4 hours PRN  diphenhydrAMINE   Injectable 25 milliGRAM(s) IV Push at bedtime  tiotropium 18 MICROgram(s) Capsule 1 Capsule(s) Inhalation daily  ---GI/FEN-  pantoprazole  Injectable 40 milliGRAM(s) IV Push daily  polyethylene glycol 3350 17 Gram(s) Oral two times a day  sodium chloride 0.9%. 1000 milliLiter(s) IV Continuous <Continuous>  ----  ---ID-   ampicillin/sulbactam  IVPB 3 Gram(s) IV Intermittent every 6 hours  ---ENDO-  dexamethasone  Injectable 4 milliGRAM(s) IV Push every 6 hours  octreotide  Infusion 20 MICROgram(s)/Hr IV Continuous <Continuous>  ---HEME-  enoxaparin Injectable 90 milliGRAM(s) SubCutaneous two times a day  ---OTHER-        VOLUME STATUS:  I&O's Detail    02 Jun 2018 07:01  -  03 Jun 2018 07:00  --------------------------------------------------------  IN:    octreotide  Infusion: 48 mL    sodium chloride 0.9%.: 960 mL  Total IN: 1008 mL    OUT:    Voided: 550 mL  Total OUT: 550 mL    Total NET: 458 mL          VITALS:  Vital Signs Last 24 Hrs  T(C): 36.7 (03 Jun 2018 14:14), Max: 36.8 (02 Jun 2018 17:43)  T(F): 98 (03 Jun 2018 14:14), Max: 98.2 (02 Jun 2018 17:43)  HR: 60 (03 Jun 2018 14:14) (60 - 77)  BP: 134/76 (03 Jun 2018 14:14) (134/76 - 152/90)  BP(mean): --  RR: 16 (03 Jun 2018 14:14) (14 - 16)  SpO2: 96% (03 Jun 2018 14:14) (95% - 99%)    PHYSICAL EXAM:  General: A/Ox4 NAD  Pulmonary: Nonlabored breathing, no respiratory distress  Cardiovascular: NSR  Abdominal: soft, moderately distended and Left side generalized TTP, no guarding or rebound.  cholecystostomy tube in place.     LABS:                        8.4    7.0   )-----------( 714      ( 03 Jun 2018 07:00 )             28.2     06-03    138  |  99  |  10  ----------------------------<  132<H>  3.9   |  28  |  0.36<L>    Ca    8.5      03 Jun 2018 07:00  Mg     2.2     06-03

## 2018-06-03 NOTE — PROGRESS NOTE ADULT - PROBLEM SELECTOR PLAN 3
Improving pain. Patient with acute onset RUQ abdominal pain and CT A/P findings c/w acute cholecystitis; RUQ U/S showing mobile stones possibly causing transient obstruction vs. extrinsic compression from tumor burden given hepatic mets. s/p IR percutaneous cholecystostomy placement 5/25  -biliary fluid growing E. faecium, citrobacter, e. coli  -Switched from Augmentin to Unasyn for now to treat GNR on bili cx   -blood cx NGTD  -cholecystostomy tube continuing to drain  -f/u GI recommendations  -f/u surgery recommendations

## 2018-06-04 LAB
ANION GAP SERPL CALC-SCNC: 10 MMOL/L — SIGNIFICANT CHANGE UP (ref 5–17)
BUN SERPL-MCNC: 10 MG/DL — SIGNIFICANT CHANGE UP (ref 7–23)
CALCIUM SERPL-MCNC: 8.8 MG/DL — SIGNIFICANT CHANGE UP (ref 8.4–10.5)
CHLORIDE SERPL-SCNC: 99 MMOL/L — SIGNIFICANT CHANGE UP (ref 96–108)
CO2 SERPL-SCNC: 30 MMOL/L — SIGNIFICANT CHANGE UP (ref 22–31)
CREAT SERPL-MCNC: 0.34 MG/DL — LOW (ref 0.5–1.3)
GLUCOSE SERPL-MCNC: 129 MG/DL — HIGH (ref 70–99)
HCT VFR BLD CALC: 29.6 % — LOW (ref 34.5–45)
HGB BLD-MCNC: 9 G/DL — LOW (ref 11.5–15.5)
MAGNESIUM SERPL-MCNC: 2.2 MG/DL — SIGNIFICANT CHANGE UP (ref 1.6–2.6)
MCHC RBC-ENTMCNC: 22.7 PG — LOW (ref 27–34)
MCHC RBC-ENTMCNC: 30.4 G/DL — LOW (ref 32–36)
MCV RBC AUTO: 74.6 FL — LOW (ref 80–100)
PLATELET # BLD AUTO: 655 K/UL — HIGH (ref 150–400)
POTASSIUM SERPL-MCNC: 3.5 MMOL/L — SIGNIFICANT CHANGE UP (ref 3.5–5.3)
POTASSIUM SERPL-SCNC: 3.5 MMOL/L — SIGNIFICANT CHANGE UP (ref 3.5–5.3)
RBC # BLD: 3.97 M/UL — SIGNIFICANT CHANGE UP (ref 3.8–5.2)
RBC # FLD: 28.8 % — HIGH (ref 10.3–16.9)
SODIUM SERPL-SCNC: 139 MMOL/L — SIGNIFICANT CHANGE UP (ref 135–145)
WBC # BLD: 6.4 K/UL — SIGNIFICANT CHANGE UP (ref 3.8–10.5)
WBC # FLD AUTO: 6.4 K/UL — SIGNIFICANT CHANGE UP (ref 3.8–10.5)

## 2018-06-04 PROCEDURE — 99239 HOSP IP/OBS DSCHRG MGMT >30: CPT

## 2018-06-04 RX ORDER — SOD SULF/SODIUM/NAHCO3/KCL/PEG
4000 SOLUTION, RECONSTITUTED, ORAL ORAL ONCE
Qty: 0 | Refills: 0 | Status: COMPLETED | OUTPATIENT
Start: 2018-06-05 | End: 2018-06-05

## 2018-06-04 RX ORDER — POTASSIUM CHLORIDE 20 MEQ
10 PACKET (EA) ORAL
Qty: 0 | Refills: 0 | Status: COMPLETED | OUTPATIENT
Start: 2018-06-04 | End: 2018-06-04

## 2018-06-04 RX ORDER — SIMETHICONE 80 MG/1
80 TABLET, CHEWABLE ORAL EVERY 8 HOURS
Qty: 0 | Refills: 0 | Status: DISCONTINUED | OUTPATIENT
Start: 2018-06-04 | End: 2018-06-07

## 2018-06-04 RX ORDER — AMPICILLIN SODIUM AND SULBACTAM SODIUM 250; 125 MG/ML; MG/ML
3 INJECTION, POWDER, FOR SUSPENSION INTRAMUSCULAR; INTRAVENOUS EVERY 6 HOURS
Qty: 0 | Refills: 0 | Status: COMPLETED | OUTPATIENT
Start: 2018-06-04 | End: 2018-06-04

## 2018-06-04 RX ADMIN — Medication 4 MILLIGRAM(S): at 06:29

## 2018-06-04 RX ADMIN — Medication 4000 MILLILITER(S): at 04:59

## 2018-06-04 RX ADMIN — POLYETHYLENE GLYCOL 3350 17 GRAM(S): 17 POWDER, FOR SOLUTION ORAL at 18:21

## 2018-06-04 RX ADMIN — Medication 100 MILLIEQUIVALENT(S): at 08:05

## 2018-06-04 RX ADMIN — OXYCODONE HYDROCHLORIDE 5 MILLIGRAM(S): 5 TABLET ORAL at 06:39

## 2018-06-04 RX ADMIN — PANTOPRAZOLE SODIUM 40 MILLIGRAM(S): 20 TABLET, DELAYED RELEASE ORAL at 11:53

## 2018-06-04 RX ADMIN — Medication 4 MILLIGRAM(S): at 11:53

## 2018-06-04 RX ADMIN — Medication 4 MILLIGRAM(S): at 18:21

## 2018-06-04 RX ADMIN — POLYETHYLENE GLYCOL 3350 17 GRAM(S): 17 POWDER, FOR SOLUTION ORAL at 06:29

## 2018-06-04 RX ADMIN — AMPICILLIN SODIUM AND SULBACTAM SODIUM 200 GRAM(S): 250; 125 INJECTION, POWDER, FOR SUSPENSION INTRAMUSCULAR; INTRAVENOUS at 18:20

## 2018-06-04 RX ADMIN — SIMETHICONE 80 MILLIGRAM(S): 80 TABLET, CHEWABLE ORAL at 22:53

## 2018-06-04 RX ADMIN — Medication 25 MILLIGRAM(S): at 22:39

## 2018-06-04 RX ADMIN — OXYCODONE HYDROCHLORIDE 5 MILLIGRAM(S): 5 TABLET ORAL at 22:52

## 2018-06-04 RX ADMIN — ENOXAPARIN SODIUM 90 MILLIGRAM(S): 100 INJECTION SUBCUTANEOUS at 06:30

## 2018-06-04 RX ADMIN — ENOXAPARIN SODIUM 90 MILLIGRAM(S): 100 INJECTION SUBCUTANEOUS at 18:21

## 2018-06-04 RX ADMIN — SODIUM CHLORIDE 80 MILLILITER(S): 9 INJECTION INTRAMUSCULAR; INTRAVENOUS; SUBCUTANEOUS at 18:20

## 2018-06-04 RX ADMIN — AMPICILLIN SODIUM AND SULBACTAM SODIUM 200 GRAM(S): 250; 125 INJECTION, POWDER, FOR SUSPENSION INTRAMUSCULAR; INTRAVENOUS at 06:29

## 2018-06-04 RX ADMIN — AMPICILLIN SODIUM AND SULBACTAM SODIUM 200 GRAM(S): 250; 125 INJECTION, POWDER, FOR SUSPENSION INTRAMUSCULAR; INTRAVENOUS at 11:53

## 2018-06-04 RX ADMIN — AMPICILLIN SODIUM AND SULBACTAM SODIUM 200 GRAM(S): 250; 125 INJECTION, POWDER, FOR SUSPENSION INTRAMUSCULAR; INTRAVENOUS at 01:03

## 2018-06-04 RX ADMIN — Medication 100 MILLIEQUIVALENT(S): at 11:52

## 2018-06-04 RX ADMIN — OXYCODONE HYDROCHLORIDE 5 MILLIGRAM(S): 5 TABLET ORAL at 07:15

## 2018-06-04 RX ADMIN — Medication 4 MILLIGRAM(S): at 01:03

## 2018-06-04 NOTE — PROGRESS NOTE ADULT - SUBJECTIVE AND OBJECTIVE BOX
Hem/Onc    PILLO PEDRAZA  MRN-[id number]    INTERVAL Hx:   weekend events noted.   Generalized abdominal pain -->  CT A/P with partial SBO vs ileus, worsening ascites. S/p Port placement on 5/31.  Pain is better this am, On Abx coverage for cholecystitis.      no vomiting  no recent bowel movement      HPI: 67 y.o woman with newly diagnosed metastatic colon cancer- we were completing outpatient work up and were planning systemic treatment- the patient received IV iron last week, she had staging PET/CT this week and was scheduled for Infusaport placement. She presented to our office on 5/24 with fever/vomiting and abdominal pain> She was sent to the ER with suspicion for an obstruction (known large R sided/obstructing lesion).  Work up at [facility] with [facility] scan showed findings c/w acute cholecystitis and SMV and portal vein thrombosis. Acute cholecystitis confirmed by US.     ROS:  + nausea     No night sweats.   + fatigue, no headaches/dizziness.    + abdominal pain/no diarrhea  No melena or hematochezia.    No dysuria/hematuria.  No history of easy bruising/bleeding.     No leg pain or leg swelling.    ROS is otherwise negative.    PMH/PSH:  PAST MEDICAL & SURGICAL HISTORY:  Colon cancer      MEDICATIONS  (STANDING):  ALBUTerol    90 MICROgram(s) HFA Inhaler 1 Puff(s) Inhalation every 4 hours  ampicillin/sulbactam  IVPB 3 Gram(s) IV Intermittent every 6 hours  dexamethasone  Injectable 4 milliGRAM(s) IV Push every 6 hours  diphenhydrAMINE   Injectable 25 milliGRAM(s) IV Push at bedtime  enoxaparin Injectable 90 milliGRAM(s) SubCutaneous two times a day  octreotide  Infusion 20 MICROgram(s)/Hr (4 mL/Hr) IV Continuous <Continuous>  pantoprazole  Injectable 40 milliGRAM(s) IV Push daily  polyethylene glycol 3350 17 Gram(s) Oral two times a day  potassium chloride  10 mEq/100 mL IVPB 10 milliEquivalent(s) IV Intermittent every 1 hour  sodium chloride 0.9%. 1000 milliLiter(s) (80 mL/Hr) IV Continuous <Continuous>  tiotropium 18 MICROgram(s) Capsule 1 Capsule(s) Inhalation daily    MEDICATIONS  (PRN):  acetaminophen  Suppository 650 milliGRAM(s) Rectal every 6 hours PRN For Temp greater than 38 C (100.4 F)  ALBUTerol/ipratropium for Nebulization 3 milliLiter(s) Nebulizer every 4 hours PRN Shortness of Breath and/or Wheezing  ondansetron Injectable 4 milliGRAM(s) IV Push every 6 hours PRN Nausea and/or Vomiting  oxyCODONE    IR 5 milliGRAM(s) Oral every 6 hours PRN Severe Pain (7 - 10)    Allergies    allergic to cats (Rash)  No Known Drug Allergies    Intolerances    Vital Signs Last 24 Hrs  T(C): 36.7 (04 Jun 2018 05:48), Max: 36.7 (03 Jun 2018 14:14)  T(F): 98.1 (04 Jun 2018 05:48), Max: 98.1 (04 Jun 2018 05:48)  HR: 69 (04 Jun 2018 05:48) (60 - 69)  BP: 177/95 (04 Jun 2018 05:48) (134/76 - 177/95)  BP(mean): --  RR: 16 (04 Jun 2018 05:48) (16 - 16)  SpO2: 95% (04 Jun 2018 05:48) (95% - 96%)    HEENT: pale mucosae, anicteric sclerae  No palpable peripheral lymphadenopathy  COR: regular rhythm rate, no murmurs, rubs or gallops  ABD: soft, distended, + generalized pain to palpation  , RUQ + drain in place  EXT: no edema  SKIN: no lesions on visible skin  R side port     Labs:                        9.5    7.3   )-----------( 770      ( 01 Jun 2018 05:53 )             31.0           CBC Full  -  ( 04 Jun 2018 05:47 )  WBC Count : 6.4 K/uL  Hemoglobin : 9.0 g/dL  Hematocrit : 29.6 %  Platelet Count - Automated : 655 K/uL  Mean Cell Volume : 74.6 fL  Mean Cell Hemoglobin : 22.7 pg  Mean Cell Hemoglobin Concentration : 30.4 g/dL      06-04    139  |  99  |  10  ----------------------------<  129<H>  3.5   |  30  |  0.34<L>    Ca    8.8      04 Jun 2018 05:48  Mg     2.2     06-04            Assessment:    Metastatic colon cancer  Severe iron deficiency anemia  Acute cholecystitis  SMV/portal vein thrombosis    Plan:     CT c/w partial SBO  Plan is for stent placement - if not feasible, then may require surgical intervention.    S/p percutaneous cholecystotomy on 5/25  On Abx coverage, remains afebrile      S/p Infusaport placement on 5/31    SMV/Portal vein thrombosis- Continue Lovenox,  she will eventually be transitioned to an oral anticoagulant       Newly diagnosed colon cancer- fairly rapidly progressive symptomatic disease   Will need systemic treatment soon once pending improvement of GI obstruction     Severe YESSICA-she received parenteral iron as outpatient  H/H is better  PRBC as clinically indicated    Reactive thrombocytosis (YESSICA, malignancy) no specific intervention is indicated    Thank you    Yara Jackson MD for Estefania Lundy MD  [phone]

## 2018-06-04 NOTE — PROGRESS NOTE ADULT - ATTENDING COMMENTS
DX  PARTIAL SBO- tolerating clears. miralax bid. for slow bowel prep and c scope  wed for placement of colonic stent.    ACUTE CHOLECYSTITIS -s/p percutaneous cholecystostomy, c/w  unasyn x 10 days post cholecystostomy     SEVERE SEPSIS - as above  HYPOXEMIC RESPIRATORY FAILURE/ PULMONARY EDEMA/PLEURAL EFFUSIONS - lasix PRN.   wean supplemental oxygen.   SMV THROMBUS/PORTAL VEIN THROMBUS - cont  lovenox   STAGE IV COLON CA w/ LIVER METASTASES and PERITONEAL CARCINOMATOSIS- f/u onc recs. s/p chemo port . may need diverting ileostomy if unable to have colonic stent placed.     HYPONATREMIA -resolved .   ANEMIA - trend hb. no active GI bleeding currently. monitor closely. transfuse to keep hb >7 .

## 2018-06-04 NOTE — PROGRESS NOTE ADULT - PROBLEM SELECTOR PLAN 6
Patient continues to feel short of breath, likely secondary to abdominal distention and compression of diaphragm/lungs. Shallow breathing. Supplemental O2 - NC.  Lungs clear - no wheezing, rhonchi or crackles.   -monitor symptoms, wean supplemental oxygen as able  -continue with duonebs PRN for now  -encourage incentive spirometer and increased activity to encourage respiratory re-conditioning.

## 2018-06-04 NOTE — PROGRESS NOTE ADULT - PROBLEM SELECTOR PLAN 7
Hb stable in 8-9 range. Acute anemia most likely 2/2 active colon malignancy.  -s/p 1U PRBC before IR procedure 5/25, no transfusion subsequently required.  -transfuse hgb<7  -maintain active type and screen

## 2018-06-04 NOTE — PROGRESS NOTE ADULT - PROBLEM SELECTOR PLAN 5
CTA A/P s/f hepatic vein thrombosis; most likely 2/2 hypercoagulable state in the setting of active untreated malignancy  -c/w lovenox 90mg BID with intention to transition to DOAC following completion of procedures to resolve partial SBO.  -f/u Dr. Nick outpatient

## 2018-06-04 NOTE — PROGRESS NOTE ADULT - SUBJECTIVE AND OBJECTIVE BOX
OVERNIGHT EVENTS: No acute events overnight.     SUBJECTIVE / INTERVAL HPI: Patient seen and examined at bedside.     VITAL SIGNS:  Vital Signs Last 24 Hrs  T(C): 36.7 (03 Jun 2018 21:00), Max: 36.7 (03 Jun 2018 14:14)  T(F): 98 (03 Jun 2018 21:00), Max: 98 (03 Jun 2018 14:14)  HR: 60 (03 Jun 2018 14:14) (60 - 68)  BP: 158/91 (03 Jun 2018 21:00) (134/76 - 158/91)  RR: 16 (03 Jun 2018 21:00) (14 - 16)  SpO2: 95% (03 Jun 2018 21:00) (95% - 99%)    PHYSICAL EXAM:  General: obese female laying in bed, in no acute distress  HEENT: NC/AT; PERRL, anicteric sclera; MMM  Neck: supple  Cardiovascular: +S1/S2; RRR  Respiratory: Clear to auscultation bilaterally   Gastrointestinal: distended, firm.  - pain diffuse. hypoactive bowel sounds throughout. cholecystostomy drain - draining about 75cc of bilious fluid. exit site c/d/i.  Extremities: WWP; no edema, clubbing or cyanosis  Vascular: 2+ radial, DP/PT pulses B/L  Neurological: AAOx3; no focal deficits    MEDICATIONS:  MEDICATIONS  (STANDING):  ALBUTerol    90 MICROgram(s) HFA Inhaler 1 Puff(s) Inhalation every 4 hours  ampicillin/sulbactam  IVPB 3 Gram(s) IV Intermittent every 6 hours  dexamethasone  Injectable 4 milliGRAM(s) IV Push every 6 hours  diphenhydrAMINE   Injectable 25 milliGRAM(s) IV Push at bedtime  enoxaparin Injectable 90 milliGRAM(s) SubCutaneous two times a day  octreotide  Infusion 20 MICROgram(s)/Hr (4 mL/Hr) IV Continuous <Continuous>  pantoprazole  Injectable 40 milliGRAM(s) IV Push daily  polyethylene glycol 3350 17 Gram(s) Oral two times a day  polyethylene glycol/electrolyte Solution. 4000 milliLiter(s) Oral once  sodium chloride 0.9%. 1000 milliLiter(s) (80 mL/Hr) IV Continuous <Continuous>  tiotropium 18 MICROgram(s) Capsule 1 Capsule(s) Inhalation daily    MEDICATIONS  (PRN):  acetaminophen  Suppository 650 milliGRAM(s) Rectal every 6 hours PRN For Temp greater than 38 C (100.4 F)  ALBUTerol/ipratropium for Nebulization 3 milliLiter(s) Nebulizer every 4 hours PRN Shortness of Breath and/or Wheezing  ondansetron Injectable 4 milliGRAM(s) IV Push every 6 hours PRN Nausea and/or Vomiting  oxyCODONE    IR 5 milliGRAM(s) Oral every 6 hours PRN Severe Pain (7 - 10)      ALLERGIES:  allergic to cats (Rash)  No Known Drug Allergies or intolerances    LABS:                        8.4    7.0   )-----------( 714      ( 03 Jun 2018 07:00 )             28.2     06-03    138  |  99  |  10  ----------------------------<  132<H>  3.9   |  28  |  0.36<L>    Ca    8.5      03 Jun 2018 07:00  Mg     2.2     06-03        RADIOLOGY & ADDITIONAL TESTS: Reviewed. OVERNIGHT EVENTS: No acute events overnight.   SUBJECTIVE / INTERVAL HPI: Patient seen and examined at bedside.     VITAL SIGNS:  Vital Signs Last 24 Hrs  T(C): 36.7 (03 Jun 2018 21:00), Max: 36.7 (03 Jun 2018 14:14)  T(F): 98 (03 Jun 2018 21:00), Max: 98 (03 Jun 2018 14:14)  HR: 60 (03 Jun 2018 14:14) (60 - 68)  BP: 158/91 (03 Jun 2018 21:00) (134/76 - 158/91)  RR: 16 (03 Jun 2018 21:00) (14 - 16)  SpO2: 95% (03 Jun 2018 21:00) (95% - 99%)    PHYSICAL EXAM:  General: obese female laying in bed, in no acute distress  HEENT: NC/AT; PERRL, anicteric sclera; MMM  Neck: supple  Cardiovascular: +S1/S2; RRR  Respiratory: Clear to auscultation bilaterally   Gastrointestinal: distended, firm.  - pain diffuse. hypoactive bowel sounds throughout. cholecystostomy drain - draining about 75cc of bilious fluid. exit site c/d/i.  Extremities: WWP; no edema, clubbing or cyanosis  Vascular: 2+ radial, DP/PT pulses B/L  Neurological: AAOx3; no focal deficits    MEDICATIONS:  MEDICATIONS  (STANDING):  ALBUTerol    90 MICROgram(s) HFA Inhaler 1 Puff(s) Inhalation every 4 hours  ampicillin/sulbactam  IVPB 3 Gram(s) IV Intermittent every 6 hours  dexamethasone  Injectable 4 milliGRAM(s) IV Push every 6 hours  diphenhydrAMINE   Injectable 25 milliGRAM(s) IV Push at bedtime  enoxaparin Injectable 90 milliGRAM(s) SubCutaneous two times a day  octreotide  Infusion 20 MICROgram(s)/Hr (4 mL/Hr) IV Continuous <Continuous>  pantoprazole  Injectable 40 milliGRAM(s) IV Push daily  polyethylene glycol 3350 17 Gram(s) Oral two times a day  polyethylene glycol/electrolyte Solution. 4000 milliLiter(s) Oral once  sodium chloride 0.9%. 1000 milliLiter(s) (80 mL/Hr) IV Continuous <Continuous>  tiotropium 18 MICROgram(s) Capsule 1 Capsule(s) Inhalation daily    MEDICATIONS  (PRN):  acetaminophen  Suppository 650 milliGRAM(s) Rectal every 6 hours PRN For Temp greater than 38 C (100.4 F)  ALBUTerol/ipratropium for Nebulization 3 milliLiter(s) Nebulizer every 4 hours PRN Shortness of Breath and/or Wheezing  ondansetron Injectable 4 milliGRAM(s) IV Push every 6 hours PRN Nausea and/or Vomiting  oxyCODONE    IR 5 milliGRAM(s) Oral every 6 hours PRN Severe Pain (7 - 10)      ALLERGIES:  allergic to cats (Rash)  No Known Drug Allergies or intolerances    LABS:                        8.4    7.0   )-----------( 714      ( 03 Jun 2018 07:00 )             28.2     06-03    138  |  99  |  10  ----------------------------<  132<H>  3.9   |  28  |  0.36<L>    Ca    8.5      03 Jun 2018 07:00  Mg     2.2     06-03        RADIOLOGY & ADDITIONAL TESTS: Reviewed. OVERNIGHT EVENTS: No acute events overnight.   SUBJECTIVE / INTERVAL HPI: Patient seen and examined at bedside. Patient is having ongoing intermittent sharp 6.5-7/10 abdominal pain originating from the left side with general soreness elsewhere - epigastric/umbilical/right lower quadrant. She continues to have some nausea, is passing flatus without much change in frequency or degree from yesterday. No emesis. She is ambulating regularly, using her incentive spirometer (at bedside) and starting her golytely prep during interview.      VITAL SIGNS:  Vital Signs Last 24 Hrs  T(C): 36.7 (03 Jun 2018 21:00), Max: 36.7 (03 Jun 2018 14:14)  T(F): 98 (03 Jun 2018 21:00), Max: 98 (03 Jun 2018 14:14)  HR: 60 (03 Jun 2018 14:14) (60 - 68)  BP: 158/91 (03 Jun 2018 21:00) (134/76 - 158/91)  RR: 16 (03 Jun 2018 21:00) (14 - 16)  SpO2: 95% (03 Jun 2018 21:00) (95% - 99%)    PHYSICAL EXAM:  General: obese female laying in bed, in no acute distress, on 2L NC  HEENT: NC/AT; PERRL, anicteric sclera; MMM  Neck: supple  Cardiovascular: +S1/S2; RRR - nontachycardic  Respiratory: Clear to auscultation bilaterally with good air movement, wearing 2L NC  Gastrointestinal: distended, firm. Pain diffuse, unchanged from yesterday. hypoactive bowel sounds throughout. cholecystostomy drain - draining about 90cc of bilious fluid. exit site c/d/i.  Extremities: WWP; no edema, clubbing or cyanosis  Vascular: 2+ radial, DP/PT pulses B/L  Neurological: AAOx3; no focal deficits    MEDICATIONS:  MEDICATIONS  (STANDING):  ALBUTerol    90 MICROgram(s) HFA Inhaler 1 Puff(s) Inhalation every 4 hours  ampicillin/sulbactam  IVPB 3 Gram(s) IV Intermittent every 6 hours  dexamethasone  Injectable 4 milliGRAM(s) IV Push every 6 hours  diphenhydrAMINE   Injectable 25 milliGRAM(s) IV Push at bedtime  enoxaparin Injectable 90 milliGRAM(s) SubCutaneous two times a day  octreotide  Infusion 20 MICROgram(s)/Hr (4 mL/Hr) IV Continuous <Continuous>  pantoprazole  Injectable 40 milliGRAM(s) IV Push daily  polyethylene glycol 3350 17 Gram(s) Oral two times a day  polyethylene glycol/electrolyte Solution. 4000 milliLiter(s) Oral once  sodium chloride 0.9%. 1000 milliLiter(s) (80 mL/Hr) IV Continuous <Continuous>  tiotropium 18 MICROgram(s) Capsule 1 Capsule(s) Inhalation daily    MEDICATIONS  (PRN):  acetaminophen  Suppository 650 milliGRAM(s) Rectal every 6 hours PRN For Temp greater than 38 C (100.4 F)  ALBUTerol/ipratropium for Nebulization 3 milliLiter(s) Nebulizer every 4 hours PRN Shortness of Breath and/or Wheezing  ondansetron Injectable 4 milliGRAM(s) IV Push every 6 hours PRN Nausea and/or Vomiting  oxyCODONE    IR 5 milliGRAM(s) Oral every 6 hours PRN Severe Pain (7 - 10)      ALLERGIES:  allergic to cats (Rash)  No Known Drug Allergies or intolerances    LABS:                        8.4    7.0   )-----------( 714      ( 03 Jun 2018 07:00 )             28.2     06-03    138  |  99  |  10  ----------------------------<  132<H>  3.9   |  28  |  0.36<L>    Ca    8.5      03 Jun 2018 07:00  Mg     2.2     06-03        RADIOLOGY & ADDITIONAL TESTS: Reviewed.

## 2018-06-04 NOTE — PROGRESS NOTE ADULT - PROBLEM SELECTOR PLAN 9
DVT PPX: Lovenox BID  Full Code: Patient wants compressions and intubation with time restriction on number of days intubated: Arnel in chart.  DISPO: RMF pending resolution of ileus v. SBO with GI stenting planned for 6/6. For possible ileostomy if stenting unsuccessful.

## 2018-06-04 NOTE — PROGRESS NOTE ADULT - ASSESSMENT
67 yr old male with a PMHx of recently diagnosed colon cancer presenting from oncologist's office with acute onset abdominal pain and fever, found to have acute cholecystitis and hepatic vein thrombosis, deemed poor surgical candidate 2/2 active metastic colon Ca, admitted to [facility] for medical management. Now s/p perc arleen with cholecystostomy drain in place and new imaging concerning for ileus v. low grade partial SBO.  Currently stable, to start slow prep this morning for colonoscopy and colonic stent placement by GI on 6/6.

## 2018-06-04 NOTE — PROGRESS NOTE ADULT - ASSESSMENT
68 YO F with PMHx significant for metastatic colon cancer admitted on 5/25/18 for evaluation of abdominal pain, fever, weakness, and found to have acute cholecystitis. She was deemed not to be a good surgical candidate and had a cholecystostomy tube placed and is on abx. She has had worsening abdominal distention and CT showed a partial SBO and ileus, distal ascending/proximal transverse mass with narrowing of the transverse colon.   GI consulted for possible colonic stent placement.    # Constipation with partial SBO  - Recommend avoiding stimulant laxative  - Pt states that she is passing flatus  - Recommend Miralax 17 g PO BID can titrate up to 34 g PO BID  - Antiemetics PRN    # Metastatic colonic cancer  - plan for a slow prep - start today- with 2 L golytely  - then full prep with 4L golytely on Tuesday  - C/w clear liquid diet  - Plan for colonoscopy +/- stent placement on Wednesday  - NPO p MN Tuesday  - Pre-op labs, EKG, and PLEASE HOLD BLOOD THINNERS (LOVENOX) BEFORE PROCEDURE    Discussed with attending Dr Hidalgo  GI following 68 YO F with PMHx significant for metastatic colon cancer admitted on 5/25/18 for evaluation of abdominal pain, fever, weakness, and found to have acute cholecystitis. She was deemed not to be a good surgical candidate and had a cholecystostomy tube placed and is on abx. She has had worsening abdominal distention and CT showed a partial SBO and ileus, distal ascending/proximal transverse mass with narrowing of the transverse colon.   GI consulted for possible colonic stent placement.    # Constipation with partial SBO  - Recommend avoiding stimulant laxative  - Pt states that she is passing flatus  - Recommend Miralax 17 g PO BID can titrate up to 34 g PO BID  - Antiemetics PRN    # Metastatic colonic cancer  - plan for a slow prep - start today- with 2 L golytely  - then full prep with 4L golytely on Tuesday  - C/w clear liquid diet  - AXR flat/upright to evaluate distention tomorrow if she continues to not have bowel movements  - Plan for colonoscopy +/- stent placement on Wednesday  - NPO p MN Tuesday  - Pre-op labs, EKG, and PLEASE HOLD BLOOD THINNERS (LOVENOX) BEFORE PROCEDURE    Discussed with attending Dr Gwen LEWIS following

## 2018-06-04 NOTE — PROGRESS NOTE ADULT - PROBLEM SELECTOR PLAN 2
CT abdomen/pelvis with contrast 6/1 consistently concerning for ileus v. partial SBO.  -octreotide gtt at 4 cc/hr  -ambulation orders: walking with PT, encourage ambulation with assistance. Patient   walking the halls.  -Continue to monitor for pain, emesis, bowel movements, flatus, fever.  -serial imaging if symptoms worsen

## 2018-06-04 NOTE — PROGRESS NOTE ADULT - SUBJECTIVE AND OBJECTIVE BOX
Pt seen and examined at bedside. ANGELICA overnight. Pt reports passing flatus, however, but no BM last 24hrs. She also reports diffuse abdominal pain, mild nausea denies vomiting, melena, hematochezia. tolerating clear liquid diet.     Allergies  allergic to cats (Rash)  No Known Drug Allergies    MEDICATIONS:  MEDICATIONS  (STANDING):  ALBUTerol    90 MICROgram(s) HFA Inhaler 1 Puff(s) Inhalation every 4 hours  ampicillin/sulbactam  IVPB 3 Gram(s) IV Intermittent every 6 hours  dexamethasone  Injectable 4 milliGRAM(s) IV Push every 6 hours  diphenhydrAMINE   Injectable 25 milliGRAM(s) IV Push at bedtime  enoxaparin Injectable 90 milliGRAM(s) SubCutaneous two times a day  octreotide  Infusion 20 MICROgram(s)/Hr (4 mL/Hr) IV Continuous <Continuous>  pantoprazole  Injectable 40 milliGRAM(s) IV Push daily  sodium chloride 0.9%. 1000 milliLiter(s) (80 mL/Hr) IV Continuous <Continuous>  tiotropium 18 MICROgram(s) Capsule 1 Capsule(s) Inhalation daily    MEDICATIONS  (PRN):  acetaminophen  Suppository 650 milliGRAM(s) Rectal every 6 hours PRN For Temp greater than 38 C (100.4 F)  ALBUTerol/ipratropium for Nebulization 3 milliLiter(s) Nebulizer every 4 hours PRN Shortness of Breath and/or Wheezing  ondansetron Injectable 4 milliGRAM(s) IV Push every 6 hours PRN Nausea and/or Vomiting  oxyCODONE    IR 5 milliGRAM(s) Oral every 6 hours PRN Severe Pain (7 - 10)    Vital Signs Last 24 Hrs  VSS  PHYSICAL EXAM:    General: Well developed; well nourished; in no acute distress  HEENT: MMM, conjunctiva and sclera clear  Gastrointestinal: Soft mild diffuse TTP mildly distended; tympanic to percussion  +CHOLECYSTOSTOMY TUBE  Skin: Warm and dry. No obvious rash    LABS:                        8.4    7.0   )-----------( 714      ( 03 Jun 2018 07:00 )             28.2     06-03    138  |  99  |  10  ----------------------------<  132<H>  3.9   |  28  |  0.36<L>    Ca    8.5      03 Jun 2018 07:00  Mg     2.2     06-03    RADIOLOGY & ADDITIONAL STUDIES:  CT Abdomen and Pelvis w/ IV Cont (06.01.18 @ 01:43)  FINDINGS: Images of the lower chest demonstrate small right and trace   left pleural effusions with overlying compressive atelectasis.    Redemonstrated are several hypodense liver lesions consistent metastatic   disease. 3.6 cm cyst in the right lobe of the liver. Interval placement   of a percutaneous cholecystostomy tube with a collapsed gallbladder.   Cholelithiasis. There is no dilatation of the intra or extrahepatic   biliary system. The pancreas is normal in appearance.  No splenic   abnormalities are seen.    The adrenal glands are unremarkable. Subcentimeter hypodensity in the   right kidney that is too small to characterize. No hydronephrosis.       No abdominal aortic aneurysm is seen. There is again thrombus within the   SMV. Multiple subcentimeter mesenteric lymphnodes are seen. Short   segment of narrowing or short segment occlusion of the proximal main   portal vein with collaterals in the josh hepatis consistent with   cavernous transformation of the portal vein. Intrahepatic portal venous   branches are patent.    Evaluation of the bowel demonstrates dilation of the stomach and small   bowel transition point in the terminal ileum is seen. Oral contrast   reaches the rectum. New moderate ascites. 6.4 x 4.0 x 1.6 cm fluid   collection in the right paracolic gutter anterior to the ascending colon.   There are areas of narrowing noted in the transverse colon. Mass at the   distal ascending colon/proximal transverse colon is consistent with   patient's known malignancy. There is adjacent peritoneal metastases. No   pneumatosis or free air.    Images of the pelvis demonstrate the uterus and adnexae to be normal in   appearance. The urinary bladder is decompressed.      Evaluation of the osseous structures demonstrates scattered degenerative   changes.    IMPRESSION:  1.  Since 5/24/2018, there has been interval placement of a percutaneous   cholecystostomy tube.  2.  The entire small bowel is dilated up to the terminal ileum were there   is a point of transition to normal caliber terminal ileum. However, oral   contrast reaches the rectum. Findings are consistent with partial small   bowel obstruction in the terminal ileum.   3.  New moderate ascites and small right and trace left pleural   effusions.   4.  6.4 x 4.0 x 1.6 cm fluid collection in the right paracolic gutter.   This could represent an abscess or loculated ascites. Correlation is   recommended.

## 2018-06-04 NOTE — PROGRESS NOTE ADULT - PROBLEM SELECTOR PLAN 4
Stage 4 metastatic colon cancer with liver mets and peritoneal carcinomatosis  - plan per Dr. Nick   - s/p chemoport placement 5/31   - heme onc following  - palliative consulted given metastatic CRC, pt aware of 1-2 yr prognosis; MOLST   completed. NOT DNR/DNI: Would like compressions, intubation with time      limitation.   -CT abdomen/pelvis positive for ascites secondary to abdominal mets. Mets cause  of partial bowel obstruction. Patient for stent placement to open relieve partial  obstruction on 6/6.  -Palliative following, recommend pain regimen as follows: 4mg decadron IV q6h for  capsular liver pain, benadryl 25mg IV push at bedtime, oxycodone IR 5mg q6h  PRN for severe pain.

## 2018-06-04 NOTE — PROGRESS NOTE ADULT - PROBLEM SELECTOR PLAN 1
Upright abdominal xray 5/31 and CT abdomen/pelvis with contrast 6/1 consistently concerning for ileus v. partial SBO.   -continue with zofran 4mg IV PRN  -clear liquid diet  -GI following: partial SBO secondary to advancing metastasis. To start golytely for two day colonic prep prior to attempted stent placement planned for 6/6.   -surgery following: will consider ileostomy if GI unable to place stent.   -ambulation orders: walking with PT, encourage ambulation with assistance. Patient  walking the halls.  -Patient currently afebrile with mildly improving abdominal pain, though remains  distended. Continue to monitor for pain, emesis, bowel movements, flatus, fever.

## 2018-06-04 NOTE — PROGRESS NOTE ADULT - SUBJECTIVE AND OBJECTIVE BOX
SUBJECTIVE: Pt seen and examined at bedside. No overnight events.   Patient complaints of intermittent abdominal cramps, pain and bloating, mostly in RUQ/RLQ, but also LLQ, denies nausea or emesis. Started drinking Golytely and tolerates that fine.  No subjective fevers or chills. No other complaints.    Vital Signs Last 24 Hrs  T(C): 36.7 (04 Jun 2018 05:48), Max: 36.7 (03 Jun 2018 14:14)  T(F): 98.1 (04 Jun 2018 05:48), Max: 98.1 (04 Jun 2018 05:48)  HR: 69 (04 Jun 2018 05:48) (60 - 69)  BP: 177/95 (04 Jun 2018 05:48) (134/76 - 177/95)  BP(mean): --  RR: 16 (04 Jun 2018 05:48) (16 - 16)  SpO2: 95% (04 Jun 2018 05:48) (95% - 96%)    PHYSICAL EXAM  General: A/Ox4 NAD  Pulmonary: Nonlabored breathing, no respiratory distress  Cardiovascular: normocardic, intermittently hypertensive  Abdominal: soft, moderately distended, tender to palpation mostly in lower right and left abdomen, but also around perc choley tube site, no rebound or guarding, cholecystostomy tube in place draining bilious outoput    LABS:                        9.0    6.4   )-----------( 655      ( 04 Jun 2018 05:47 )             29.6     06-04    139  |  99  |  10  ----------------------------<  129<H>  3.5   |  30  |  0.34<L>    Ca    8.8      04 Jun 2018 05:48  Mg     2.2     06-04          ASSESSMENT AND PLAN  68YO F with metastatic colon cancer and acute cholecystitis s/p percutaneous cholecystostomy.    Condition stable. No signs of obstruction, tolerates Golytely slowly    Plan for colonoscopy with possible stenting on Wednesday, will consider loop ileostomy if GI is unable to stent and if patient is agreeable to procedure. No surgical intervention at this point.   Appreciate Pallative Care recs (Octreotide, Dexamethasone, Benadryl, etc)    Team 4 continue to follow. Please call if any issues  Discussed with Dr. Narvaez SUBJECTIVE: Pt seen and examined at bedside. No overnight events.   Patient complaints of intermittent abdominal cramps, pain and bloating, mostly in RUQ/RLQ, but also LLQ, denies nausea or emesis. Started drinking Golytely and tolerates that fine.  No subjective fevers or chills. No other complaints.    Vital Signs Last 24 Hrs  T(C): 36.7 (04 Jun 2018 05:48), Max: 36.7 (03 Jun 2018 14:14)  T(F): 98.1 (04 Jun 2018 05:48), Max: 98.1 (04 Jun 2018 05:48)  HR: 69 (04 Jun 2018 05:48) (60 - 69)  BP: 177/95 (04 Jun 2018 05:48) (134/76 - 177/95)  BP(mean): --  RR: 16 (04 Jun 2018 05:48) (16 - 16)  SpO2: 95% (04 Jun 2018 05:48) (95% - 96%)    PHYSICAL EXAM  General: A/Ox4 NAD  Pulmonary: Nonlabored breathing, no respiratory distress  Cardiovascular: normocardic, intermittently hypertensive  Abdominal: soft, moderately distended, tender to palpation mostly in lower right and left abdomen, but also around perc choley tube site, no rebound or guarding, cholecystostomy tube in place draining bilious outoput    LABS:                        9.0    6.4   )-----------( 655      ( 04 Jun 2018 05:47 )             29.6     06-04    139  |  99  |  10  ----------------------------<  129<H>  3.5   |  30  |  0.34<L>    Ca    8.8      04 Jun 2018 05:48  Mg     2.2     06-04          ASSESSMENT AND PLAN  68YO F with metastatic colon cancer and acute cholecystitis s/p percutaneous cholecystostomy.    Condition stable. No signs of obstruction (passing flatus), tolerates Golytely slowly    Plan for colonoscopy with possible stenting on Wednesday, will consider loop ileostomy if GI is unable to stent and if patient is agreeable to procedure. No surgical intervention at this point.   Appreciate Pallative Care recs (Octreotide, Dexamethasone, Benadryl, etc)    Team 4 continue to follow. Please call if any issues  Discussed with Dr. Narvaez

## 2018-06-04 NOTE — CHART NOTE - NSCHARTNOTEFT_GEN_A_CORE
Admitting Diagnosis:   68YO F with metastatic colon cancer and acute cholecystitis s/p percutaneous cholecystostomy. Plan for colonoscopy with possible stenting on Wednesday, will consider loop ileostomy if GI is unable to stent.       PAST MEDICAL & SURGICAL HISTORY:  Colon cancer  No significant past surgical history      Current Nutrition Order: Clear Liquid Diet     PO Intake: Good (%) [   ]  Fair (50-75%) [ X  ] Poor (<25%) [   ]    GI Issues: No n/v/d, +constipation, SBO     Pain: No pain at this time     Skin Integrity: intact     Labs:   06-04    139  |  99  |  10  ----------------------------<  129<H>  3.5   |  30  |  0.34<L>    Ca    8.8      04 Jun 2018 05:48  Mg     2.2     06-04      CAPILLARY BLOOD GLUCOSE          Medications:  MEDICATIONS  (STANDING):  ALBUTerol    90 MICROgram(s) HFA Inhaler 1 Puff(s) Inhalation every 4 hours  ampicillin/sulbactam  IVPB 3 Gram(s) IV Intermittent every 6 hours  dexamethasone  Injectable 4 milliGRAM(s) IV Push every 6 hours  diphenhydrAMINE   Injectable 25 milliGRAM(s) IV Push at bedtime  enoxaparin Injectable 90 milliGRAM(s) SubCutaneous two times a day  octreotide  Infusion 20 MICROgram(s)/Hr (4 mL/Hr) IV Continuous <Continuous>  pantoprazole  Injectable 40 milliGRAM(s) IV Push daily  polyethylene glycol 3350 17 Gram(s) Oral two times a day  potassium chloride  10 mEq/100 mL IVPB 10 milliEquivalent(s) IV Intermittent every 1 hour  simethicone 80 milliGRAM(s) Chew every 8 hours  sodium chloride 0.9%. 1000 milliLiter(s) (80 mL/Hr) IV Continuous <Continuous>  tiotropium 18 MICROgram(s) Capsule 1 Capsule(s) Inhalation daily    MEDICATIONS  (PRN):  acetaminophen  Suppository 650 milliGRAM(s) Rectal every 6 hours PRN For Temp greater than 38 C (100.4 F)  ALBUTerol/ipratropium for Nebulization 3 milliLiter(s) Nebulizer every 4 hours PRN Shortness of Breath and/or Wheezing  ondansetron Injectable 4 milliGRAM(s) IV Push every 6 hours PRN Nausea and/or Vomiting  oxyCODONE    IR 5 milliGRAM(s) Oral every 6 hours PRN Severe Pain (7 - 10)      Weight: 88.2 kg       Weight Change: No new wt to assess      Estimated Energy Needs (30-35 calories/kg):  · Weight  (lbs)	110 lb  · Weight (kg)	49.8 kg  · From (30 marin/kg)	1494  · To (35 calories/kg)	1743     Other Calculation:  · Other Calculation	Height: 62" Weight: 194lbs, IBW 110lbs+/-10%, %%, BMI - 35  IBW used to calculate energy needs due to pt's current body weight exceeding 120% of IBW  Nutrient needs based on [facility] standards of care for repletion in adults 2/2 wt loss     Estimated Protein Needs (1.2-1.4 gm/kg):  · Weight  (lbs)	110  · Weight (kg)	49.8 kg  · From (1.2 g/kg)	59.76  · To (1.4 g/kg)	69.72     Estimated Fluid Needs (30-35 ml/kg):  · Weight  (lbs)	110  · Weight (kg)	49.8  · From (30 ml/kg)	1494  · To (35 ml/kg)	1743      Subjective: Pt tolerating Clear Liquid Diet. Encouraged intake of Ensure Clears to provide additional calories and protein. Pt pending colonic stent. Has been NPO or on Clear Liquid Diet for the past 7 days. If unable to advance diet after stent, consider TPN.     Previous Nutrition Diagnosis:  Inadequate oral intake r/t decreased appetite/intake AEB current intake </50% of meals, 30 lb wt loss over the past 6 months      Active [ X  ]  Resolved [   ]    If resolved, new PES:     Goal: 1.) Pt to meet 75% of needs PO     Recommendations: Advance diet as medically feasible; Clear Liquid Diet >> low fiber, low fat diet + Ensure Enlive BID     Education: Discussed diet progression and encouraged intake of Clear Liquid Diet     Risk Level: High [ X  ] Moderate [   ] Low [   ]

## 2018-06-04 NOTE — PROGRESS NOTE ADULT - PROBLEM SELECTOR PLAN 3
s/p IR percutaneous cholecystostomy placement 5/25. cholecystostomy drain in place, continues to drain small amounts daily (approx 50cc).  -biliary fluid growing E. faecium, citrobacter, e. coli  -continue with Unasyn    -blood cx NGTD  -f/u GI recommendations  -f/u surgery recommendations s/p IR percutaneous cholecystostomy placement 5/25. cholecystostomy drain in place, continues to drain small amounts daily (approx 50cc).  -biliary fluid growing E. faecium, citrobacter, e. coli  -today day 10 of antibiotics: complete Unasyn today.   -blood cx NGTD  -f/u GI recommendations  -f/u surgery recommendations

## 2018-06-05 LAB
ANION GAP SERPL CALC-SCNC: 13 MMOL/L — SIGNIFICANT CHANGE UP (ref 5–17)
BUN SERPL-MCNC: 12 MG/DL — SIGNIFICANT CHANGE UP (ref 7–23)
CALCIUM SERPL-MCNC: 8.8 MG/DL — SIGNIFICANT CHANGE UP (ref 8.4–10.5)
CHLORIDE SERPL-SCNC: 98 MMOL/L — SIGNIFICANT CHANGE UP (ref 96–108)
CO2 SERPL-SCNC: 28 MMOL/L — SIGNIFICANT CHANGE UP (ref 22–31)
CREAT SERPL-MCNC: 0.37 MG/DL — LOW (ref 0.5–1.3)
GLUCOSE SERPL-MCNC: 143 MG/DL — HIGH (ref 70–99)
HCT VFR BLD CALC: 32.8 % — LOW (ref 34.5–45)
HGB BLD-MCNC: 10 G/DL — LOW (ref 11.5–15.5)
MAGNESIUM SERPL-MCNC: 2.2 MG/DL — SIGNIFICANT CHANGE UP (ref 1.6–2.6)
MCHC RBC-ENTMCNC: 22.4 PG — LOW (ref 27–34)
MCHC RBC-ENTMCNC: 30.5 G/DL — LOW (ref 32–36)
MCV RBC AUTO: 73.5 FL — LOW (ref 80–100)
PLATELET # BLD AUTO: 772 K/UL — HIGH (ref 150–400)
POTASSIUM SERPL-MCNC: 3.4 MMOL/L — LOW (ref 3.5–5.3)
POTASSIUM SERPL-SCNC: 3.4 MMOL/L — LOW (ref 3.5–5.3)
RBC # BLD: 4.46 M/UL — SIGNIFICANT CHANGE UP (ref 3.8–5.2)
RBC # FLD: 28.8 % — HIGH (ref 10.3–16.9)
SODIUM SERPL-SCNC: 139 MMOL/L — SIGNIFICANT CHANGE UP (ref 135–145)
WBC # BLD: 8.6 K/UL — SIGNIFICANT CHANGE UP (ref 3.8–10.5)
WBC # FLD AUTO: 8.6 K/UL — SIGNIFICANT CHANGE UP (ref 3.8–10.5)

## 2018-06-05 PROCEDURE — 74019 RADEX ABDOMEN 2 VIEWS: CPT | Mod: 26

## 2018-06-05 PROCEDURE — 99233 SBSQ HOSP IP/OBS HIGH 50: CPT | Mod: GC

## 2018-06-05 PROCEDURE — 71045 X-RAY EXAM CHEST 1 VIEW: CPT | Mod: 26

## 2018-06-05 RX ORDER — HYDROMORPHONE HYDROCHLORIDE 2 MG/ML
0.5 INJECTION INTRAMUSCULAR; INTRAVENOUS; SUBCUTANEOUS EVERY 4 HOURS
Qty: 0 | Refills: 0 | Status: DISCONTINUED | OUTPATIENT
Start: 2018-06-05 | End: 2018-06-11

## 2018-06-05 RX ORDER — ENOXAPARIN SODIUM 100 MG/ML
90 INJECTION SUBCUTANEOUS EVERY 12 HOURS
Qty: 0 | Refills: 0 | Status: COMPLETED | OUTPATIENT
Start: 2018-06-05 | End: 2018-06-05

## 2018-06-05 RX ORDER — POLYETHYLENE GLYCOL 3350 17 G/17G
34 POWDER, FOR SOLUTION ORAL
Qty: 0 | Refills: 0 | Status: DISCONTINUED | OUTPATIENT
Start: 2018-06-05 | End: 2018-06-08

## 2018-06-05 RX ORDER — MORPHINE SULFATE 50 MG/1
2 CAPSULE, EXTENDED RELEASE ORAL ONCE
Qty: 0 | Refills: 0 | Status: DISCONTINUED | OUTPATIENT
Start: 2018-06-05 | End: 2018-06-05

## 2018-06-05 RX ORDER — POTASSIUM CHLORIDE 20 MEQ
10 PACKET (EA) ORAL
Qty: 0 | Refills: 0 | Status: COMPLETED | OUTPATIENT
Start: 2018-06-05 | End: 2018-06-05

## 2018-06-05 RX ORDER — ZALEPLON 10 MG
5 CAPSULE ORAL AT BEDTIME
Qty: 0 | Refills: 0 | Status: DISCONTINUED | OUTPATIENT
Start: 2018-06-05 | End: 2018-06-12

## 2018-06-05 RX ORDER — OXYCODONE AND ACETAMINOPHEN 5; 325 MG/1; MG/1
1 TABLET ORAL ONCE
Qty: 0 | Refills: 0 | Status: DISCONTINUED | OUTPATIENT
Start: 2018-06-05 | End: 2018-06-05

## 2018-06-05 RX ADMIN — Medication 4 MILLIGRAM(S): at 06:47

## 2018-06-05 RX ADMIN — HYDROMORPHONE HYDROCHLORIDE 0.5 MILLIGRAM(S): 2 INJECTION INTRAMUSCULAR; INTRAVENOUS; SUBCUTANEOUS at 10:40

## 2018-06-05 RX ADMIN — ENOXAPARIN SODIUM 90 MILLIGRAM(S): 100 INJECTION SUBCUTANEOUS at 18:27

## 2018-06-05 RX ADMIN — ENOXAPARIN SODIUM 90 MILLIGRAM(S): 100 INJECTION SUBCUTANEOUS at 06:48

## 2018-06-05 RX ADMIN — Medication 4 MILLIGRAM(S): at 23:18

## 2018-06-05 RX ADMIN — OCTREOTIDE ACETATE 4 MICROGRAM(S)/HR: 200 INJECTION, SOLUTION INTRAVENOUS; SUBCUTANEOUS at 06:48

## 2018-06-05 RX ADMIN — Medication 4 MILLIGRAM(S): at 00:42

## 2018-06-05 RX ADMIN — OXYCODONE HYDROCHLORIDE 5 MILLIGRAM(S): 5 TABLET ORAL at 05:14

## 2018-06-05 RX ADMIN — Medication 4 MILLIGRAM(S): at 14:45

## 2018-06-05 RX ADMIN — OXYCODONE HYDROCHLORIDE 5 MILLIGRAM(S): 5 TABLET ORAL at 07:27

## 2018-06-05 RX ADMIN — MORPHINE SULFATE 2 MILLIGRAM(S): 50 CAPSULE, EXTENDED RELEASE ORAL at 00:42

## 2018-06-05 RX ADMIN — MORPHINE SULFATE 2 MILLIGRAM(S): 50 CAPSULE, EXTENDED RELEASE ORAL at 03:40

## 2018-06-05 RX ADMIN — Medication 25 MILLIGRAM(S): at 22:18

## 2018-06-05 RX ADMIN — SIMETHICONE 80 MILLIGRAM(S): 80 TABLET, CHEWABLE ORAL at 13:18

## 2018-06-05 RX ADMIN — SIMETHICONE 80 MILLIGRAM(S): 80 TABLET, CHEWABLE ORAL at 06:47

## 2018-06-05 RX ADMIN — PANTOPRAZOLE SODIUM 40 MILLIGRAM(S): 20 TABLET, DELAYED RELEASE ORAL at 13:18

## 2018-06-05 RX ADMIN — POLYETHYLENE GLYCOL 3350 34 GRAM(S): 17 POWDER, FOR SOLUTION ORAL at 07:17

## 2018-06-05 RX ADMIN — HYDROMORPHONE HYDROCHLORIDE 0.5 MILLIGRAM(S): 2 INJECTION INTRAMUSCULAR; INTRAVENOUS; SUBCUTANEOUS at 09:33

## 2018-06-05 RX ADMIN — Medication 4 MILLIGRAM(S): at 18:26

## 2018-06-05 RX ADMIN — OXYCODONE HYDROCHLORIDE 5 MILLIGRAM(S): 5 TABLET ORAL at 03:47

## 2018-06-05 NOTE — PROGRESS NOTE ADULT - PROBLEM SELECTOR PLAN 3
Decreased   - Continue Oxycodone IR 5mg PO q4h PRN Moderate pain  - Continue Dilaudid 0.5mg IV q4h PRN Severe pain.  - Recommend steroids as above Decreased.   - Recommend standing APAP 650mg PO q6h PRN Mild pain  - Recommend Oxycodone IR 5mg PO q4h PRN Moderate pain  - Continue Dilaudid 0.5mg IV q4h PRN Severe pain.  - Recommend steroids as above

## 2018-06-05 NOTE — PROGRESS NOTE ADULT - PROBLEM SELECTOR PLAN 6
Patient continues to feel short of breath, likely secondary to abdominal distention and compression of diaphragm/lungs. Shallow breathing. Supplemental O2 - NC.  Lungs clear - no wheezing, rhonchi or crackles.   -monitor symptoms, wean supplemental oxygen as able  -continue with duonebs PRN for now  -encourage incentive spirometer and increased activity to encourage respiratory re-conditioning. Patient continues to feel short of breath, likely secondary to abdominal distention and compression of diaphragm/lungs. Shallow breathing. Now on room air.   Lungs clear.  -encourage incentive spirometer and increased activity to encourage respiratory re-conditioning.

## 2018-06-05 NOTE — PROGRESS NOTE ADULT - ASSESSMENT
66 YO F with PMHx significant for metastatic colon cancer admitted on 5/25/18 for evaluation of abdominal pain, fever, weakness, and found to have acute cholecystitis. She was deemed not to be a good surgical candidate and had a cholecystostomy tube placed and is on abx. She has had worsening abdominal distention and CT showed a partial SBO and ileus, distal ascending/proximal transverse mass with narrowing of the transverse colon.   GI consulted for possible colonic stent placement.  Patient now clinically worse likely due to worsening obstruction.  - NPO  - STAT AXR flat/upright  - Plan for colonoscopy +/- stent placement on Wednesday  - Pre-op labs, EKG, and PLEASE HOLD BLOOD THINNERS (LOVENOX) BEFORE PROCEDURE    GI following

## 2018-06-05 NOTE — PROGRESS NOTE ADULT - SUBJECTIVE AND OBJECTIVE BOX
PILLO PEDRAZA             [id number]    CC:   Supportive services    HPI:  66 y/o female with a PMHx of recently dx metastatic ascending colon cancer (dx 2 weeks ago w/ mets to the liver and peritoneal carcinomatosis) that presents from Dr. Lundy's office with acute onset RUQ abdominal pain, fever and weakness for the past 3 days. Pain has worsened over the past 3 days and last evening was associated with one episode of vomiting (nonbloody, questionable bilious content).  Last BM was 2 days prior to presentation. She denies any associated chills, chest pain/discomfort/pressure, SOB/dyspnea, dysuria, diarrhea, hematochezia. Had CT A/P in ED which was s/f acute cholecystitis and hepatic vein thrombosis.    Cancer Hx: pt had a normal colonoscopy 2 years ago-had a polypectomy with benign pathology per pt. States she started getting non-specific abdominal pain starting last summer which she self-medicated with rolaids. States she ad 4 total episodes of abdominal pain over the summer and eventually went to see GI who did colonoscopy 2 weeks ago and diagnosed her with sub-total obstructing ascending colon cancer; had PET scan on Tuesday which showed liver mets and peritoneal carcinomatosis; plan for chemoport placement in June; patient also endorsing intermittent fevers, chills, and unintentional 25 lbs weight loss since Janurary. Denies any family hx of colon cancer.  ED Course  Vital Signs   TMax: 100.3 HR: 97 BP: 116/73 RR: 16 SpO2: 96%   CT a/p findings as above   s/p zosyn 3.375 q6  s/p initiation of heparin gtt for hepatic vein thrombosis (25 May 2018 02:05)    SUBJECTIVE:   Patient started on Golytely bowel prep planning colonoscopy with stent tomorrow. Patient only had 2 small bowel movements, one with mucus. Endorses worsening abdominal pain and feeling more distended, also associated with belching, nausea but no vomiting. Still has small flatus. Had diffuse abdominal pain, worse in RUQ, that was controlled with Oxycodone and Dilaudid. Patient slept very little last night. Patient to be seen by Pranav for massage therapy today.     ROS:  DYSPNEA: No  NAUS/VOM: Yes	  SECRETIONS: No	  AGITATION: No  Pain (Y/N):  Yes  -Provocation/Palliation:   -Quality/Quantity: Acute visceral pain currently uncontrolled. Chronic malignant somatic hepatic pain currently controlled.  -Radiating: Flank and lower abdomen  -Severity: Moderate  -Timing/Frequency: Constant with exacerbations related to movement  -Impact on ADLs: Prevents her from sleep and ADLs or ambulating.    OTHER REVIEW OF SYSTEMS:  UNABLE TO OBTAIN  due to:    PEx:  T(C): 36.6 (06-05-18 @ 06:02), Max: 36.7 (06-04-18 @ 12:06)  HR: 79 (06-05-18 @ 06:02) (62 - 79)  BP: 174/- (06-05-18 @ 06:02) (126/86 - 174/-)  RR: 16 (06-05-18 @ 06:02) (16 - 17)  SpO2: 94% (06-05-18 @ 06:02) (92% - 95%)  Wt(kg): --    General: AAOx3 lying in bedside chair, no acute distress. Somnolent  HEENT: NCAT Dry mucous membranes Non icteric PERRL   Neck:  Supple, no masses  CVS: Normal S1, S2, No M/R/G  Resp: Unlabored, shallow breaths  GI: Very distended in epigastric region, TTP of lower quadrants, hypoactive BS, perc drain in place  : Normal   Musc: No C/C/E    Neuro: No focal deficits. Somnolence  Psych:  Concerned about finding of sbo  Skin: Xerosis. Right chest infuse a port scar c/d/i  Lymph: Normal      	     ALLERGIES: allergic to cats (Rash)  No Known Drug Allergies      OPIATE NAÏVE (Y/N): YES    MEDICATIONS: REVIEWED  MEDICATIONS  (STANDING):  ALBUTerol    90 MICROgram(s) HFA Inhaler 1 Puff(s) Inhalation every 4 hours  dexamethasone  Injectable 4 milliGRAM(s) IV Push every 6 hours  diphenhydrAMINE   Injectable 25 milliGRAM(s) IV Push at bedtime  enoxaparin Injectable 90 milliGRAM(s) SubCutaneous every 12 hours  octreotide  Infusion 20 MICROgram(s)/Hr (4 mL/Hr) IV Continuous <Continuous>  pantoprazole  Injectable 40 milliGRAM(s) IV Push daily  polyethylene glycol 3350 34 Gram(s) Oral two times a day  polyethylene glycol/electrolyte Solution. 4000 milliLiter(s) Oral once  potassium chloride  10 mEq/100 mL IVPB 10 milliEquivalent(s) IV Intermittent every 1 hour  simethicone 80 milliGRAM(s) Chew every 8 hours  sodium chloride 0.9%. 1000 milliLiter(s) (80 mL/Hr) IV Continuous <Continuous>  tiotropium 18 MICROgram(s) Capsule 1 Capsule(s) Inhalation daily    MEDICATIONS  (PRN):  acetaminophen  Suppository 650 milliGRAM(s) Rectal every 6 hours PRN For Temp greater than 38 C (100.4 F)  HYDROmorphone  Injectable 0.5 milliGRAM(s) IV Push every 4 hours PRN Severe Pain (7 - 10)  ondansetron Injectable 4 milliGRAM(s) IV Push every 6 hours PRN Nausea and/or Vomiting      LABS: REVIEWED    LABS:                         10.0   8.6   )-----------( 772      ( 05 Jun 2018 07:02 )             32.8     06-05    139  |  98  |  12  ----------------------------<  143<H>  3.4<L>   |  28  |  0.37<L>    Ca    8.8      05 Jun 2018 07:02  Mg     2.2     06-05      IMAGING:     < from: CT Abdomen and Pelvis w/ IV Cont (06.01.18 @ 01:43) >  EXAM:  CT ABDOMEN AND PELVIS IC                          PROCEDURE DATE:  06/01/2018          INTERPRETATION:  CT of the ABDOMEN and PELVIS with intravenous contrast   dated 6/1/2018 1:43 AM    INDICATION: Metastatic lung cancer. New abdominal pain.    TECHNIQUE: CT of the abdomen and pelvis was performed using oral and   intravenous contrast. Axial, sagittal and coronal images were produced   and reviewed.    PRIOR STUDIES: CT abdomen and pelvis 5/24/2018.    FINDINGS: Images of the lower chest demonstrate small right and trace   left pleural effusions with overlying compressive atelectasis.    Redemonstrated are several hypodense liver lesions consistent metastatic   disease. 3.6 cm cyst in the right lobe of the liver. Interval placement   of a percutaneous cholecystostomy tube with a collapsed gallbladder.   Cholelithiasis. There is no dilatation of the intra or extrahepatic   biliary system. The pancreas is normal in appearance.  No splenic   abnormalities are seen.    The adrenal glands are unremarkable. Subcentimeter hypodensity in the   right kidney that is too small to characterize. No hydronephrosis.       No abdominal aortic aneurysm is seen. There is again thrombus within the   SMV. Multiple subcentimeter mesenteric lymphnodes are seen. Short   segment of narrowing or short segment occlusion of the proximal main   portal vein with collaterals in the josh hepatis consistent with   cavernous transformation of the portal vein. Intrahepatic portal venous   branches are patent.    Evaluation of the bowel demonstrates dilation of the stomach and small   bowel transition point in the terminal ileum is seen. Oral contrast   reaches the rectum. New moderate ascites. 6.4 x 4.0 x 1.6 cm fluid   collection in the right paracolic gutter anterior to the ascending colon.   There are areas of narrowing noted in the transverse colon. Mass at the   distal ascending colon/proximal transverse colon is consistent with   patient's known malignancy. There is adjacent peritoneal metastases. No   pneumatosis or free air.    Images of the pelvis demonstrate the uterus and adnexae to be normal in   appearance. The urinary bladder is decompressed.      Evaluation of the osseous structures demonstrates scattered degenerative   changes.    IMPRESSION:  1.  Since 5/24/2018, there has been interval placement of a percutaneous   cholecystostomy tube.  2.  The entire small bowel is dilated up to the terminal ileum were there   is a point of transition to normal caliber terminal ileum. However, oral   contrast reaches the rectum. Findings are consistent with partial small   bowel obstruction in the terminal ileum.   3.  New moderate ascites and small right and trace left pleural   effusions.   4.  6.4 x 4.0 x 1.6 cm fluid collection in the right paracolic gutter.   This could represent an abscess or loculated ascites. Correlation is   recommended.    < end of copied text >      Advanced Directives:      Full code          MOLST - Ongoing     Decision maker: The patient is able to participate in complex medical decision making conversations  Legal surrogate: No designated HCP, but the patient is thinking of assigning her sister in [place] and her friend in [facility].    GOALS OF CARE DISCUSSION       Palliative care info/counseling provided	           Documentation of GOC:  Wants to pursue cancer targeted treatments.          REFERRALS	        Unit SW/Case [facility]        - To see the patient       Patient/Family Support - To see the patient       Massage Therapy - To see the patient       Music Therapy - To see the patient       Geriatric case manager    REFERRALS	        Palliative Med        Unit SW/Case [facility]              Patient/Family Support       Massage Therapy       Music Therapy       PT/OT        CRITICAL CARE TIME PROVIDED TO UNSTABLE PT W/ ORGAN FAILURE	   Start:               End:  	       Minutes:              > 50% OF THE TIME SPENT IN COUNSELING AND COORDINATING CARE 	   Start:               End:  	       Minutes:      PROLONGED SERVICE             FACE TO FACE:    PT            PT & FAMILY	   Start:               End:  	       Minutes:      Advance Care Planning Time: PILLO PEDRAZA             [id number]    CC: Malignant SBO, Nause, Gas distension / Pain, Constipation, GOC/AD, Support    HPI:  66 y/o female with a PMHx of recently dx metastatic ascending colon cancer (dx 2 weeks ago w/ mets to the liver and peritoneal carcinomatosis) that presents from Dr. Lundy's office with acute onset RUQ abdominal pain, fever and weakness for the past 3 days. Pain has worsened over the past 3 days and last evening was associated with one episode of vomiting (nonbloody, questionable bilious content).  Last BM was 2 days prior to presentation. She denies any associated chills, chest pain/discomfort/pressure, SOB/dyspnea, dysuria, diarrhea, hematochezia. Had CT A/P in ED which was s/f acute cholecystitis and hepatic vein thrombosis    SUBJECTIVE:   Patient started on Golytely bowel prep planning colonoscopy with stent tomorrow. Patient only had 2 small bowel movements, one with mucus. Endorses worsening abdominal pain and feeling more distended, also associated with belching, nausea but no vomiting. Still has small flatus. Had diffuse abdominal pain, worse in RUQ, that was controlled with Oxycodone and Dilaudid. Patient slept very little last night. Patient to be seen by Pranav for massage therapy today.     ROS:  DYSPNEA: No  NAUS/VOM: Yes	  SECRETIONS: No	  AGITATION: No  Pain (Y/N):  Yes  -Provocation/Palliation: Medication improves pain  -Quality/Quantity: Acute visceral pain currently uncontrolled. Chronic malignant somatic hepatic pain currently controlled.  -Radiating: Flank and lower abdomen  -Severity: Moderate  -Timing/Frequency: Constant with exacerbations related to movement  -Impact on ADLs: Prevents her from sleep and ADLs or ambulating.    OTHER REVIEW OF SYSTEMS: Constipation improving, Gas distension    PEx:  T(C): 36.6 (06-05-18 @ 06:02), Max: 36.7 (06-04-18 @ 12:06)  HR: 79 (06-05-18 @ 06:02) (62 - 79)  BP: 174/- (06-05-18 @ 06:02) (126/86 - 174/-)  RR: 16 (06-05-18 @ 06:02) (16 - 17)  SpO2: 94% (06-05-18 @ 06:02) (92% - 95%)  Wt(kg): 88.2    General: AAOx3 lying in bedside chair, no acute distress. Somnolent  HEENT: NCAT Dry mucous membranes Non icteric PERRL   Neck:  Supple, no masses  CVS: Normal S1, S2, No M/R/G  Resp: Unlabored, shallow breaths  GI: Very distended in epigastric region, TTP of lower quadrants, hypoactive BS, perc drain in place  : Normal   Musc: No C/C/E    Neuro: No focal deficits. Somnolence  Psych:  Concerned about finding of SBO  Skin: Xerosis. Right chest infuse a port scar c/d/i  Lymph: Normal      	     ALLERGIES: No Known Drug Allergies  INTOLERANCES: Allergic to cats (Rash)    OPIATE NAÏVE (Y/N): Yes    MEDICATIONS: REVIEWED  MEDICATIONS  (STANDING):  ALBUTerol    90 MICROgram(s) HFA Inhaler 1 Puff(s) Inhalation every 4 hours  dexamethasone  Injectable 4 milliGRAM(s) IV Push every 6 hours  diphenhydrAMINE   Injectable 25 milliGRAM(s) IV Push at bedtime  enoxaparin Injectable 90 milliGRAM(s) SubCutaneous every 12 hours  octreotide  Infusion 20 MICROgram(s)/Hr (4 mL/Hr) IV Continuous <Continuous>  pantoprazole  Injectable 40 milliGRAM(s) IV Push daily  polyethylene glycol 3350 34 Gram(s) Oral two times a day  polyethylene glycol/electrolyte Solution. 4000 milliLiter(s) Oral once  potassium chloride  10 mEq/100 mL IVPB 10 milliEquivalent(s) IV Intermittent every 1 hour  simethicone 80 milliGRAM(s) Chew every 8 hours  sodium chloride 0.9%. 1000 milliLiter(s) (80 mL/Hr) IV Continuous <Continuous>  tiotropium 18 MICROgram(s) Capsule 1 Capsule(s) Inhalation daily    MEDICATIONS  (PRN):  acetaminophen  Suppository 650 milliGRAM(s) Rectal every 6 hours PRN For Temp greater than 38 C (100.4 F)  HYDROmorphone  Injectable 0.5 milliGRAM(s) IV Push every 4 hours PRN Severe Pain (7 - 10)  ondansetron Injectable 4 milliGRAM(s) IV Push every 6 hours PRN Nausea and/or Vomiting    LABS: REVIEWED                    10.0   8.6   )-----------( 772      ( 05 Jun 2018 07:02 )             32.8     06-05    139  |  98  |  12  ----------------------------<  143<H>  3.4<L>   |  28  |  0.37<L>    Ca    8.8      05 Jun 2018 07:02  Mg     2.2     06-05    Type + Screen (06.05.18 @ 05:30)    ABO Interpretation: B    Rh Interpretation: Positive    Antibody Screen: Negative    IMAGING:     EXAM:  CT ABDOMEN AND PELVIS IC                        PROCEDURE DATE:  06/01/2018    INTERPRETATION:  CT of the ABDOMEN and PELVIS with intravenous contrast   dated 6/1/2018 1:43 AM  INDICATION: Metastatic lung cancer. New abdominal pain.  TECHNIQUE: CT of the abdomen and pelvis was performed using oral and   intravenous contrast. Axial, sagittal and coronal images were produced   and reviewed.  PRIOR STUDIES: CT abdomen and pelvis 5/24/2018.  FINDINGS: Images of the lower chest demonstrate small right and trace   left pleural effusions with overlying compressive atelectasis.  Redemonstrated are several hypodense liver lesions consistent metastatic   disease. 3.6 cm cyst in the right lobe of the liver. Interval placement   of a percutaneous cholecystostomy tube with a collapsed gallbladder.   Cholelithiasis. There is no dilatation of the intra or extrahepatic   biliary system. The pancreas is normal in appearance.  No splenic   abnormalities are seen.  The adrenal glands are unremarkable. Subcentimeter hypodensity in the   right kidney that is too small to characterize. No hydronephrosis.     No abdominal aortic aneurysm is seen. There is again thrombus within the   SMV. Multiple subcentimeter mesenteric lymphnodes are seen. Short   segment of narrowing or short segment occlusion of the proximal main   portal vein with collaterals in the josh hepatis consistent with   cavernous transformation of the portal vein. Intrahepatic portal venous   branches are patent.  Evaluation of the bowel demonstrates dilation of the stomach and small   bowel transition point in the terminal ileum is seen. Oral contrast   reaches the rectum. New moderate ascites. 6.4 x 4.0 x 1.6 cm fluid   collection in the right paracolic gutter anterior to the ascending colon.   There are areas of narrowing noted in the transverse colon. Mass at the   distal ascending colon/proximal transverse colon is consistent with   patient's known malignancy. There is adjacent peritoneal metastases. No   pneumatosis or free air.  Images of the pelvis demonstrate the uterus and adnexae to be normal in   appearance. The urinary bladder is decompressed.    Evaluation of the osseous structures demonstrates scattered degenerative   changes.  IMPRESSION:  1.  Since 5/24/2018, there has been interval placement of a percutaneous   cholecystostomy tube.  2.  The entire small bowel is dilated up to the terminal ileum were there   is a point of transition to normal caliber terminal ileum. However, oral   contrast reaches the rectum. Findings are consistent with partial small   bowel obstruction in the terminal ileum.   3.  New moderate ascites and small right and trace left pleural   effusions.   4.  6.4 x 4.0 x 1.6 cm fluid collection in the right paracolic gutter.   This could represent an abscess or loculated ascites. Correlation is   recommended.    Advanced Directives:      Full code          MOLST - Ongoing     Decision maker: The patient is able to participate in complex medical decision making conversations  Legal surrogate: No designated HCP, but the patient is thinking of assigning her sister in [place] and her [facility].    GOALS OF CARE DISCUSSION       Palliative care info/counseling provided	           Documentation of GOC:  Wants to pursue cancer targeted treatments.          REFERRALS	        Unit SW/Case [facility]        - To see the patient       Patient/Family Support - To see the patient       Massage Therapy - To see the patient       Music Therapy - To see the patient       Geriatric case manager - To visit patient today    REFERRALS	        Palliative Med        Unit SW/Case [facility]              Patient/Family Support       Massage Therapy       Music Therapy

## 2018-06-05 NOTE — PROGRESS NOTE ADULT - ASSESSMENT
66YO F with metastatic colon cancer and acute cholecystitis s/p percutaneous cholecystostomy.    Condition stable. No signs of obstruction (passing flatus), tolerating Golytely  Plan for colonoscopy with possible stenting on Wednesday, will consider loop ileostomy if GI is unable to stent and if patient is agreeable to procedure. No surgical intervention at this point.   Follow BF until clear  OOB/IS  Team 4 continue to follow. Please call if any issues 68YO F with acute cholecystitis s/p percutaneous cholecystostomy and metastatic colon CA with S/Sx of obstruction    Condition stable. No signs of obstruction (passing flatus), tolerating Golytely  Plan for colonoscopy with possible stenting on Wednesday, will consider diverting loop ileostomy if GI is unable to stent and if patient is agreeable to procedure. No surgical intervention at this point.   Follow BF until clear  OOB/IS  Team 4 continue to follow. Please call if any issues

## 2018-06-05 NOTE — PROGRESS NOTE ADULT - SUBJECTIVE AND OBJECTIVE BOX
OVERNIGHT EVENTS: Sharp abdominal pain to 9.5/10, patient received 2mg IV morphine with brief resolution. Two small bowel movements.   SUBJECTIVE / INTERVAL HPI: Patient seen and examined at bedside. She is closing her eyes in pain     VITAL SIGNS:  Vital Signs Last 24 Hrs  T(C): 36.7 (04 Jun 2018 22:00), Max: 36.7 (04 Jun 2018 12:06)  T(F): 98 (04 Jun 2018 22:00), Max: 98 (04 Jun 2018 12:06)  HR: 63 (04 Jun 2018 22:00) (62 - 65)  BP: 165/94 (04 Jun 2018 22:00) (126/86 - 165/94)  BP(mean): --  RR: 16 (04 Jun 2018 22:00) (16 - 17)  SpO2: 95% (04 Jun 2018 22:00) (92% - 95%)    PHYSICAL EXAM:    General: WDWN  HEENT: NC/AT; PERRL, anicteric sclera; MMM  Neck: supple  Cardiovascular: +S1/S2; RRR  Respiratory: CTA B/L; no W/R/R  Gastrointestinal: soft, NT/ND; +BSx4  Extremities: WWP; no edema, clubbing or cyanosis  Vascular: 2+ radial, DP/PT pulses B/L  Neurological: AAOx3; no focal deficits    MEDICATIONS:  MEDICATIONS  (STANDING):  ALBUTerol    90 MICROgram(s) HFA Inhaler 1 Puff(s) Inhalation every 4 hours  dexamethasone  Injectable 4 milliGRAM(s) IV Push every 6 hours  diphenhydrAMINE   Injectable 25 milliGRAM(s) IV Push at bedtime  enoxaparin Injectable 90 milliGRAM(s) SubCutaneous every 12 hours  octreotide  Infusion 20 MICROgram(s)/Hr (4 mL/Hr) IV Continuous <Continuous>  pantoprazole  Injectable 40 milliGRAM(s) IV Push daily  polyethylene glycol 3350 34 Gram(s) Oral two times a day  polyethylene glycol/electrolyte Solution. 4000 milliLiter(s) Oral once  simethicone 80 milliGRAM(s) Chew every 8 hours  sodium chloride 0.9%. 1000 milliLiter(s) (80 mL/Hr) IV Continuous <Continuous>  tiotropium 18 MICROgram(s) Capsule 1 Capsule(s) Inhalation daily    MEDICATIONS  (PRN):  acetaminophen  Suppository 650 milliGRAM(s) Rectal every 6 hours PRN For Temp greater than 38 C (100.4 F)  ondansetron Injectable 4 milliGRAM(s) IV Push every 6 hours PRN Nausea and/or Vomiting  oxyCODONE    IR 5 milliGRAM(s) Oral every 6 hours PRN Severe Pain (7 - 10)      ALLERGIES:  allergic to cats (Rash)  No Known Drug Allergies or Intolerances    LABS:                        9.0    6.4   )-----------( 655      ( 04 Jun 2018 05:47 )             29.6     06-04    139  |  99  |  10  ----------------------------<  129<H>  3.5   |  30  |  0.34<L>    Ca    8.8      04 Jun 2018 05:48  Mg     2.2     06-04          CAPILLARY BLOOD GLUCOSE              RADIOLOGY & ADDITIONAL TESTS: Reviewed.      ASSESSMENT:    PLAN:

## 2018-06-05 NOTE — PROGRESS NOTE ADULT - SUBJECTIVE AND OBJECTIVE BOX
Pt seen and examined. Pt states she had an increase in abdominal pain requiring dilaudid and abdominal distension since yesterday. Had 2 BM after drinking 1/2 golytely. Nauseous but no vomiting,    MEDICATIONS:  MEDICATIONS  (STANDING):  ALBUTerol    90 MICROgram(s) HFA Inhaler 1 Puff(s) Inhalation every 4 hours  dexamethasone  Injectable 4 milliGRAM(s) IV Push every 6 hours  diphenhydrAMINE   Injectable 25 milliGRAM(s) IV Push at bedtime  enoxaparin Injectable 90 milliGRAM(s) SubCutaneous every 12 hours  octreotide  Infusion 20 MICROgram(s)/Hr (4 mL/Hr) IV Continuous <Continuous>  pantoprazole  Injectable 40 milliGRAM(s) IV Push daily  polyethylene glycol 3350 34 Gram(s) Oral two times a day  polyethylene glycol/electrolyte Solution. 4000 milliLiter(s) Oral once  potassium chloride  10 mEq/100 mL IVPB 10 milliEquivalent(s) IV Intermittent every 1 hour  simethicone 80 milliGRAM(s) Chew every 8 hours  sodium chloride 0.9%. 1000 milliLiter(s) (80 mL/Hr) IV Continuous <Continuous>  tiotropium 18 MICROgram(s) Capsule 1 Capsule(s) Inhalation daily    MEDICATIONS  (PRN):  acetaminophen  Suppository 650 milliGRAM(s) Rectal every 6 hours PRN For Temp greater than 38 C (100.4 F)  HYDROmorphone  Injectable 0.5 milliGRAM(s) IV Push every 4 hours PRN Severe Pain (7 - 10)  ondansetron Injectable 4 milliGRAM(s) IV Push every 6 hours PRN Nausea and/or Vomiting  zaleplon 5 milliGRAM(s) Oral at bedtime PRN Insomnia      Allergies    allergic to cats (Rash)  No Known Drug Allergies    Intolerances        Vital Signs Last 24 Hrs  T(C): 36.6 (05 Jun 2018 06:02), Max: 36.7 (04 Jun 2018 22:00)  T(F): 97.9 (05 Jun 2018 06:02), Max: 98 (04 Jun 2018 22:00)  HR: 79 (05 Jun 2018 06:02) (63 - 79)  BP: 174/- (05 Jun 2018 06:02) (157/87 - 174/-)  BP(mean): --  RR: 16 (05 Jun 2018 06:02) (16 - 16)  SpO2: 94% (05 Jun 2018 06:02) (92% - 95%)      PHYSICAL EXAM:    General: NAD  HEENT: MMM, conjunctiva and sclera clear  Gastrointestinal: Grossly distended tense and diffusely tender  Skin: Warm and dry. No obvious rash    LABS:      CBC Full  -  ( 05 Jun 2018 07:02 )  WBC Count : 8.6 K/uL  Hemoglobin : 10.0 g/dL  Hematocrit : 32.8 %  Platelet Count - Automated : 772 K/uL  Mean Cell Volume : 73.5 fL  Mean Cell Hemoglobin : 22.4 pg  Mean Cell Hemoglobin Concentration : 30.5 g/dL  Auto Neutrophil # : x  Auto Lymphocyte # : x  Auto Monocyte # : x  Auto Eosinophil # : x  Auto Basophil # : x  Auto Neutrophil % : x  Auto Lymphocyte % : x  Auto Monocyte % : x  Auto Eosinophil % : x  Auto Basophil % : x    06-05    139  |  98  |  12  ----------------------------<  143<H>  3.4<L>   |  28  |  0.37<L>    Ca    8.8      05 Jun 2018 07:02  Mg     2.2     06-05                        RADIOLOGY & ADDITIONAL STUDIES (The following images were personally reviewed):

## 2018-06-05 NOTE — PROGRESS NOTE ADULT - ATTENDING COMMENTS
DX  PARTIAL SBO- tolerating clears.  slow bowel prep and c scope  wed (attempt placement of colonic stent).    ACUTE CHOLECYSTITIS -s/p percutaneous cholecystostomy, s/p   unasyn x 10 days post cholecystostomy     SEVERE SEPSIS - as above  HYPOXEMIC RESPIRATORY FAILURE/ PULMONARY EDEMA/PLEURAL EFFUSIONS - lasix PRN.   weaned off supplemental oxygen.   SMV THROMBUS/PORTAL VEIN THROMBUS - cont  lovenox   STAGE IV COLON CA w/ LIVER METASTASES and PERITONEAL CARCINOMATOSIS- f/u onc recs. s/p chemo port . may need diverting ileostomy if unable to have colonic stent placed.     HYPONATREMIA -resolved .   ANEMIA - trend hb. no active GI bleeding currently. monitor closely. transfuse to keep hb >7 .

## 2018-06-05 NOTE — PROGRESS NOTE ADULT - PROBLEM SELECTOR PLAN 1
CT findings consistent with partial SBO 2/2 POD carcinomatosis, primary likely colon cancer.   -Recommend to start Octreotide 100mcg IV x1 and start Octreotide drip 500mcg @ 20ml/hr.   -Recommend Dexamethasone 4mg IV q6h ATC  -Recommend Benadryl 25mg IVPB qHS.  - GI following. Plan for colonoscopy with stent placement tomorrow CT findings consistent with partial SBO 2/2 POD carcinomatosis, primary likely colon cancer. Reported by nursing that the patient remains awake at night, may be side effect of steroids. Will write for PRN Zaleplon qHS.  -Continue Octreotide drip 500mcg @ 20mcg/hr.   -Continue Dexamethasone 4mg IV q6h ATC  -Continue Benadryl 25mg IVPB qHS.  - GI following. Plan for colonoscopy with stent placement tomorrow Wednesday

## 2018-06-05 NOTE — PROGRESS NOTE ADULT - PROBLEM SELECTOR PLAN 5
Chemo port placed. Has not received any cancer targeted treatments. Evidence of liver and abdominal carcinomatosis spread. Chemo port placed. Has not received any cancer targeted treatments. Evidence of liver and abdominal carcinomatosis spread. Plan of care includes a palliative GI stenting vs iliostomy if unable to have stent and if patient so desires. Chemo port placed. Has not received any cancer targeted treatments. Evidence of liver and abdominal carcinomatosis spread. Plan of care includes a palliative GI stenting vs ileostomy if unable to have stent and if patient so desires.

## 2018-06-05 NOTE — PROGRESS NOTE ADULT - PROBLEM SELECTOR PLAN 4
Abdomen continues to be distended, exacerbated by Golytely   - Recommend continued OOB to chair with assistance.  - Recommend Simethicone q8h ATC  - Consider scopolamine patch for cramping pain.  - Physical therapy followup.  - Management as per primary team and GI Abdomen continues to be distended, exacerbated by Golytely   - Recommend continued OOB to chair with assistance.  - Continue Simethicone q8h ATC  - Consider scopolamine patch for cramping pain.  - Physical therapy followup.  - Management as per primary team and GI

## 2018-06-05 NOTE — PROGRESS NOTE ADULT - PROBLEM SELECTOR PLAN 2
CT abdomen/pelvis with contrast 6/1 consistently concerning for ileus v. partial SBO.  -octreotide gtt at 4 cc/hr  -ambulation orders: walking with PT, encourage ambulation with assistance. Patient   walking the halls.  -Continue to monitor for pain, emesis, bowel movements, flatus, fever.  -serial imaging if symptoms worsen: two small bowel movements overnight CT abdomen/pelvis with contrast 6/1 consistently concerning for ileus v. partial SBO.  -ambulation orders: walking with PT, encourage ambulation with assistance. Patient   walking the halls.  -Continue to monitor for pain, emesis, bowel movements, flatus, fever.  -serial imaging if symptoms worsen: two small bowel movements overnight

## 2018-06-05 NOTE — PROGRESS NOTE ADULT - SUBJECTIVE AND OBJECTIVE BOX
Hem/Onc    PILLO PEDRAZA  MRN-[id number]    INTERVAL Hx:  The patient remains uncomfortable. She had RLQ pain overnight.  Abdomen remain distended She is on prep for scope and possible stent placement. Afebrile. Some nausea    Generalized abdominal pain on 6/1-->  CT A/P with partial SBO vs ileus, worsening ascites.     HPI: 67 y.o woman with newly diagnosed metastatic colon cancer- we were completing outpatient work up and were planning systemic treatment- the patient received IV iron last week, she had staging PET/CT this week and was scheduled for Infusaport placement. She presented to our office on 5/24 with fever/vomiting and abdominal pain> She was sent to the ER with suspicion for an obstruction (known large R sided/obstructing lesion).  Work up at [facility] with CT scan showed findings c/w acute cholecystitis and SMV and portal vein thrombosis. Acute cholecystitis confirmed by US.     ROS:  + nausea     No night sweats.   + fatigue, no headaches/dizziness.    + abdominal pain/no diarrhea  No melena or hematochezia.    No dysuria/hematuria.  No history of easy bruising/bleeding.     No leg pain or leg swelling.    ROS is otherwise negative.    PMH/PSH:  PAST MEDICAL & SURGICAL HISTORY:  Colon cancer      MEDICATIONS  (STANDING):  ALBUTerol    90 MICROgram(s) HFA Inhaler 1 Puff(s) Inhalation every 4 hours  ampicillin/sulbactam  IVPB 3 Gram(s) IV Intermittent every 6 hours  dexamethasone  Injectable 4 milliGRAM(s) IV Push every 6 hours  diphenhydrAMINE   Injectable 25 milliGRAM(s) IV Push at bedtime  enoxaparin Injectable 90 milliGRAM(s) SubCutaneous two times a day  octreotide  Infusion 20 MICROgram(s)/Hr (4 mL/Hr) IV Continuous <Continuous>  pantoprazole  Injectable 40 milliGRAM(s) IV Push daily  polyethylene glycol 3350 17 Gram(s) Oral two times a day  potassium chloride  10 mEq/100 mL IVPB 10 milliEquivalent(s) IV Intermittent every 1 hour  sodium chloride 0.9%. 1000 milliLiter(s) (80 mL/Hr) IV Continuous <Continuous>  tiotropium 18 MICROgram(s) Capsule 1 Capsule(s) Inhalation daily    MEDICATIONS  (PRN):  acetaminophen  Suppository 650 milliGRAM(s) Rectal every 6 hours PRN For Temp greater than 38 C (100.4 F)  ALBUTerol/ipratropium for Nebulization 3 milliLiter(s) Nebulizer every 4 hours PRN Shortness of Breath and/or Wheezing  ondansetron Injectable 4 milliGRAM(s) IV Push every 6 hours PRN Nausea and/or Vomiting  oxyCODONE    IR 5 milliGRAM(s) Oral every 6 hours PRN Severe Pain (7 - 10)    Allergies    allergic to cats (Rash)  No Known Drug Allergies    Intolerances    Vital Signs Last 24 Hrs  T(C): 36.6 (05 Jun 2018 06:02), Max: 36.7 (04 Jun 2018 12:06)  T(F): 97.9 (05 Jun 2018 06:02), Max: 98 (04 Jun 2018 12:06)  HR: 79 (05 Jun 2018 06:02) (62 - 79)  BP: 174/- (05 Jun 2018 06:02) (126/86 - 174/-)  BP(mean): --  RR: 16 (05 Jun 2018 06:02) (16 - 17)  SpO2: 94% (05 Jun 2018 06:02) (92% - 95%)      HEENT: pale mucosae, anicteric sclerae  No palpable peripheral lymphadenopathy  COR: regular rhythm rate, no murmurs, rubs or gallops  ABD: soft, distended, + generalized discomfort with palpation  , RUQ + drain in place  EXT: no edema  SKIN: no lesions on visible skin  R side port     Labs:                            10.0   8.6   )-----------( 772      ( 05 Jun 2018 07:02 )             32.8         CBC Full  -  ( 05 Jun 2018 07:02 )  WBC Count : 8.6 K/uL  Hemoglobin : 10.0 g/dL  Hematocrit : 32.8 %  Platelet Count - Automated : 772 K/uL  Mean Cell Volume : 73.5 fL  Mean Cell Hemoglobin : 22.4 pg  Mean Cell Hemoglobin Concentration : 30.5 g/dL  Auto Neutrophil # : x  Auto Lymphocyte # : x  Auto Monocyte # : x  Auto Eosinophil # : x  Auto Basophil # : x  Auto Neutrophil % : x  Auto Lymphocyte % : x  Auto Monocyte % : x  Auto Eosinophil % : x  Auto Basophil % : x        06-05    139  |  98  |  12  ----------------------------<  143<H>  3.4<L>   |  28  |  0.37<L>    Ca    8.8      05 Jun 2018 07:02  Mg     2.2     06-05                  Assessment:    Metastatic colon cancer  Severe iron deficiency anemia  Acute cholecystitis  SMV/portal vein thrombosis    Plan:    Clinically stable, but significant abdominal discomfort    + partial SBO    Plan is for stent placement - if not feasible, then may require surgical intervention (discussed with Dr. Marci Narvaez)    S/p percutaneous cholecystotomy on 5/25  On Abx coverage, remains afebrile      S/p Infusaport placement on 5/31    SMV/Portal vein thrombosis- Continue Lovenox for now,  she will eventually be transitioned to an oral anticoagulant     Newly diagnosed colon cancer- fairly rapidly progressive symptomatic disease   Will need systemic treatment soon once pending improvement of GI obstruction     Severe YESSICA-she received parenteral iron as outpatient  H/H is better and stable  PRBC as clinically indicated    Reactive thrombocytosis (YESSICA, malignancy) no specific intervention is indicated    Thank you    Estefania Lundy MD  [phone]

## 2018-06-05 NOTE — PROGRESS NOTE ADULT - PROBLEM SELECTOR PLAN 9
DVT PPX: Lovenox BID - to hold tomorrow AM  Full Code: Patient wants compressions and intubation with time restriction on number of days intubated: Arnel in chart.  DISPO: RMF pending resolution of ileus v. SBO with GI stenting planned for 6/6. For possible ileostomy if stenting unsuccessful. DVT PPX: Lovenox BID - to hold tomorrow AM  Full Code: Patient wants compressions and intubation with time restriction on number of days intubated: Arnel in chart.  DISPO: RMF pending resolution of partial SBO with GI stenting planned for 6/6. For possible ileostomy if stenting unsuccessful.

## 2018-06-05 NOTE — PROGRESS NOTE ADULT - PROBLEM SELECTOR PLAN 6
Patient deciding which treatment approach she would like to pursue for her partial SBO.  - Plan for colonoscopy with stent placement by GI tomorrow.   - Will discuss with primary team.

## 2018-06-05 NOTE — PROGRESS NOTE ADULT - ASSESSMENT
67 yr old male with a PMHx of recently diagnosed colon cancer presenting from oncologist's office with acute onset abdominal pain and fever, found to have acute cholecystitis and hepatic vein thrombosis, deemed poor surgical candidate 2/2 active metastic colon Ca, admitted to [facility] for medical management. Now s/p perc arleen with cholecystostomy drain in place and new imaging concerning for ileus v. low grade partial SBO.  Currently stable, on day two of slow bowel prep for colonoscopy and colonic stent placement by GI tomorrow. 67 yr old male with a PMHx of recently diagnosed colon cancer presenting from oncologist's office with acute onset abdominal pain and fever, found to have acute cholecystitis and hepatic vein thrombosis, deemed poor surgical candidate 2/2 active metastic colon Ca, admitted to [facility] for medical management. Now s/p perc arleen with cholecystostomy drain in place. Now with partial SBO and ileus on day two of slow bowel prep for colonoscopy and colonic stent placement by GI tomorrow.

## 2018-06-05 NOTE — PROGRESS NOTE ADULT - PROBLEM SELECTOR PLAN 8
F: NS at 80cc/hr  E: Replete PRN  N: Clears with golytely prep F: NS at 80cc/hr, golytely  E: Replete PRN  N: Clears with golytely prep

## 2018-06-05 NOTE — PROGRESS NOTE ADULT - PROBLEM SELECTOR PLAN 1
Upright abdominal xray 5/31 and CT abdomen/pelvis with contrast 6/1 consistently concerning for ileus v. partial SBO.   -continue with zofran 4mg IV PRN  -clear liquid diet  -GI following: partial SBO secondary to advancing metastasis. Continue with golytely for completion of two day colonic prep prior to attempted stent placement by GI planned for 6/6.   -surgery following: will consider ileostomy if GI unable to place stent.   -ambulation orders: walking with PT, encourage ambulation with assistance. Patient  walking the halls.  -Patient currently afebrile with ongoing abdominal pain, remains distended. Continue to monitor for pain, emesis, bowel movements, flatus, fever. Upright abdominal xray 5/31 and CT abdomen/pelvis with contrast 6/1 consistently concerning for ileus v. partial SBO.   -continue with zofran 4mg IV PRN, octreotide gtt at 4 cc/hr for nausea relief  -clear liquid diet  -GI following: partial SBO secondary to advancing metastasis. Continue with golytely for completion of two day colonic prep prior to attempted stent placement by GI planned for 6/6.   -surgery following: will consider ileostomy if GI unable to place stent.   -ambulation orders: walking with PT, encourage ambulation with assistance. Patient  walking the halls.  -Patient currently afebrile with ongoing abdominal pain, remains distended. Continue to monitor for pain, emesis, bowel movements, flatus, fever. Upright abdominal xray 5/31 and CT abdomen/pelvis with contrast 6/1 consistently visualizing partial SBO.   -GI following: partial SBO secondary to advancing metastasis. Continue with golytely for completion of two day colonic prep prior to attempted stent placement by GI planned for 6/6.   -surgery following: will consider ileostomy if GI unable to place stent.   -continue with zofran 4mg IV PRN, octreotide gtt at 4 cc/hr for nausea relief  -ambulation orders: walking with PT, encourage ambulation with assistance.   -Patient currently afebrile with ongoing abdominal pain, remains distended. Continue to monitor for pain, emesis, bowel movements, flatus, fever.

## 2018-06-05 NOTE — PROGRESS NOTE ADULT - PROBLEM SELECTOR PLAN 7
Acute anemia most likely 2/2 active colon malignancy. Received 1 uPRBC this admission following IR procedure. Has remained stable.   -transfuse hgb<7  -maintain active type and screen: renewed 6/5.

## 2018-06-05 NOTE — PROGRESS NOTE ADULT - SUBJECTIVE AND OBJECTIVE BOX
OVERNIGHT EVENTS: 9.5/10 sharp abdominal pain overnight, brief resolution with 2mg IV morphine. Despite opioid medication, still able to pass two small stools with ongoing golytely consumption.   SUBJECTIVE / INTERVAL HPI: Patient seen and examined at bedside. She continues to experience intermittent, sharp abdominal pain. Her nausea is a little worse with golytely intake. Still belching but no vomiting. She stooled twice, small amount per nursing. Patient is extremely uncomfortable but remains afebrile denying subjective fevers/chills.    VITAL SIGNS:  Vital Signs Last 24 Hrs  T(C): 36.7 (04 Jun 2018 22:00), Max: 36.7 (04 Jun 2018 12:06)  T(F): 98 (04 Jun 2018 22:00), Max: 98 (04 Jun 2018 12:06)  HR: 63 (04 Jun 2018 22:00) (62 - 65)  BP: 165/94 (04 Jun 2018 22:00) (126/86 - 165/94)  BP(mean): --  RR: 16 (04 Jun 2018 22:00) (16 - 17)  SpO2: 95% (04 Jun 2018 22:00) (92% - 95%)    PHYSICAL EXAM:  General: obese female laying in bed, in no acute distress, breathing a little shallow but comfortable on room air  HEENT: NC/AT; PERRL, anicteric sclera; MMM  Neck: supple  Cardiovascular: +S1/S2; mildly tachycradic  Respiratory: Clear to auscultation bilaterally with good air movement, mildly shallow breaths  Gastrointestinal: belly remains distended and diffusely tender, little softer in the lower quadrants. hyperactive bowel sounds throughout. cholecystostomy drain in place, exit site c/d/i.  Extremities: WWP; no edema, clubbing or cyanosis  Vascular: 2+ radial, DP/PT pulses B/L  Neurological: AAOx3; no focal deficits    MEDICATIONS:  MEDICATIONS  (STANDING):  ALBUTerol    90 MICROgram(s) HFA Inhaler 1 Puff(s) Inhalation every 4 hours  dexamethasone  Injectable 4 milliGRAM(s) IV Push every 6 hours  diphenhydrAMINE   Injectable 25 milliGRAM(s) IV Push at bedtime  enoxaparin Injectable 90 milliGRAM(s) SubCutaneous every 12 hours  octreotide  Infusion 20 MICROgram(s)/Hr (4 mL/Hr) IV Continuous <Continuous>  pantoprazole  Injectable 40 milliGRAM(s) IV Push daily  polyethylene glycol 3350 34 Gram(s) Oral two times a day  polyethylene glycol/electrolyte Solution. 4000 milliLiter(s) Oral once  simethicone 80 milliGRAM(s) Chew every 8 hours  sodium chloride 0.9%. 1000 milliLiter(s) (80 mL/Hr) IV Continuous <Continuous>  tiotropium 18 MICROgram(s) Capsule 1 Capsule(s) Inhalation daily    MEDICATIONS  (PRN):  acetaminophen  Suppository 650 milliGRAM(s) Rectal every 6 hours PRN For Temp greater than 38 C (100.4 F)  ondansetron Injectable 4 milliGRAM(s) IV Push every 6 hours PRN Nausea and/or Vomiting  oxyCODONE    IR 5 milliGRAM(s) Oral every 6 hours PRN Severe Pain (7 - 10)      ALLERGIES:  allergic to cats (Rash)  No Known Drug Allergies or Intolerances    LABS:                        9.0    6.4   )-----------( 655      ( 04 Jun 2018 05:47 )             29.6     06-04    139  |  99  |  10  ----------------------------<  129<H>  3.5   |  30  |  0.34<L>    Ca    8.8      04 Jun 2018 05:48  Mg     2.2     06-04    CAPILLARY BLOOD GLUCOSE    RADIOLOGY & ADDITIONAL TESTS: No new imaging. OVERNIGHT EVENTS: 9.5/10 sharp abdominal pain overnight, brief resolution with 2mg IV morphine. Despite opioid medication, still able to pass two small stools with ongoing golytely consumption.   SUBJECTIVE / INTERVAL HPI: Patient seen and examined at bedside. She continues to experience intermittent, sharp abdominal pain. Her nausea is a little worse with golytely intake. Still belching but no vomiting. She stooled twice, small amount per nursing. Patient is extremely uncomfortable but continues to pass flatus, remains afebrile, denying subjective fevers/chills.    VITAL SIGNS:  Vital Signs Last 24 Hrs  T(C): 36.7 (04 Jun 2018 22:00), Max: 36.7 (04 Jun 2018 12:06)  T(F): 98 (04 Jun 2018 22:00), Max: 98 (04 Jun 2018 12:06)  HR: 63 (04 Jun 2018 22:00) (62 - 65)  BP: 165/94 (04 Jun 2018 22:00) (126/86 - 165/94)  BP(mean): --  RR: 16 (04 Jun 2018 22:00) (16 - 17)  SpO2: 95% (04 Jun 2018 22:00) (92% - 95%)    PHYSICAL EXAM:  General: obese female laying in bed, in no acute distress, breathing a little shallow but comfortable on room air  HEENT: NC/AT; PERRL, anicteric sclera; MMM  Neck: supple  Cardiovascular: +S1/S2; mildly tachycradic  Respiratory: Clear to auscultation bilaterally with good air movement, mildly shallow breaths  Gastrointestinal: belly remains distended and diffusely tender, little softer in the lower quadrants. hyperactive bowel sounds throughout. cholecystostomy drain in place, exit site c/d/i.  Extremities: WWP; no edema, clubbing or cyanosis  Vascular: 2+ radial, DP/PT pulses B/L  Neurological: AAOx3; no focal deficits    MEDICATIONS:  MEDICATIONS  (STANDING):  ALBUTerol    90 MICROgram(s) HFA Inhaler 1 Puff(s) Inhalation every 4 hours  dexamethasone  Injectable 4 milliGRAM(s) IV Push every 6 hours  diphenhydrAMINE   Injectable 25 milliGRAM(s) IV Push at bedtime  enoxaparin Injectable 90 milliGRAM(s) SubCutaneous every 12 hours  octreotide  Infusion 20 MICROgram(s)/Hr (4 mL/Hr) IV Continuous <Continuous>  pantoprazole  Injectable 40 milliGRAM(s) IV Push daily  polyethylene glycol 3350 34 Gram(s) Oral two times a day  polyethylene glycol/electrolyte Solution. 4000 milliLiter(s) Oral once  simethicone 80 milliGRAM(s) Chew every 8 hours  sodium chloride 0.9%. 1000 milliLiter(s) (80 mL/Hr) IV Continuous <Continuous>  tiotropium 18 MICROgram(s) Capsule 1 Capsule(s) Inhalation daily    MEDICATIONS  (PRN):  acetaminophen  Suppository 650 milliGRAM(s) Rectal every 6 hours PRN For Temp greater than 38 C (100.4 F)  ondansetron Injectable 4 milliGRAM(s) IV Push every 6 hours PRN Nausea and/or Vomiting  oxyCODONE    IR 5 milliGRAM(s) Oral every 6 hours PRN Severe Pain (7 - 10)      ALLERGIES:  allergic to cats (Rash)  No Known Drug Allergies or Intolerances    LABS:                        9.0    6.4   )-----------( 655      ( 04 Jun 2018 05:47 )             29.6     06-04    139  |  99  |  10  ----------------------------<  129<H>  3.5   |  30  |  0.34<L>    Ca    8.8      04 Jun 2018 05:48  Mg     2.2     06-04    CAPILLARY BLOOD GLUCOSE    RADIOLOGY & ADDITIONAL TESTS: No new imaging.

## 2018-06-05 NOTE — PROGRESS NOTE ADULT - SUBJECTIVE AND OBJECTIVE BOX
SUBJECTIVE: Patient seen and examined bedside. Patient drinking golyltely and tolerating. Mild nausea ovn. Denies f/c/v/CP/SOB. +flatus with small BM, still brown.     enoxaparin Injectable 90 milliGRAM(s) SubCutaneous every 12 hours      Vital Signs Last 24 Hrs  T(C): 36.6 (05 Jun 2018 06:02), Max: 36.7 (04 Jun 2018 12:06)  T(F): 97.9 (05 Jun 2018 06:02), Max: 98 (04 Jun 2018 12:06)  HR: 79 (05 Jun 2018 06:02) (62 - 79)  BP: 174/- (05 Jun 2018 06:02) (126/86 - 174/-)  BP(mean): --  RR: 16 (05 Jun 2018 06:02) (16 - 17)  SpO2: 94% (05 Jun 2018 06:02) (92% - 95%)  I&O's Detail      General: NAD, resting comfortably in bed  Pulm: Nonlabored breathing, no respiratory distress  Abd: mildly TTP, distended, firm        LABS:                        10.0   8.6   )-----------( 772      ( 05 Jun 2018 07:02 )             32.8     06-05    139  |  98  |  12  ----------------------------<  143<H>  3.4<L>   |  28  |  0.37<L>    Ca    8.8      05 Jun 2018 07:02  Mg     2.2     06-05            RADIOLOGY & ADDITIONAL STUDIES: Patient drinking golyltely for EGD and colonoscopy today and tolerating. Mild nausea ovn. Denies f/c/v/CP/SOB. +flatus with small BM, still brown.     Vital Signs Last 24 Hrs  T(C): 36.6 (05 Jun 2018 06:02), Max: 36.7 (04 Jun 2018 12:06)  T(F): 97.9 (05 Jun 2018 06:02), Max: 98 (04 Jun 2018 12:06)  HR: 79 (05 Jun 2018 06:02) (62 - 79)  BP: 174/- (05 Jun 2018 06:02) (126/86 - 174/-)  BP(mean): --  RR: 16 (05 Jun 2018 06:02) (16 - 17)  SpO2: 94% (05 Jun 2018 06:02) (92% - 95%)  I&O's Detail      General: NAD, resting comfortably in bed  Pulm: Nonlabored breathing, no respiratory distress  Abd: mildly TTP, firmly distended, RUQ perc arleen drain functioning        LABS:                        10.0   8.6   )-----------( 772      ( 05 Jun 2018 07:02 )             32.8     06-05    139  |  98  |  12  ----------------------------<  143<H>  3.4<L>   |  28  |  0.37<L>    Ca    8.8      05 Jun 2018 07:02  Mg     2.2     06-05

## 2018-06-05 NOTE — PROGRESS NOTE ADULT - PROBLEM SELECTOR PLAN 4
Stage 4 metastatic colon cancer with liver mets and peritoneal carcinomatosis  - plan per Dr. Nick   - s/p chemoport placement 5/31   - heme onc following  - palliative consulted given metastatic CRC, pt aware of 1-2 yr prognosis; MOLST   completed. NOT DNR/DNI: Would like compressions, intubation with time      limitation.   -CT abdomen/pelvis positive for ascites secondary to abdominal mets. Mets cause  of partial bowel obstruction. Patient for stent placement to open relieve partial  obstruction on 6/6.  -Palliative following, recommend pain regimen as follows: 4mg decadron IV q6h for  capsular liver pain, benadryl 25mg IV push at bedtime, oxycodone IR 5mg q6h  PRN for severe pain. Stage 4 metastatic colon cancer with liver mets and peritoneal carcinomatosis  - plan per Dr. Nick   - s/p chemoport placement 5/31   - heme onc following  - palliative consulted given metastatic CRC, pt aware of 1-2 yr prognosis; MOLST   completed. NOT DNR/DNI: Would like compressions, intubation with time      limitation.   -CT abdomen/pelvis positive for ascites secondary to abdominal mets. Mets cause  of partial bowel obstruction. Patient for stent placement to open relieve partial  obstruction on 6/6.  -Palliative following, recommend pain regimen as follows: 4mg decadron IV q6h for  capsular liver pain, benadryl 25mg IV push at bedtime, oxycodone IR 5mg q6h   PRN for severe pain.

## 2018-06-05 NOTE — PROGRESS NOTE ADULT - PROBLEM SELECTOR PLAN 5
CTA A/P s/f hepatic vein thrombosis; most likely 2/2 hypercoagulable state in the setting of active untreated malignancy  -c/w lovenox 90mg BID with intention to transition to DOAC following completion of procedures to resolve partial SBO. Will hold lovenox tomorrow AM in anticipation of GI intervention with plan to restart 24 hours post-procedure, or per GI recommendation.   -f/u Dr. Nick outpatient

## 2018-06-05 NOTE — PROGRESS NOTE ADULT - PROBLEM SELECTOR PLAN 2
Reported having nausea during our encounter, also being exacerbated in setting of Golytely intake for bowel prep. Currently on PRN Zofran.  -Recommend Olanzapine 5mg ODT daily

## 2018-06-05 NOTE — PROGRESS NOTE ADULT - PROBLEM SELECTOR PLAN 3
s/p IR percutaneous cholecystostomy placement 5/25. cholecystostomy drain in place, continues to drain small amounts daily (approx 50cc).  -biliary fluid growing E. faecium, citrobacter, e. coli  -completed 10 day course of unasyn/augmentin on 6/4   -blood cx NGTD  -cholecystostomy tube remains in place, continues to drain small amounts s/p IR percutaneous cholecystostomy placement 5/25. cholecystostomy drain in place, continues to drain small amounts daily (approx 50cc).  -biliary fluid grew E. faecium, citrobacter, e. coli  -completed 10 day course of unasyn/augmentin on 6/4   -blood cx NG  -cholecystostomy tube remains in place, continues to drain small amounts

## 2018-06-06 ENCOUNTER — APPOINTMENT (OUTPATIENT)
Dept: INTERVENTIONAL RADIOLOGY/VASCULAR | Facility: HOSPITAL | Age: 68
End: 2018-06-06

## 2018-06-06 LAB
ANION GAP SERPL CALC-SCNC: 11 MMOL/L — SIGNIFICANT CHANGE UP (ref 5–17)
APTT BLD: 31.4 SEC — SIGNIFICANT CHANGE UP (ref 27.5–37.4)
BUN SERPL-MCNC: 14 MG/DL — SIGNIFICANT CHANGE UP (ref 7–23)
CALCIUM SERPL-MCNC: 8.3 MG/DL — LOW (ref 8.4–10.5)
CHLORIDE SERPL-SCNC: 98 MMOL/L — SIGNIFICANT CHANGE UP (ref 96–108)
CO2 SERPL-SCNC: 27 MMOL/L — SIGNIFICANT CHANGE UP (ref 22–31)
CREAT SERPL-MCNC: 0.34 MG/DL — LOW (ref 0.5–1.3)
GLUCOSE SERPL-MCNC: 121 MG/DL — HIGH (ref 70–99)
HCT VFR BLD CALC: 31.3 % — LOW (ref 34.5–45)
HGB BLD-MCNC: 9.6 G/DL — LOW (ref 11.5–15.5)
INR BLD: 1.38 — HIGH (ref 0.88–1.16)
MAGNESIUM SERPL-MCNC: 2.3 MG/DL — SIGNIFICANT CHANGE UP (ref 1.6–2.6)
MCHC RBC-ENTMCNC: 22.9 PG — LOW (ref 27–34)
MCHC RBC-ENTMCNC: 30.7 G/DL — LOW (ref 32–36)
MCV RBC AUTO: 74.5 FL — LOW (ref 80–100)
PLATELET # BLD AUTO: 694 K/UL — HIGH (ref 150–400)
POTASSIUM SERPL-MCNC: 3.8 MMOL/L — SIGNIFICANT CHANGE UP (ref 3.5–5.3)
POTASSIUM SERPL-SCNC: 3.8 MMOL/L — SIGNIFICANT CHANGE UP (ref 3.5–5.3)
PROTHROM AB SERPL-ACNC: 15.4 SEC — HIGH (ref 9.8–12.7)
RBC # BLD: 4.2 M/UL — SIGNIFICANT CHANGE UP (ref 3.8–5.2)
RBC # FLD: 29.1 % — HIGH (ref 10.3–16.9)
SODIUM SERPL-SCNC: 136 MMOL/L — SIGNIFICANT CHANGE UP (ref 135–145)
WBC # BLD: 6.6 K/UL — SIGNIFICANT CHANGE UP (ref 3.8–10.5)
WBC # FLD AUTO: 6.6 K/UL — SIGNIFICANT CHANGE UP (ref 3.8–10.5)

## 2018-06-06 PROCEDURE — 99233 SBSQ HOSP IP/OBS HIGH 50: CPT | Mod: GC

## 2018-06-06 PROCEDURE — 43246 EGD PLACE GASTROSTOMY TUBE: CPT

## 2018-06-06 PROCEDURE — 45389 COLONOSCOPY W/STENT PLCMT: CPT

## 2018-06-06 RX ORDER — SOD SULF/SODIUM/NAHCO3/KCL/PEG
2000 SOLUTION, RECONSTITUTED, ORAL ORAL ONCE
Qty: 0 | Refills: 0 | Status: COMPLETED | OUTPATIENT
Start: 2018-06-06 | End: 2018-06-06

## 2018-06-06 RX ORDER — PANTOPRAZOLE SODIUM 20 MG/1
40 TABLET, DELAYED RELEASE ORAL EVERY 12 HOURS
Qty: 0 | Refills: 0 | Status: DISCONTINUED | OUTPATIENT
Start: 2018-06-06 | End: 2018-06-08

## 2018-06-06 RX ADMIN — PANTOPRAZOLE SODIUM 40 MILLIGRAM(S): 20 TABLET, DELAYED RELEASE ORAL at 18:03

## 2018-06-06 RX ADMIN — Medication 2000 MILLILITER(S): at 18:04

## 2018-06-06 RX ADMIN — HYDROMORPHONE HYDROCHLORIDE 0.5 MILLIGRAM(S): 2 INJECTION INTRAMUSCULAR; INTRAVENOUS; SUBCUTANEOUS at 07:39

## 2018-06-06 RX ADMIN — HYDROMORPHONE HYDROCHLORIDE 0.5 MILLIGRAM(S): 2 INJECTION INTRAMUSCULAR; INTRAVENOUS; SUBCUTANEOUS at 15:30

## 2018-06-06 RX ADMIN — Medication 4 MILLIGRAM(S): at 13:56

## 2018-06-06 RX ADMIN — HYDROMORPHONE HYDROCHLORIDE 0.5 MILLIGRAM(S): 2 INJECTION INTRAMUSCULAR; INTRAVENOUS; SUBCUTANEOUS at 02:58

## 2018-06-06 RX ADMIN — PANTOPRAZOLE SODIUM 40 MILLIGRAM(S): 20 TABLET, DELAYED RELEASE ORAL at 13:56

## 2018-06-06 RX ADMIN — Medication 4 MILLIGRAM(S): at 06:28

## 2018-06-06 RX ADMIN — SIMETHICONE 80 MILLIGRAM(S): 80 TABLET, CHEWABLE ORAL at 06:29

## 2018-06-06 RX ADMIN — Medication 4 MILLIGRAM(S): at 18:03

## 2018-06-06 RX ADMIN — HYDROMORPHONE HYDROCHLORIDE 0.5 MILLIGRAM(S): 2 INJECTION INTRAMUSCULAR; INTRAVENOUS; SUBCUTANEOUS at 14:49

## 2018-06-06 RX ADMIN — OCTREOTIDE ACETATE 4 MICROGRAM(S)/HR: 200 INJECTION, SOLUTION INTRAVENOUS; SUBCUTANEOUS at 00:13

## 2018-06-06 NOTE — PROGRESS NOTE ADULT - PROBLEM SELECTOR PLAN 5
CTA A/P s/f hepatic vein thrombosis; most likely 2/2 hypercoagulable state in the setting of active untreated malignancy  -holding lovenox 90mg BID this AM for GI intervention  -transition to DOAC following completion of procedures to resolve partial SBO.    -f/u Dr. Nick outpatient

## 2018-06-06 NOTE — PROGRESS NOTE ADULT - ATTENDING COMMENTS
DX  PARTIAL SBO- s/p sigmoid and splenic flexure stent, will have c-scope w/ placement of proximal colonic stent tomorrow     ACUTE CHOLECYSTITIS -s/p percutaneous cholecystostomy, s/p   unasyn x 10 days post cholecystostomy     SEVERE SEPSIS - as above  HYPOXEMIC RESPIRATORY FAILURE/ PULMONARY EDEMA/PLEURAL EFFUSIONS - lasix PRN.   weaned off supplemental oxygen.   SMV THROMBUS/PORTAL VEIN THROMBUS - cont  lovenox   STAGE IV COLON CA w/ LIVER METASTASES and PERITONEAL CARCINOMATOSIS- f/u onc recs. s/p chemo port . may need diverting ileostomy if unable to have colonic stent placed.     HYPONATREMIA -resolved .   ANEMIA - trend hb. no active GI bleeding currently. monitor closely. transfuse to keep hb >7 .

## 2018-06-06 NOTE — PROGRESS NOTE ADULT - SUBJECTIVE AND OBJECTIVE BOX
Hem/Onc    PILLO PEDRAZA  MRN-[id number]    INTERVAL Hx:  S/p NGT placement on 6/5. Doing much better this am. No significant abdominal pain. + flatus. She is on the schedule for colonoscopy and possible stent placement. Afebrile.      Generalized abdominal pain on 6/1-->  CT A/P with partial SBO vs ileus, worsening ascites.     HPI: 67 y.o woman with newly diagnosed metastatic colon cancer- we were completing outpatient work up and were planning systemic treatment- the patient received IV iron last week, she had staging PET/CT this week and was scheduled for Infusaport placement. She presented to our office on 5/24 with fever/vomiting and abdominal pain> She was sent to the ER with suspicion for an obstruction (known large R sided/obstructing lesion).  Work up at [facility] with CT scan showed findings c/w acute cholecystitis and SMV and portal vein thrombosis. Acute cholecystitis confirmed by US.     ROS:  + nausea     No night sweats.   + fatigue, no headaches/dizziness.    + abdominal pain/no diarrhea  No melena or hematochezia.    No dysuria/hematuria.  No history of easy bruising/bleeding.     No leg pain or leg swelling.    ROS is otherwise negative.    PMH/PSH:  PAST MEDICAL & SURGICAL HISTORY:  Colon cancer      MEDICATIONS  (STANDING):  ALBUTerol    90 MICROgram(s) HFA Inhaler 1 Puff(s) Inhalation every 4 hours  ampicillin/sulbactam  IVPB 3 Gram(s) IV Intermittent every 6 hours  dexamethasone  Injectable 4 milliGRAM(s) IV Push every 6 hours  diphenhydrAMINE   Injectable 25 milliGRAM(s) IV Push at bedtime  enoxaparin Injectable 90 milliGRAM(s) SubCutaneous two times a day  octreotide  Infusion 20 MICROgram(s)/Hr (4 mL/Hr) IV Continuous <Continuous>  pantoprazole  Injectable 40 milliGRAM(s) IV Push daily  polyethylene glycol 3350 17 Gram(s) Oral two times a day  potassium chloride  10 mEq/100 mL IVPB 10 milliEquivalent(s) IV Intermittent every 1 hour  sodium chloride 0.9%. 1000 milliLiter(s) (80 mL/Hr) IV Continuous <Continuous>  tiotropium 18 MICROgram(s) Capsule 1 Capsule(s) Inhalation daily    MEDICATIONS  (PRN):  acetaminophen  Suppository 650 milliGRAM(s) Rectal every 6 hours PRN For Temp greater than 38 C (100.4 F)  ALBUTerol/ipratropium for Nebulization 3 milliLiter(s) Nebulizer every 4 hours PRN Shortness of Breath and/or Wheezing  ondansetron Injectable 4 milliGRAM(s) IV Push every 6 hours PRN Nausea and/or Vomiting  oxyCODONE    IR 5 milliGRAM(s) Oral every 6 hours PRN Severe Pain (7 - 10)    Allergies    allergic to cats (Rash)  No Known Drug Allergies    Intolerances    Vital Signs Last 24 Hrs  T(C): 36.6 (06 Jun 2018 06:14), Max: 36.6 (06 Jun 2018 06:14)  T(F): 97.9 (06 Jun 2018 06:14), Max: 97.9 (06 Jun 2018 06:14)  HR: 79 (06 Jun 2018 06:14) (79 - 97)  BP: 158/75 (06 Jun 2018 06:14) (158/75 - 166/76)  BP(mean): --  RR: 16 (06 Jun 2018 06:14) (16 - 16)  SpO2: 95% (06 Jun 2018 06:14) (94% - 95%)    HEENT: pale mucosae, anicteric sclerae  No palpable peripheral lymphadenopathy  COR: regular rhythm rate, no murmurs, rubs or gallops  ABD: soft, not distended, mild discomfort with palpation  , RUQ + drain in place  EXT: no edema  SKIN: no lesions on visible skin  R side port     Labs:                        9.6    6.6   )-----------( 694      ( 06 Jun 2018 05:58 )             31.3         CBC Full  -  ( 06 Jun 2018 05:58 )  WBC Count : 6.6 K/uL  Hemoglobin : 9.6 g/dL  Hematocrit : 31.3 %  Platelet Count - Automated : 694 K/uL  Mean Cell Volume : 74.5 fL  Mean Cell Hemoglobin : 22.9 pg  Mean Cell Hemoglobin Concentration : 30.7 g/dL  Auto Neutrophil # : x  Auto Lymphocyte # : x  Auto Monocyte # : x  Auto Eosinophil # : x  Auto Basophil # : x  Auto Neutrophil % : x  Auto Lymphocyte % : x  Auto Monocyte % : x  Auto Eosinophil % : x  Auto Basophil % : x        06-06    136  |  98  |  14  ----------------------------<  121<H>  3.8   |  27  |  0.34<L>    Ca    8.3<L>      06 Jun 2018 05:58  Mg     2.3     06-06          PT/INR - ( 06 Jun 2018 05:58 )   PT: 15.4 sec;   INR: 1.38          PTT - ( 06 Jun 2018 05:58 )  PTT:31.4 sec  Assessment:    Metastatic colon cancer  Severe iron deficiency anemia  Acute cholecystitis  SMV/portal vein thrombosis    Plan:    Clinically considerably better  S/p NGT placement, less abdominal discomfort and distention      Plan is for colonoscopy and stent placement - if not feasible, then may require surgical intervention (discussed with Dr. Marci Narvaez)    S/p percutaneous cholecystotomy on 5/25  On Abx coverage, remains afebrile      S/p Infusaport placement on 5/31    SMV/Portal vein thrombosis- Continue Lovenox for now,  she will eventually be transitioned to an oral anticoagulant     Newly diagnosed colon cancer- fairly rapidly progressive symptomatic disease     Will need systemic treatment soon once pending improvement of GI obstruction   If stent placement is successful, we will plan to initiate tx with FOLFOX within the next 48 hours    Severe YESSICA-she received parenteral iron as outpatient  H/H is better and stable  PRBC as clinically indicated    Reactive thrombocytosis (YESSICA, malignancy) no specific intervention is indicated    Thank you    Estefania Lundy MD  [phone]

## 2018-06-06 NOTE — PROGRESS NOTE ADULT - PROBLEM SELECTOR PLAN 1
Upright abdominal xray 5/31 and CT abdomen/pelvis with contrast 6/1 consistently visualizing partial SBO.   -Upright abdominal xray 6/5 showing increased distention of bowel to level of stomach. NG tube placed for decompression of accumulated GI contents. On intermittent suction.   -GI following: partial SBO secondary to advancing metastasis. For GI attempted stent placement today.   -surgery following: will consider ileostomy if GI unable to place stent.   -continue with zofran 4mg IV PRN, octreotide gtt at 4 cc/hr for nausea relief, consider olanzapine 5mg if above insufficient  -ambulation orders: walking with PT, encourage ambulation with assistance.   -Patient currently afebrile with ongoing abdominal pain, remains distended. Continue to monitor for pain, emesis, bowel movements, flatus, fever. Upright abdominal xray 5/31 and CT abdomen/pelvis with contrast 6/1 consistently visualizing partial SBO.   -Upright abdominal xray 6/5 showing increased distention of bowel to level of stomach. NG tube placed for decompression of accumulated GI contents. On intermittent suction.   -GI following: partial SBO secondary to advancing metastasis. For GI attempted stent placement today.   -surgery following: will consider ileostomy if GI unable to place stent.   -continue with zofran 4mg IV PRN, octreotide gtt at 4 cc/hr for nausea relief, consider olanzapine 5mg if above insufficient  -ambulation orders: walking with PT, encourage ambulation with assistance.   -Patient currently afebrile with ongoing abdominal pain, remains distended. Continue to monitor for pain, emesis, bowel movements, flatus, fever.  -Consider Relistor s/p intervention to reduce opioid induced constipation from pain  regimen.

## 2018-06-06 NOTE — PROGRESS NOTE ADULT - PROBLEM SELECTOR PLAN 2
CT abdomen/pelvis with contrast 6/1 consistently concerning for ileus v. partial SBO.  -ambulation orders: walking with PT, encourage ambulation with assistance. Patient   walking the halls.  -Continue to monitor for pain, emesis, bowel movements, flatus, fever.  -serial imaging if symptoms worsen

## 2018-06-06 NOTE — PROGRESS NOTE ADULT - PROBLEM SELECTOR PLAN 3
s/p IR percutaneous cholecystostomy placement 5/25. cholecystostomy drain in place, continues to drain small amounts daily (approx 50cc).  -biliary fluid grew E. faecium, citrobacter, e. coli  -completed 10 day course of unasyn/augmentin on 6/4   -blood cx NG  -cholecystostomy tube remains in place, continues to drain small amounts

## 2018-06-06 NOTE — PROGRESS NOTE ADULT - PROBLEM SELECTOR PLAN 9
DVT PPX: holding this AM  Full Code: Patient wants compressions and intubation with time restriction on number of days intubated: Molst in chart.  DISPO: RMF pending resolution of partial SBO with GI stenting planned for 6/6. For possible ileostomy if stenting unsuccessful. DVT PPX: holding this AM  Lines: peripheral IV access x1, right sided chemoport, NG tube  Full Code: Patient wants compressions and intubation with time restriction on number of days intubated: Arnel in chart.  DISPO: RMF pending resolution of partial SBO with GI stenting planned for 6/6. For possible ileostomy if stenting unsuccessful.

## 2018-06-06 NOTE — PROGRESS NOTE ADULT - PROBLEM SELECTOR PLAN 7
Chronic anemia most likely 2/2 active colon malignancy. Received 1 uPRBC this admission following IR procedure. Has remained stable.   -transfuse hgb<7  -maintain active type and screen: renewed 6/5.

## 2018-06-06 NOTE — PROGRESS NOTE ADULT - SUBJECTIVE AND OBJECTIVE BOX
SUBJECTIVE: Pt seen and examined at bedside. No overnight events.   Feels better after NGT placement. No nausea or emesis, still passing small amount of flatus, no BMs  Awaiting colonoscopy today    Vital Signs Last 24 Hrs  T(C): 36.7 (06 Jun 2018 09:15), Max: 36.7 (06 Jun 2018 09:15)  T(F): 98 (06 Jun 2018 09:15), Max: 98 (06 Jun 2018 09:15)  HR: 78 (06 Jun 2018 09:15) (78 - 97)  BP: 151/94 (06 Jun 2018 09:15) (151/94 - 166/76)  BP(mean): 113 (06 Jun 2018 09:15) (113 - 113)  RR: 16 (06 Jun 2018 09:15) (16 - 16)  SpO2: 98% (06 Jun 2018 09:15) (94% - 98%)    PHYSICAL EXAM  General: A/Ox4 NAD  Pulmonary: Nonlabored breathing, no respiratory distress  NGT in place with bilious output  Cardiovascular: normocardic, intermittently hypertensive  Abdominal: soft, less distended, still mildly tender to palpation mostly in lower  abdomen, no rebound or guarding, percutaneous cholecystostomy tube in place draining bilious outoput    LABS:                        9.6    6.6   )-----------( 694      ( 06 Jun 2018 05:58 )             31.3     06-06    136  |  98  |  14  ----------------------------<  121<H>  3.8   |  27  |  0.34<L>    Ca    8.3<L>      06 Jun 2018 05:58  Mg     2.3     06-06      PT/INR - ( 06 Jun 2018 05:58 )   PT: 15.4 sec;   INR: 1.38          PTT - ( 06 Jun 2018 05:58 )  PTT:31.4 sec      ASSESSMENT AND PLAN  68YO F with metastatic colon cancer and acute cholecystitis s/p percutaneous cholecystostomy.    Patient more distended with increased pain yesterday - abdominal xray with distendes loops of bowel - probably partial obstruction since patient still passing flatus - NGT placed yesterday, patient feeling much better, less distended    Plan for colonoscopy with possible stenting today, will consider loop ileostomy if GI is unable to stent and if patient is agreeable to procedure. No surgical intervention at this point.   Appreciate Pallative Care recs (Octreotide, Dexamethasone, Benadryl, etc)    Team 4 continue to follow. Please call if any issues  Discussed with Dr. Narvaez

## 2018-06-06 NOTE — PROGRESS NOTE ADULT - PROBLEM SELECTOR PLAN 6
Likely secondary to abdominal distention and compression of diaphragm/lungs. Shallow breathing on room air. Lungs clear.  -incentive spirometer and increased activity to encourage respiratory re-conditioning.

## 2018-06-06 NOTE — PROGRESS NOTE ADULT - PROBLEM SELECTOR PLAN 4
Stage 4 metastatic colon cancer with liver mets and peritoneal carcinomatosis  - plan per Dr. Nick   - s/p chemoport placement 5/31   - heme onc following  - palliative consulted given metastatic CRC, pt aware of 1-2 yr prognosis; MOLST   completed. NOT DNR/DNI: Would like compressions, intubation with time      limitation.   -CT abdomen/pelvis positive for ascites secondary to abdominal mets. Mets cause  of partial bowel obstruction. Patient for stent placement to open and relieve partial  obstruction today.  -Palliative following, recommend pain regimen as follows: 4mg decadron IV q6h for  capsular liver pain, benadryl 25mg IV push at bedtime, oxycodone IR 5mg q6h, dilaudid 0.5mg IV q4 PRN for severe pain.

## 2018-06-06 NOTE — PROGRESS NOTE ADULT - ASSESSMENT
67 yr old male with a PMHx of recently diagnosed colon cancer presenting from oncologist's office with acute onset abdominal pain and fever, found to have acute cholecystitis and hepatic vein thrombosis, deemed poor surgical candidate 2/2 active metastic colon Ca, admitted to [facility] for medical management. Now s/p perc arleen with cholecystostomy drain in place. Now with partial SBO and ileus on day two of slow bowel prep for colonoscopy and colonic stent placement by GI tomorrow.

## 2018-06-06 NOTE — PROGRESS NOTE ADULT - SUBJECTIVE AND OBJECTIVE BOX
OVERNIGHT EVENTS:    SUBJECTIVE / INTERVAL HPI: Patient seen and examined at bedside.     VITAL SIGNS:  Vital Signs Last 24 Hrs  T(C): 36.1 (05 Jun 2018 22:00), Max: 36.6 (05 Jun 2018 06:02)  T(F): 97 (05 Jun 2018 22:00), Max: 97.9 (05 Jun 2018 06:02)  HR: 97 (05 Jun 2018 22:00) (79 - 97)  BP: 166/76 (05 Jun 2018 22:00) (166/76 - 174/-)  RR: 16 (05 Jun 2018 22:00) (16 - 16)  SpO2: 94% (05 Jun 2018 22:00) (94% - 94%)    PHYSICAL EXAM:  General: WDWN  HEENT: NC/AT; PERRL, anicteric sclera; MMM  Neck: supple  Cardiovascular: +S1/S2; RRR  Respiratory: CTA B/L; no W/R/R  Gastrointestinal: soft, NT/ND; +BSx4  Extremities: WWP; no edema, clubbing or cyanosis  Vascular: 2+ radial, DP/PT pulses B/L  Neurological: AAOx3; no focal deficits    MEDICATIONS  (STANDING):  ALBUTerol    90 MICROgram(s) HFA Inhaler 1 Puff(s) Inhalation every 4 hours  dexamethasone  Injectable 4 milliGRAM(s) IV Push every 6 hours  diphenhydrAMINE   Injectable 25 milliGRAM(s) IV Push at bedtime  octreotide  Infusion 20 MICROgram(s)/Hr (4 mL/Hr) IV Continuous <Continuous>  pantoprazole  Injectable 40 milliGRAM(s) IV Push daily  polyethylene glycol 3350 34 Gram(s) Oral two times a day  simethicone 80 milliGRAM(s) Chew every 8 hours  sodium chloride 0.9%. 1000 milliLiter(s) (80 mL/Hr) IV Continuous <Continuous>  tiotropium 18 MICROgram(s) Capsule 1 Capsule(s) Inhalation daily    MEDICATIONS  (PRN):  acetaminophen  Suppository 650 milliGRAM(s) Rectal every 6 hours PRN For Temp greater than 38 C (100.4 F)  HYDROmorphone  Injectable 0.5 milliGRAM(s) IV Push every 4 hours PRN Severe Pain (7 - 10)  ondansetron Injectable 4 milliGRAM(s) IV Push every 6 hours PRN Nausea and/or Vomiting  zaleplon 5 milliGRAM(s) Oral at bedtime PRN Insomnia    ALLERGIES:  allergic to cats (Rash)  No Known Drug Allergies or Intolerances    LABS:                        10.0   8.6   )-----------( 772      ( 05 Jun 2018 07:02 )             32.8     06-05    139  |  98  |  12  ----------------------------<  143<H>  3.4<L>   |  28  |  0.37<L>    Ca    8.8      05 Jun 2018 07:02  Mg     2.2     06-05          CAPILLARY BLOOD GLUCOSE              RADIOLOGY & ADDITIONAL TESTS: Reviewed.      ASSESSMENT:    PLAN: OVERNIGHT EVENTS: No acute events overnight. NG tube draining.   SUBJECTIVE / INTERVAL HPI: Patient seen and examined at bedside. Patient says this is her "best morning yet." She has decreased nausea since NG tube was placed, decreased belching. She continues to pass some flatus without discernible change from yesterday. Denies vomiting, stooling. Now on PRN dilaudid, her pain is currently a 0 but when she experiences breakthrough she ranks it at 4-5/10, down from 9.5/10 yesterday. Denies ongoing shortness of breath but breathing remains a little shallow with ongoing abdominal distention.     VITAL SIGNS:  Vital Signs Last 24 Hrs  T(C): 36.1 (05 Jun 2018 22:00), Max: 36.6 (05 Jun 2018 06:02)  T(F): 97 (05 Jun 2018 22:00), Max: 97.9 (05 Jun 2018 06:02)  HR: 97 (05 Jun 2018 22:00) (79 - 97)  BP: 166/76 (05 Jun 2018 22:00) (166/76 - 174/-)  RR: 16 (05 Jun 2018 22:00) (16 - 16)  SpO2: 94% (05 Jun 2018 22:00) (94% - 94%)    PHYSICAL EXAM:  General: obese female laying in her reclined chair, light sleeping, in no acute distress, comfortably breathing room air  HEENT: NC/AT; PERRL, anicteric sclera; dry mucous membranes   Neck: supple  Cardiovascular: +S1/S2; RRR  Respiratory: Clear to auscultation bilaterally, breathing remains shallow  Gastrointestinal: belly remains distended and diffusely tender but no wincing - patient describes it as uncomfortable not painful when I apply deep pressure. Improved from yesterday. hyperactive bowel sounds throughout. cholecystostomy drain in place, exit site c/d/i, draining 75cc of bilious fluid (changed yesterday).  Extremities: WWP; no clubbing or cyanosis, 1+ edema at the ankles   Vascular: 2+ radial, DP/PT pulses B/L  Neurological: AAOx3; no focal deficits  MSK: left calf tender with deep palpation for edema analysis: no pain with ankle flexion.     MEDICATIONS  (STANDING):  ALBUTerol    90 MICROgram(s) HFA Inhaler 1 Puff(s) Inhalation every 4 hours  dexamethasone  Injectable 4 milliGRAM(s) IV Push every 6 hours  diphenhydrAMINE   Injectable 25 milliGRAM(s) IV Push at bedtime  octreotide  Infusion 20 MICROgram(s)/Hr (4 mL/Hr) IV Continuous <Continuous>  pantoprazole  Injectable 40 milliGRAM(s) IV Push daily  polyethylene glycol 3350 34 Gram(s) Oral two times a day  simethicone 80 milliGRAM(s) Chew every 8 hours  sodium chloride 0.9%. 1000 milliLiter(s) (80 mL/Hr) IV Continuous <Continuous>  tiotropium 18 MICROgram(s) Capsule 1 Capsule(s) Inhalation daily    MEDICATIONS  (PRN):  acetaminophen  Suppository 650 milliGRAM(s) Rectal every 6 hours PRN For Temp greater than 38 C (100.4 F)  HYDROmorphone  Injectable 0.5 milliGRAM(s) IV Push every 4 hours PRN Severe Pain (7 - 10)  ondansetron Injectable 4 milliGRAM(s) IV Push every 6 hours PRN Nausea and/or Vomiting  zaleplon 5 milliGRAM(s) Oral at bedtime PRN Insomnia    ALLERGIES:  allergic to cats (Rash)  No Known Drug Allergies or Intolerances    LABS:                        10.0   8.6   )-----------( 772      ( 05 Jun 2018 07:02 )             32.8     06-05    139  |  98  |  12  ----------------------------<  143<H>  3.4<L>   |  28  |  0.37<L>    Ca    8.8      05 Jun 2018 07:02  Mg     2.2     06-05          CAPILLARY BLOOD GLUCOSE              RADIOLOGY & ADDITIONAL TESTS: Reviewed.      ASSESSMENT:    PLAN: OVERNIGHT EVENTS: No acute events overnight. NG tube draining.   SUBJECTIVE / INTERVAL HPI: Patient seen and examined at bedside. Patient says this is her "best morning yet." She has decreased nausea since NG tube was placed, decreased belching. She continues to pass some flatus without discernible change from yesterday. Denies vomiting, stooling. Now on PRN dilaudid, her pain is currently a 0 but when she experiences breakthrough she ranks it at 4-5/10, down from 9.5/10 yesterday. Denies ongoing shortness of breath but breathing remains a little shallow with ongoing abdominal distention.     VITAL SIGNS:  Vital Signs Last 24 Hrs  T(C): 36.1 (05 Jun 2018 22:00), Max: 36.6 (05 Jun 2018 06:02)  T(F): 97 (05 Jun 2018 22:00), Max: 97.9 (05 Jun 2018 06:02)  HR: 97 (05 Jun 2018 22:00) (79 - 97)  BP: 166/76 (05 Jun 2018 22:00) (166/76 - 174/-)  RR: 16 (05 Jun 2018 22:00) (16 - 16)  SpO2: 94% (05 Jun 2018 22:00) (94% - 94%)    PHYSICAL EXAM:  General: obese female laying in her reclined chair, light sleeping, in no acute distress, comfortably breathing room air. NG tube in place.  HEENT: NC/AT; PERRL, anicteric sclera; dry mucous membranes   Neck: supple  Cardiovascular: +S1/S2; RRR  Respiratory: Clear to auscultation bilaterally, breathing remains shallow  Gastrointestinal: belly remains distended and diffusely tender but no wincing - patient describes it as uncomfortable not painful when I apply deep pressure. Improved from yesterday. hyperactive bowel sounds throughout. cholecystostomy drain in place, exit site c/d/i, draining 75cc of bilious fluid (changed yesterday). NG tube in place (approximately 250ccs in drain)  Extremities: WWP; no clubbing or cyanosis, 1+ edema at the ankles   Vascular: 2+ radial, DP/PT pulses B/L  Neurological: AAOx3; no focal deficits  MSK: left calf tender with deep palpation for edema analysis: no pain with ankle flexion.     MEDICATIONS  (STANDING):  ALBUTerol    90 MICROgram(s) HFA Inhaler 1 Puff(s) Inhalation every 4 hours  dexamethasone  Injectable 4 milliGRAM(s) IV Push every 6 hours  diphenhydrAMINE   Injectable 25 milliGRAM(s) IV Push at bedtime  octreotide  Infusion 20 MICROgram(s)/Hr (4 mL/Hr) IV Continuous <Continuous>  pantoprazole  Injectable 40 milliGRAM(s) IV Push daily  polyethylene glycol 3350 34 Gram(s) Oral two times a day  simethicone 80 milliGRAM(s) Chew every 8 hours  sodium chloride 0.9%. 1000 milliLiter(s) (80 mL/Hr) IV Continuous <Continuous>  tiotropium 18 MICROgram(s) Capsule 1 Capsule(s) Inhalation daily    MEDICATIONS  (PRN):  acetaminophen  Suppository 650 milliGRAM(s) Rectal every 6 hours PRN For Temp greater than 38 C (100.4 F)  HYDROmorphone  Injectable 0.5 milliGRAM(s) IV Push every 4 hours PRN Severe Pain (7 - 10)  ondansetron Injectable 4 milliGRAM(s) IV Push every 6 hours PRN Nausea and/or Vomiting  zaleplon 5 milliGRAM(s) Oral at bedtime PRN Insomnia    ALLERGIES:  allergic to cats (Rash)  No Known Drug Allergies or Intolerances    LABS:                        10.0   8.6   )-----------( 772      ( 05 Jun 2018 07:02 )             32.8     06-05    139  |  98  |  12  ----------------------------<  143<H>  3.4<L>   |  28  |  0.37<L>    Ca    8.8      05 Jun 2018 07:02  Mg     2.2     06-05          CAPILLARY BLOOD GLUCOSE              RADIOLOGY & ADDITIONAL TESTS: Reviewed.      ASSESSMENT:    PLAN: OVERNIGHT EVENTS: No acute events overnight. NG tube draining.   SUBJECTIVE / INTERVAL HPI: Patient seen and examined at bedside. Patient says this is her "best morning yet." She has decreased nausea since NG tube was placed, decreased belching. She continues to pass some flatus without discernible change from yesterday. Denies vomiting, stooling. Now on PRN dilaudid, her pain is currently a 0 but when she experiences breakthrough she ranks it at 4-5/10, down from 9.5/10 yesterday. Denies ongoing shortness of breath but breathing remains a little shallow with ongoing abdominal distention.     VITAL SIGNS:  Vital Signs Last 24 Hrs  T(C): 36.1 (05 Jun 2018 22:00), Max: 36.6 (05 Jun 2018 06:02)  T(F): 97 (05 Jun 2018 22:00), Max: 97.9 (05 Jun 2018 06:02)  HR: 97 (05 Jun 2018 22:00) (79 - 97)  BP: 166/76 (05 Jun 2018 22:00) (166/76 - 174/-)  RR: 16 (05 Jun 2018 22:00) (16 - 16)  SpO2: 94% (05 Jun 2018 22:00) (94% - 94%)    PHYSICAL EXAM:  General: obese female laying in her reclined chair, light sleeping, in no acute distress, comfortably breathing room air. NG tube in place.  HEENT: NC/AT; PERRL, anicteric sclera; dry mucous membranes   Neck: supple  Cardiovascular: +S1/S2; RRR, Right sided port in place (incision site c/d/i)  Respiratory: Clear to auscultation bilaterally, breathing remains shallow  Gastrointestinal: belly remains distended and diffusely tender but no wincing - patient describes it as uncomfortable not painful when I apply deep pressure. Improved from yesterday. hyperactive bowel sounds throughout. cholecystostomy drain in place, exit site c/d/i, draining 75cc of bilious fluid (changed yesterday). NG tube in place (approximately 250ccs in drain)  Extremities: WWP; no clubbing or cyanosis, 1+ edema at the ankles   Vascular: 2+ radial, DP/PT pulses B/L  Neurological: AAOx3; no focal deficits  MSK: left calf tender with deep palpation for edema analysis: no pain with ankle flexion.     MEDICATIONS  (STANDING):  ALBUTerol    90 MICROgram(s) HFA Inhaler 1 Puff(s) Inhalation every 4 hours  dexamethasone  Injectable 4 milliGRAM(s) IV Push every 6 hours  diphenhydrAMINE   Injectable 25 milliGRAM(s) IV Push at bedtime  octreotide  Infusion 20 MICROgram(s)/Hr (4 mL/Hr) IV Continuous <Continuous>  pantoprazole  Injectable 40 milliGRAM(s) IV Push daily  polyethylene glycol 3350 34 Gram(s) Oral two times a day  simethicone 80 milliGRAM(s) Chew every 8 hours  sodium chloride 0.9%. 1000 milliLiter(s) (80 mL/Hr) IV Continuous <Continuous>  tiotropium 18 MICROgram(s) Capsule 1 Capsule(s) Inhalation daily    MEDICATIONS  (PRN):  acetaminophen  Suppository 650 milliGRAM(s) Rectal every 6 hours PRN For Temp greater than 38 C (100.4 F)  HYDROmorphone  Injectable 0.5 milliGRAM(s) IV Push every 4 hours PRN Severe Pain (7 - 10)  ondansetron Injectable 4 milliGRAM(s) IV Push every 6 hours PRN Nausea and/or Vomiting  zaleplon 5 milliGRAM(s) Oral at bedtime PRN Insomnia    ALLERGIES:  allergic to cats (Rash)  No Known Drug Allergies or Intolerances    LABS:                        10.0   8.6   )-----------( 772      ( 05 Jun 2018 07:02 )             32.8     06-05    139  |  98  |  12  ----------------------------<  143<H>  3.4<L>   |  28  |  0.37<L>    Ca    8.8      05 Jun 2018 07:02  Mg     2.2     06-05      CAPILLARY BLOOD GLUCOSE    RADIOLOGY & ADDITIONAL TESTS: Reviewed.  Xray abdomen 6/5: increased abdominal distention to stomach.

## 2018-06-07 DIAGNOSIS — G47.00 INSOMNIA, UNSPECIFIED: ICD-10-CM

## 2018-06-07 LAB
ANION GAP SERPL CALC-SCNC: 11 MMOL/L — SIGNIFICANT CHANGE UP (ref 5–17)
BUN SERPL-MCNC: 17 MG/DL — SIGNIFICANT CHANGE UP (ref 7–23)
CALCIUM SERPL-MCNC: 8.7 MG/DL — SIGNIFICANT CHANGE UP (ref 8.4–10.5)
CHLORIDE SERPL-SCNC: 98 MMOL/L — SIGNIFICANT CHANGE UP (ref 96–108)
CO2 SERPL-SCNC: 29 MMOL/L — SIGNIFICANT CHANGE UP (ref 22–31)
CREAT SERPL-MCNC: 0.36 MG/DL — LOW (ref 0.5–1.3)
GLUCOSE BLDC GLUCOMTR-MCNC: 108 MG/DL — HIGH (ref 70–99)
GLUCOSE SERPL-MCNC: 120 MG/DL — HIGH (ref 70–99)
HCT VFR BLD CALC: 32 % — LOW (ref 34.5–45)
HGB BLD-MCNC: 9.7 G/DL — LOW (ref 11.5–15.5)
MAGNESIUM SERPL-MCNC: 2.3 MG/DL — SIGNIFICANT CHANGE UP (ref 1.6–2.6)
MCHC RBC-ENTMCNC: 22.8 PG — LOW (ref 27–34)
MCHC RBC-ENTMCNC: 30.3 G/DL — LOW (ref 32–36)
MCV RBC AUTO: 75.3 FL — LOW (ref 80–100)
PLATELET # BLD AUTO: 630 K/UL — HIGH (ref 150–400)
POTASSIUM SERPL-MCNC: 3.8 MMOL/L — SIGNIFICANT CHANGE UP (ref 3.5–5.3)
POTASSIUM SERPL-SCNC: 3.8 MMOL/L — SIGNIFICANT CHANGE UP (ref 3.5–5.3)
RBC # BLD: 4.25 M/UL — SIGNIFICANT CHANGE UP (ref 3.8–5.2)
RBC # FLD: 29.7 % — HIGH (ref 10.3–16.9)
SODIUM SERPL-SCNC: 138 MMOL/L — SIGNIFICANT CHANGE UP (ref 135–145)
WBC # BLD: 9 K/UL — SIGNIFICANT CHANGE UP (ref 3.8–10.5)
WBC # FLD AUTO: 9 K/UL — SIGNIFICANT CHANGE UP (ref 3.8–10.5)

## 2018-06-07 PROCEDURE — 99233 SBSQ HOSP IP/OBS HIGH 50: CPT

## 2018-06-07 PROCEDURE — 45378 DIAGNOSTIC COLONOSCOPY: CPT

## 2018-06-07 PROCEDURE — 74018 RADEX ABDOMEN 1 VIEW: CPT | Mod: 26

## 2018-06-07 RX ORDER — ENOXAPARIN SODIUM 100 MG/ML
90 INJECTION SUBCUTANEOUS EVERY 12 HOURS
Qty: 0 | Refills: 0 | Status: DISCONTINUED | OUTPATIENT
Start: 2018-06-07 | End: 2018-06-12

## 2018-06-07 RX ADMIN — SODIUM CHLORIDE 80 MILLILITER(S): 9 INJECTION INTRAMUSCULAR; INTRAVENOUS; SUBCUTANEOUS at 18:44

## 2018-06-07 RX ADMIN — SIMETHICONE 80 MILLIGRAM(S): 80 TABLET, CHEWABLE ORAL at 06:43

## 2018-06-07 RX ADMIN — Medication 4 MILLIGRAM(S): at 18:43

## 2018-06-07 RX ADMIN — HYDROMORPHONE HYDROCHLORIDE 0.5 MILLIGRAM(S): 2 INJECTION INTRAMUSCULAR; INTRAVENOUS; SUBCUTANEOUS at 07:55

## 2018-06-07 RX ADMIN — Medication 4 MILLIGRAM(S): at 06:45

## 2018-06-07 RX ADMIN — HYDROMORPHONE HYDROCHLORIDE 0.5 MILLIGRAM(S): 2 INJECTION INTRAMUSCULAR; INTRAVENOUS; SUBCUTANEOUS at 01:47

## 2018-06-07 RX ADMIN — HYDROMORPHONE HYDROCHLORIDE 0.5 MILLIGRAM(S): 2 INJECTION INTRAMUSCULAR; INTRAVENOUS; SUBCUTANEOUS at 07:06

## 2018-06-07 RX ADMIN — Medication 4 MILLIGRAM(S): at 01:23

## 2018-06-07 RX ADMIN — SIMETHICONE 80 MILLIGRAM(S): 80 TABLET, CHEWABLE ORAL at 15:03

## 2018-06-07 RX ADMIN — HYDROMORPHONE HYDROCHLORIDE 0.5 MILLIGRAM(S): 2 INJECTION INTRAMUSCULAR; INTRAVENOUS; SUBCUTANEOUS at 01:23

## 2018-06-07 RX ADMIN — HYDROMORPHONE HYDROCHLORIDE 0.5 MILLIGRAM(S): 2 INJECTION INTRAMUSCULAR; INTRAVENOUS; SUBCUTANEOUS at 13:38

## 2018-06-07 RX ADMIN — Medication 4 MILLIGRAM(S): at 12:18

## 2018-06-07 RX ADMIN — PANTOPRAZOLE SODIUM 40 MILLIGRAM(S): 20 TABLET, DELAYED RELEASE ORAL at 18:43

## 2018-06-07 RX ADMIN — ENOXAPARIN SODIUM 90 MILLIGRAM(S): 100 INJECTION SUBCUTANEOUS at 18:43

## 2018-06-07 RX ADMIN — PANTOPRAZOLE SODIUM 40 MILLIGRAM(S): 20 TABLET, DELAYED RELEASE ORAL at 06:44

## 2018-06-07 RX ADMIN — Medication 25 MILLIGRAM(S): at 02:10

## 2018-06-07 RX ADMIN — HYDROMORPHONE HYDROCHLORIDE 0.5 MILLIGRAM(S): 2 INJECTION INTRAMUSCULAR; INTRAVENOUS; SUBCUTANEOUS at 12:18

## 2018-06-07 NOTE — PROGRESS NOTE ADULT - ATTENDING COMMENTS
DX  PARTIAL SBO- s/p sigmoid and splenic flexure stent, repeat c-scope today reveal no masses at hepatic flexure. it appears that tumor is mainly near splenic flexure. clears today, adv tomorrow (diet)     ACUTE CHOLECYSTITIS -s/p percutaneous cholecystostomy, s/p   unasyn x 10 days post cholecystostomy     SEVERE SEPSIS - as above  HYPOXEMIC RESPIRATORY FAILURE/ PULMONARY EDEMA/PLEURAL EFFUSIONS - lasix PRN.   weaned off supplemental oxygen.   SMV THROMBUS/PORTAL VEIN THROMBUS - cont  lovenox   STAGE IV COLON CA w/ LIVER METASTASES and PERITONEAL CARCINOMATOSIS- f/u onc recs. s/p chemo port . may need diverting ileostomy if unable to have colonic stent placed.     HYPONATREMIA -resolved .   ANEMIA - trend hb. no active GI bleeding currently. monitor closely. transfuse to keep hb >7 .

## 2018-06-07 NOTE — PROGRESS NOTE ADULT - SUBJECTIVE AND OBJECTIVE BOX
SUBJECTIVE: Pt seen and examined at bedside. No overnight events.   Complaints of mild nausea, denies emesis. Passing small amount of gas and had tiny mucous BM after Golytely.   Had colonoscopy in the morning, stents remained patent, no intervention.     Vital Signs Last 24 Hrs  T(C): 36.3 (07 Jun 2018 05:54), Max: 36.3 (07 Jun 2018 05:54)  T(F): 97.4 (07 Jun 2018 05:54), Max: 97.4 (07 Jun 2018 05:54)  HR: 74 (07 Jun 2018 05:54) (69 - 79)  BP: 158/92 (07 Jun 2018 05:54) (129/78 - 159/86)  BP(mean): --  RR: 15 (07 Jun 2018 05:54) (15 - 16)  SpO2: 94% (07 Jun 2018 05:54) (94% - 95%)    PHYSICAL EXAM  General: A/Ox4 NAD  Pulmonary: Nonlabored breathing, no respiratory distress, CTAB  Cardiovascular: normocardic, intermittently hypertensive  Abdominal: soft, less distended, non tender, no rebound or guarding  percutaneous cholecystostomy tube in place draining bilious outoput      LABS:                        9.7    9.0   )-----------( 630      ( 07 Jun 2018 06:44 )             32.0     06-07    138  |  98  |  17  ----------------------------<  120<H>  3.8   |  29  |  0.36<L>    Ca    8.7      07 Jun 2018 06:44  Mg     2.3     06-07      PT/INR - ( 06 Jun 2018 05:58 )   PT: 15.4 sec;   INR: 1.38          PTT - ( 06 Jun 2018 05:58 )  PTT:31.4 sec      ASSESSMENT AND PLAN  66YO F with metastatic colon cancer and acute cholecystitis s/p percutaneous cholecystostomy.    Now s/p colonoscopy with stent placement into sigmoid colon and splenic flexure yeaterday. Repeated colonoscopy today revealed patent stents, no further intervention.    Recommend advancing diet  Recommend heme/onc recs for chemotherapy    Team 4 continue to follow. Please call if any issues  Discussed with Dr. Narvaez

## 2018-06-07 NOTE — PROGRESS NOTE ADULT - PROBLEM SELECTOR PLAN 5
Reported by nursing that the patient remains awake at night, may be side effect of steroids. Did not receive dose last night.  Continue PRN Zaleplon qHS.

## 2018-06-07 NOTE — PROGRESS NOTE ADULT - SUBJECTIVE AND OBJECTIVE BOX
OVERNIGHT EVENTS: Completed golytely prep.   SUBJECTIVE / INTERVAL HPI: Patient seen and examined at bedside.     VITAL SIGNS:  Vital Signs Last 24 Hrs  T(C): 36 (06 Jun 2018 21:45), Max: 36.7 (06 Jun 2018 09:15)  T(F): 96.8 (06 Jun 2018 21:45), Max: 98 (06 Jun 2018 09:15)  HR: 79 (06 Jun 2018 21:45) (69 - 79)  BP: 159/86 (06 Jun 2018 21:45) (129/78 - 159/86)  BP(mean): 113 (06 Jun 2018 09:15) (113 - 113)  RR: 15 (06 Jun 2018 21:45) (15 - 16)  SpO2: 95% (06 Jun 2018 21:45) (94% - 98%)    PHYSICAL EXAM:  General: WDWN  HEENT: NC/AT; PERRL, anicteric sclera; MMM  Neck: supple  Cardiovascular: +S1/S2; RRR  Respiratory: CTA B/L; no W/R/R  Gastrointestinal: soft, NT/ND; +BSx4  Extremities: WWP; no edema, clubbing or cyanosis  Vascular: 2+ radial, DP/PT pulses B/L  Neurological: AAOx3; no focal deficits    MEDICATIONS  (STANDING):  ALBUTerol    90 MICROgram(s) HFA Inhaler 1 Puff(s) Inhalation every 4 hours  dexamethasone  Injectable 4 milliGRAM(s) IV Push every 6 hours  diphenhydrAMINE   Injectable 25 milliGRAM(s) IV Push at bedtime  octreotide  Infusion 20 MICROgram(s)/Hr (4 mL/Hr) IV Continuous <Continuous>  pantoprazole  Injectable 40 milliGRAM(s) IV Push every 12 hours  polyethylene glycol 3350 34 Gram(s) Oral two times a day  simethicone 80 milliGRAM(s) Chew every 8 hours  sodium chloride 0.9%. 1000 milliLiter(s) (80 mL/Hr) IV Continuous <Continuous>  tiotropium 18 MICROgram(s) Capsule 1 Capsule(s) Inhalation daily    MEDICATIONS  (PRN):  acetaminophen  Suppository 650 milliGRAM(s) Rectal every 6 hours PRN For Temp greater than 38 C (100.4 F)  HYDROmorphone  Injectable 0.5 milliGRAM(s) IV Push every 4 hours PRN Severe Pain (7 - 10)  ondansetron Injectable 4 milliGRAM(s) IV Push every 6 hours PRN Nausea and/or Vomiting  zaleplon 5 milliGRAM(s) Oral at bedtime PRN Insomnia      ALLERGIES:  allergic to cats (Rash)  No Known Drug Allergies or intolerances    LABS:                        9.6    6.6   )-----------( 694      ( 06 Jun 2018 05:58 )             31.3     06-06    136  |  98  |  14  ----------------------------<  121<H>  3.8   |  27  |  0.34<L>    Ca    8.3<L>      06 Jun 2018 05:58  Mg     2.3     06-06      PT/INR - ( 06 Jun 2018 05:58 )   PT: 15.4 sec;   INR: 1.38     PTT - ( 06 Jun 2018 05:58 )  PTT:31.4 sec      RADIOLOGY & ADDITIONAL TESTS: Reviewed. OVERNIGHT EVENTS: Completed 2L golytely prep through PEG tube.   SUBJECTIVE / INTERVAL HPI: Patient seen and examined at bedside. She acknowledges that she's doing better this morning: decreased nausea and belching overnight, no vomiting, continues to pass flatus. She subjectively feels less bloated. She rarely has sharp abdominal pains and ranks them at 3/10 when they occur. She had not stooled overnight but stooled at conclusion of interview.     VITAL SIGNS:  Vital Signs Last 24 Hrs  T(C): 36 (06 Jun 2018 21:45), Max: 36.7 (06 Jun 2018 09:15)  T(F): 96.8 (06 Jun 2018 21:45), Max: 98 (06 Jun 2018 09:15)  HR: 79 (06 Jun 2018 21:45) (69 - 79)  BP: 159/86 (06 Jun 2018 21:45) (129/78 - 159/86)  BP(mean): 113 (06 Jun 2018 09:15) (113 - 113)  RR: 15 (06 Jun 2018 21:45) (15 - 16)  SpO2: 95% (06 Jun 2018 21:45) (94% - 98%)    PHYSICAL EXAM:  General: obese female laying in her reclined chair, light sleeping, in no acute distress, comfortably breathing room air. NG tube in place.  HEENT: NC/AT; PERRL, anicteric sclera; dry mucous membranes   Neck: supple  Cardiovascular: +S1/S2; RRR, Right sided port in place (incision site c/d/i)  Respiratory: Clear to auscultation bilaterally, breathing remains shallow  Gastrointestinal: belly remains distended but improved this morning, less tight, more easily compressible/soft, no wincing with moderate palpation, some discomfort over umbilical area. Hyperactive bowel sounds throughout. cholecystostomy drain in place, exit site c/d/i, draining 30cc of bilious fluid (changed yesterday). Venting PEG tube in place - entrance site c/d/i  Extremities: WWP; no clubbing or cyanosis, 1+ edema at the ankles   Vascular: 2+ radial, DP/PT pulses B/L  Neurological: AAOx3; no focal deficits  MSK: left calf tender with deep palpation for edema analysis - stable. No pain elicited in calf with ankle flexion.     MEDICATIONS  (STANDING):  ALBUTerol    90 MICROgram(s) HFA Inhaler 1 Puff(s) Inhalation every 4 hours  dexamethasone  Injectable 4 milliGRAM(s) IV Push every 6 hours  diphenhydrAMINE   Injectable 25 milliGRAM(s) IV Push at bedtime  octreotide  Infusion 20 MICROgram(s)/Hr (4 mL/Hr) IV Continuous <Continuous>  pantoprazole  Injectable 40 milliGRAM(s) IV Push every 12 hours  polyethylene glycol 3350 34 Gram(s) Oral two times a day  simethicone 80 milliGRAM(s) Chew every 8 hours  sodium chloride 0.9%. 1000 milliLiter(s) (80 mL/Hr) IV Continuous <Continuous>  tiotropium 18 MICROgram(s) Capsule 1 Capsule(s) Inhalation daily    MEDICATIONS  (PRN):  acetaminophen  Suppository 650 milliGRAM(s) Rectal every 6 hours PRN For Temp greater than 38 C (100.4 F)  HYDROmorphone  Injectable 0.5 milliGRAM(s) IV Push every 4 hours PRN Severe Pain (7 - 10)  ondansetron Injectable 4 milliGRAM(s) IV Push every 6 hours PRN Nausea and/or Vomiting  zaleplon 5 milliGRAM(s) Oral at bedtime PRN Insomnia      ALLERGIES:  allergic to cats (Rash)  No Known Drug Allergies or intolerances    LABS:                        9.6    6.6   )-----------( 694      ( 06 Jun 2018 05:58 )             31.3     06-06    136  |  98  |  14  ----------------------------<  121<H>  3.8   |  27  |  0.34<L>    Ca    8.3<L>      06 Jun 2018 05:58  Mg     2.3     06-06      PT/INR - ( 06 Jun 2018 05:58 )   PT: 15.4 sec;   INR: 1.38     PTT - ( 06 Jun 2018 05:58 )  PTT:31.4 sec      RADIOLOGY & ADDITIONAL TESTS: Reviewed.

## 2018-06-07 NOTE — CHART NOTE - NSCHARTNOTEFT_GEN_A_CORE
Admitting Diagnosis:   66YO F with metastatic colon cancer and acute cholecystitis s/p percutaneous cholecystostomy. Pt s/p colonoscopy with stenting.       PAST MEDICAL & SURGICAL HISTORY:  Colon cancer  No significant past surgical history      Current Nutrition Order: NPO    PO Intake: Good (%) [ X  ]  Fair (50-75%) [   ] Poor (<25%) [   ] N/A - NPO     GI Issues: No n/v/d/c     Pain: Unable to assess; pt off floor at time of attempted visit     Skin Integrity: Intact     Labs:   06-07    138  |  98  |  17  ----------------------------<  120<H>  3.8   |  29  |  0.36<L>    Ca    8.7      07 Jun 2018 06:44  Mg     2.3     06-07      CAPILLARY BLOOD GLUCOSE      POCT Blood Glucose.: 108 mg/dL (07 Jun 2018 07:50)      Medications:  MEDICATIONS  (STANDING):  ALBUTerol    90 MICROgram(s) HFA Inhaler 1 Puff(s) Inhalation every 4 hours  dexamethasone  Injectable 4 milliGRAM(s) IV Push every 6 hours  diphenhydrAMINE   Injectable 25 milliGRAM(s) IV Push at bedtime  enoxaparin Injectable 90 milliGRAM(s) SubCutaneous every 12 hours  octreotide  Infusion 20 MICROgram(s)/Hr (4 mL/Hr) IV Continuous <Continuous>  pantoprazole  Injectable 40 milliGRAM(s) IV Push every 12 hours  polyethylene glycol 3350 34 Gram(s) Oral two times a day  simethicone 80 milliGRAM(s) Chew every 8 hours  sodium chloride 0.9%. 1000 milliLiter(s) (80 mL/Hr) IV Continuous <Continuous>  tiotropium 18 MICROgram(s) Capsule 1 Capsule(s) Inhalation daily    MEDICATIONS  (PRN):  acetaminophen  Suppository 650 milliGRAM(s) Rectal every 6 hours PRN For Temp greater than 38 C (100.4 F)  HYDROmorphone  Injectable 0.5 milliGRAM(s) IV Push every 4 hours PRN Severe Pain (7 - 10)  ondansetron Injectable 4 milliGRAM(s) IV Push every 6 hours PRN Nausea and/or Vomiting  zaleplon 5 milliGRAM(s) Oral at bedtime PRN Insomnia      Weight: 88.2 kg   Daily     Daily     Weight Change: No new wt to assess      Estimated Energy Needs (30-35 calories/kg):  · Weight  (lbs)	110 lb  · Weight (kg)	49.8 kg  · From (30 marin/kg)	1494  · To (35 calories/kg)	1743     Other Calculation:  · Other Calculation	Height: 62" Weight: 194lbs, IBW 110lbs+/-10%, %%, BMI - 35  IBW used to calculate energy needs due to pt's current body weight exceeding 120% of IBW  Nutrient needs based on [facility] standards of care for repletion in adults 2/2 wt loss     Estimated Protein Needs (1.2-1.4 gm/kg):  · Weight  (lbs)	110  · Weight (kg)	49.8 kg  · From (1.2 g/kg)	59.76  · To (1.4 g/kg)	69.72     Estimated Fluid Needs (30-35 ml/kg):  · Weight  (lbs)	110  · Weight (kg)	49.8  · From (30 ml/kg)	1494  · To (35 ml/kg)	1743    Subjective: 66YO F with metastatic colon cancer and acute cholecystitis s/p percutaneous cholecystostomy. She has had worsening abdominal distention and CT showed a partial SBO and ileus, distal ascending/proximal transverse mass with narrowing of the transverse colon.   Pt now s/p colonic stent. As per IDT, to be started on PO diet today.     Previous Nutrition Diagnosis: Inadequate oral intake r/t decreased appetite/intake AEB current intake </50% of meals, 30 lb wt loss over the past 6 months    Active [ X  ]  Resolved [   ]    If resolved, new PES:     Goal: 1.) Pt to meet 75% of needs PO     Recommendations: 1.) Rec. Clear Liquid Diet >> low fat, low fiber diet as tolerated + Ensure Clear TID     Education: Unable to educate this visit     Risk Level: High [ X  ] Moderate [   ] Low [   ]

## 2018-06-07 NOTE — PROGRESS NOTE ADULT - PROBLEM SELECTOR PLAN 3
- Recommend Oxycodone IR 5mg PO q4h PRN Moderate pain  - Continue Dilaudid 0.5mg IV q4h PRN Severe pain.  - Continue steroids as above Patient has used fewer PRN doses since procedures were done. MDD of 10mg  - Recommend Oxycodone IR 5mg PO q4h PRN Moderate pain  - Continue Dilaudid 0.5mg IV q4h PRN Severe pain.  - Continue steroids as above

## 2018-06-07 NOTE — PROGRESS NOTE ADULT - PROBLEM SELECTOR PLAN 7
Currently having procedural treatments ie. GI stenting. Uncertain if patient will agree to ileostomy. Will discuss post procedure today. Await to speak to patient later today.

## 2018-06-07 NOTE — PROGRESS NOTE ADULT - SUBJECTIVE AND OBJECTIVE BOX
PILLO PEDRAZA             MRN-[id number]    CC: Malignant SBO, Nause, Gas distension / Pain, Constipation, GOC/AD, Support    HPI:  68 y/o female with a PMHx of recently dx metastatic ascending colon cancer (dx 2 weeks ago w/ mets to the liver and peritoneal carcinomatosis) that presents from Dr. Lundy's office with acute onset RUQ abdominal pain, fever and weakness for the past 3 days. Pain has worsened over the past 3 days and last evening was associated with one episode of vomiting (nonbloody, questionable bilious content).  Last BM was 2 days prior to presentation. She denies any associated chills, chest pain/discomfort/pressure, SOB/dyspnea, dysuria, diarrhea, hematochezia. Had CT A/P in ED which was s/f acute cholecystitis and hepatic vein thrombosis.    Cancer Hx: pt had a normal colonoscopy 2 years ago-had a polypectomy with benign pathology per pt. States she started getting non-specific abdominal pain starting last summer which she self-medicated with rolaids. States she ad 4 total episodes of abdominal pain over the summer and eventually went to see GI who did colonoscopy 2 weeks ago and diagnosed her with sub-total obstructing ascending colon cancer; had PET scan on Tuesday which showed liver mets and peritoneal carcinomatosis; plan for chemoport placement in June; patient also endorsing intermittent fevers, chills, and unintentional 25 lbs weight loss since Janurary. Denies any family hx of colon cancer.    SUBJECTIVE: Patient off the floors on a procedure. The patient continues to receive supportive services via palliative medicine eg. music therapy, massage therapy, pet therapy, spiritual support via chaplaincy, patient and family support, and coordination of care with involvement of a geriatric case manager that will coordinate with the patient once discharged.    ROS: UNABLE TO OBTAIN  due to: patient off floors    PEx: Deffered  T(C): 36.3 (06-07-18 @ 05:54), Max: 36.3 (06-07-18 @ 05:54)  HR: 74 (06-07-18 @ 05:54) (69 - 79)  BP: 158/92 (06-07-18 @ 05:54) (129/78 - 159/86)  RR: 15 (06-07-18 @ 05:54) (15 - 16)  SpO2: 94% (06-07-18 @ 05:54) (94% - 95%)  Wt(kg):  88     ALLERGIES: No Known Drug Allergies  INTOLERANCES: allergic to cats (Rash)    OPIATE NAÏVE (Y/N): Yes    MEDICATIONS: REVIEWED  MEDICATIONS  (STANDING):  ALBUTerol    90 MICROgram(s) HFA Inhaler 1 Puff(s) Inhalation every 4 hours  dexamethasone  Injectable 4 milliGRAM(s) IV Push every 6 hours  diphenhydrAMINE   Injectable 25 milliGRAM(s) IV Push at bedtime  octreotide  Infusion 20 MICROgram(s)/Hr (4 mL/Hr) IV Continuous <Continuous>  pantoprazole  Injectable 40 milliGRAM(s) IV Push every 12 hours  polyethylene glycol 3350 34 Gram(s) Oral two times a day  simethicone 80 milliGRAM(s) Chew every 8 hours  sodium chloride 0.9%. 1000 milliLiter(s) (80 mL/Hr) IV Continuous <Continuous>  tiotropium 18 MICROgram(s) Capsule 1 Capsule(s) Inhalation daily    MEDICATIONS  (PRN):  acetaminophen  Suppository 650 milliGRAM(s) Rectal every 6 hours PRN For Temp greater than 38 C (100.4 F)  HYDROmorphone  Injectable 0.5 milliGRAM(s) IV Push every 4 hours PRN Severe Pain (7 - 10)  ondansetron Injectable 4 milliGRAM(s) IV Push every 6 hours PRN Nausea and/or Vomiting  zaleplon 5 milliGRAM(s) Oral at bedtime PRN Insomnia    LABS: REVIEWED                        9.7    9.0   )-----------( 630      ( 07 Jun 2018 06:44 )             32.0   06-07    138  |  98  |  17  ----------------------------<  120<H>  3.8   |  29  |  0.36<L>    Ca    8.7      07 Jun 2018 06:44  Mg     2.3     06-07    POCT Blood Glucose.: 108 mg/dL (06.07.18 @ 07:50)    Type + Screen (06.05.18 @ 05:30)    ABO Interpretation: B    Rh Interpretation: Positive    Antibody Screen: Negative    IMAGING: REVIEWED    EXAM:  XR CHEST PORTABLE URGENT 1V                        PROCEDURE DATE:  06/05/2018    INTERPRETATION:  Chest x-ray  Indication: Gastric tube placement  An AP view of the chest is compared to the prior study of the same day.   Gastrictube is in cephalic position. Right IJ chest port is unchanged.   Right basilar opacity is again noted likely due to increased atelectasis.   Evidence exclude small pleural effusions. No pneumothorax.. Pigtail drain   is again noted in the right sideof the abdomen.  IMPRESSION: Gastric tube in satisfactory position.     EXAM:  XR CHEST PORTABLE URGENT 1V                        PROCEDURE DATE:  06/05/2018    INTERPRETATION:  Chest x-ray  Indication: Gastric tube position  A portable frontal view of the chest lower chest and upper abdomen is   compared to the prior study dated 5/31/2018. Tip of Dobbhoff catheter   overlies the lateral aspect of the gastric on this. Stomach is distended.   Dilated loops of small bowel are seen in the upper abdomen. Linear   atelectasis is noted in the lung bases.  IMPRESSION: Tip of Dobbhoff catheter overlies the lateral aspect of the   gastric fundus.    EXAM:  XR ABDOMEN PORTABLE IMMED 2V                        PROCEDURE DATE:  06/05/2018    INTERPRETATION:  X-ray of the abdomen  Indication: Abdominal pain  2 frontal views of the abdomen are compared to the study of 5/31/2018.   Tip ofpigtail drain overlies the right upper quadrant. Moderate amount   of gas is seen in the small and large bowel. Stomach and small bowel   loops are mildly dilated. No free air is seen in this limited evaluation.  Impression: Moderate gaseous distention of the small and large bowel,   probably due to ileus.    EXAM:  IR INTERVENTIONAL RAD PROC                        PROCEDURE DATE:  05/31/2018    INTERPRETATION:  History: Metastatic colon cancer.  : Zander Lin MD  Anesthesia: Conscious sedation was administered and monitored by   radiology nursing personnel,  under my direct supervision for 30 minutes.   Fluoroscopy time: 0.2 minute.   Procedure:  Following informed consent the patient was placed in the supine position   and the right lower neck and upper chest prepped and draped in a sterile   fashion.  Antibiotics were administered intravenously prior to this   procedure.  Intravenous Versed and Fentanyl were administered for   conscious sedation.  Physiologic monitoring was performed throughout the   procedure.    Under US guidance the right internal jugular vein was accessed with a 21   gauge needle just above the level of the clavicle and a 0.018" wire   placed centrally.  A 4 Slovak micro conversion catheter was advanced over   the wire and the inner dilator and wire removed.  A .035 inch Amplatz   super stiff wire was then placed into the inferior vena cava.  An 8   Slovak peel-away sheath was placed over the wire and the 8 Slovak   catheter placed through the peel-away sheath which was then removed.    A 3 cm incision was made over the right second anterior rib and a   subcutaneous pocket formed using blunt dissection.  The catheter was   tunneled from the venotomy site to the incision, attached to the port and   the port placed within thesubcutaneous pocket.  The port was secured to   the deep tissues using 2.0 nylon suture.  The deep tissues of the   incision were closed with interrupted 2.0 coated vicryl suture and the   skin closed with a subcuticular stitch using 4.0 coated vicryl.  The   venotomy site was closed with interrupted 4.0 coated vicryl sutures.  A   sterile dressing was applied.  The port was accessed and flushed with   Heparin.   A post procedure chest radiograph was obtained.  The patient   tolerated the procedure well and left the department in satisfactory   condition.  There were no immediate complications.    Findings:   The right internal jugular vein is widely patent.  A chest port with 8   Slovak catheter was inserted via the right internal jugular vein with its   tip positioned in the cephalad portion of the right atrium as described   above.  No pneumothorax is identified on the post procedure chest   radiograph.    IMPRESSION: Placement of chest port with 8 Slovak catheter via the right  internal jugular  vein with the tip of the catheter positioned in the   cephalad portion of the right atrium.    Advanced Directives:      Full code          MOLST - Ongoing     Decision maker: The patient is able to participate in complex medical decision making conversations  Legal surrogate: No designated HCP, but the patient is thinking of assigning her sister in [place] and her friend in [facility].    GOALS OF CARE DISCUSSION       Palliative care info/counseling provided	           Documentation of GOC:  Wants to pursue cancer targeted treatments.          REFERRALS	        Unit SW/Case Mgmt        - Following the patient       Patient/Family Support - Following the patient       Massage Therapy - Following the patient       Music Therapy - Following the patient       Geriatric case manager - Following the patient

## 2018-06-07 NOTE — PROGRESS NOTE ADULT - PROBLEM SELECTOR PLAN 4
Abdomen less distended post venting PEG. To reevaluate after colonoscopy today.  - Continue Simethicone q8h ATC  - Consider scopolamine patch for cramping pain.  - Physical therapy followup.  - Management as per primary team and GI

## 2018-06-07 NOTE — PROGRESS NOTE ADULT - PROBLEM SELECTOR PLAN 1
Partial SBO 2/2 POD carcinomatosis. Had colonoscopy with sigmoid and splenic flexure stents. Also had venting PEG placed. Currently off the floors for repeat colonoscopy for attempt to stent hepatic flexure stricture.  -Continue Octreotide drip 500mcg @ 20mcg/hr.   -Continue Dexamethasone 4mg IV q6h ATC  -Continue Benadryl 25mg IVPB qHS.

## 2018-06-07 NOTE — PROGRESS NOTE ADULT - PROBLEM SELECTOR PLAN 9
DVT PPX: holding in anticipation of GI intevention this morning  Lines: peripheral IV access x1, right sided chemoport, Venting PEG  Full Code: Patient wants compressions and intubation with time restriction on number of days intubated: Arnel in chart.  DISPO: RMF pending resolution of partial SBO with GI stenting planned for 6/7. For possible ileostomy if stenting unsuccessful.

## 2018-06-07 NOTE — PROGRESS NOTE ADULT - PROBLEM SELECTOR PLAN 2
RN attempted to call report to [facility], no answer. Will attempt again shortly.     Report given to RN at [facility].    Currently on PRN Zofran.  -Recommend Olanzapine 5mg ODT daily

## 2018-06-07 NOTE — PROGRESS NOTE ADULT - PROBLEM SELECTOR PLAN 6
Chemo port placed. Has not received any cancer targeted treatments. Evidence of liver and abdominal carcinomatosis spread with worsening SSx. Plan of care includes a GI stenting vs ileostomy if unable to have stent and if patient so desires.  Patient's prognosis is guarded given POD related complications. Prognosis of weeks to months if disease is left to run its usual course. Prognosis may change if SBO resolved and is able to start cancer targeted treatment.

## 2018-06-07 NOTE — PROGRESS NOTE ADULT - SUBJECTIVE AND OBJECTIVE BOX
Hem/Onc    PILLO PEDRAZA  MRN-[id number]    INTERVAL Hx:      * patient off floor for procedure      S/p NGT placement on 6/5.  s/p stent placement x 2 yesterday - unable to pass splenic flexure      Generalized abdominal pain on 6/1-->  CT A/P with partial SBO vs ileus, worsening ascites.     HPI: 67 y.o woman with newly diagnosed metastatic colon cancer- we were completing outpatient work up and were planning systemic treatment- the patient received IV iron last week, she had staging PET/CT this week and was scheduled for Infusaport placement. She presented to our office on 5/24 with fever/vomiting and abdominal pain> She was sent to the ER with suspicion for an obstruction (known large R sided/obstructing lesion).  Work up at [facility] with CT scan showed findings c/w acute cholecystitis and SMV and portal vein thrombosis. Acute cholecystitis confirmed by US.     ROS:  unable to obtain - patient off floor    PMH/PSH:  PAST MEDICAL & SURGICAL HISTORY:  Colon cancer      MEDICATIONS  (STANDING):  ALBUTerol    90 MICROgram(s) HFA Inhaler 1 Puff(s) Inhalation every 4 hours  ampicillin/sulbactam  IVPB 3 Gram(s) IV Intermittent every 6 hours  dexamethasone  Injectable 4 milliGRAM(s) IV Push every 6 hours  diphenhydrAMINE   Injectable 25 milliGRAM(s) IV Push at bedtime  enoxaparin Injectable 90 milliGRAM(s) SubCutaneous two times a day  octreotide  Infusion 20 MICROgram(s)/Hr (4 mL/Hr) IV Continuous <Continuous>  pantoprazole  Injectable 40 milliGRAM(s) IV Push daily  polyethylene glycol 3350 17 Gram(s) Oral two times a day  potassium chloride  10 mEq/100 mL IVPB 10 milliEquivalent(s) IV Intermittent every 1 hour  sodium chloride 0.9%. 1000 milliLiter(s) (80 mL/Hr) IV Continuous <Continuous>  tiotropium 18 MICROgram(s) Capsule 1 Capsule(s) Inhalation daily    MEDICATIONS  (PRN):  acetaminophen  Suppository 650 milliGRAM(s) Rectal every 6 hours PRN For Temp greater than 38 C (100.4 F)  ALBUTerol/ipratropium for Nebulization 3 milliLiter(s) Nebulizer every 4 hours PRN Shortness of Breath and/or Wheezing  ondansetron Injectable 4 milliGRAM(s) IV Push every 6 hours PRN Nausea and/or Vomiting  oxyCODONE    IR 5 milliGRAM(s) Oral every 6 hours PRN Severe Pain (7 - 10)    Allergies    allergic to cats (Rash)  No Known Drug Allergies    Intolerances    Vital Signs Last 24 Hrs  T(C): 36.3 (07 Jun 2018 05:54), Max: 36.7 (06 Jun 2018 09:15)  T(F): 97.4 (07 Jun 2018 05:54), Max: 98 (06 Jun 2018 09:15)  HR: 74 (07 Jun 2018 05:54) (69 - 79)  BP: 158/92 (07 Jun 2018 05:54) (129/78 - 159/86)  BP(mean): 113 (06 Jun 2018 09:15) (113 - 113)  RR: 15 (07 Jun 2018 05:54) (15 - 16)  SpO2: 94% (07 Jun 2018 05:54) (94% - 98%)      CBC Full  -  ( 07 Jun 2018 06:44 )  WBC Count : 9.0 K/uL  Hemoglobin : 9.7 g/dL  Hematocrit : 32.0 %  Platelet Count - Automated : 630 K/uL  Mean Cell Volume : 75.3 fL  Mean Cell Hemoglobin : 22.8 pg  Mean Cell Hemoglobin Concentration : 30.3 g/dL        06-07    138  |  98  |  17  ----------------------------<  120<H>  3.8   |  29  |  0.36<L>    Ca    8.7      07 Jun 2018 06:44  Mg     2.3     06-07        PT/INR - ( 06 Jun 2018 05:58 )   PT: 15.4 sec;   INR: 1.38          PTT - ( 06 Jun 2018 05:58 )  PTT:31.4 sec      Assessment:    Metastatic colon cancer  Severe iron deficiency anemia  Acute cholecystitis  SMV/portal vein thrombosis    Plan:    Clinically considerably better  S/p NGT placement, less abdominal discomfort and distention      s/p colonoscopy and stent placement  - two stents placed, but unable to pass splenic flexure. s/p PEG   plan for today is repeat colonoscopy to see if can get past splenic flexure.     S/p percutaneous cholecystotomy on 5/25  On Abx coverage, remains afebrile      S/p Infusaport placement on 5/31    SMV/Portal vein thrombosis- Continue Lovenox for now,  she will eventually be transitioned to an oral anticoagulant     Newly diagnosed colon cancer- fairly rapidly progressive symptomatic disease     Will need systemic treatment soon once pending improvement of GI obstruction   If stent placement is successful, we will plan to initiate tx with FOLFOX within the next 48 hours    Severe YESSICA-she received parenteral iron as outpatient  H/H is better and stable  PRBC as clinically indicated    Reactive thrombocytosis (YESSICA, malignancy) no specific intervention is indicated    Thank you    Yara Jackson MD for Estefania Lundy MD  [phone]

## 2018-06-07 NOTE — PROGRESS NOTE ADULT - ASSESSMENT
67 yr old male with a PMHx of recently diagnosed colon cancer presenting from oncologist's office with acute cholecystitis and hepatic vein thrombosis, deemed poor surgical candidate 2/2 active metastic colon Ca, s/p perc arleen with cholecystostomy drain in place. Now with partial SBO and ileus, with venting PEG in place, anticipating second attempt by GI at relieving colonic obstruction.

## 2018-06-07 NOTE — PROGRESS NOTE ADULT - PROBLEM SELECTOR PLAN 1
Diagnosed 5/31 by clinical exam, abdominal xray and CT A/P.  Secondary to metastases, ascites.   -GI following: two stents placed on 6/6 in attempt to relieve partial SBO-one in the sigmoid colon and one at splenic flexure. Unable to pass through to hepatic flexure. Venting PEG placed and re-attempt scheduled for this morning after additional 2L golytely administered last night.   -surgery following: will consider ileostomy if GI unable to stent narrowing at hepatic flexure.   -continue with zofran 4mg IV PRN, octreotide gtt at 4 cc/hr for nausea relief, consider olanzapine 5mg if above insufficient  -ambulation orders: walking with PT, encourage ambulation with assistance.   -Patient currently afebrile with ongoing abdominal pain, remains distended. Continue to monitor for pain, emesis, bowel movements, flatus, fever.  -Consider Relistor s/p intervention to reduce opioid induced constipation from pain  regimen.

## 2018-06-08 LAB
ANION GAP SERPL CALC-SCNC: 11 MMOL/L — SIGNIFICANT CHANGE UP (ref 5–17)
BLD GP AB SCN SERPL QL: POSITIVE — SIGNIFICANT CHANGE UP
BUN SERPL-MCNC: 13 MG/DL — SIGNIFICANT CHANGE UP (ref 7–23)
CALCIUM SERPL-MCNC: 8.9 MG/DL — SIGNIFICANT CHANGE UP (ref 8.4–10.5)
CHLORIDE SERPL-SCNC: 100 MMOL/L — SIGNIFICANT CHANGE UP (ref 96–108)
CO2 SERPL-SCNC: 28 MMOL/L — SIGNIFICANT CHANGE UP (ref 22–31)
CREAT SERPL-MCNC: 0.4 MG/DL — LOW (ref 0.5–1.3)
GLUCOSE SERPL-MCNC: 114 MG/DL — HIGH (ref 70–99)
HCT VFR BLD CALC: 33.2 % — LOW (ref 34.5–45)
HGB BLD-MCNC: 10.1 G/DL — LOW (ref 11.5–15.5)
MAGNESIUM SERPL-MCNC: 2.2 MG/DL — SIGNIFICANT CHANGE UP (ref 1.6–2.6)
MCHC RBC-ENTMCNC: 23 PG — LOW (ref 27–34)
MCHC RBC-ENTMCNC: 30.4 G/DL — LOW (ref 32–36)
MCV RBC AUTO: 75.5 FL — LOW (ref 80–100)
PLATELET # BLD AUTO: 604 K/UL — HIGH (ref 150–400)
POTASSIUM SERPL-MCNC: 3.3 MMOL/L — LOW (ref 3.5–5.3)
POTASSIUM SERPL-SCNC: 3.3 MMOL/L — LOW (ref 3.5–5.3)
RBC # BLD: 4.4 M/UL — SIGNIFICANT CHANGE UP (ref 3.8–5.2)
RBC # FLD: 30 % — HIGH (ref 10.3–16.9)
RH IG SCN BLD-IMP: POSITIVE — SIGNIFICANT CHANGE UP
SODIUM SERPL-SCNC: 139 MMOL/L — SIGNIFICANT CHANGE UP (ref 135–145)
WBC # BLD: 8.9 K/UL — SIGNIFICANT CHANGE UP (ref 3.8–10.5)
WBC # FLD AUTO: 8.9 K/UL — SIGNIFICANT CHANGE UP (ref 3.8–10.5)

## 2018-06-08 PROCEDURE — 86077 PHYS BLOOD BANK SERV XMATCH: CPT

## 2018-06-08 PROCEDURE — 99233 SBSQ HOSP IP/OBS HIGH 50: CPT

## 2018-06-08 PROCEDURE — 99233 SBSQ HOSP IP/OBS HIGH 50: CPT | Mod: GC

## 2018-06-08 RX ORDER — POTASSIUM CHLORIDE 20 MEQ
40 PACKET (EA) ORAL ONCE
Qty: 0 | Refills: 0 | Status: DISCONTINUED | OUTPATIENT
Start: 2018-06-08 | End: 2018-06-08

## 2018-06-08 RX ORDER — PANTOPRAZOLE SODIUM 20 MG/1
40 TABLET, DELAYED RELEASE ORAL
Qty: 0 | Refills: 0 | Status: DISCONTINUED | OUTPATIENT
Start: 2018-06-09 | End: 2018-06-12

## 2018-06-08 RX ORDER — POTASSIUM CHLORIDE 20 MEQ
40 PACKET (EA) ORAL EVERY 4 HOURS
Qty: 0 | Refills: 0 | Status: COMPLETED | OUTPATIENT
Start: 2018-06-08 | End: 2018-06-08

## 2018-06-08 RX ADMIN — HYDROMORPHONE HYDROCHLORIDE 0.5 MILLIGRAM(S): 2 INJECTION INTRAMUSCULAR; INTRAVENOUS; SUBCUTANEOUS at 19:09

## 2018-06-08 RX ADMIN — ENOXAPARIN SODIUM 90 MILLIGRAM(S): 100 INJECTION SUBCUTANEOUS at 23:30

## 2018-06-08 RX ADMIN — Medication 4 MILLIGRAM(S): at 11:11

## 2018-06-08 RX ADMIN — HYDROMORPHONE HYDROCHLORIDE 0.5 MILLIGRAM(S): 2 INJECTION INTRAMUSCULAR; INTRAVENOUS; SUBCUTANEOUS at 12:37

## 2018-06-08 RX ADMIN — SODIUM CHLORIDE 80 MILLILITER(S): 9 INJECTION INTRAMUSCULAR; INTRAVENOUS; SUBCUTANEOUS at 11:14

## 2018-06-08 RX ADMIN — HYDROMORPHONE HYDROCHLORIDE 0.5 MILLIGRAM(S): 2 INJECTION INTRAMUSCULAR; INTRAVENOUS; SUBCUTANEOUS at 20:31

## 2018-06-08 RX ADMIN — Medication 40 MILLIEQUIVALENT(S): at 11:10

## 2018-06-08 RX ADMIN — Medication 4 MILLIGRAM(S): at 08:06

## 2018-06-08 RX ADMIN — SODIUM CHLORIDE 80 MILLILITER(S): 9 INJECTION INTRAMUSCULAR; INTRAVENOUS; SUBCUTANEOUS at 08:08

## 2018-06-08 RX ADMIN — Medication 40 MILLIEQUIVALENT(S): at 13:53

## 2018-06-08 RX ADMIN — PANTOPRAZOLE SODIUM 40 MILLIGRAM(S): 20 TABLET, DELAYED RELEASE ORAL at 08:05

## 2018-06-08 RX ADMIN — ENOXAPARIN SODIUM 90 MILLIGRAM(S): 100 INJECTION SUBCUTANEOUS at 11:10

## 2018-06-08 RX ADMIN — OCTREOTIDE ACETATE 4 MICROGRAM(S)/HR: 200 INJECTION, SOLUTION INTRAVENOUS; SUBCUTANEOUS at 08:04

## 2018-06-08 RX ADMIN — Medication 5 MILLIGRAM(S): at 14:58

## 2018-06-08 NOTE — PROGRESS NOTE ADULT - PROBLEM SELECTOR PLAN 2
Resolving. CT abdomen/pelvis with contrast 6/1 consistently concerning for ileus v. partial SBO.   -ambulation orders: walking with PT, encourage ambulation with assistance. Patient   walking the halls.  -Continue to monitor for pain, emesis, bowel movements, flatus, fever.

## 2018-06-08 NOTE — PROGRESS NOTE ADULT - SUBJECTIVE AND OBJECTIVE BOX
OVERNIGHT EVENTS:    SUBJECTIVE / INTERVAL HPI: Patient seen and examined at bedside.     VITAL SIGNS:  Vital Signs Last 24 Hrs  T(C): 36.8 (07 Jun 2018 22:11), Max: 36.8 (07 Jun 2018 22:11)  T(F): 98.2 (07 Jun 2018 22:11), Max: 98.2 (07 Jun 2018 22:11)  HR: 65 (07 Jun 2018 22:11) (65 - 74)  BP: 138/81 (07 Jun 2018 22:11) (138/81 - 158/92)  BP(mean): --  RR: 15 (07 Jun 2018 22:11) (15 - 17)  SpO2: 93% (07 Jun 2018 22:11) (93% - 94%)    PHYSICAL EXAM:  General: obese female laying in bed in no acute distress breathing room air comfortably  HEENT: NC/AT; PERRL, anicteric sclera; moist mucous membranes  Neck: supple  Cardiovascular: +S1/S2; RRR, Right sided port in place (incision site c/d/i)  Respiratory: Clear to auscultation bilaterally, breathing remains shallow  Gastrointestinal: belly remains distended but improved this morning, less tight, more easily compressible/soft, no wincing with moderate palpation, some discomfort over umbilical area. Hyperactive bowel sounds throughout. cholecystostomy drain in place, exit site c/d/i, draining 30cc of bilious fluid (changed yesterday). Venting PEG tube in place - entrance site c/d/i  Extremities: WWP; no clubbing or cyanosis, 1+ edema at the ankles   Vascular: 2+ radial, DP/PT pulses B/L  Neurological: AAOx3; no focal deficits  MSK: left calf tender with deep palpation for edema analysis - stable. No pain elicited in calf with ankle flexion.     MEDICATIONS  (STANDING):  ALBUTerol    90 MICROgram(s) HFA Inhaler 1 Puff(s) Inhalation every 4 hours  dexamethasone  Injectable 4 milliGRAM(s) IV Push every 6 hours  enoxaparin Injectable 90 milliGRAM(s) SubCutaneous every 12 hours  octreotide  Infusion 20 MICROgram(s)/Hr (4 mL/Hr) IV Continuous <Continuous>  pantoprazole  Injectable 40 milliGRAM(s) IV Push every 12 hours  polyethylene glycol 3350 34 Gram(s) Oral two times a day  sodium chloride 0.9%. 1000 milliLiter(s) (80 mL/Hr) IV Continuous <Continuous>  tiotropium 18 MICROgram(s) Capsule 1 Capsule(s) Inhalation daily    MEDICATIONS  (PRN):  acetaminophen  Suppository 650 milliGRAM(s) Rectal every 6 hours PRN For Temp greater than 38 C (100.4 F)  HYDROmorphone  Injectable 0.5 milliGRAM(s) IV Push every 4 hours PRN Severe Pain (7 - 10)  ondansetron Injectable 4 milliGRAM(s) IV Push every 6 hours PRN Nausea and/or Vomiting  zaleplon 5 milliGRAM(s) Oral at bedtime PRN Insomnia    ALLERGIES:  allergic to cats (Rash)  No Known Drug Allergies or Intolerances    LABS:                        9.7    9.0   )-----------( 630      ( 07 Jun 2018 06:44 )             32.0     06-07    138  |  98  |  17  ----------------------------<  120<H>  3.8   |  29  |  0.36<L>    Ca    8.7      07 Jun 2018 06:44  Mg     2.3     06-07      PT/INR - ( 06 Jun 2018 05:58 )   PT: 15.4 sec;   INR: 1.38     PTT - ( 06 Jun 2018 05:58 )  PTT:31.4 sec    CAPILLARY BLOOD GLUCOSE  POCT Blood Glucose.: 108 mg/dL (07 Jun 2018 07:50)    RADIOLOGY & ADDITIONAL TESTS:     Xray Abdomen 1 View Portable, (06.07.18 @ 12:58)  Indication: Status post colonic stent placement    Single frontal view of the abdomen in supine position is compared to the   study of 6/5/2018. Colonic stent is present in the left lower quadrant or   pelvis. Right upper quadrant pigtail drain is again noted. Gastric tube   overlies the gastric region. Moderate amount of small bowel gas is seen.   Small bowel loops are mildly dilated. Moderate amount of gas is also   present in the right colon. Oral contrast is present in the nondistended   left colon and rectum. No free air is visible in this study.    Impression: Moderate amount of bowel gas with mildly dilated loops of   small bowel as seen on prior study. Colonic stent in place. OVERNIGHT EVENTS: Stooled x 3 overnight.   SUBJECTIVE / INTERVAL HPI: Patient seen and examined at bedside. She is looking well, smiling and for the first time, seems energized. She says her nausea level is at "nada," denies vomiting, is passing flatus and stooled x3 overnight. She noticed some sparse blood, she understands this is typical following a procedure with manipulation of the bowel. She has some soreness in her hips (3/10) and has some sparse episodes of sharp pain in her left flank that she ranked at 6/10, relieved by dilaudid overnight. She is excited for diet advancement.     VITAL SIGNS:  Vital Signs Last 24 Hrs  T(C): 36.8 (07 Jun 2018 22:11), Max: 36.8 (07 Jun 2018 22:11)  T(F): 98.2 (07 Jun 2018 22:11), Max: 98.2 (07 Jun 2018 22:11)  HR: 65 (07 Jun 2018 22:11) (65 - 74)  BP: 138/81 (07 Jun 2018 22:11) (138/81 - 158/92)  RR: 15 (07 Jun 2018 22:11) (15 - 17)  SpO2: 93% (07 Jun 2018 22:11) (93% - 94%)    PHYSICAL EXAM:  General: obese female laying in bed in no acute distress breathing room air comfortably, smiling when visitors enter the room  HEENT: NC/AT; PERRL, anicteric sclera; moist mucous membranes  Neck: supple  Cardiovascular: +S1/S2; RRR, Right sided port in place (incision site d/i - some dried blood at the closure site)  Respiratory: Clear to auscultation bilaterally, breathing more full - using incentive spirometer  Gastrointestinal: belly remains distended but much improved this morning, more soft, some pain elicited with unintended palpation close to her cholecystostomy site. cholecystostomy drain in place, exit site c/d/i, draining 125cc of bilious fluid. Venting PEG tube in place - entrance site c/d/i  Extremities: WWP; no clubbing or cyanosis, 1+ edema at the ankles   Vascular: 2+ radial, DP/PT pulses B/L  Neurological: AAOx3; no focal deficits  MSK: left calf tender with deep palpation for edema analysis - stable.     MEDICATIONS  (STANDING):  ALBUTerol    90 MICROgram(s) HFA Inhaler 1 Puff(s) Inhalation every 4 hours  dexamethasone  Injectable 4 milliGRAM(s) IV Push every 6 hours  enoxaparin Injectable 90 milliGRAM(s) SubCutaneous every 12 hours  octreotide  Infusion 20 MICROgram(s)/Hr (4 mL/Hr) IV Continuous <Continuous>  pantoprazole  Injectable 40 milliGRAM(s) IV Push every 12 hours  polyethylene glycol 3350 34 Gram(s) Oral two times a day  sodium chloride 0.9%. 1000 milliLiter(s) (80 mL/Hr) IV Continuous <Continuous>  tiotropium 18 MICROgram(s) Capsule 1 Capsule(s) Inhalation daily    MEDICATIONS  (PRN):  acetaminophen  Suppository 650 milliGRAM(s) Rectal every 6 hours PRN For Temp greater than 38 C (100.4 F)  HYDROmorphone  Injectable 0.5 milliGRAM(s) IV Push every 4 hours PRN Severe Pain (7 - 10)  ondansetron Injectable 4 milliGRAM(s) IV Push every 6 hours PRN Nausea and/or Vomiting  zaleplon 5 milliGRAM(s) Oral at bedtime PRN Insomnia    ALLERGIES:  allergic to cats (Rash)  No Known Drug Allergies or Intolerances    LABS:                        9.7    9.0   )-----------( 630      ( 07 Jun 2018 06:44 )             32.0     06-07    138  |  98  |  17  ----------------------------<  120<H>  3.8   |  29  |  0.36<L>    Ca    8.7      07 Jun 2018 06:44  Mg     2.3     06-07      PT/INR - ( 06 Jun 2018 05:58 )   PT: 15.4 sec;   INR: 1.38     PTT - ( 06 Jun 2018 05:58 )  PTT:31.4 sec    CAPILLARY BLOOD GLUCOSE  POCT Blood Glucose.: 108 mg/dL (07 Jun 2018 07:50)    RADIOLOGY & ADDITIONAL TESTS:     Xray Abdomen 1 View Portable, (06.07.18 @ 12:58)  Indication: Status post colonic stent placement    Single frontal view of the abdomen in supine position is compared to the   study of 6/5/2018. Colonic stent is present in the left lower quadrant or   pelvis. Right upper quadrant pigtail drain is again noted. Gastric tube   overlies the gastric region. Moderate amount of small bowel gas is seen.   Small bowel loops are mildly dilated. Moderate amount of gas is also   present in the right colon. Oral contrast is present in the nondistended   left colon and rectum. No free air is visible in this study.    Impression: Moderate amount of bowel gas with mildly dilated loops of   small bowel as seen on prior study. Colonic stent in place.

## 2018-06-08 NOTE — PROGRESS NOTE ADULT - SUBJECTIVE AND OBJECTIVE BOX
Hem/Onc    PILLO PEDRAZA  [id number]    INTERVAL Hx:  S/p NGT placement on 6/5 with good relief. S/p vent PEG placement, colonoscopy and stent placement x 2 ion 6/6 and 6/7.  Doing better. No significant abdominal pain this am. + BM last night.     HPI: 67 y.o woman with newly diagnosed metastatic colon cancer- we were completing outpatient work up and were planning systemic treatment- the patient received IV iron last week, she had staging PET/CT this week and was scheduled for Infusaport placement. She presented to our office on 5/24 with fever/vomiting and abdominal pain> She was sent to the ER with suspicion for an obstruction (known large R sided/obstructing lesion).  Work up at [facility] with CT scan showed findings c/w acute cholecystitis and SMV and portal vein thrombosis. Acute cholecystitis confirmed by US.     ROS:  Mild nausea   Mild abdominal discomfort  No fever, chills, night sweats.   + fatigue, no headaches/dizziness.    No dysuria/hematuria.  No history of easy bruising/bleeding.     No leg pain or leg swelling.    ROS is otherwise negative.    PMH/PSH:  PAST MEDICAL & SURGICAL HISTORY:  Colon cancer      MEDICATIONS  (STANDING):  ALBUTerol    90 MICROgram(s) HFA Inhaler 1 Puff(s) Inhalation every 4 hours  ampicillin/sulbactam  IVPB 3 Gram(s) IV Intermittent every 6 hours  dexamethasone  Injectable 4 milliGRAM(s) IV Push every 6 hours  diphenhydrAMINE   Injectable 25 milliGRAM(s) IV Push at bedtime  enoxaparin Injectable 90 milliGRAM(s) SubCutaneous two times a day  octreotide  Infusion 20 MICROgram(s)/Hr (4 mL/Hr) IV Continuous <Continuous>  pantoprazole  Injectable 40 milliGRAM(s) IV Push daily  polyethylene glycol 3350 17 Gram(s) Oral two times a day  potassium chloride  10 mEq/100 mL IVPB 10 milliEquivalent(s) IV Intermittent every 1 hour  sodium chloride 0.9%. 1000 milliLiter(s) (80 mL/Hr) IV Continuous <Continuous>  tiotropium 18 MICROgram(s) Capsule 1 Capsule(s) Inhalation daily    MEDICATIONS  (PRN):  acetaminophen  Suppository 650 milliGRAM(s) Rectal every 6 hours PRN For Temp greater than 38 C (100.4 F)  ALBUTerol/ipratropium for Nebulization 3 milliLiter(s) Nebulizer every 4 hours PRN Shortness of Breath and/or Wheezing  ondansetron Injectable 4 milliGRAM(s) IV Push every 6 hours PRN Nausea and/or Vomiting  oxyCODONE    IR 5 milliGRAM(s) Oral every 6 hours PRN Severe Pain (7 - 10)    Allergies    allergic to cats (Rash)  No Known Drug Allergies    Intolerances    Vital Signs Last 24 Hrs  T(C): 36.6 (08 Jun 2018 06:00), Max: 36.8 (07 Jun 2018 22:11)  T(F): 97.9 (08 Jun 2018 06:00), Max: 98.2 (07 Jun 2018 22:11)  HR: 65 (08 Jun 2018 06:00) (65 - 66)  BP: 147/81 (08 Jun 2018 06:00) (138/81 - 150/74)  BP(mean): --  RR: 14 (08 Jun 2018 06:00) (14 - 17)  SpO2: 94% (08 Jun 2018 06:00) (93% - 94%)    HEENT: pale mucosae, anicteric sclerae  No palpable peripheral lymphadenopathy  COR: regular rhythm rate, no murmurs, rubs or gallops  ABD: soft, not distended, mild discomfort with palpation  , RUQ + drain in place  EXT: no edema  SKIN: no lesions on visible skin  R side port     Labs:                        10.1   8.9   )-----------( 604      ( 08 Jun 2018 06:33 )             33.2         CBC Full  -  ( 08 Jun 2018 06:33 )  WBC Count : 8.9 K/uL  Hemoglobin : 10.1 g/dL  Hematocrit : 33.2 %  Platelet Count - Automated : 604 K/uL  Mean Cell Volume : 75.5 fL  Mean Cell Hemoglobin : 23.0 pg  Mean Cell Hemoglobin Concentration : 30.4 g/dL  Auto Neutrophil # : x  Auto Lymphocyte # : x  Auto Monocyte # : x  Auto Eosinophil # : x  Auto Basophil # : x  Auto Neutrophil % : x  Auto Lymphocyte % : x  Auto Monocyte % : x  Auto Eosinophil % : x  Auto Basophil % : x        06-07    138  |  98  |  17  ----------------------------<  120<H>  3.8   |  29  |  0.36<L>    Ca    8.7      07 Jun 2018 06:44  Mg     2.3     06-07            Assessment:    Metastatic colon cancer  Severe iron deficiency anemia  Acute cholecystitis  SMV/portal vein thrombosis    Plan:    Clinically better  S/p  colonoscopy with successful stent placement x 2 on 6/7,  less abdominal discomfort and distention      S/p percutaneous cholecystotomy on 5/25  S/p Infusaport placement on 5/31    SMV/Portal vein thrombosis- Continue Lovenox for now,  she will eventually be transitioned to an oral anticoagulant     Newly diagnosed colon cancer- fairly rapidly progressive symptomatic disease   We will initiate palliative tx with FOLFOX within 24 hours.  I discussed treatment and it's potential benefit and side effects with the patient.   Consent has been signed.    Addition of biologic agent will be considered down the line, at this moment she can not get bevacizumab due to a risk of bleeding     Severe YESSICA-she received parenteral iron as outpatient  H/H is better and stable  PRBC as clinically indicated    Reactive thrombocytosis (YESSICA, malignancy) no specific intervention is indicated    Thank you    Estefania Lundy MD  [phone]

## 2018-06-08 NOTE — PROGRESS NOTE ADULT - PROBLEM SELECTOR PLAN 9
DVT PPX: lovenox 90mg BID for portal venous thrombus  Lines: peripheral IV access x1, right sided chemoport, Venting PEG  Full Code: Patient wants compressions and intubation with time restriction on number of days intubated: Arnel in chart.  DISPO: discharge pending stability post-intervention

## 2018-06-08 NOTE — PROGRESS NOTE ADULT - SUBJECTIVE AND OBJECTIVE BOX
SUBJECTIVE: Pt seen and examined at bedside. No overnight events.  S/p colonoscopy yesterday stents patent, no intervention. Patient had few small BMs and is passing flatus. Tolerates clears, denies nausea or emesis. Still has occasional abdominal cramps, but overall feels much better,    Vital Signs Last 24 Hrs  T(C): 36.6 (08 Jun 2018 06:00), Max: 36.8 (07 Jun 2018 22:11)  T(F): 97.9 (08 Jun 2018 06:00), Max: 98.2 (07 Jun 2018 22:11)  HR: 65 (08 Jun 2018 06:00) (65 - 66)  BP: 147/81 (08 Jun 2018 06:00) (138/81 - 150/74)  BP(mean): --  RR: 14 (08 Jun 2018 06:00) (14 - 17)  SpO2: 94% (08 Jun 2018 06:00) (93% - 94%)    PHYSICAL EXAM  General: A/Ox4 NAD  Pulmonary: Nonlabored breathing, no respiratory distress, CTAB  Cardiovascular: normocardic, intermittently hypertensive  Abdominal: soft, less distended, mildly tender in RUQ, no rebound or guarding  percutaneous cholecystostomy tube in place draining bilious outopu    LABS:                        10.1   8.9   )-----------( 604      ( 08 Jun 2018 06:33 )             33.2     06-08    139  |  100  |  13  ----------------------------<  114<H>  3.3<L>   |  28  |  0.40<L>    Ca    8.9      08 Jun 2018 06:33  Mg     2.2     06-08      ASSESSMENT AND PLAN  68YO F with metastatic colon cancer and acute cholecystitis s/p percutaneous cholecystostomy.    S/p colonoscopy with stent placement into sigmoid colon and splenic flexure. Repeated colonoscopy with patent stents. No further intervention.  Patient tolerates clears, may advance diet  Recommend heme/onc recs for chemotherapy    Team 4 will sign off, please re-consult as needed  Discussed with Dr. Narvaez

## 2018-06-08 NOTE — PROGRESS NOTE ADULT - PROBLEM SELECTOR PLAN 3
Patient has used 1 PRN dose since procedures were done. MDD of 10 mg  - Continue Oxycodone IR 5mg PO q4h PRN Moderate pain  - Continue Dilaudid 0.5mg IV q4h PRN Severe pain. Patient has used 1 PRN dose since procedures were done. MDD of 10 mg  - Consider Oxycodone IR 5mg PO q4h PRN Moderate pain  - Continue Dilaudid 0.5mg IV q4h PRN Severe pain.

## 2018-06-08 NOTE — PROGRESS NOTE ADULT - PROBLEM SELECTOR PLAN 5
CTA A/P s/f hepatic vein thrombosis; most likely 2/2 hypercoagulable state in the setting of active untreated malignancy  -continue with lovenox 90mg BID with intent to transition to DOAC  -f/u Dr. Nick outpatient

## 2018-06-08 NOTE — PROGRESS NOTE ADULT - PROBLEM SELECTOR PLAN 7
Stable. Chronic anemia most likely 2/2 active colon malignancy. Received 1 uPRBC this admission following IR procedure. Has remained stable.   -transfuse hgb<7  -maintain active type and screen: renewed 6/5.

## 2018-06-08 NOTE — PROGRESS NOTE ADULT - SUBJECTIVE AND OBJECTIVE BOX
PILLO PEDRAZA             [id number]    CC: Malignant SBO, Nause, Gas distension / Pain, Constipation, GOC/AD, Support    HPI:  68 y/o female with a PMHx of recently dx metastatic ascending colon cancer (dx 2 weeks ago w/ mets to the liver and peritoneal carcinomatosis) that presents from Dr. Lundy's office with acute onset RUQ abdominal pain, fever and weakness for the past 3 days. Pain has worsened over the past 3 days and last evening was associated with one episode of vomiting (nonbloody, questionable bilious content).  Last BM was 2 days prior to presentation. She denies any associated chills, chest pain/discomfort/pressure, SOB/dyspnea, dysuria, diarrhea, hematochezia. Had CT A/P in ED which was s/f acute cholecystitis and hepatic vein thrombosis.    SUBJECTIVE: Saw patient at bedside. Reportedly feeling much better post stenting and venting PEG. Several friends at bedside and the patient was in a very good mood. Minimal use of PRN in the last 24h ie. 1 dose of Dilaudid. She continues to have increasing bowel movements and tolerating PO. She is due to start chemotherapy likely tomorrow.     ROS:  DYSPNEA: No  NAUS/VOM: No	  SECRETIONS: No	  AGITATION: No  Pain (Y/N): No      -Provocation/Palliation:  -Quality/Quantity:  -Radiating:  -Severity:  -Timing/Frequency:  -Impact on ADLs:    OTHER REVIEW OF SYSTEMS: Insomnia    PEx:  T(C): 36.6 (06-08-18 @ 12:30), Max: 36.8 (06-07-18 @ 22:11)  HR: 69 (06-08-18 @ 12:30) (65 - 69)  BP: 139/85 (06-08-18 @ 12:30) (138/81 - 147/81)  RR: 16 (06-08-18 @ 12:30) (14 - 16)  SpO2: 94% (06-08-18 @ 12:30) (93% - 94%)  Wt(kg): 88.2    General: AAOx3 sitting up in recliner chair, no acute distress.   HEENT: NCAT Non icteric PERRL   Neck:  Supple, no masses  CVS: Normal S1, S2, No M/R/G  Resp: Unlabored CTAB  GI: Soft Distension improved. Active BS, perc drain in place  : Normal   Musc: No C/C/E    Neuro: No focal deficits. Following commands  Psych:  Improved mood  Skin: Xerosis. Right chest infuse a port scar c/d/i  Lymph: Normal      	   	   ALLERGIES: No Known Drug Allergies   INTOLERANCES: allergic to cats (Rash)    OPIATE NAÏVE (Y/N): Yes    MEDICATIONS: REVIEWED  MEDICATIONS  (STANDING):  ALBUTerol    90 MICROgram(s) HFA Inhaler 1 Puff(s) Inhalation every 4 hours  dexamethasone  Injectable 4 milliGRAM(s) IV Push every 6 hours  enoxaparin Injectable 90 milliGRAM(s) SubCutaneous every 12 hours  octreotide  Infusion 20 MICROgram(s)/Hr (4 mL/Hr) IV Continuous <Continuous>  sodium chloride 0.9%. 1000 milliLiter(s) (80 mL/Hr) IV Continuous <Continuous>  tiotropium 18 MICROgram(s) Capsule 1 Capsule(s) Inhalation daily    MEDICATIONS  (PRN):  acetaminophen  Suppository 650 milliGRAM(s) Rectal every 6 hours PRN For Temp greater than 38 C (100.4 F)  HYDROmorphone  Injectable 0.5 milliGRAM(s) IV Push every 4 hours PRN Severe Pain (7 - 10)  ondansetron Injectable 4 milliGRAM(s) IV Push every 6 hours PRN Nausea and/or Vomiting  zaleplon 5 milliGRAM(s) Oral at bedtime PRN Insomnia    LABS: REVIEWED                        10.1   8.9   )-----------( 604      ( 08 Jun 2018 06:33 )             33.2   06-08    139  |  100  |  13  ----------------------------<  114<H>  3.3<L>   |  28  |  0.40<L>    Ca    8.9      08 Jun 2018 06:33  Mg     2.2     06-08    Direct Lien Poly: Negative (06.08.18 @ 08:54)    Antibody ID 1_[id number]: Anti-E (06.08.18 @ 07:53)    Type + Screen (06.08.18 @ 05:30)    ABO Interpretation: B    Rh Interpretation: Positive    Antibody Screen: Positive    POCT Blood Glucose.: 108 mg/dL (06.07.18 @ 07:50)    IMAGING: REVIEWED    EXAM:  XR ABDOMEN PORTABLE IMMED 1V                        PROCEDURE DATE:  06/07/2018    INTERPRETATION:  X-ray of the abdomen  Indication: Status post colonic stent placement  Single frontal view of the abdomen in supine position is compared to the   study of 6/5/2018. Colonic stent is present in the left lower quadrant or   pelvis. Right upper quadrant pigtail drain is again noted. Gastric tube   overlies the gastric region. Moderate amount of small bowel gas is seen.   Small bowel loops are mildly dilated. Moderate amount of gas is also   present in the right colon. Oral contrast is present in the nondistended   left colon and rectum. No free air is visible in this study.  Impression: Moderate amount of bowel gas with mildly dilated loops of   small bowel as seen on prior study. Colonic stent in place.    Advanced Directives:      Full code          MOLST       HCP - Ongoing    Decision maker: The patient is able to participate in complex medical decision making conversations  Legal surrogate: No designated HCP, but the patient is thinking of assigning her sister in [place] and her friend [facility].    GOALS OF CARE DISCUSSION       Palliative care info/counseling provided	           Documentation of GOC:  Wants to pursue cancer targeted treatments.          REFERRALS	        Unit SW/Case Mgmt        - Following the patient       Patient/Family Support - Following the patient       Massage Therapy - Following the patient       Music Therapy - Following the patient       Pet Therapy - Offered       Geriatric case manager - Following the patient

## 2018-06-08 NOTE — PROGRESS NOTE ADULT - ATTENDING COMMENTS
DX  PARTIAL SBO- s/p sigmoid and splenic flexure stent, repeat c-scope 6/7/18 revealed no masses at hepatic flexure. it appears that tumor is mainly near splenic flexure. diet adv as tolerated  ACUTE CHOLECYSTITIS -s/p percutaneous cholecystostomy, s/p   unasyn x 10 days post cholecystostomy     SEVERE SEPSIS - as above  HYPOXEMIC RESPIRATORY FAILURE/ PULMONARY EDEMA/PLEURAL EFFUSIONS - lasix PRN.   weaned off supplemental oxygen.   SMV THROMBUS/PORTAL VEIN THROMBUS - cont  lovenox   STAGE IV COLON CA w/ LIVER METASTASES and PERITONEAL CARCINOMATOSIS- f/u onc recs. s/p chemo port , with plans for chemo saturday  HYPONATREMIA -resolved .   ANEMIA - trend hb. no active GI bleeding currently. monitor closely. transfuse to keep hb >7 .

## 2018-06-08 NOTE — PROGRESS NOTE ADULT - PROBLEM SELECTOR PLAN 3
Resolved. s/p IR percutaneous cholecystostomy placement 5/25. cholecystostomy drain in place, continues to drain small amounts daily (approx 50cc).  -biliary fluid grew E. faecium, citrobacter, e. coli  -completed 10 day course of unasyn/augmentin on 6/4   -blood cx NG  -cholecystostomy tube remains in place, continues to drain small amounts

## 2018-06-08 NOTE — PROGRESS NOTE ADULT - PROBLEM SELECTOR PLAN 1
Diagnosed 5/31 by clinical exam, abdominal xray and CT A/P.  Secondary to metastases, ascites.   -GI following: two stents placed on 6/6 in attempt to relieve partial SBO-one in the sigmoid colon and one at splenic flexure. Repeat procedure on 6/7 -able to pass through to hepatic flexure.   -Venting PEG placed on 6/6   -surgery following: ileostomy no longer required at this time  -continue with zofran 4mg IV PRN, octreotide gtt at 4 cc/hr for nausea relief  -ambulation orders: walking with PT, encourage ambulation with assistance.   -Patient currently afebrile with ongoing abdominal pain, remains distended. Continue to monitor for pain, emesis, bowel movements, flatus, fever. Diagnosed 5/31 by clinical exam, abdominal xray and CT A/P.  Secondary to metastases, ascites.   -GI following: two stents placed on 6/6 in attempt to relieve partial SBO-one in the sigmoid colon and one at splenic flexure. Repeat procedure on 6/7 -able to pass through to hepatic flexure.   -Venting PEG placed on 6/6   -surgery following: ileostomy no longer required at this time  -continue with zofran 4mg IV PRN, octreotide gtt at 4 cc/hr for nausea relief  -ambulation orders: walking with PT, encourage ambulation with assistance.   -Patient currently afebrile with ongoing abdominal pain, remains distended. Continue to monitor for pain, emesis, bowel movements, flatus, fever.  -pain persists, though intermittent. Wean from opioid medications or consider relistor to decrease risk of constipation if pain requiring opioids persists

## 2018-06-08 NOTE — PROGRESS NOTE ADULT - ASSESSMENT
67 yr old male with a PMHx of recently diagnosed colon cancer presenting from oncologist's office with acute cholecystitis and hepatic vein thrombosis, deemed poor surgical candidate 2/2 active metastic colon Ca, s/p perc arleen with cholecystostomy drain in place. Now with partial SBO and ileus, with venting PEG in place, s/p colonic stent placement (x2) by GI. Tolerating a clear diet with improving abdominal distention.

## 2018-06-08 NOTE — PROGRESS NOTE ADULT - PROBLEM SELECTOR PLAN 4
Abdomen less distended post procedures.   - Change Simethicone to PRN  - Consider scopolamine patch PRN for cramping pain.  - Physical therapy followup.  - Management as per primary team and GI

## 2018-06-08 NOTE — PROGRESS NOTE ADULT - PROBLEM SELECTOR PLAN 7
Plan for cancer targeted treatments, continues full code, anticipate would need rehab post prolonged / complicated hospital stay.

## 2018-06-08 NOTE — PROGRESS NOTE ADULT - PROBLEM SELECTOR PLAN 6
Resolved. Likely secondary to abdominal distention and compression of diaphragm/lungs, pain. Lungs clear.  -continue to encourage incentive spirometer and increased activity to promote respiratory re-conditioning.

## 2018-06-08 NOTE — PROGRESS NOTE ADULT - PROBLEM SELECTOR PLAN 6
Chemo port placed. Plan for chemotherapy tomorrow. Prognosis of weeks to months if disease is left to run its usual course. Prognosis may change now that SBO resolved and if she is able to start cancer targeted treatment.

## 2018-06-08 NOTE — PROGRESS NOTE ADULT - PROBLEM SELECTOR PLAN 1
Clinically resolved after colonoscopy with sigmoid and splenic flexure stents.  -DC Octreotide drip    -DC Dexamethasone  -DC Benadryl Clinically resolved after colonoscopy with sigmoid and splenic flexure stents.  -DC Octreotide drip    -DC Dexamethasone  -DC Benadryl  -Change Miralax to PRN

## 2018-06-08 NOTE — PROGRESS NOTE ADULT - PROBLEM SELECTOR PLAN 4
Stage 4 metastatic colon cancer with liver mets and peritoneal carcinomatosis  - plan per Dr. Nick   - s/p chemoport placement 5/31   - heme onc following  - palliative consulted given metastatic CRC, pt aware of 1-2 yr prognosis; MOLST   completed. NOT DNR/DNI: Would like compressions, intubation with time      limitation.   -CT abdomen/pelvis positive for ascites secondary to abdominal mets. Mets cause  of partial bowel obstruction. Patient for stent placement to open and relieve partial  obstruction today.  -Palliative following, recommend pain regimen as follows: 4mg decadron IV q6h for  capsular liver pain, benadryl 25mg IV push at bedtime, oxycodone IR 5mg q6h, dilaudid 0.5mg IV q4 PRN for severe pain. Consider scale back with improving symptoms.

## 2018-06-08 NOTE — PROGRESS NOTE ADULT - SUBJECTIVE AND OBJECTIVE BOX
Pt seen and examined. Pt feels improved today. Had 8 BM (still on miralax). Had intermittent stabbing pain 6/10 last night, resolved. No n/v.     MEDICATIONS:  MEDICATIONS  (STANDING):  ALBUTerol    90 MICROgram(s) HFA Inhaler 1 Puff(s) Inhalation every 4 hours  dexamethasone  Injectable 4 milliGRAM(s) IV Push every 6 hours  enoxaparin Injectable 90 milliGRAM(s) SubCutaneous every 12 hours  octreotide  Infusion 20 MICROgram(s)/Hr (4 mL/Hr) IV Continuous <Continuous>  potassium chloride   Powder 40 milliEquivalent(s) Oral every 4 hours  sodium chloride 0.9%. 1000 milliLiter(s) (80 mL/Hr) IV Continuous <Continuous>  tiotropium 18 MICROgram(s) Capsule 1 Capsule(s) Inhalation daily    MEDICATIONS  (PRN):  acetaminophen  Suppository 650 milliGRAM(s) Rectal every 6 hours PRN For Temp greater than 38 C (100.4 F)  HYDROmorphone  Injectable 0.5 milliGRAM(s) IV Push every 4 hours PRN Severe Pain (7 - 10)  ondansetron Injectable 4 milliGRAM(s) IV Push every 6 hours PRN Nausea and/or Vomiting  zaleplon 5 milliGRAM(s) Oral at bedtime PRN Insomnia      Allergies    allergic to cats (Rash)  No Known Drug Allergies    Intolerances        Vital Signs Last 24 Hrs  T(C): 36.6 (08 Jun 2018 06:00), Max: 36.8 (07 Jun 2018 22:11)  T(F): 97.9 (08 Jun 2018 06:00), Max: 98.2 (07 Jun 2018 22:11)  HR: 65 (08 Jun 2018 06:00) (65 - 66)  BP: 147/81 (08 Jun 2018 06:00) (138/81 - 150/74)  BP(mean): --  RR: 14 (08 Jun 2018 06:00) (14 - 17)  SpO2: 94% (08 Jun 2018 06:00) (93% - 94%)    06-07 @ 07:01  -  06-08 @ 07:00  --------------------------------------------------------  IN: 1848 mL / OUT: 728 mL / NET: 1120 mL        PHYSICAL EXAM:    General: Well developed; well nourished; in no acute distress  HEENT: MMM, conjunctiva and sclera clear  Gastrointestinal: Soft mildly-tender mod-distended; +PEG site c/d/i  Skin: Warm and dry. No obvious rash    LABS:      CBC Full  -  ( 08 Jun 2018 06:33 )  WBC Count : 8.9 K/uL  Hemoglobin : 10.1 g/dL  Hematocrit : 33.2 %  Platelet Count - Automated : 604 K/uL  Mean Cell Volume : 75.5 fL  Mean Cell Hemoglobin : 23.0 pg  Mean Cell Hemoglobin Concentration : 30.4 g/dL  Auto Neutrophil # : x  Auto Lymphocyte # : x  Auto Monocyte # : x  Auto Eosinophil # : x  Auto Basophil # : x  Auto Neutrophil % : x  Auto Lymphocyte % : x  Auto Monocyte % : x  Auto Eosinophil % : x  Auto Basophil % : x    06-08    139  |  100  |  13  ----------------------------<  114<H>  3.3<L>   |  28  |  0.40<L>    Ca    8.9      08 Jun 2018 06:33  Mg     2.2     06-08                        RADIOLOGY & ADDITIONAL STUDIES (The following images were personally reviewed):

## 2018-06-09 LAB
ANION GAP SERPL CALC-SCNC: 7 MMOL/L — SIGNIFICANT CHANGE UP (ref 5–17)
BUN SERPL-MCNC: 10 MG/DL — SIGNIFICANT CHANGE UP (ref 7–23)
CALCIUM SERPL-MCNC: 8.4 MG/DL — SIGNIFICANT CHANGE UP (ref 8.4–10.5)
CHLORIDE SERPL-SCNC: 101 MMOL/L — SIGNIFICANT CHANGE UP (ref 96–108)
CO2 SERPL-SCNC: 30 MMOL/L — SIGNIFICANT CHANGE UP (ref 22–31)
CREAT SERPL-MCNC: 0.37 MG/DL — LOW (ref 0.5–1.3)
GLUCOSE SERPL-MCNC: 105 MG/DL — HIGH (ref 70–99)
HCT VFR BLD CALC: 32.8 % — LOW (ref 34.5–45)
HGB BLD-MCNC: 10 G/DL — LOW (ref 11.5–15.5)
MAGNESIUM SERPL-MCNC: 2.1 MG/DL — SIGNIFICANT CHANGE UP (ref 1.6–2.6)
MCHC RBC-ENTMCNC: 23.2 PG — LOW (ref 27–34)
MCHC RBC-ENTMCNC: 30.5 G/DL — LOW (ref 32–36)
MCV RBC AUTO: 76.1 FL — LOW (ref 80–100)
PLATELET # BLD AUTO: 516 K/UL — HIGH (ref 150–400)
POTASSIUM SERPL-MCNC: 3.5 MMOL/L — SIGNIFICANT CHANGE UP (ref 3.5–5.3)
POTASSIUM SERPL-SCNC: 3.5 MMOL/L — SIGNIFICANT CHANGE UP (ref 3.5–5.3)
RBC # BLD: 4.31 M/UL — SIGNIFICANT CHANGE UP (ref 3.8–5.2)
RBC # FLD: 30.2 % — HIGH (ref 10.3–16.9)
SODIUM SERPL-SCNC: 138 MMOL/L — SIGNIFICANT CHANGE UP (ref 135–145)
WBC # BLD: 8 K/UL — SIGNIFICANT CHANGE UP (ref 3.8–10.5)
WBC # FLD AUTO: 8 K/UL — SIGNIFICANT CHANGE UP (ref 3.8–10.5)

## 2018-06-09 PROCEDURE — 99233 SBSQ HOSP IP/OBS HIGH 50: CPT | Mod: GC

## 2018-06-09 RX ORDER — FLUOROURACIL 50 MG/ML
4500 INJECTION, SOLUTION INTRAVENOUS ONCE
Qty: 0 | Refills: 0 | Status: COMPLETED | OUTPATIENT
Start: 2018-06-09 | End: 2018-06-09

## 2018-06-09 RX ORDER — POTASSIUM CHLORIDE 20 MEQ
40 PACKET (EA) ORAL ONCE
Qty: 0 | Refills: 0 | Status: COMPLETED | OUTPATIENT
Start: 2018-06-09 | End: 2018-06-09

## 2018-06-09 RX ORDER — ONDANSETRON 8 MG/1
16 TABLET, FILM COATED ORAL ONCE
Qty: 0 | Refills: 0 | Status: COMPLETED | OUTPATIENT
Start: 2018-06-09 | End: 2018-06-09

## 2018-06-09 RX ORDER — FLUOROURACIL 50 MG/ML
760 INJECTION, SOLUTION INTRAVENOUS ONCE
Qty: 0 | Refills: 0 | Status: COMPLETED | OUTPATIENT
Start: 2018-06-09 | End: 2018-06-09

## 2018-06-09 RX ORDER — DEXAMETHASONE 0.5 MG/5ML
10 ELIXIR ORAL ONCE
Qty: 0 | Refills: 0 | Status: COMPLETED | OUTPATIENT
Start: 2018-06-09 | End: 2018-06-09

## 2018-06-09 RX ORDER — OXALIPLATIN 5 MG/ML
160 INJECTION, SOLUTION INTRAVENOUS ONCE
Qty: 0 | Refills: 0 | Status: COMPLETED | OUTPATIENT
Start: 2018-06-09 | End: 2018-06-09

## 2018-06-09 RX ORDER — LEUCOVORIN CALCIUM 5 MG
380 TABLET ORAL ONCE
Qty: 0 | Refills: 0 | Status: COMPLETED | OUTPATIENT
Start: 2018-06-09 | End: 2018-06-09

## 2018-06-09 RX ADMIN — PANTOPRAZOLE SODIUM 40 MILLIGRAM(S): 20 TABLET, DELAYED RELEASE ORAL at 07:56

## 2018-06-09 RX ADMIN — Medication 40 MILLIEQUIVALENT(S): at 12:23

## 2018-06-09 RX ADMIN — HYDROMORPHONE HYDROCHLORIDE 0.5 MILLIGRAM(S): 2 INJECTION INTRAMUSCULAR; INTRAVENOUS; SUBCUTANEOUS at 22:34

## 2018-06-09 RX ADMIN — Medication 102 MILLIGRAM(S): at 17:17

## 2018-06-09 RX ADMIN — ENOXAPARIN SODIUM 90 MILLIGRAM(S): 100 INJECTION SUBCUTANEOUS at 12:22

## 2018-06-09 RX ADMIN — ONDANSETRON 116 MILLIGRAM(S): 8 TABLET, FILM COATED ORAL at 16:39

## 2018-06-09 RX ADMIN — ENOXAPARIN SODIUM 90 MILLIGRAM(S): 100 INJECTION SUBCUTANEOUS at 23:04

## 2018-06-09 RX ADMIN — OXALIPLATIN 141 MILLIGRAM(S): 5 INJECTION, SOLUTION INTRAVENOUS at 17:53

## 2018-06-09 RX ADMIN — HYDROMORPHONE HYDROCHLORIDE 0.5 MILLIGRAM(S): 2 INJECTION INTRAMUSCULAR; INTRAVENOUS; SUBCUTANEOUS at 22:19

## 2018-06-09 RX ADMIN — Medication 134.5 MILLIGRAM(S): at 17:53

## 2018-06-09 RX ADMIN — FLUOROURACIL 22.71 MILLIGRAM(S): 50 INJECTION, SOLUTION INTRAVENOUS at 21:25

## 2018-06-09 RX ADMIN — FLUOROURACIL 260.8 MILLIGRAM(S): 50 INJECTION, SOLUTION INTRAVENOUS at 20:53

## 2018-06-09 NOTE — PROGRESS NOTE ADULT - ATTENDING COMMENTS
pt seen and examined by me. case d/w housestaff. agree with VS, PE, assessment and plan as outlined above

## 2018-06-09 NOTE — PROGRESS NOTE ADULT - PROBLEM SELECTOR PLAN 6
Resolved. Likely secondary to abdominal distention and compression of diaphragm/lungs, pain. Lungs clear.  -continue to encourage incentive spirometer and increased activity to promote respiratory re-conditioning. Resolvied.   -ambulation orders: walking with PT, encourage ambulation with assistance. Patient   walking the halls.  -Continue to monitor for pain, emesis, bowel movements, flatus, fever.

## 2018-06-09 NOTE — PROGRESS NOTE ADULT - PROBLEM SELECTOR PROBLEM 9
Need for prophylactic measure
Palliative care by specialist
Need for prophylactic measure

## 2018-06-09 NOTE — PROGRESS NOTE ADULT - PROBLEM SELECTOR PLAN 9
DVT PPX: lovenox 90mg BID for portal venous thrombus  Lines: peripheral IV access x1, right sided chemoport, Venting PEG  Full Code: Patient wants compressions and intubation with time restriction on number of days intubated: Arnel in chart.  DISPO: discharge pending stability post-intervention DVT PPX: lovenox 90mg BID for portal venous thrombus  Lines: peripheral IV access x1, right sided chemoport, Venting PEG  Full Code: Patient wants compressions and intubation with time restriction on number of days intubated: Arnel in chart.  DISPO: discharge pending administration of first cycle of chemotherapy. Anticipate discharge on Tuesday.

## 2018-06-09 NOTE — PROGRESS NOTE ADULT - SUBJECTIVE AND OBJECTIVE BOX
OVERNIGHT EVENTS:    SUBJECTIVE / INTERVAL HPI: Patient seen and examined at bedside.     VITAL SIGNS:  Vital Signs Last 24 Hrs  T(C): 36.6 (09 Jun 2018 05:14), Max: 36.7 (08 Jun 2018 22:25)  T(F): 97.8 (09 Jun 2018 05:14), Max: 98 (08 Jun 2018 22:25)  HR: 62 (09 Jun 2018 05:14) (62 - 77)  BP: 132/85 (09 Jun 2018 05:14) (122/82 - 147/81)  RR: 14 (09 Jun 2018 05:14) (14 - 16)  SpO2: 97% (09 Jun 2018 05:14) (94% - 97%)    PHYSICAL EXAM:  General: WDWN  HEENT: NC/AT; PERRL, anicteric sclera; MMM  Neck: supple  Cardiovascular: +S1/S2; RRR  Respiratory: CTA B/L; no W/R/R  Gastrointestinal: soft, NT/ND; +BSx4  Extremities: WWP; no edema, clubbing or cyanosis  Vascular: 2+ radial, DP/PT pulses B/L  Neurological: AAOx3; no focal deficits    MEDICATIONS  (STANDING):  ALBUTerol    90 MICROgram(s) HFA Inhaler 1 Puff(s) Inhalation every 4 hours  enoxaparin Injectable 90 milliGRAM(s) SubCutaneous every 12 hours  pantoprazole    Tablet 40 milliGRAM(s) Oral before breakfast  tiotropium 18 MICROgram(s) Capsule 1 Capsule(s) Inhalation daily    MEDICATIONS  (PRN):  acetaminophen  Suppository 650 milliGRAM(s) Rectal every 6 hours PRN For Temp greater than 38 C (100.4 F)  HYDROmorphone  Injectable 0.5 milliGRAM(s) IV Push every 4 hours PRN Severe Pain (7 - 10)  ondansetron Injectable 4 milliGRAM(s) IV Push every 6 hours PRN Nausea and/or Vomiting  zaleplon 5 milliGRAM(s) Oral at bedtime PRN Insomnia      ALLERGIES:  allergic to cats (Rash)  No Known Drug Allergies    LABS:                        10.1   8.9   )-----------( 604      ( 08 Jun 2018 06:33 )             33.2     06-08    139  |  100  |  13  ----------------------------<  114<H>  3.3<L>   |  28  |  0.40<L>    Ca    8.9      08 Jun 2018 06:33  Mg     2.2     06-08      CAPILLARY BLOOD GLUCOSE  POCT Blood Glucose.: 108 mg/dL (07 Jun 2018 07:50)    RADIOLOGY & ADDITIONAL TESTS: Reviewed. OVERNIGHT EVENTS: Stooled x 2-3 overnight. Slept well.  SUBJECTIVE / INTERVAL HPI: Patient seen and examined at bedside. Patient is sitting in bed composing an essay on her laptop. She's smiling, interactive, feeling well. Denies nausea/vomiting/fevers/chills/ongoing shortness of breath. Pain intermittently 7/10 - lasts for a second, resolves. Received dilaudid for an episode of short-lived pain overnight. Stooling and passing flatus.    VITAL SIGNS:  Vital Signs Last 24 Hrs  T(C): 36.6 (09 Jun 2018 05:14), Max: 36.7 (08 Jun 2018 22:25)  T(F): 97.8 (09 Jun 2018 05:14), Max: 98 (08 Jun 2018 22:25)  HR: 62 (09 Jun 2018 05:14) (62 - 77)  BP: 132/85 (09 Jun 2018 05:14) (122/82 - 147/81)  RR: 14 (09 Jun 2018 05:14) (14 - 16)  SpO2: 97% (09 Jun 2018 05:14) (94% - 97%)    PHYSICAL EXAM:  General: obese female laying in bed in no acute distress breathing room air comfortably, working on her laptop  HEENT: NC/AT; PERRL, anicteric sclera; moist mucous membranes  Neck: supple  Cardiovascular: +S1/S2; RRR, Right sided port in place (incision site c/d/i)  Respiratory: Clear to auscultation bilaterally, good respiratory effort  Gastrointestinal: belly distended but soft, some pain with palpation near area of recent stenting, and tubing sites. cholecystostomy drain in place, exit site c/d/i, draining 10cc of bilious fluid (changed a few hours earlier). Venting PEG tube in place - entrance site has some dried blood but otherwise d/i  Extremities: WWP; no clubbing or cyanosis, 1+ edema at the ankles   Vascular: 2+ radial, DP/PT pulses B/L  Neurological: AAOx3; no focal deficits    MEDICATIONS  (STANDING):  ALBUTerol    90 MICROgram(s) HFA Inhaler 1 Puff(s) Inhalation every 4 hours  enoxaparin Injectable 90 milliGRAM(s) SubCutaneous every 12 hours  pantoprazole    Tablet 40 milliGRAM(s) Oral before breakfast  tiotropium 18 MICROgram(s) Capsule 1 Capsule(s) Inhalation daily    MEDICATIONS  (PRN):  acetaminophen  Suppository 650 milliGRAM(s) Rectal every 6 hours PRN For Temp greater than 38 C (100.4 F)  HYDROmorphone  Injectable 0.5 milliGRAM(s) IV Push every 4 hours PRN Severe Pain (7 - 10)  ondansetron Injectable 4 milliGRAM(s) IV Push every 6 hours PRN Nausea and/or Vomiting  zaleplon 5 milliGRAM(s) Oral at bedtime PRN Insomnia      ALLERGIES:  allergic to cats (Rash)  No Known Drug Allergies    LABS:                        10.1   8.9   )-----------( 604      ( 08 Jun 2018 06:33 )             33.2     06-08    139  |  100  |  13  ----------------------------<  114<H>  3.3<L>   |  28  |  0.40<L>    Ca    8.9      08 Jun 2018 06:33  Mg     2.2     06-08      CAPILLARY BLOOD GLUCOSE  POCT Blood Glucose.: 108 mg/dL (07 Jun 2018 07:50)    RADIOLOGY & ADDITIONAL TESTS: Reviewed.

## 2018-06-09 NOTE — PROGRESS NOTE ADULT - ASSESSMENT
67 yr old male with a PMHx of recently diagnosed colon cancer presenting from oncologist's office with acute cholecystitis and hepatic vein thrombosis, deemed poor surgical candidate 2/2 active metastic colon Ca, s/p perc arleen with cholecystostomy drain in place. Now with partial SBO and ileus, with venting PEG in place, s/p colonic stent placement (x2) by GI. Tolerating a clear diet with improving abdominal distention. 67 yr old male with a PMHx of recently diagnosed colon cancer presenting from oncologist's office with acute cholecystitis and hepatic vein thrombosis, deemed poor surgical candidate 2/2 active metastic colon Ca, s/p perc arleen with cholecystostomy drain in place.  S/p stenting by GI with subsequent resolution of partial SBO, now with venting PEG in place. Tolerating a puree diet with improving abdominal exam. First session chemotherapy 6/9.

## 2018-06-09 NOTE — PROGRESS NOTE ADULT - PROBLEM SELECTOR PLAN 4
Stage 4 metastatic colon cancer with liver mets and peritoneal carcinomatosis  - plan per Dr. Nick   - s/p chemoport placement 5/31   - heme onc following  - palliative consulted given metastatic CRC, pt aware of 1-2 yr prognosis; MOLST   completed. NOT DNR/DNI: Would like compressions, intubation with time      limitation.   -CT abdomen/pelvis positive for ascites secondary to abdominal mets. Mets cause  of partial bowel obstruction. Patient for stent placement to open and relieve partial  obstruction today.  -Palliative following, recommend pain regimen as follows: 4mg decadron IV q6h for  capsular liver pain, benadryl 25mg IV push at bedtime, oxycodone IR 5mg q6h, dilaudid 0.5mg IV q4 PRN for severe pain. Consider scale back with improving symptoms. Stable. Chronic anemia most likely 2/2 active colon malignancy. Received 1 uPRBC this admission following IR procedure. Has remained stable.   -Microcytic, f/u iron studies  -transfuse hgb<7  -maintain active type and screen: renewed 6/5.

## 2018-06-09 NOTE — PROGRESS NOTE ADULT - SUBJECTIVE AND OBJECTIVE BOX
Hem/Onc    PILLO PEDRAZA  [id number]    INTERVAL Hx:  S/p NGT placement on 6/5 with good relief. S/p vent PEG placement, colonoscopy and stent placement x 2 ion 6/6 and 6/7.      HPI: 67 y.o woman with newly diagnosed metastatic colon cancer- we were completing outpatient work up and were planning systemic treatment- the patient received IV iron last week, she had staging PET/CT this week and was scheduled for Infusaport placement. She presented to our office on 5/24 with fever/vomiting and abdominal pain> She was sent to the ER with suspicion for an obstruction (known large R sided/obstructing lesion).  Work up at [facility] with CT scan showed findings c/w acute cholecystitis and SMV and portal vein thrombosis. Acute cholecystitis confirmed by US.     ROS:  Mild nausea   Mild abdominal discomfort  No fever, chills, night sweats.   + fatigue, no headaches/dizziness.    No dysuria/hematuria.  No history of easy bruising/bleeding.     No leg pain or leg swelling.    ROS is otherwise negative.    PMH/PSH:  PAST MEDICAL & SURGICAL HISTORY:  Colon cancer      MEDICATIONS  (STANDING):  ALBUTerol    90 MICROgram(s) HFA Inhaler 1 Puff(s) Inhalation every 4 hours  ampicillin/sulbactam  IVPB 3 Gram(s) IV Intermittent every 6 hours  dexamethasone  Injectable 4 milliGRAM(s) IV Push every 6 hours  diphenhydrAMINE   Injectable 25 milliGRAM(s) IV Push at bedtime  enoxaparin Injectable 90 milliGRAM(s) SubCutaneous two times a day  octreotide  Infusion 20 MICROgram(s)/Hr (4 mL/Hr) IV Continuous <Continuous>  pantoprazole  Injectable 40 milliGRAM(s) IV Push daily  polyethylene glycol 3350 17 Gram(s) Oral two times a day  potassium chloride  10 mEq/100 mL IVPB 10 milliEquivalent(s) IV Intermittent every 1 hour  sodium chloride 0.9%. 1000 milliLiter(s) (80 mL/Hr) IV Continuous <Continuous>  tiotropium 18 MICROgram(s) Capsule 1 Capsule(s) Inhalation daily    MEDICATIONS  (PRN):  acetaminophen  Suppository 650 milliGRAM(s) Rectal every 6 hours PRN For Temp greater than 38 C (100.4 F)  ALBUTerol/ipratropium for Nebulization 3 milliLiter(s) Nebulizer every 4 hours PRN Shortness of Breath and/or Wheezing  ondansetron Injectable 4 milliGRAM(s) IV Push every 6 hours PRN Nausea and/or Vomiting  oxyCODONE    IR 5 milliGRAM(s) Oral every 6 hours PRN Severe Pain (7 - 10)    Allergies    allergic to cats (Rash)  No Known Drug Allergies    Intolerances    Vital Signs Last 24 Hrs  Vital Signs Last 24 Hrs  T(C): 36.6 (09 Jun 2018 05:14), Max: 36.7 (08 Jun 2018 22:25)  T(F): 97.8 (09 Jun 2018 05:14), Max: 98 (08 Jun 2018 22:25)  HR: 62 (09 Jun 2018 05:14) (62 - 77)  BP: 132/85 (09 Jun 2018 05:14) (122/82 - 139/85)  BP(mean): --  RR: 14 (09 Jun 2018 05:14) (14 - 16)  SpO2: 97% (09 Jun 2018 05:14) (94% - 97%)    HEENT: pale mucosae, anicteric sclerae  No palpable peripheral lymphadenopathy  COR: regular rhythm rate, no murmurs, rubs or gallops  ABD: soft, not distended, mild discomfort with palpation  , RUQ + drain in place  EXT: no edema  SKIN: no lesions on visible skin  R side port     Labs:  reviewed              Assessment:    Metastatic colon cancer  Severe iron deficiency anemia  Acute cholecystitis  SMV/portal vein thrombosis    Plan:    Clinically better  S/p  colonoscopy with successful stent placement x 2 on 6/7,  less abdominal discomfort and distention      S/p percutaneous cholecystotomy on 5/25  S/p Infusaport placement on 5/31    SMV/Portal vein thrombosis- Continue Lovenox for now,  she will eventually be transitioned to an oral anticoagulant     Newly diagnosed colon cancer- fairly rapidly progressive symptomatic disease   We will initiate palliative tx with FOLFOX today 6/9  I discussed treatment and it's potential benefit and side effects with the patient.   Consent has been signed.    Addition of biologic agent will be considered down the line, at this moment she can not get bevacizumab due to a risk of bleeding     Severe YESSICA-she received parenteral iron as outpatient  H/H is better and stable  PRBC as clinically indicated    Reactive thrombocytosis (YESSICA, malignancy) no specific intervention is indicated    Thank you    Yara Jackson MD for Estefania Lundy MD  [phone] Hem/Onc    PILLO PEDRAZA  MRN-[id number]    INTERVAL Hx:  S/p NGT placement on 6/5 with good relief. S/p vent PEG placement, colonoscopy and stent placement x 2 ion 6/6 and 6/7.    bowel movement this morning  no vomiting  occasional epigastric discomfort    HPI: 67 y.o woman with newly diagnosed metastatic colon cancer- we were completing outpatient work up and were planning systemic treatment- the patient received IV iron last week, she had staging PET/CT this week and was scheduled for Infusaport placement. She presented to our office on 5/24 with fever/vomiting and abdominal pain> She was sent to the ER with suspicion for an obstruction (known large R sided/obstructing lesion).  Work up at [facility] with CT scan showed findings c/w acute cholecystitis and SMV and portal vein thrombosis. Acute cholecystitis confirmed by US.     ROS:  Mild nausea   Mild abdominal discomfort  No fever, chills, night sweats.   + fatigue, no headaches/dizziness.    No dysuria/hematuria.  No history of easy bruising/bleeding.     No leg pain or leg swelling.    ROS is otherwise negative.    PMH/PSH:  PAST MEDICAL & SURGICAL HISTORY:  Colon cancer      MEDICATIONS  (STANDING):  ALBUTerol    90 MICROgram(s) HFA Inhaler 1 Puff(s) Inhalation every 4 hours  ampicillin/sulbactam  IVPB 3 Gram(s) IV Intermittent every 6 hours  dexamethasone  Injectable 4 milliGRAM(s) IV Push every 6 hours  diphenhydrAMINE   Injectable 25 milliGRAM(s) IV Push at bedtime  enoxaparin Injectable 90 milliGRAM(s) SubCutaneous two times a day  octreotide  Infusion 20 MICROgram(s)/Hr (4 mL/Hr) IV Continuous <Continuous>  pantoprazole  Injectable 40 milliGRAM(s) IV Push daily  polyethylene glycol 3350 17 Gram(s) Oral two times a day  potassium chloride  10 mEq/100 mL IVPB 10 milliEquivalent(s) IV Intermittent every 1 hour  sodium chloride 0.9%. 1000 milliLiter(s) (80 mL/Hr) IV Continuous <Continuous>  tiotropium 18 MICROgram(s) Capsule 1 Capsule(s) Inhalation daily    MEDICATIONS  (PRN):  acetaminophen  Suppository 650 milliGRAM(s) Rectal every 6 hours PRN For Temp greater than 38 C (100.4 F)  ALBUTerol/ipratropium for Nebulization 3 milliLiter(s) Nebulizer every 4 hours PRN Shortness of Breath and/or Wheezing  ondansetron Injectable 4 milliGRAM(s) IV Push every 6 hours PRN Nausea and/or Vomiting  oxyCODONE    IR 5 milliGRAM(s) Oral every 6 hours PRN Severe Pain (7 - 10)    Allergies    allergic to cats (Rash)  No Known Drug Allergies    Intolerances    Vital Signs Last 24 Hrs  Vital Signs Last 24 Hrs  T(C): 36.6 (09 Jun 2018 05:14), Max: 36.7 (08 Jun 2018 22:25)  T(F): 97.8 (09 Jun 2018 05:14), Max: 98 (08 Jun 2018 22:25)  HR: 62 (09 Jun 2018 05:14) (62 - 77)  BP: 132/85 (09 Jun 2018 05:14) (122/82 - 139/85)  BP(mean): --  RR: 14 (09 Jun 2018 05:14) (14 - 16)  SpO2: 97% (09 Jun 2018 05:14) (94% - 97%)    HEENT: pale mucosae, anicteric sclerae  No palpable peripheral lymphadenopathy  COR: regular rhythm rate, no murmurs, rubs or gallops  ABD: soft, not distended, mild discomfort with palpation  , RUQ + drain in place  EXT: no edema  SKIN: no lesions on visible skin  R side port     Labs:  reviewed              Assessment:    Metastatic colon cancer  Severe iron deficiency anemia  Acute cholecystitis  SMV/portal vein thrombosis    Plan:    Clinically better  S/p  colonoscopy with successful stent placement x 2 on 6/7,  less abdominal discomfort and distention      S/p percutaneous cholecystotomy on 5/25  S/p Infusaport placement on 5/31    SMV/Portal vein thrombosis- Continue Lovenox for now,  she will eventually be transitioned to an oral anticoagulant     Newly diagnosed colon cancer- fairly rapidly progressive symptomatic disease   We will initiate palliative tx with FOLFOX today 6/9  I discussed treatment and it's potential benefit and side effects with the patient.   Consent has been signed.    Addition of biologic agent will be considered down the line, at this moment she can not get bevacizumab due to a risk of bleeding     Severe YESSICA-she received parenteral iron as outpatient  H/H is better and stable  PRBC as clinically indicated    Reactive thrombocytosis (YESSICA, malignancy) no specific intervention is indicated    Thank you    Yara Renetta MD andrews Lundy MD  [phone]

## 2018-06-09 NOTE — PROGRESS NOTE ADULT - PROBLEM SELECTOR PLAN 3
Resolved. s/p IR percutaneous cholecystostomy placement 5/25. cholecystostomy drain in place, continues to drain small amounts daily (approx 50cc).  -biliary fluid grew E. faecium, citrobacter, e. coli  -completed 10 day course of unasyn/augmentin on 6/4   -blood cx NG  -cholecystostomy tube remains in place, continues to drain small amounts CTA A/P s/f hepatic vein thrombosis; most likely 2/2 hypercoagulable state in the setting of active untreated malignancy  -continue with lovenox 90mg BID with intent to transition to DOAC on discharge  -f/u Dr. Nick outpatient

## 2018-06-09 NOTE — PROGRESS NOTE ADULT - ASSESSMENT
68 YO F with PMHx significant for metastatic colon cancer admitted on 5/25/18 for evaluation of abdominal pain, fever, weakness, and found to have acute cholecystitis. She was deemed not to be a good surgical candidate and had a cholecystostomy tube placed and is on abx. She has had worsening abdominal distention and CT showed a partial SBO and ileus, distal ascending/proximal transverse mass with narrowing of the transverse colon now s/p colonoscopy with 2 stents placed that have opened up her GI tract and he is able to eat and have bowel movements.    - advance diet slowly  - if any evidence of obstruction, make NPO and use peg to suction, call GI  - heme/onc to start chemo today  - stop miralax in setting of diarrhea  - PPI once daily    Discussed with attending Dr Gwen LEWIS following

## 2018-06-09 NOTE — PROGRESS NOTE ADULT - PROBLEM SELECTOR PLAN 2
Resolving. CT abdomen/pelvis with contrast 6/1 consistently concerning for ileus v. partial SBO.   -ambulation orders: walking with PT, encourage ambulation with assistance. Patient   walking the halls.  -Continue to monitor for pain, emesis, bowel movements, flatus, fever. Resolved. s/p IR percutaneous cholecystostomy placement 5/25. cholecystostomy drain in place, continues to drain small amounts daily   -completed 10 day course of unasyn/augmentin on 6/4

## 2018-06-09 NOTE — PROGRESS NOTE ADULT - PROBLEM SELECTOR PLAN 1
Diagnosed 5/31 by clinical exam, abdominal xray and CT A/P.  Secondary to metastases, ascites.   -GI following: two stents placed on 6/6 in attempt to relieve partial SBO-one in the sigmoid colon and one at splenic flexure. Repeat procedure on 6/7 -able to pass through to hepatic flexure.   -Venting PEG placed on 6/6   -surgery following: ileostomy no longer required at this time  -continue with zofran 4mg IV PRN, octreotide gtt at 4 cc/hr for nausea relief  -ambulation orders: walking with PT, encourage ambulation with assistance.   -Patient currently afebrile with ongoing abdominal pain, remains distended. Continue to monitor for pain, emesis, bowel movements, flatus, fever.  -pain persists, though intermittent. Wean from opioid medications or consider relistor to decrease risk of constipation if pain requiring opioids persists Stage 4 metastatic colon cancer with liver mets and peritoneal carcinomatosis  - heme/onc following: plan per Dr. Nick   - s/p chemoport placement 5/31, initiation of chemotherapy 6/9  - s/p successful stent placement for relief of partial SBO 2/2 colonic mets.  - Palliative following, recommend pain regimen as follows: 4mg decadron IV q6h for capsular liver pain, benadryl 25mg IV push at bedtime, oxycodone IR 5mg q6h, dilaudid 0.5mg IV q4 PRN for severe pain.

## 2018-06-09 NOTE — PROGRESS NOTE ADULT - PROBLEM SELECTOR PLAN 7
Stable. Chronic anemia most likely 2/2 active colon malignancy. Received 1 uPRBC this admission following IR procedure. Has remained stable.   -transfuse hgb<7  -maintain active type and screen: renewed 6/5. Resolved. Likely secondary to abdominal distention and compression of diaphragm/lungs, pain. Lungs clear.  -continue to encourage incentive spirometer and increased activity to promote respiratory re-conditioning.

## 2018-06-09 NOTE — PROGRESS NOTE ADULT - PROBLEM SELECTOR PLAN 5
CTA A/P s/f hepatic vein thrombosis; most likely 2/2 hypercoagulable state in the setting of active untreated malignancy  -continue with lovenox 90mg BID with intent to transition to DOAC  -f/u Dr. Nick outpatient Resolved. Diagnosed 5/31 by clinical exam, abdominal xray and CT A/P.  Secondary to metastases, ascites.   -GI following: two stents placed on 6/6 in attempt to relieve partial SBO-one in the sigmoid colon and one at splenic flexure. Repeat procedure on 6/7 -able to pass through to hepatic flexure.   -Venting PEG placed on 6/6   -surgery signed off following resolution of partial SBO.  -ambulation orders: walking with PT, encourage ambulation with assistance.   -Patient currently afebrile with ongoing abdominal pain, remains distended. Continue to monitor for pain, emesis, bowel movements, flatus, fever.  -pain persists, though intermittent. Wean from opioid medications or consider relistor to decrease risk of constipation if pain requiring opioids persists

## 2018-06-09 NOTE — PROGRESS NOTE ADULT - SUBJECTIVE AND OBJECTIVE BOX
Pt seen and examined at bedside  No new c/o  Feels good  Denies n/v/d, abdominal pain     MEDICATIONS:  MEDICATIONS  (STANDING):  ALBUTerol    90 MICROgram(s) HFA Inhaler 1 Puff(s) Inhalation every 4 hours  dexamethasone  IVPB 10 milliGRAM(s) IV Intermittent once  enoxaparin Injectable 90 milliGRAM(s) SubCutaneous every 12 hours  fluorouracil IVPB (eMAR) 760 milliGRAM(s) IV Intermittent once  fluorouracil IVPB (eMAR) 4500 milliGRAM(s) IV Intermittent once  leucovorin IVPB (eMAR) 380 milliGRAM(s) IV Intermittent once  ondansetron  IVPB 16 milliGRAM(s) IV Intermittent once  OXALIplatin IVPB (eMAR) 160 milliGRAM(s) IV Intermittent once  pantoprazole    Tablet 40 milliGRAM(s) Oral before breakfast  tiotropium 18 MICROgram(s) Capsule 1 Capsule(s) Inhalation daily    MEDICATIONS  (PRN):  acetaminophen  Suppository 650 milliGRAM(s) Rectal every 6 hours PRN For Temp greater than 38 C (100.4 F)  HYDROmorphone  Injectable 0.5 milliGRAM(s) IV Push every 4 hours PRN Severe Pain (7 - 10)  zaleplon 5 milliGRAM(s) Oral at bedtime PRN Insomnia      Allergies  allergic to cats (Rash)  No Known Drug Allergies    Intolerances      Vital Signs Last 24 Hrs  T(C): 36.6 (09 Jun 2018 12:30), Max: 36.7 (08 Jun 2018 22:25)  T(F): 97.9 (09 Jun 2018 12:30), Max: 98 (08 Jun 2018 22:25)  HR: 68 (09 Jun 2018 12:30) (62 - 77)  BP: 121/79 (09 Jun 2018 12:30) (121/79 - 132/85)  BP(mean): --  RR: 16 (09 Jun 2018 12:30) (14 - 16)  SpO2: 98% (09 Jun 2018 12:30) (95% - 98%)    06-08 @ 07:01  -  06-09 @ 07:00  --------------------------------------------------------  IN: 0 mL / OUT: 100 mL / NET: -100 mL      PHYSICAL EXAM:  General: Well developed; well nourished; in no acute distress  HEENT: MMM, conjunctiva and sclera clear  Gastrointestinal: Soft mildly-tender mod-distended; +PEG site c/d/i  Skin: Warm and dry. No obvious rash      LABS:  CBC Full  -  ( 09 Jun 2018 07:58 )  WBC Count : 8.0 K/uL  Hemoglobin : 10.0 g/dL  Hematocrit : 32.8 %  Platelet Count - Automated : 516 K/uL  Mean Cell Volume : 76.1 fL  Mean Cell Hemoglobin : 23.2 pg  Mean Cell Hemoglobin Concentration : 30.5 g/dL    138  |  101  |  10  ----------------------------<  105<H>  3.5   |  30  |  0.37<L>    Ca    8.4      09 Jun 2018 07:58  Mg     2.1     06-09          RADIOLOGY & ADDITIONAL STUDIES (The following images were personally reviewed):

## 2018-06-10 LAB
FERRITIN SERPL-MCNC: 126 NG/ML — SIGNIFICANT CHANGE UP (ref 15–150)
HGB BLD-MCNC: 9.8 G/DL — LOW (ref 11.5–15.5)
IRON SATN MFR SERPL: 20 % — SIGNIFICANT CHANGE UP (ref 14–50)
IRON SATN MFR SERPL: 46 UG/DL — SIGNIFICANT CHANGE UP (ref 30–160)
MAGNESIUM SERPL-MCNC: 2.2 MG/DL — SIGNIFICANT CHANGE UP (ref 1.6–2.6)
POTASSIUM SERPL-MCNC: 3.6 MMOL/L — SIGNIFICANT CHANGE UP (ref 3.5–5.3)
POTASSIUM SERPL-SCNC: 3.6 MMOL/L — SIGNIFICANT CHANGE UP (ref 3.5–5.3)
SODIUM SERPL-SCNC: 138 MMOL/L — SIGNIFICANT CHANGE UP (ref 135–145)
TIBC SERPL-MCNC: 233 UG/DL — SIGNIFICANT CHANGE UP (ref 220–430)
TRANSFERRIN SERPL-MCNC: 188 MG/DL — LOW (ref 200–360)
UIBC SERPL-MCNC: 187 UG/DL — SIGNIFICANT CHANGE UP (ref 110–370)

## 2018-06-10 PROCEDURE — 99233 SBSQ HOSP IP/OBS HIGH 50: CPT | Mod: GC

## 2018-06-10 RX ORDER — POTASSIUM CHLORIDE 20 MEQ
40 PACKET (EA) ORAL ONCE
Qty: 0 | Refills: 0 | Status: DISCONTINUED | OUTPATIENT
Start: 2018-06-10 | End: 2018-06-10

## 2018-06-10 RX ORDER — POTASSIUM CHLORIDE 20 MEQ
40 PACKET (EA) ORAL ONCE
Qty: 0 | Refills: 0 | Status: COMPLETED | OUTPATIENT
Start: 2018-06-10 | End: 2018-06-10

## 2018-06-10 RX ORDER — OXYCODONE HYDROCHLORIDE 5 MG/1
5 TABLET ORAL EVERY 4 HOURS
Qty: 0 | Refills: 0 | Status: DISCONTINUED | OUTPATIENT
Start: 2018-06-10 | End: 2018-06-12

## 2018-06-10 RX ADMIN — HYDROMORPHONE HYDROCHLORIDE 0.5 MILLIGRAM(S): 2 INJECTION INTRAMUSCULAR; INTRAVENOUS; SUBCUTANEOUS at 19:45

## 2018-06-10 RX ADMIN — ENOXAPARIN SODIUM 90 MILLIGRAM(S): 100 INJECTION SUBCUTANEOUS at 23:01

## 2018-06-10 RX ADMIN — HYDROMORPHONE HYDROCHLORIDE 0.5 MILLIGRAM(S): 2 INJECTION INTRAMUSCULAR; INTRAVENOUS; SUBCUTANEOUS at 19:27

## 2018-06-10 RX ADMIN — ENOXAPARIN SODIUM 90 MILLIGRAM(S): 100 INJECTION SUBCUTANEOUS at 12:42

## 2018-06-10 RX ADMIN — PANTOPRAZOLE SODIUM 40 MILLIGRAM(S): 20 TABLET, DELAYED RELEASE ORAL at 07:09

## 2018-06-10 RX ADMIN — HYDROMORPHONE HYDROCHLORIDE 0.5 MILLIGRAM(S): 2 INJECTION INTRAMUSCULAR; INTRAVENOUS; SUBCUTANEOUS at 04:50

## 2018-06-10 RX ADMIN — HYDROMORPHONE HYDROCHLORIDE 0.5 MILLIGRAM(S): 2 INJECTION INTRAMUSCULAR; INTRAVENOUS; SUBCUTANEOUS at 05:28

## 2018-06-10 RX ADMIN — Medication 40 MILLIEQUIVALENT(S): at 23:53

## 2018-06-10 NOTE — PROGRESS NOTE ADULT - ASSESSMENT
67 yr old male with a PMHx of recently diagnosed colon cancer presenting from oncologist's office with acute cholecystitis and hepatic vein thrombosis, deemed poor surgical candidate 2/2 active metastic colon Ca, s/p perc arleen with cholecystostomy drain in place.  S/p stenting by GI with subsequent resolution of partial SBO, now with venting PEG in place. Tolerating a puree diet with improving abdominal exam. First session chemotherapy 6/9.

## 2018-06-10 NOTE — PROGRESS NOTE ADULT - SUBJECTIVE AND OBJECTIVE BOX
feels well. Denies abdominal pain. no vomiting  tolerating PO    VITAL SIGNS: Vital Signs Last 24 Hrs  T(C): 36.6 (10 Willy 2018 11:51), Max: 36.9 (10 Willy 2018 05:48)  T(F): 97.8 (10 Willy 2018 11:51), Max: 98.4 (10 Willy 2018 05:48)  HR: 65 (10 Willy 2018 11:51) (61 - 66)  BP: 132/85 (10 Willy 2018 11:51) (118/74 - 135/88)  BP(mean): --  RR: 16 (10 Willy 2018 11:51) (16 - 16)  SpO2: 98% (10 Willy 2018 11:51) (95% - 98%)      PHYSICAL EXAM:  General: obese female laying in bed in no acute distress breathing room air comfortably, working on her laptop  HEENT: NC/AT; PERRL, anicteric sclera; moist mucous membranes  Neck: supple  Cardiovascular: +S1/S2; RRR, Right sided port in place (incision site c/d/i)  Respiratory: Clear to auscultation bilaterally, good respiratory effort  Gastrointestinal: belly distended but soft, some pain with palpation near area of recent stenting, and tubing sites. cholecystostomy drain in place, exit site c/d/i, draining 10cc of bilious fluid (changed a few hours earlier). Venting PEG tube in place - entrance site has some dried blood but otherwise d/i  Extremities: WWP; no clubbing or cyanosis, 1+ edema at the ankles   Vascular: 2+ radial, DP/PT pulses B/L  Neurological: AAOx3; no focal deficits

## 2018-06-10 NOTE — PROGRESS NOTE ADULT - PROBLEM SELECTOR PLAN 1
Stage 4 metastatic colon cancer with liver mets and peritoneal carcinomatosis  undergoing chemotherapy

## 2018-06-10 NOTE — PROGRESS NOTE ADULT - PROBLEM SELECTOR PLAN 5
Resolved. Diagnosed 5/31 by clinical exam, abdominal xray and CT A/P.  Secondary to metastases, ascites.   -GI following: two stents placed on 6/6 in attempt to relieve partial SBO-one in the sigmoid colon and one at splenic flexure. Repeat procedure on 6/7 -able to pass through to hepatic flexure.   -Venting PEG placed on 6/6   -surgery signed off following resolution of partial SBO.  -ambulation orders: walking with PT, encourage ambulation with assistance.   -Patient currently afebrile with ongoing abdominal pain, remains distended. Continue to monitor for pain, emesis, bowel movements, flatus, fever.  -pain persists, though intermittent. Wean from opioid medications or consider relistor to decrease risk of constipation if pain requiring opioids persists

## 2018-06-11 LAB
ANION GAP SERPL CALC-SCNC: 12 MMOL/L — SIGNIFICANT CHANGE UP (ref 5–17)
BUN SERPL-MCNC: 11 MG/DL — SIGNIFICANT CHANGE UP (ref 7–23)
CALCIUM SERPL-MCNC: 8.6 MG/DL — SIGNIFICANT CHANGE UP (ref 8.4–10.5)
CHLORIDE SERPL-SCNC: 101 MMOL/L — SIGNIFICANT CHANGE UP (ref 96–108)
CO2 SERPL-SCNC: 26 MMOL/L — SIGNIFICANT CHANGE UP (ref 22–31)
CREAT SERPL-MCNC: 0.47 MG/DL — LOW (ref 0.5–1.3)
GLUCOSE SERPL-MCNC: 107 MG/DL — HIGH (ref 70–99)
HCT VFR BLD CALC: 33.4 % — LOW (ref 34.5–45)
HGB BLD-MCNC: 10.1 G/DL — LOW (ref 11.5–15.5)
LDH SERPL L TO P-CCNC: 297 U/L — HIGH (ref 50–242)
MAGNESIUM SERPL-MCNC: 2.4 MG/DL — SIGNIFICANT CHANGE UP (ref 1.6–2.6)
MCHC RBC-ENTMCNC: 23.1 PG — LOW (ref 27–34)
MCHC RBC-ENTMCNC: 30.2 G/DL — LOW (ref 32–36)
MCV RBC AUTO: 76.4 FL — LOW (ref 80–100)
PHOSPHATE SERPL-MCNC: 2.8 MG/DL — SIGNIFICANT CHANGE UP (ref 2.5–4.5)
PLATELET # BLD AUTO: 496 K/UL — HIGH (ref 150–400)
POTASSIUM SERPL-MCNC: 4.4 MMOL/L — SIGNIFICANT CHANGE UP (ref 3.5–5.3)
POTASSIUM SERPL-SCNC: 4.4 MMOL/L — SIGNIFICANT CHANGE UP (ref 3.5–5.3)
RBC # BLD: 4.37 M/UL — SIGNIFICANT CHANGE UP (ref 3.8–5.2)
RBC # FLD: 30.5 % — HIGH (ref 10.3–16.9)
SODIUM SERPL-SCNC: 139 MMOL/L — SIGNIFICANT CHANGE UP (ref 135–145)
URATE SERPL-MCNC: 3.6 MG/DL — SIGNIFICANT CHANGE UP (ref 2.5–7)
WBC # BLD: 5.8 K/UL — SIGNIFICANT CHANGE UP (ref 3.8–10.5)
WBC # FLD AUTO: 5.8 K/UL — SIGNIFICANT CHANGE UP (ref 3.8–10.5)

## 2018-06-11 PROCEDURE — 99233 SBSQ HOSP IP/OBS HIGH 50: CPT | Mod: GC

## 2018-06-11 PROCEDURE — 99233 SBSQ HOSP IP/OBS HIGH 50: CPT

## 2018-06-11 RX ORDER — ONDANSETRON 8 MG/1
4 TABLET, FILM COATED ORAL EVERY 6 HOURS
Qty: 0 | Refills: 0 | Status: DISCONTINUED | OUTPATIENT
Start: 2018-06-11 | End: 2018-06-12

## 2018-06-11 RX ORDER — OXYCODONE HYDROCHLORIDE 5 MG/1
10 TABLET ORAL EVERY 4 HOURS
Qty: 0 | Refills: 0 | Status: DISCONTINUED | OUTPATIENT
Start: 2018-06-11 | End: 2018-06-12

## 2018-06-11 RX ORDER — POLYETHYLENE GLYCOL 3350 17 G/17G
17 POWDER, FOR SOLUTION ORAL DAILY
Qty: 0 | Refills: 0 | Status: DISCONTINUED | OUTPATIENT
Start: 2018-06-11 | End: 2018-06-12

## 2018-06-11 RX ADMIN — ENOXAPARIN SODIUM 90 MILLIGRAM(S): 100 INJECTION SUBCUTANEOUS at 11:40

## 2018-06-11 RX ADMIN — ONDANSETRON 4 MILLIGRAM(S): 8 TABLET, FILM COATED ORAL at 11:40

## 2018-06-11 RX ADMIN — PANTOPRAZOLE SODIUM 40 MILLIGRAM(S): 20 TABLET, DELAYED RELEASE ORAL at 06:59

## 2018-06-11 RX ADMIN — OXYCODONE HYDROCHLORIDE 10 MILLIGRAM(S): 5 TABLET ORAL at 13:00

## 2018-06-11 RX ADMIN — POLYETHYLENE GLYCOL 3350 17 GRAM(S): 17 POWDER, FOR SOLUTION ORAL at 11:41

## 2018-06-11 RX ADMIN — ENOXAPARIN SODIUM 90 MILLIGRAM(S): 100 INJECTION SUBCUTANEOUS at 23:22

## 2018-06-11 RX ADMIN — OXYCODONE HYDROCHLORIDE 10 MILLIGRAM(S): 5 TABLET ORAL at 14:11

## 2018-06-11 NOTE — PROGRESS NOTE ADULT - PROBLEM SELECTOR PLAN 1
Clinically resolved after colonoscopy with sigmoid and splenic flexure stents.  -Change Miralax to PRN Clinically resolved after colonoscopy with sigmoid and splenic flexure stents.  -Continue Miralax daily

## 2018-06-11 NOTE — PROGRESS NOTE ADULT - SUBJECTIVE AND OBJECTIVE BOX
PILLO PEDRAZA             MRN-[id number]    CC: Support services     HPI:  68 y/o female with a PMHx of recently dx metastatic ascending colon cancer (dx 2 weeks ago w/ mets to the liver and peritoneal carcinomatosis) that presents from Dr. Lundy's office with acute onset RUQ abdominal pain, fever and weakness for the past 3 days. Pain has worsened over the past 3 days and last evening was associated with one episode of vomiting (nonbloody, questionable bilious content).  Last BM was 2 days prior to presentation. She denies any associated chills, chest pain/discomfort/pressure, SOB/dyspnea, dysuria, diarrhea, hematochezia. Had CT A/P in ED which was s/f acute cholecystitis and hepatic vein thrombosis.    Cancer Hx: pt had a normal colonoscopy 2 years ago-had a polypectomy with benign pathology per pt. States she started getting non-specific abdominal pain starting last summer which she self-medicated with rolaids. States she ad 4 total episodes of abdominal pain over the summer and eventually went to see GI who did colonoscopy 2 weeks ago and diagnosed her with sub-total obstructing ascending colon cancer; had PET scan on Tuesday which showed liver mets and peritoneal carcinomatosis; plan for chemoport placement in June; patient also endorsing intermittent fevers, chills, and unintentional 25 lbs weight loss since January Denies any family hx of colon cancer.  ED Course  Vital Signs   TMax: 100.3 HR: 97 BP: 116/73 RR: 16 SpO2: 96%   CT a/p findings as above   s/p zosyn 3.375 q6  s/p initiation of heparin gtt for hepatic vein thrombosis (25 May 2018 02:05)    SUBJECTIVE:   Patient seen working with physical therapy and is in pretty good spirit. Patient had her first cycle of chemotherapy with FOLFOX x1 on Saturday. Complains of some nausea likely related to chemotherapy. Patient only used Dilaudid 0.5mgx 1 in the past 24hrs.     ROS:  DYSPNEA: NO  NAUS/VOM: NO	  SECRETIONS: NO	  AGITATION: NO  Pain (Y/N):       -Provocation/Palliation:  -Quality/Quantity:  -Radiating:  -Severity:  -Timing/Frequency:  -Impact on ADLs:    OTHER REVIEW OF SYSTEMS:  UNABLE TO OBTAIN  due to:    PEx:  T(C): 36.9 (06-11-18 @ 05:44), Max: 36.9 (06-11-18 @ 05:44)  HR: 70 (06-11-18 @ 05:44) (67 - 70)  BP: 134/80 (06-11-18 @ 05:44) (121/72 - 134/80)  RR: 16 (06-11-18 @ 05:44) (16 - 16)  SpO2: 97% (06-11-18 @ 05:44) (97% - 98%)  Wt(kg): --    GENERAL:  NAD  HEENT: NCAT Non icteric PERRL   Neck:  Supple, no masses  CVS: Normal S1, S2, No M/R/G  Resp: Unlabored CTAB  GI: Soft Distension improved. Active BS, perc drain in place  : Normal   Musc: No C/C/E    Neuro: No focal deficits. Following commands  Psych:  Improved mood  Skin: Xerosis. Right chest infuse a port scar c/d/i  Lymph: Normal    	     ALLERGIES: allergic to cats (Rash)  No Known Drug Allergies      OPIATE NAÏVE (Y/N): YES    MEDICATIONS: REVIEWED  MEDICATIONS  (STANDING):  ALBUTerol    90 MICROgram(s) HFA Inhaler 1 Puff(s) Inhalation every 4 hours  enoxaparin Injectable 90 milliGRAM(s) SubCutaneous every 12 hours  pantoprazole    Tablet 40 milliGRAM(s) Oral before breakfast  polyethylene glycol 3350 17 Gram(s) Oral daily  tiotropium 18 MICROgram(s) Capsule 1 Capsule(s) Inhalation daily    MEDICATIONS  (PRN):  acetaminophen  Suppository 650 milliGRAM(s) Rectal every 6 hours PRN For Temp greater than 38 C (100.4 F)  ondansetron Injectable 4 milliGRAM(s) IV Push every 6 hours PRN Nausea and/or Vomiting  oxyCODONE    IR 10 milliGRAM(s) Oral every 4 hours PRN Severe Pain (7 - 10)  oxyCODONE    IR 5 milliGRAM(s) Oral every 4 hours PRN Moderate Pain (4 - 6)  zaleplon 5 milliGRAM(s) Oral at bedtime PRN Insomnia      LABS: REVIEWED        IMAGING: REVIEWED    Advanced Directives:      Full code          MOLST       HCP - Ongoing    Decision maker: The patient is able to participate in complex medical decision making conversations  Legal surrogate: No designated HCP, but the patient is thinking of assigning her sister in [place] and her friend in [facility].    GOALS OF CARE DISCUSSION       Palliative care info/counseling provided	           Documentation of GOC:  Wants to pursue cancer targeted treatments.          REFERRALS	        Unit SW/Case Mgmt        - Following the patient       Patient/Family Support - Following the patient       Massage Therapy - Following the patient       Music Therapy - Following the patient       Pet Therapy - Offered       Geriatric case manager - Following the patient          CRITICAL CARE TIME PROVIDED TO UNSTABLE PT W/ ORGAN FAILURE	   Start:               End:  	       Minutes:              > 50% OF THE TIME SPENT IN COUNSELING AND COORDINATING CARE 	   Start:               End:  	       Minutes:      PROLONGED SERVICE             FACE TO FACE:    PT            PT & FAMILY	   Start:               End:  	       Minutes:      Advance Care Planning Time: PILLO PEDRAZA             MRN-[id number]    CC: Pain, Gas distension, Constipation, SBO, Support services     HPI:  66 y/o female with a PMHx of recently dx metastatic ascending colon cancer (dx 2 weeks ago w/ mets to the liver and peritoneal carcinomatosis) that presents from Dr. Lundy's office with acute onset RUQ abdominal pain, fever and weakness for the past 3 days. Pain has worsened over the past 3 days and last evening was associated with one episode of vomiting (nonbloody, questionable bilious content).  Last BM was 2 days prior to presentation. She denies any associated chills, chest pain/discomfort/pressure, SOB/dyspnea, dysuria, diarrhea, hematochezia. Had CT A/P in ED which was s/f acute cholecystitis and hepatic vein thrombosis.    Cancer Hx: pt had a normal colonoscopy 2 years ago-had a polypectomy with benign pathology per pt. States she started getting non-specific abdominal pain starting last summer which she self-medicated with rolaids. States she ad 4 total episodes of abdominal pain over the summer and eventually went to see GI who did colonoscopy 2 weeks ago and diagnosed her with sub-total obstructing ascending colon cancer; had PET scan on Tuesday which showed liver mets and peritoneal carcinomatosis; plan for chemoport placement in June; patient also endorsing intermittent fevers, chills, and unintentional 25 lbs weight loss since January Denies any family hx of colon cancer.    SUBJECTIVE:   Patient seen working with physical therapy and is in pretty good spirit. Patient had her first cycle of chemotherapy with FOLFOX x1 on Saturday. Complains of some nausea likely related to chemotherapy. Patient only used Dilaudid 0.5mgx 1 in the past 24hrs.     ROS:  DYSPNEA: No  NAUS/VOM: No	  SECRETIONS: No	  AGITATION: No  Pain (Y/N): No      -Provocation/Palliation:  -Quality/Quantity:  -Radiating:  -Severity:  -Timing/Frequency:  -Impact on ADLs:    OTHER REVIEW OF SYSTEMS: Denied    PEx:  T(C): 36.9 (06-11-18 @ 05:44), Max: 36.9 (06-11-18 @ 05:44)  HR: 70 (06-11-18 @ 05:44) (67 - 70)  BP: 134/80 (06-11-18 @ 05:44) (121/72 - 134/80)  RR: 16 (06-11-18 @ 05:44) (16 - 16)  SpO2: 97% (06-11-18 @ 05:44) (97% - 98%)  Wt(kg): 88.2    GENERAL:  NAD   HEENT: NCAT Non icteric PERRL Dry lips  Neck:  Supple, no masses  CVS: Normal S1, S2, No M/R/G  Resp: Unlabored CTAB  GI: Soft Distension improved. Active BS, perc drain and PEG in place  : Normal   Musc: No C/C/E    Neuro: No focal deficits. Following commands  Psych:  Improved mood  Skin: Xerosis. Right chest infuse a port scar c/d/i  Lymph: Normal    	     ALLERGIES: No Known Drug Allergies   INTOLERANCES: allergic to cats (Rash)    OPIATE NAÏVE (Y/N): YES    MEDICATIONS: REVIEWED  MEDICATIONS  (STANDING):  ALBUTerol    90 MICROgram(s) HFA Inhaler 1 Puff(s) Inhalation every 4 hours  enoxaparin Injectable 90 milliGRAM(s) SubCutaneous every 12 hours  pantoprazole    Tablet 40 milliGRAM(s) Oral before breakfast  polyethylene glycol 3350 17 Gram(s) Oral daily  tiotropium 18 MICROgram(s) Capsule 1 Capsule(s) Inhalation daily    MEDICATIONS  (PRN):  acetaminophen  Suppository 650 milliGRAM(s) Rectal every 6 hours PRN For Temp greater than 38 C (100.4 F)  ondansetron Injectable 4 milliGRAM(s) IV Push every 6 hours PRN Nausea and/or Vomiting  oxyCODONE    IR 10 milliGRAM(s) Oral every 4 hours PRN Severe Pain (7 - 10)  oxyCODONE    IR 5 milliGRAM(s) Oral every 4 hours PRN Moderate Pain (4 - 6)  zaleplon 5 milliGRAM(s) Oral at bedtime PRN Insomnia      LABS: REVIEWED                        10.1   5.8   )-----------( 496      ( 11 Jun 2018 06:21 )             33.4   06-11    139  |  101  |  11  ----------------------------<  107<H>  4.4   |  26  |  0.47<L>    Ca    8.6      11 Jun 2018 06:21  Phos  2.8     06-11  Mg     2.4     06-11    Uric Acid, Serum: 3.6 mg/dL (06.11.18 @ 06:21)    Lactate Dehydrogenase, Serum: 297 U/L (06.11.18 @ 06:21)    Transferrin, Serum: 188 mg/dL (06.10.18 @ 08:34)  Ferritin, Serum: 126: Note: New reference ranges effective 04/16/2018. ng/mL (06.10.18 @ 08:34)  Iron with Total Binding Capacity in AM (06.10.18 @ 08:34)    % Saturation, Iron: 20 %    Iron - Total Binding Capacity.: 233 ug/dL    Iron Total, Serum: 46 ug/dL    Unsaturated Iron Binding Capacity: 187 ug/dL    Direct Lien Poly: Negative (06.08.18 @ 08:54)  Antibody ID [id number]: Anti-E (06.08.18 @ 07:53)    IMAGING: REVIEWED    Advanced Directives:      Full code          MOLST       HCP - Ongoing    Decision maker: The patient is able to participate in complex medical decision making conversations  Legal surrogate: No designated HCP, but the patient is thinking of assigning her sister in [place] and her friend in [facility].    GOALS OF CARE DISCUSSION       Palliative care info/counseling provided	           Documentation of GOC:  Wants to pursue cancer targeted treatments.          REFERRALS	        Unit SW/Case Mgmt        - Following the patient       Patient/Family Support - Following the patient       Massage Therapy - Following the patient       Music Therapy - Following the patient       Pet Therapy - Offered       Geriatric case manager - Following the patient PILLO PEDRAZA             MRN-[id number]    CC: Pain, Gas distension, Constipation, SBO, Support services     HPI:  68 y/o female with a PMHx of recently dx metastatic ascending colon cancer (dx 2 weeks ago w/ mets to the liver and peritoneal carcinomatosis) that presents from Dr. Lundy's office with acute onset RUQ abdominal pain, fever and weakness for the past 3 days. Pain has worsened over the past 3 days and last evening was associated with one episode of vomiting (nonbloody, questionable bilious content).  Last BM was 2 days prior to presentation. She denies any associated chills, chest pain/discomfort/pressure, SOB/dyspnea, dysuria, diarrhea, hematochezia. Had CT A/P in ED which was s/f acute cholecystitis and hepatic vein thrombosis.    Cancer Hx: pt had a normal colonoscopy 2 years ago-had a polypectomy with benign pathology per pt. States she started getting non-specific abdominal pain starting last summer which she self-medicated with rolaids. States she ad 4 total episodes of abdominal pain over the summer and eventually went to see GI who did colonoscopy 2 weeks ago and diagnosed her with sub-total obstructing ascending colon cancer; had PET scan on Tuesday which showed liver mets and peritoneal carcinomatosis; plan for chemoport placement in June; patient also endorsing intermittent fevers, chills, and unintentional 25 lbs weight loss since January Denies any family hx of colon cancer.    SUBJECTIVE:   Patient seen working with physical therapy and is in pretty good spirit. Patient had her first cycle of chemotherapy with FOLFOX x1 on Saturday. Complains of some nausea likely related to chemotherapy but is tolerating PO intake. Had a small BMs this morning but is passing gas. Only reports of abdominal pain with palpation. Patient only used Dilaudid 0.5mgx 1 in the past 24hrs.     ROS:  DYSPNEA: No  NAUS/VOM: No	  SECRETIONS: No	  AGITATION: No  Pain (Y/N): No      -Provocation/Palliation:  -Quality/Quantity:  -Radiating:  -Severity:  -Timing/Frequency:  -Impact on ADLs:    OTHER REVIEW OF SYSTEMS: Denied    PEx:  T(C): 36.9 (06-11-18 @ 05:44), Max: 36.9 (06-11-18 @ 05:44)  HR: 70 (06-11-18 @ 05:44) (67 - 70)  BP: 134/80 (06-11-18 @ 05:44) (121/72 - 134/80)  RR: 16 (06-11-18 @ 05:44) (16 - 16)  SpO2: 97% (06-11-18 @ 05:44) (97% - 98%)  Wt(kg): 88.2    GENERAL:  NAD   HEENT: NCAT Non icteric PERRL Dry lips  Neck:  Supple, no masses  CVS: Normal S1, S2, No M/R/G  Resp: Unlabored CTAB  GI: Soft Distension improved. Active BS, perc drain and PEG in place  : Normal   Musc: No C/C/E    Neuro: No focal deficits. Following commands  Psych:  Improved mood  Skin: Xerosis. Right chest infuse a port scar c/d/i  Lymph: Normal    	     ALLERGIES: No Known Drug Allergies   INTOLERANCES: allergic to cats (Rash)    OPIATE NAÏVE (Y/N): YES    MEDICATIONS: REVIEWED  MEDICATIONS  (STANDING):  ALBUTerol    90 MICROgram(s) HFA Inhaler 1 Puff(s) Inhalation every 4 hours  enoxaparin Injectable 90 milliGRAM(s) SubCutaneous every 12 hours  pantoprazole    Tablet 40 milliGRAM(s) Oral before breakfast  polyethylene glycol 3350 17 Gram(s) Oral daily  tiotropium 18 MICROgram(s) Capsule 1 Capsule(s) Inhalation daily    MEDICATIONS  (PRN):  acetaminophen  Suppository 650 milliGRAM(s) Rectal every 6 hours PRN For Temp greater than 38 C (100.4 F)  ondansetron Injectable 4 milliGRAM(s) IV Push every 6 hours PRN Nausea and/or Vomiting  oxyCODONE    IR 10 milliGRAM(s) Oral every 4 hours PRN Severe Pain (7 - 10)  oxyCODONE    IR 5 milliGRAM(s) Oral every 4 hours PRN Moderate Pain (4 - 6)  zaleplon 5 milliGRAM(s) Oral at bedtime PRN Insomnia      LABS: REVIEWED                        10.1   5.8   )-----------( 496      ( 11 Jun 2018 06:21 )             33.4   06-11    139  |  101  |  11  ----------------------------<  107<H>  4.4   |  26  |  0.47<L>    Ca    8.6      11 Jun 2018 06:21  Phos  2.8     06-11  Mg     2.4     06-11    Uric Acid, Serum: 3.6 mg/dL (06.11.18 @ 06:21)    Lactate Dehydrogenase, Serum: 297 U/L (06.11.18 @ 06:21)    Transferrin, Serum: 188 mg/dL (06.10.18 @ 08:34)  Ferritin, Serum: 126: Note: New reference ranges effective 04/16/2018. ng/mL (06.10.18 @ 08:34)  Iron with Total Binding Capacity in AM (06.10.18 @ 08:34)    % Saturation, Iron: 20 %    Iron - Total Binding Capacity.: 233 ug/dL    Iron Total, Serum: 46 ug/dL    Unsaturated Iron Binding Capacity: 187 ug/dL    Direct Lien Poly: Negative (06.08.18 @ 08:54)  Antibody ID [id number]: Anti-E (06.08.18 @ 07:53)    IMAGING: REVIEWED    Advanced Directives:      Full code          MOLST       HCP - Ongoing    Decision maker: The patient is able to participate in complex medical decision making conversations  Legal surrogate: No designated HCP, but the patient is thinking of assigning her sister in [place] and her friend in [facility].    GOALS OF CARE DISCUSSION       Palliative care info/counseling provided	           Documentation of GOC:  Wants to pursue cancer targeted treatments.          REFERRALS	        Unit SW/Case Mgmt        - Following the patient       Patient/Family Support - Following the patient       Massage Therapy - Following the patient       Music Therapy - Following the patient       Pet Therapy - Offered       Geriatric case manager - Following the patient

## 2018-06-11 NOTE — PROGRESS NOTE ADULT - PROBLEM SELECTOR PLAN 5
Resolved. Diagnosed 5/31 by clinical exam, abdominal xray and CT A/P.  Secondary to metastases, ascites.   -two stents placed on 6/6 in attempt to relieve partial SBO-one in the sigmoid colon and one at splenic flexure. Repeat procedure on 6/7 -able to pass through to hepatic flexure.   -Venting PEG placed on 6/6   -ambulation orders: walking with PT, encourage ambulation with assistance.   -pain persists, though intermittent. Wean from opioid medications or consider relistor to decrease risk of constipation Resolved. Diagnosed 5/31 by clinical exam, abdominal xray and CT A/P.  Secondary to metastases, ascites.   -two stents placed on 6/6 in attempt to relieve partial SBO-one in the sigmoid colon and one at splenic flexure. Repeat procedure on 6/7 -able to pass through to hepatic flexure.   -Venting PEG placed on 6/6   -ambulation orders: walking with PT, encourage ambulation with assistance.   -pain persists, though intermittent. Wean from opioid medications or consider  relistor to decrease risk of constipation

## 2018-06-11 NOTE — PROGRESS NOTE ADULT - PROBLEM SELECTOR PLAN 1
Stage 4 metastatic colon cancer with liver mets and peritoneal carcinomatosis  - heme/onc following: plan per Dr. Nick   - s/p chemoport placement 5/31, initiation of chemotherapy 6/9  - s/p successful stent placement for relief of partial SBO 2/2 colonic mets.  - Palliative following, recommend pain regimen as follows: 4mg decadron IV q6h for capsular liver pain, benadryl 25mg IV push at bedtime, oxycodone IR 5mg q6h, dilaudid 0.5mg IV q4 PRN for severe pain. Stage 4 metastatic colon cancer with liver mets and peritoneal carcinomatosis  - heme/onc following: plan per Dr. Nick   - s/p chemoport placement 5/31, initiation of chemotherapy 6/9: FOLFOX  - zofran 4mg IV q6h PRN for nausea from chemotherapy  - Palliative following  - Pain regimen as follows: oxycodone 10mg q4h for severe pain, oxycodone 5mg      for moderate pain. Transitioning to orals in anticipation of discharge. Miralax     bowel regimen to supplement opioid administration.    - s/p successful stent placement for relief of partial SBO 2/2 colonic mets. Stage 4 metastatic colon cancer with liver mets and peritoneal carcinomatosis  - heme/onc following: plan per Dr. Nick   - s/p chemoport placement 5/31, initiation of chemotherapy 6/9 with FOLFOX  - chemo cycle to complete on Tuesday, June 12  - zofran 4mg IV q6h PRN for nausea from chemotherapy  - Palliative following  - Pain regimen as follows: oxycodone 10mg q4h for severe pain, oxycodone 5mg      for moderate pain. Transitioning to orals in anticipation of discharge. Miralax     bowel regimen to supplement opioid administration.    - s/p successful stent placement for relief of partial SBO 2/2 colonic mets.

## 2018-06-11 NOTE — PROGRESS NOTE ADULT - ASSESSMENT
66 YO F with PMHx significant for metastatic colon cancer admitted on 5/25/18 for evaluation of abdominal pain, fever, weakness, and found to have acute cholecystitis. She was deemed not to be a good surgical candidate and had a cholecystostomy tube placed and is on abx. She has had worsening abdominal distention and CT showed a partial SBO and ileus, distal ascending/proximal transverse mass with narrowing of the transverse colon now s/p colonoscopy with 2 stents placed that have opened up her GI tract and he is able to eat and have bowel movements.  - advance diet as tolerated  - if any evidence of obstruction, make NPO and use peg to suction, call GI  - f/u heme/onc for chemo/mgmt of side effects  - start miralax 17g daily and titrate up as needed  - PPI once daily    Discussed with attending Dr Gwen LEWIS following

## 2018-06-11 NOTE — PROGRESS NOTE ADULT - SUBJECTIVE AND OBJECTIVE BOX
Pt seen and examined. C/o new nausea now 2 days after chemo. Having some abdominal pain traveling up/down center but in general improving. No BM x3days but passing flatus.       MEDICATIONS:  MEDICATIONS  (STANDING):  ALBUTerol    90 MICROgram(s) HFA Inhaler 1 Puff(s) Inhalation every 4 hours  enoxaparin Injectable 90 milliGRAM(s) SubCutaneous every 12 hours  pantoprazole    Tablet 40 milliGRAM(s) Oral before breakfast  tiotropium 18 MICROgram(s) Capsule 1 Capsule(s) Inhalation daily    MEDICATIONS  (PRN):  acetaminophen  Suppository 650 milliGRAM(s) Rectal every 6 hours PRN For Temp greater than 38 C (100.4 F)  ondansetron Injectable 4 milliGRAM(s) IV Push every 6 hours PRN Nausea and/or Vomiting  oxyCODONE    IR 10 milliGRAM(s) Oral every 4 hours PRN Severe Pain (7 - 10)  oxyCODONE    IR 5 milliGRAM(s) Oral every 4 hours PRN Moderate Pain (4 - 6)  zaleplon 5 milliGRAM(s) Oral at bedtime PRN Insomnia      Allergies    allergic to cats (Rash)  No Known Drug Allergies    Intolerances        Vital Signs Last 24 Hrs  T(C): 36.9 (11 Jun 2018 05:44), Max: 36.9 (11 Jun 2018 05:44)  T(F): 98.4 (11 Jun 2018 05:44), Max: 98.4 (11 Jun 2018 05:44)  HR: 70 (11 Jun 2018 05:44) (65 - 70)  BP: 134/80 (11 Jun 2018 05:44) (121/72 - 134/80)  BP(mean): --  RR: 16 (11 Jun 2018 05:44) (16 - 16)  SpO2: 97% (11 Jun 2018 05:44) (97% - 98%)    06-10 @ 07:01  -  06-11 @ 07:00  --------------------------------------------------------  IN: 762.1 mL / OUT: 410 mL / NET: 352.1 mL        PHYSICAL EXAM:    General: Well developed; well nourished; in no acute distress  HEENT: MMM, conjunctiva and sclera clear  Gastrointestinal: +PEG c/d/i. abdomen mildly distended but soft, TTP diffusely but non peritoneal  Skin: Warm and dry. No obvious rash    LABS:      CBC Full  -  ( 11 Jun 2018 06:21 )  WBC Count : 5.8 K/uL  Hemoglobin : 10.1 g/dL  Hematocrit : 33.4 %  Platelet Count - Automated : 496 K/uL  Mean Cell Volume : 76.4 fL  Mean Cell Hemoglobin : 23.1 pg  Mean Cell Hemoglobin Concentration : 30.2 g/dL  Auto Neutrophil # : x  Auto Lymphocyte # : x  Auto Monocyte # : x  Auto Eosinophil # : x  Auto Basophil # : x  Auto Neutrophil % : x  Auto Lymphocyte % : x  Auto Monocyte % : x  Auto Eosinophil % : x  Auto Basophil % : x    06-11    139  |  101  |  11  ----------------------------<  107<H>  4.4   |  26  |  0.47<L>    Ca    8.6      11 Jun 2018 06:21  Phos  2.8     06-11  Mg     2.4     06-11                        RADIOLOGY & ADDITIONAL STUDIES (The following images were personally reviewed):

## 2018-06-11 NOTE — PROGRESS NOTE ADULT - ATTENDING COMMENTS
DX  PARTIAL SBO- s/p sigmoid and splenic flexure stent, now resolved. mech soft diet.   ACUTE CHOLECYSTITIS -s/p percutaneous cholecystostomy, s/p   unasyn x 10 days post cholecystostomy     SEVERE SEPSIS - as above  HYPOXEMIC RESPIRATORY FAILURE/ PULMONARY EDEMA/PLEURAL EFFUSIONS - lasix PRN.   weaned off supplemental oxygen.   SMV THROMBUS/PORTAL VEIN THROMBUS - cont  lovenox   STAGE IV COLON CA w/ LIVER METASTASES and PERITONEAL CARCINOMATOSIS-  s/p chemo port .  rec'd first cycle chemo.   HYPONATREMIA -resolved .   ANEMIA - trend hb. no active GI bleeding currently. monitor closely. transfuse to keep hb >7 .

## 2018-06-11 NOTE — PROGRESS NOTE ADULT - PROBLEM SELECTOR PLAN 4
Abdomen less distended post procedures.   - Change Simethicone to PRN  - Consider scopolamine patch PRN for cramping pain.  - Physical therapy followup.  - Management as per primary team and GI Abdomen less distended post procedures.   - Consider Simethicone PRN  - Consider scopolamine patch if cramping pain.  - Physical therapy followup.  - Management as per primary team and GI

## 2018-06-11 NOTE — PROGRESS NOTE ADULT - SUBJECTIVE AND OBJECTIVE BOX
OVERNIGHT EVENTS:    SUBJECTIVE / INTERVAL HPI: Patient seen and examined at bedside.     VITAL SIGNS:  Vital Signs Last 24 Hrs  T(C): 36.6 (10 Willy 2018 20:38), Max: 36.9 (10 Willy 2018 05:48)  T(F): 97.9 (10 Willy 2018 20:38), Max: 98.4 (10 Willy 2018 05:48)  HR: 67 (10 Willy 2018 20:38) (65 - 67)  BP: 121/72 (10 Willy 2018 20:38) (121/72 - 135/88)  RR: 16 (10 Willy 2018 20:38) (16 - 16)  SpO2: 98% (10 Willy 2018 20:38) (96% - 98%)    PHYSICAL EXAM:    General: WDWN  HEENT: NC/AT; PERRL, anicteric sclera; MMM  Neck: supple  Cardiovascular: +S1/S2; RRR  Respiratory: CTA B/L; no W/R/R  Gastrointestinal: soft, NT/ND; +BSx4  Extremities: WWP; no edema, clubbing or cyanosis  Vascular: 2+ radial, DP/PT pulses B/L  Neurological: AAOx3; no focal deficits    MEDICATIONS:  MEDICATIONS  (STANDING):  ALBUTerol    90 MICROgram(s) HFA Inhaler 1 Puff(s) Inhalation every 4 hours  enoxaparin Injectable 90 milliGRAM(s) SubCutaneous every 12 hours  pantoprazole    Tablet 40 milliGRAM(s) Oral before breakfast  tiotropium 18 MICROgram(s) Capsule 1 Capsule(s) Inhalation daily    MEDICATIONS  (PRN):  acetaminophen  Suppository 650 milliGRAM(s) Rectal every 6 hours PRN For Temp greater than 38 C (100.4 F)  HYDROmorphone  Injectable 0.5 milliGRAM(s) IV Push every 4 hours PRN Severe Pain (7 - 10)  oxyCODONE    IR 5 milliGRAM(s) Oral every 4 hours PRN Moderate Pain (4 - 6)  zaleplon 5 milliGRAM(s) Oral at bedtime PRN Insomnia      ALLERGIES:  allergic to cats (Rash)  No Known Drug Allergies or intolerances    LABS:                        9.8    x     )-----------( x        ( 10 Willy 2018 08:34 )             x        06-10    138  |  x   |  x   ----------------------------<  x   3.6   |  x   |  x     Ca    8.4      09 Jun 2018 07:58  Mg     2.2     06-10          CAPILLARY BLOOD GLUCOSE    RADIOLOGY & ADDITIONAL TESTS: Reviewed. OVERNIGHT EVENTS: No acute overnight events.     SUBJECTIVE / INTERVAL HPI: Patient seen and examined at bedside. Patient doing well this morning. Endorses some nausea after initiation of chemotherapy, no vomiting. Ongoing intermittent umbilical and left side abdominal pain for which she continues to assess as severe. Her primary concern was whether she could have oatmeal for breakfast. Diet advanced.     VITAL SIGNS:  Vital Signs Last 24 Hrs  T(C): 36.6 (10 Willy 2018 20:38), Max: 36.9 (10 Willy 2018 05:48)  T(F): 97.9 (10 Willy 2018 20:38), Max: 98.4 (10 Willy 2018 05:48)  HR: 67 (10 Willy 2018 20:38) (65 - 67)  BP: 121/72 (10 Willy 2018 20:38) (121/72 - 135/88)  RR: 16 (10 Willy 2018 20:38) (16 - 16)  SpO2: 98% (10 Willy 2018 20:38) (96% - 98%)    PHYSICAL EXAM:  General: obese female laying in bed in no acute distress breathing room air comfortably, speaking with her sisters who are sitting at bedside  HEENT: NC/AT; PERRL, anicteric sclera; moist mucous membranes  Neck: supple  Cardiovascular: +S1/S2; RRR, Right sided port in place with triple lumen (site c/d/i)  Respiratory: Clear to auscultation bilaterally, good respiratory effort  Gastrointestinal: belly distended but soft. pain to palpation on the left side. cholecystostomy drain in place, exit site c/d/i, draining few ccs of bilious fluid (unclear when changed). Venting PEG tube in place - c/d/i  Extremities: WWP; no clubbing or cyanosis, 1+ edema at the ankles   Vascular: 2+ radial, DP/PT pulses B/L  Neurological: AAOx3; no focal deficits  MSK: pain on left calf palpation present. stable. No associated asymmetric edema or pain with ankle flexion.     MEDICATIONS  (STANDING):  ALBUTerol    90 MICROgram(s) HFA Inhaler 1 Puff(s) Inhalation every 4 hours  enoxaparin Injectable 90 milliGRAM(s) SubCutaneous every 12 hours  pantoprazole    Tablet 40 milliGRAM(s) Oral before breakfast  tiotropium 18 MICROgram(s) Capsule 1 Capsule(s) Inhalation daily    MEDICATIONS  (PRN):  acetaminophen  Suppository 650 milliGRAM(s) Rectal every 6 hours PRN For Temp greater than 38 C (100.4 F)  HYDROmorphone  Injectable 0.5 milliGRAM(s) IV Push every 4 hours PRN Severe Pain (7 - 10)  oxyCODONE    IR 5 milliGRAM(s) Oral every 4 hours PRN Moderate Pain (4 - 6)  zaleplon 5 milliGRAM(s) Oral at bedtime PRN Insomnia      ALLERGIES:  allergic to cats (Rash)  No Known Drug Allergies or intolerances    LABS:                        9.8    x     )-----------( x        ( 10 Willy 2018 08:34 )             x        06-10    138  |  x   |  x   ----------------------------<  x   3.6   |  x   |  x     Ca    8.4      09 Jun 2018 07:58  Mg     2.2     06-10          CAPILLARY BLOOD GLUCOSE    RADIOLOGY & ADDITIONAL TESTS: Reviewed.

## 2018-06-11 NOTE — PROGRESS NOTE ADULT - SUBJECTIVE AND OBJECTIVE BOX
Hem/Onc    PILLO PEDRAZA  MRN-[id number]    INTERVAL Hx:  S/p NGT placement on 6/5 with good relief. S/p vent PEG placement, colonoscopy and stent placement x 2 ion 6/6 and 6/7.    cycle 1 FOLFOX 6/9/18  no vomiting  + abdominal discomfort  + nausesa    HPI: 67 y.o woman with newly diagnosed metastatic colon cancer- we were completing outpatient work up and were planning systemic treatment- the patient received IV iron last week, she had staging PET/CT this week and was scheduled for Infusaport placement. She presented to our office on 5/24 with fever/vomiting and abdominal pain> She was sent to the ER with suspicion for an obstruction (known large R sided/obstructing lesion).  Work up at [facility] with CT scan showed findings c/w acute cholecystitis and SMV and portal vein thrombosis. Acute cholecystitis confirmed by US.     ROS:  Mild nausea   Mild abdominal discomfort  No fever, chills, night sweats.   + fatigue, no headaches/dizziness.    No dysuria/hematuria.  No history of easy bruising/bleeding.     No leg pain or leg swelling.    ROS is otherwise negative.    PMH/PSH:  PAST MEDICAL & SURGICAL HISTORY:  Colon cancer      MEDICATIONS  (STANDING):  ALBUTerol    90 MICROgram(s) HFA Inhaler 1 Puff(s) Inhalation every 4 hours  enoxaparin Injectable 90 milliGRAM(s) SubCutaneous every 12 hours  pantoprazole    Tablet 40 milliGRAM(s) Oral before breakfast  tiotropium 18 MICROgram(s) Capsule 1 Capsule(s) Inhalation daily    MEDICATIONS  (PRN):  acetaminophen  Suppository 650 milliGRAM(s) Rectal every 6 hours PRN For Temp greater than 38 C (100.4 F)  HYDROmorphone  Injectable 0.5 milliGRAM(s) IV Push every 4 hours PRN Severe Pain (7 - 10)  oxyCODONE    IR 5 milliGRAM(s) Oral every 4 hours PRN Moderate Pain (4 - 6)  zaleplon 5 milliGRAM(s) Oral at bedtime PRN Insomnia    Allergies    allergic to cats (Rash)  No Known Drug Allergies    Intolerances    Vital Signs Last 24 Hrs  T(C): 36.9 (11 Jun 2018 05:44), Max: 36.9 (11 Jun 2018 05:44)  T(F): 98.4 (11 Jun 2018 05:44), Max: 98.4 (11 Jun 2018 05:44)  HR: 70 (11 Jun 2018 05:44) (65 - 70)  BP: 134/80 (11 Jun 2018 05:44) (121/72 - 134/80)  BP(mean): --  RR: 16 (11 Jun 2018 05:44) (16 - 16)  SpO2: 97% (11 Jun 2018 05:44) (97% - 98%)    HEENT: pale mucosae, anicteric sclerae  No palpable peripheral lymphadenopathy  COR: regular rhythm rate, no murmurs, rubs or gallops  ABD: soft, not distended, mild discomfort with palpation generalized  , RUQ + drain in place  EXT: no edema  SKIN: no lesions on visible skin  R side port         CBC Full  -  ( 11 Jun 2018 06:21 )  WBC Count : 5.8 K/uL  Hemoglobin : 10.1 g/dL  Hematocrit : 33.4 %  Platelet Count - Automated : 496 K/uL  Mean Cell Volume : 76.4 fL  Mean Cell Hemoglobin : 23.1 pg  Mean Cell Hemoglobin Concentration : 30.2 g/dL      06-11    139  |  101  |  11  ----------------------------<  107<H>  4.4   |  26  |  0.47<L>    Ca    8.6      11 Jun 2018 06:21  Phos  2.8     06-11  Mg     2.4     06-11                      Assessment:    Metastatic colon cancer  Severe iron deficiency anemia  Acute cholecystitis  SMV/portal vein thrombosis    Plan:    Clinically better  S/p  colonoscopy with successful stent placement x 2 on 6/7,  less abdominal discomfort and distention      S/p percutaneous cholecystotomy on 5/25  S/p Infusaport placement on 5/31    SMV/Portal vein thrombosis- Continue Lovenox for now,  she will eventually be transitioned to an oral anticoagulant     Newly diagnosed colon cancer- fairly rapidly progressive symptomatic disease   cycle 1 FOLFOX  6/9  I discussed treatment and it's potential benefit and side effects with the patient.   Consent has been signed.    Addition of biologic agent will be considered down the line, at this moment she can not get bevacizumab due to a risk of bleeding     Severe YESSICA-she received parenteral iron as outpatient  H/H is better and stable  PRBC as clinically indicated    Reactive thrombocytosis (YESSICA, malignancy) no specific intervention is indicated    Family at bedside this AM - all questions answered.    Thank you    Yara Jackson MD for Estefania Lundy MD  [phone]

## 2018-06-11 NOTE — PROGRESS NOTE ADULT - PROBLEM SELECTOR PLAN 2
Resolved. s/p IR percutaneous cholecystostomy placement 5/25. cholecystostomy drain in place, continues to drain small amounts daily   -completed 10 day course of unasyn/augmentin on 6/4

## 2018-06-11 NOTE — PROGRESS NOTE ADULT - PROBLEM SELECTOR PLAN 6
Resolvied.   -ambulation orders: walking with PT, encourage ambulation with assistance. Patient   walking the halls.  -Continue to monitor for pain, emesis, bowel movements, flatus, fever. Resolved.   -ambulation orders: walking with PT, encourage ambulation with assistance. Patient   walking the halls.  -Continue to monitor for pain, emesis, bowel movements, flatus, fever.

## 2018-06-11 NOTE — PROGRESS NOTE ADULT - PROBLEM SELECTOR PLAN 7
s/p cancer targeted treatment.  Patient continues full code and is planning for likely discharge to [facility] ([place]  - Physical therapy to continue to follow while inpatient Patient continues full code and is planning for likely discharge to [facility] ([place]) tomorrow.  - Physical therapy to continue to follow while inpatient

## 2018-06-11 NOTE — PROGRESS NOTE ADULT - PROBLEM SELECTOR PLAN 2
Likely related to chemotherapy.   Currently on PRN Zofran.   -Recommend Olanzapine 5mg ODT daily if no response to Zofran

## 2018-06-11 NOTE — PROGRESS NOTE ADULT - PROBLEM SELECTOR PLAN 3
CTA A/P s/f hepatic vein thrombosis; most likely 2/2 hypercoagulable state in the setting of active untreated malignancy  -continue with lovenox 90mg BID with intent to transition to DOAC on discharge  -f/u Dr. Nick outpatient CTA A/P s/f hepatic vein thrombosis; most likely 2/2 hypercoagulable state in the setting of active untreated malignancy  -continue with lovenox 90mg BID, consider transition to oral on discharge  -f/u Dr. Nick outpatient

## 2018-06-11 NOTE — PROGRESS NOTE ADULT - NSHPATTENDINGPLANDISCUSS_GEN_ALL_CORE
medicine team
nursing
housestaff
housestaff
house staff
housestaff
house staff
housestaff
Palliative IDT meeting today as well as the patient's primary nurse and team.
Patient, primary sw.
housestaff
house staff
housestaff
house staff
Primary nurse, patient, patient's friend, and primary team.
housestaff

## 2018-06-11 NOTE — PROGRESS NOTE ADULT - PROBLEM SELECTOR PLAN 3
Patient has used Dilaudid 0.5mg x1, equivalent to Morphine 10mg PO.   - Consider Oxycodone IR 5mg PO q4h PRN Moderate pain Patient has used Dilaudid 0.5mg x1, equivalent to Morphine 10mg PO.   - Continue Oxycodone IR 5mg PO q4h PRN Moderate pain  - Continue Oxycodone IR 10mg PO q4h PRN Severe pain

## 2018-06-11 NOTE — PROGRESS NOTE ADULT - PROBLEM SELECTOR PLAN 6
Chemo port placed. s/p FOLFOX cycle #1. Prognosis still pretty poor and would likely be affected by complications associated with stage 4 colon CA.   - Prognosis may change now that SBO resolved and if she is able to start cancer targeted treatment.  - Heme/onc following Chemo port placed. s/p FOLFOX cycle #1. Prognosis still pretty poor and would likely be affected by complications associated with stage 4 colon CA.   - Prognosis may change now that SBO resolved and if she is able to start cancer targeted treatment.  - Heme/onc following  Management as per primary team and hemeonc.

## 2018-06-11 NOTE — PROGRESS NOTE ADULT - PROBLEM SELECTOR PLAN 8
DVT PPX: lovenox 90mg BID for portal venous thrombus  Lines: peripheral IV access x1, right sided chemoport, Venting PEG  Full Code: Patient wants compressions and intubation with time restriction on number of days intubated: Arnel in chart.  DISPO: discharge pending administration of first cycle of chemotherapy. Anticipate discharge on Tuesday.

## 2018-06-12 ENCOUNTER — TRANSCRIPTION ENCOUNTER (OUTPATIENT)
Age: 68
End: 2018-06-12

## 2018-06-12 VITALS
TEMPERATURE: 98 F | DIASTOLIC BLOOD PRESSURE: 83 MMHG | OXYGEN SATURATION: 98 % | SYSTOLIC BLOOD PRESSURE: 142 MMHG | HEART RATE: 76 BPM | RESPIRATION RATE: 16 BRPM

## 2018-06-12 LAB
ANION GAP SERPL CALC-SCNC: 9 MMOL/L — SIGNIFICANT CHANGE UP (ref 5–17)
BUN SERPL-MCNC: 9 MG/DL — SIGNIFICANT CHANGE UP (ref 7–23)
CALCIUM SERPL-MCNC: 8.7 MG/DL — SIGNIFICANT CHANGE UP (ref 8.4–10.5)
CHLORIDE SERPL-SCNC: 101 MMOL/L — SIGNIFICANT CHANGE UP (ref 96–108)
CO2 SERPL-SCNC: 27 MMOL/L — SIGNIFICANT CHANGE UP (ref 22–31)
CREAT SERPL-MCNC: 0.46 MG/DL — LOW (ref 0.5–1.3)
GLUCOSE SERPL-MCNC: 109 MG/DL — HIGH (ref 70–99)
HCT VFR BLD CALC: 30.6 % — LOW (ref 34.5–45)
HGB BLD-MCNC: 9.6 G/DL — LOW (ref 11.5–15.5)
MAGNESIUM SERPL-MCNC: 2.4 MG/DL — SIGNIFICANT CHANGE UP (ref 1.6–2.6)
MCHC RBC-ENTMCNC: 24.2 PG — LOW (ref 27–34)
MCHC RBC-ENTMCNC: 31.4 G/DL — LOW (ref 32–36)
MCV RBC AUTO: 77.3 FL — LOW (ref 80–100)
PLATELET # BLD AUTO: 410 K/UL — HIGH (ref 150–400)
POTASSIUM SERPL-MCNC: 4.6 MMOL/L — SIGNIFICANT CHANGE UP (ref 3.5–5.3)
POTASSIUM SERPL-SCNC: 4.6 MMOL/L — SIGNIFICANT CHANGE UP (ref 3.5–5.3)
RBC # BLD: 3.96 M/UL — SIGNIFICANT CHANGE UP (ref 3.8–5.2)
RBC # FLD: 30.7 % — HIGH (ref 10.3–16.9)
SODIUM SERPL-SCNC: 137 MMOL/L — SIGNIFICANT CHANGE UP (ref 135–145)
WBC # BLD: 4.2 K/UL — SIGNIFICANT CHANGE UP (ref 3.8–10.5)
WBC # FLD AUTO: 4.2 K/UL — SIGNIFICANT CHANGE UP (ref 3.8–10.5)

## 2018-06-12 PROCEDURE — 76705 ECHO EXAM OF ABDOMEN: CPT

## 2018-06-12 PROCEDURE — P9016: CPT

## 2018-06-12 PROCEDURE — 94660 CPAP INITIATION&MGMT: CPT

## 2018-06-12 PROCEDURE — 84466 ASSAY OF TRANSFERRIN: CPT

## 2018-06-12 PROCEDURE — 84132 ASSAY OF SERUM POTASSIUM: CPT

## 2018-06-12 PROCEDURE — 85018 HEMOGLOBIN: CPT

## 2018-06-12 PROCEDURE — 87040 BLOOD CULTURE FOR BACTERIA: CPT

## 2018-06-12 PROCEDURE — 87186 SC STD MICRODIL/AGAR DIL: CPT

## 2018-06-12 PROCEDURE — 99152 MOD SED SAME PHYS/QHP 5/>YRS: CPT

## 2018-06-12 PROCEDURE — 93306 TTE W/DOPPLER COMPLETE: CPT

## 2018-06-12 PROCEDURE — 83550 IRON BINDING TEST: CPT

## 2018-06-12 PROCEDURE — 74177 CT ABD & PELVIS W/CONTRAST: CPT

## 2018-06-12 PROCEDURE — 36430 TRANSFUSION BLD/BLD COMPNT: CPT

## 2018-06-12 PROCEDURE — 83615 LACTATE (LD) (LDH) ENZYME: CPT

## 2018-06-12 PROCEDURE — 86904 BLOOD TYPING PATIENT SERUM: CPT

## 2018-06-12 PROCEDURE — 96374 THER/PROPH/DIAG INJ IV PUSH: CPT | Mod: XU

## 2018-06-12 PROCEDURE — 84100 ASSAY OF PHOSPHORUS: CPT

## 2018-06-12 PROCEDURE — 96375 TX/PRO/DX INJ NEW DRUG ADDON: CPT

## 2018-06-12 PROCEDURE — 82962 GLUCOSE BLOOD TEST: CPT

## 2018-06-12 PROCEDURE — 85610 PROTHROMBIN TIME: CPT

## 2018-06-12 PROCEDURE — 83605 ASSAY OF LACTIC ACID: CPT

## 2018-06-12 PROCEDURE — C1894: CPT

## 2018-06-12 PROCEDURE — 86870 RBC ANTIBODY IDENTIFICATION: CPT

## 2018-06-12 PROCEDURE — 86901 BLOOD TYPING SEROLOGIC RH(D): CPT

## 2018-06-12 PROCEDURE — 87070 CULTURE OTHR SPECIMN AEROBIC: CPT

## 2018-06-12 PROCEDURE — 80053 COMPREHEN METABOLIC PANEL: CPT

## 2018-06-12 PROCEDURE — 47490 INCISION OF GALLBLADDER: CPT

## 2018-06-12 PROCEDURE — 84295 ASSAY OF SERUM SODIUM: CPT

## 2018-06-12 PROCEDURE — C1729: CPT

## 2018-06-12 PROCEDURE — 99285 EMERGENCY DEPT VISIT HI MDM: CPT | Mod: 25

## 2018-06-12 PROCEDURE — 85025 COMPLETE CBC W/AUTO DIFF WBC: CPT

## 2018-06-12 PROCEDURE — 84550 ASSAY OF BLOOD/URIC ACID: CPT

## 2018-06-12 PROCEDURE — 82728 ASSAY OF FERRITIN: CPT

## 2018-06-12 PROCEDURE — 99153 MOD SED SAME PHYS/QHP EA: CPT

## 2018-06-12 PROCEDURE — 87086 URINE CULTURE/COLONY COUNT: CPT

## 2018-06-12 PROCEDURE — 76000 FLUOROSCOPY <1 HR PHYS/QHP: CPT

## 2018-06-12 PROCEDURE — 74018 RADEX ABDOMEN 1 VIEW: CPT

## 2018-06-12 PROCEDURE — 85045 AUTOMATED RETICULOCYTE COUNT: CPT

## 2018-06-12 PROCEDURE — 71045 X-RAY EXAM CHEST 1 VIEW: CPT

## 2018-06-12 PROCEDURE — 96376 TX/PRO/DX INJ SAME DRUG ADON: CPT

## 2018-06-12 PROCEDURE — 83930 ASSAY OF BLOOD OSMOLALITY: CPT

## 2018-06-12 PROCEDURE — 86900 BLOOD TYPING SEROLOGIC ABO: CPT

## 2018-06-12 PROCEDURE — 36561 INSERT TUNNELED CV CATH: CPT

## 2018-06-12 PROCEDURE — 83690 ASSAY OF LIPASE: CPT

## 2018-06-12 PROCEDURE — 97530 THERAPEUTIC ACTIVITIES: CPT

## 2018-06-12 PROCEDURE — 99233 SBSQ HOSP IP/OBS HIGH 50: CPT

## 2018-06-12 PROCEDURE — C1769: CPT

## 2018-06-12 PROCEDURE — 77001 FLUOROGUIDE FOR VEIN DEVICE: CPT

## 2018-06-12 PROCEDURE — 97162 PT EVAL MOD COMPLEX 30 MIN: CPT

## 2018-06-12 PROCEDURE — 83735 ASSAY OF MAGNESIUM: CPT

## 2018-06-12 PROCEDURE — 85730 THROMBOPLASTIN TIME PARTIAL: CPT

## 2018-06-12 PROCEDURE — 93005 ELECTROCARDIOGRAM TRACING: CPT

## 2018-06-12 PROCEDURE — 74019 RADEX ABDOMEN 2 VIEWS: CPT

## 2018-06-12 PROCEDURE — L8699: CPT

## 2018-06-12 PROCEDURE — 80048 BASIC METABOLIC PNL TOTAL CA: CPT

## 2018-06-12 PROCEDURE — 86923 COMPATIBILITY TEST ELECTRIC: CPT

## 2018-06-12 PROCEDURE — 97116 GAIT TRAINING THERAPY: CPT

## 2018-06-12 PROCEDURE — 85027 COMPLETE CBC AUTOMATED: CPT

## 2018-06-12 PROCEDURE — 86850 RBC ANTIBODY SCREEN: CPT

## 2018-06-12 PROCEDURE — C1876: CPT

## 2018-06-12 PROCEDURE — 87075 CULTR BACTERIA EXCEPT BLOOD: CPT

## 2018-06-12 PROCEDURE — 94640 AIRWAY INHALATION TREATMENT: CPT

## 2018-06-12 PROCEDURE — 76937 US GUIDE VASCULAR ACCESS: CPT

## 2018-06-12 PROCEDURE — 82803 BLOOD GASES ANY COMBINATION: CPT

## 2018-06-12 PROCEDURE — C1788: CPT

## 2018-06-12 PROCEDURE — 80076 HEPATIC FUNCTION PANEL: CPT

## 2018-06-12 PROCEDURE — 74328 X-RAY BILE DUCT ENDOSCOPY: CPT

## 2018-06-12 PROCEDURE — 86880 COOMBS TEST DIRECT: CPT

## 2018-06-12 PROCEDURE — 87205 SMEAR GRAM STAIN: CPT

## 2018-06-12 PROCEDURE — 36415 COLL VENOUS BLD VENIPUNCTURE: CPT

## 2018-06-12 RX ORDER — ENOXAPARIN SODIUM 100 MG/ML
80 INJECTION SUBCUTANEOUS
Qty: 2 | Refills: 0 | OUTPATIENT
Start: 2018-06-12 | End: 2018-06-25

## 2018-06-12 RX ORDER — ACETAMINOPHEN 500 MG
1 TABLET ORAL
Qty: 0 | Refills: 0 | COMMUNITY
Start: 2018-06-12

## 2018-06-12 RX ORDER — OXYCODONE HYDROCHLORIDE 5 MG/1
1 TABLET ORAL
Qty: 0 | Refills: 0 | COMMUNITY
Start: 2018-06-12

## 2018-06-12 RX ORDER — ONDANSETRON 8 MG/1
4 TABLET, FILM COATED ORAL
Qty: 224 | Refills: 0 | OUTPATIENT
Start: 2018-06-12 | End: 2018-06-25

## 2018-06-12 RX ORDER — ZALEPLON 10 MG
1 CAPSULE ORAL
Qty: 0 | Refills: 0 | COMMUNITY
Start: 2018-06-12

## 2018-06-12 RX ORDER — POLYETHYLENE GLYCOL 3350 17 G/17G
17 POWDER, FOR SOLUTION ORAL
Qty: 0 | Refills: 0 | COMMUNITY
Start: 2018-06-12

## 2018-06-12 RX ORDER — EDOXABAN TOSYLATE 30 MG/1
1 TABLET, FILM COATED ORAL
Qty: 30 | Refills: 3 | OUTPATIENT
Start: 2018-06-12 | End: 2018-10-09

## 2018-06-12 RX ORDER — TIOTROPIUM BROMIDE 18 UG/1
1 CAPSULE ORAL; RESPIRATORY (INHALATION)
Qty: 0 | Refills: 0 | COMMUNITY
Start: 2018-06-12

## 2018-06-12 RX ORDER — ALBUTEROL 90 UG/1
1 AEROSOL, METERED ORAL
Qty: 0 | Refills: 0 | COMMUNITY
Start: 2018-06-12

## 2018-06-12 RX ORDER — PANTOPRAZOLE SODIUM 20 MG/1
1 TABLET, DELAYED RELEASE ORAL
Qty: 0 | Refills: 0 | COMMUNITY
Start: 2018-06-12

## 2018-06-12 RX ORDER — ENOXAPARIN SODIUM 100 MG/ML
0 INJECTION SUBCUTANEOUS
Qty: 0 | Refills: 0 | COMMUNITY
Start: 2018-06-12

## 2018-06-12 RX ADMIN — ENOXAPARIN SODIUM 90 MILLIGRAM(S): 100 INJECTION SUBCUTANEOUS at 11:23

## 2018-06-12 RX ADMIN — POLYETHYLENE GLYCOL 3350 17 GRAM(S): 17 POWDER, FOR SOLUTION ORAL at 11:23

## 2018-06-12 RX ADMIN — PANTOPRAZOLE SODIUM 40 MILLIGRAM(S): 20 TABLET, DELAYED RELEASE ORAL at 07:01

## 2018-06-12 NOTE — PROGRESS NOTE ADULT - PROVIDER SPECIALTY LIST ADULT
Gastroenterology
Heme/Onc
Hospitalist
Hospitalist
Internal Medicine
Palliative Care
Surgery
Heme/Onc
Surgery
Gastroenterology
Internal Medicine

## 2018-06-12 NOTE — DISCHARGE NOTE ADULT - SECONDARY DIAGNOSIS.
Portal vein thrombosis Advance care planning Cholecystitis Adenocarcinoma of colon metastatic to liver

## 2018-06-12 NOTE — DISCHARGE NOTE ADULT - HOSPITAL COURSE
Patient is a 67 year old male with a PMHx of recently diagnosed colon cancer presenting from oncologist's office with severe sepsis secondary to acute cholecystitis with an hepatic vein thrombosis. Patient was deemed poor surgical candidate 2/2 active metastatic colon Ca, consequently received an IR directed percutaneous cholecystostomy and improved with zosyn. Patient required one unit of pRBCs.     Patient developed abdominal distention secondary to partial SBO with associated ileus, which required NG tube placement and ultimate venting PEG tube for decompression. Partial obstruction resolved with GI directed stenting of narrowing in the lumen of the colon at the sigmoid colon and splenic flexure. With resolution and stability, patient started chemotherapy with FOLFOX through chemoport placed this inpatient stay.     Patient was safe for discharge to [facility] ([place] on 6/12 with close outpatient follow up with Jaime Nick (oncology) and Gwen (GI). Patient is a 67 year old male with a PMHx of recently diagnosed colon cancer presenting from oncologist's office with severe sepsis secondary to acute cholecystitis, also with an hepatic vein thrombosis. Patient was deemed poor surgical candidate 2/2 active metastatic colon Ca, consequently received an IR directed percutaneous cholecystostomy and improved with zosyn. Patient required one unit of pRBCs.     Patient developed abdominal distention secondary to partial SBO with associated ileus, which required NG tube placement and ultimate venting PEG tube for decompression. Partial obstruction resolved with GI directed stenting of narrowing in the lumen of the colon at the sigmoid colon and splenic flexure. With resolution and stability, patient started chemotherapy with FOLFOX through chemoport placed this inpatient stay.     Patient was safe for discharge to [facility] on 6/12 with close outpatient follow up with Jaime Nick (oncology) and Gwen (GI).

## 2018-06-12 NOTE — DISCHARGE NOTE ADULT - CARE PLAN
Principal Discharge DX:	Sepsis, due to unspecified organism  Goal:	To reduce risk of future recurrence  Assessment and plan of treatment:	You presented to the hospital with an infection in your biliary tree. You underwent a procedure to have the contents of that infected collection drained and completed a course of antibiotics.  Secondary Diagnosis:	Cholecystitis  Assessment and plan of treatment:	You came to the hospital with an infection that was treated with antibiotics. You currently have a tube in place to prevent ongoing infection.  Secondary Diagnosis:	Adenocarcinoma of colon metastatic to liver  Assessment and plan of treatment:	You have cancer of the colon. The growth was causing narrowing of your colon that was relieved by stenting. You started chemotherapy while you were in the hospital. Please follow up with Dr. Basurto at the time listed below.  Secondary Diagnosis:	Portal vein thrombosis  Assessment and plan of treatment:	You have a clot in the vein that leads to the liver. Please continue to take your daily anticoagulation medication to ensure that this clot goes away and to reduce the risk of new clots forming.  Secondary Diagnosis:	Advance care planning  Assessment and plan of treatment:	You filled out a MOLST. This form specifies what your wishes are with regard to cardiopulmonary resuscitation. Principal Discharge DX:	Sepsis, due to unspecified organism  Goal:	To reduce risk of future recurrence  Assessment and plan of treatment:	You presented to the hospital with an infection in your biliary tree. You underwent a procedure to have the contents of that infected collection drained and completed a course of antibiotics.  Secondary Diagnosis:	Cholecystitis  Assessment and plan of treatment:	You came to the hospital with an infection that was treated with antibiotics. You currently have a tube in place to prevent ongoing infection.  Secondary Diagnosis:	Adenocarcinoma of colon metastatic to liver  Assessment and plan of treatment:	You have cancer of the colon. The growth was causing narrowing of your colon that was relieved by stenting. You started chemotherapy while you were in the hospital. Please follow up with Dr. Basurto at the time listed below.  Secondary Diagnosis:	Portal vein thrombosis  Assessment and plan of treatment:	You have a clot in the vein that leads to the liver. Please continue to take your daily anticoagulation medication to ensure that this clot goes away and to reduce the risk of new clots forming.  Secondary Diagnosis:	Advance care planning  Assessment and plan of treatment:	You filled out a MOLST. This form specifies what your wishes are with regard to cardiopulmonary resuscitation.    #PEG tube in place  Please clean the tube before and after meals by flushing it with 10-20cc of water or saline.     #cholecystostomy tube  Please follow up with Dr. Marci Narvaez to determine when you will have your cholecystostomy drain removed. You should follow up with Dr. Narvaez on Friday, June 29 at noon. You have an appointment at her [place] office on [address] [place] [address], 1st floor, [address].

## 2018-06-12 NOTE — DISCHARGE NOTE ADULT - ADDITIONAL INSTRUCTIONS
Please follow up with Dr. Basurto on Monday, June 25 at 1:30PM.   Please follow up with Dr. Hidalgo. Dr. Hidalgo's office will reach out to you to schedule an appointment. Please follow up with Dr. Basurto on Monday, June 25 at 1:30PM.   Please follow up with Dr. Hidalgo. Dr. Hidalgo's office will reach out to you to schedule an appointment.  Please follow up with Dr. Marci Narvaez on June 29 at 12:00PM at her [place] office. Address: [address] [place] [address], 1st floor, [address].

## 2018-06-12 NOTE — DISCHARGE NOTE ADULT - PROVIDER TOKENS
TOKEN:'[id number]',TOKEN:'[id number]' TOKEN:'[id number]:'[id number]',TOKEN:'[id number]'

## 2018-06-12 NOTE — DISCHARGE NOTE ADULT - CARE PROVIDERS DIRECT ADDRESSES
,DirectAddress_Unknown,[email or web address] ,DirectAddress_Unknown,[email or web address],DirectAddress_Unknown

## 2018-06-12 NOTE — DISCHARGE NOTE ADULT - OTHER SIGNIFICANT FINDINGS
CT Abdomen and Pelvis w/ IV Cont (06.01.18 @ 01:43) >  IMPRESSION:  1.  Since 5/24/2018, there has been interval placement of a percutaneous   cholecystostomy tube.  2.  The entire small bowel is dilated up to the terminal ileum were there   is a point of transition to normal caliber terminal ileum. However, oral   contrast reaches the rectum. Findings are consistent with partial small   bowel obstruction in the terminal ileum.   3.  New moderate ascites and small right and trace left pleural   effusions.   4.  6.4 x 4.0 x 1.6 cm fluid collection in the right paracolic gutter.   This could represent an abscess or loculated ascites. Correlation is   recommended.    Xray Abdomen 1 View Portable, IMMEDIATE (06.07.18 @ 12:58) >  Impression: Moderate amount of bowel gas with mildly dilated loops of   small bowel as seen on prior study. Colonic stent in place.

## 2018-06-12 NOTE — DISCHARGE NOTE ADULT - PATIENT PORTAL LINK FT
You can access the [facility] Patient Portal, offered by [facility], by registering with the following website: [email or web address]

## 2018-06-12 NOTE — DISCHARGE NOTE ADULT - MEDICATION SUMMARY - MEDICATIONS TO TAKE
I will START or STAY ON the medications listed below when I get home from the hospital:    acetaminophen 650 mg rectal suppository  -- 1 suppository(ies) rectally every 6 hours, As needed, For Temp greater than 38 C (100.4 F)  -- Indication: For Pain    oxyCODONE 5 mg oral tablet  -- 1 tab(s) by mouth every 4 hours, As needed, Moderate Pain (4 - 6)  -- Indication: For Pain    oxyCODONE 10 mg oral tablet  -- 1 tab(s) by mouth every 4 hours, As needed, Severe Pain (7 - 10)  -- Indication: For Pain    edoxaban 60 mg oral tablet  -- 1 tab(s) by mouth once a day MDD:1  -- Check with your doctor before becoming pregnant.  Do not take aspirin or aspirin containing products without knowledge and consent of your physician.  It is very important that you take or use this exactly as directed.  Do not skip doses or discontinue unless directed by your doctor.  Obtain medical advice before taking any non-prescription drugs as some may affect the action of this medication.    -- Indication: For Portal vein thrombosis    ondansetron  -- 4 milligram(s) by mouth every 6 hours MDD:4  -- Indication: For Nausea alone    polyethylene glycol 3350 oral powder for reconstitution  -- 17 gram(s) by mouth once a day  -- Indication: For Constipation due to opioid therapy    pantoprazole 40 mg oral delayed release tablet  -- 1 tab(s) by mouth once a day (before a meal)  -- Indication: For Gerd

## 2018-06-12 NOTE — PROGRESS NOTE ADULT - SUBJECTIVE AND OBJECTIVE BOX
Hem/Onc    PILLO PEDRAZA  MRN-[id number]    INTERVAL Hx:  S/p NGT placement on 6/5 with good relief. S/p vent PEG placement, colonoscopy and stent placement x 2 ion 6/6 and 6/7.    cycle 1 FOLFOX 6/9/18    Doing well this am  No nausea or vomiting  + abdominal discomfort but mild  Able to ambulate    HPI: 67 y.o woman with newly diagnosed metastatic colon cancer- we were completing outpatient work up and were planning systemic treatment- the patient received IV iron last week, she had staging PET/CT this week and was scheduled for Infusaport placement. She presented to our office on 5/24 with fever/vomiting and abdominal pain> She was sent to the ER with suspicion for an obstruction (known large R sided/obstructing lesion).  Work up at [facility] with CT scan showed findings c/w acute cholecystitis and SMV and portal vein thrombosis. Acute cholecystitis confirmed by US.     ROS:  No nausea   Mild abdominal discomfort  No fever, chills, night sweats.   + fatigue, no headaches/dizziness.    No dysuria/hematuria.  No history of easy bruising/bleeding.     No leg pain or leg swelling.    ROS is otherwise negative.    PMH/PSH:  PAST MEDICAL & SURGICAL HISTORY:  Colon cancer      MEDICATIONS  (STANDING):  ALBUTerol    90 MICROgram(s) HFA Inhaler 1 Puff(s) Inhalation every 4 hours  enoxaparin Injectable 90 milliGRAM(s) SubCutaneous every 12 hours  pantoprazole    Tablet 40 milliGRAM(s) Oral before breakfast  tiotropium 18 MICROgram(s) Capsule 1 Capsule(s) Inhalation daily    MEDICATIONS  (PRN):  acetaminophen  Suppository 650 milliGRAM(s) Rectal every 6 hours PRN For Temp greater than 38 C (100.4 F)  HYDROmorphone  Injectable 0.5 milliGRAM(s) IV Push every 4 hours PRN Severe Pain (7 - 10)  oxyCODONE    IR 5 milliGRAM(s) Oral every 4 hours PRN Moderate Pain (4 - 6)  zaleplon 5 milliGRAM(s) Oral at bedtime PRN Insomnia    Allergies    allergic to cats (Rash)  No Known Drug Allergies    Intolerances    Vital Signs Last 24 Hrs  T(C): 36.8 (12 Jun 2018 05:47), Max: 36.8 (11 Jun 2018 13:30)  T(F): 98.2 (12 Jun 2018 05:47), Max: 98.2 (11 Jun 2018 13:30)  HR: 65 (12 Jun 2018 05:47) (64 - 74)  BP: 133/75 (12 Jun 2018 05:47) (130/79 - 134/86)  BP(mean): --  RR: 14 (12 Jun 2018 05:47) (14 - 15)  SpO2: 98% (12 Jun 2018 05:47) (96% - 98%)    HEENT: pale mucosae, anicteric sclerae  No palpable peripheral lymphadenopathy  COR: regular rhythm rate, no murmurs, rubs or gallops  ABD: soft, not distended, mild discomfort with palpation generalized  , RUQ + drain in place  EXT: no edema  SKIN: no lesions on visible skin  R side port                           9.6    4.2   )-----------( 410      ( 12 Jun 2018 06:54 )             30.6         CBC Full  -  ( 12 Jun 2018 06:54 )  WBC Count : 4.2 K/uL  Hemoglobin : 9.6 g/dL  Hematocrit : 30.6 %  Platelet Count - Automated : 410 K/uL  Mean Cell Volume : 77.3 fL  Mean Cell Hemoglobin : 24.2 pg  Mean Cell Hemoglobin Concentration : 31.4 g/dL  Auto Neutrophil # : x  Auto Lymphocyte # : x  Auto Monocyte # : x  Auto Eosinophil # : x  Auto Basophil # : x  Auto Neutrophil % : x  Auto Lymphocyte % : x  Auto Monocyte % : x  Auto Eosinophil % : x  Auto Basophil % : x        06-12    137  |  101  |  9   ----------------------------<  109<H>  4.6   |  27  |  0.46<L>    Ca    8.7      12 Jun 2018 06:54  Phos  2.8     06-11  Mg     2.4     06-12            Assessment:    Metastatic colon cancer  Severe iron deficiency anemia  Acute cholecystitis  SMV/portal vein thrombosis    Plan:    Clinically stable  S/p cycle 1 of FOLFOX on 6/9  Tolerated tx well    S/p  colonoscopy with successful stent placement x 2 on 6/7,  less abdominal discomfort and distention  S/p percutaneous cholecystotomy on 5/25  S/p Infusaport placement on 5/31    SMV/Portal vein thrombosis- On Lovenox,   transition oral anticoagulant (ideally edoxaban, but apixiban or rivaroxiban would be ok)    Addition of biologic agent will be considered down the line, at this moment she can not get bevacizumab due to a risk of bleeding     Severe YESSICA-she received parenteral iron as outpatient  H/H is better and stable  PRBC as clinically indicated  Reactive thrombocytosis (YESSICA, malignancy) no specific intervention is indicated  Improving    Scheduled to be discharged to rehab  We will arrange outpatient follow up    Extensive discussion with the patient and her 2 sisters    all questions answered.    Thank you    Estefania Lundy MD  [phone]

## 2018-06-12 NOTE — DISCHARGE NOTE ADULT - PLAN OF CARE
You have a clot in the vein that leads to the liver. Please continue to take your daily anticoagulation medication to ensure that this clot goes away and to reduce the risk of new clots forming. You filled out a MOLST. This form specifies what your wishes are with regard to cardiopulmonary resuscitation. To reduce risk of future recurrence You presented to the hospital with an infection in your biliary tree. You underwent a procedure to have the contents of that infected collection drained and completed a course of antibiotics. You came to the hospital with an infection that was treated with antibiotics. You currently have a tube in place to prevent ongoing infection. You have cancer of the colon. The growth was causing narrowing of your colon that was relieved by stenting. You started chemotherapy while you were in the hospital. Please follow up with Dr. Basurto at the time listed below. You filled out a MOLST. This form specifies what your wishes are with regard to cardiopulmonary resuscitation.    #PEG tube in place  Please clean the tube before and after meals by flushing it with 10-20cc of water or saline.     #cholecystostomy tube  Please follow up with Dr. Marci Narvaez to determine when you will have your cholecystostomy drain removed. You should follow up with Dr. Narvaez on Friday, June 29 at noon. You have an appointment at her [place] office on [address], [address], [address].

## 2018-06-12 NOTE — DISCHARGE NOTE ADULT - CARE PROVIDER_API CALL
Estefania Lundy), Hematology; Medical Oncology  [address]  [address]  [place] [address]  Phone: [phone]  Fax: [phone]    Nestor Hidalgo), Gastroenterology  [address]  [address]th [place] [address]  Phone: [phone]  Fax: [phone] Estefania Lundy), Hematology; Medical Oncology  [address]  [address]  [place] [address]  Phone: [phone]  Fax: [phone]    Nestor Hidalgo), Gastroenterology  [address]  [address]th [place] [address]  Phone: [phone]  Fax: [phone]    Marci Narvaez), Surgery; Surgical Oncology  [address]  [address] [place]  [place] [address]  Phone: [phone]  Fax: [phone]

## 2018-06-14 DIAGNOSIS — K80.01 CALCULUS OF GALLBLADDER WITH ACUTE CHOLECYSTITIS WITH OBSTRUCTION: ICD-10-CM

## 2018-06-14 DIAGNOSIS — J90 PLEURAL EFFUSION, NOT ELSEWHERE CLASSIFIED: ICD-10-CM

## 2018-06-14 DIAGNOSIS — R65.20 SEVERE SEPSIS WITHOUT SEPTIC SHOCK: ICD-10-CM

## 2018-06-14 DIAGNOSIS — J96.91 RESPIRATORY FAILURE, UNSPECIFIED WITH HYPOXIA: ICD-10-CM

## 2018-06-14 DIAGNOSIS — J81.1 CHRONIC PULMONARY EDEMA: ICD-10-CM

## 2018-06-14 DIAGNOSIS — C18.9 MALIGNANT NEOPLASM OF COLON, UNSPECIFIED: ICD-10-CM

## 2018-06-14 DIAGNOSIS — I82.0 BUDD-CHIARI SYNDROME: ICD-10-CM

## 2018-06-14 DIAGNOSIS — K56.600 PARTIAL INTESTINAL OBSTRUCTION, UNSPECIFIED AS TO CAUSE: ICD-10-CM

## 2018-06-14 DIAGNOSIS — I81 PORTAL VEIN THROMBOSIS: ICD-10-CM

## 2018-06-14 DIAGNOSIS — E87.1 HYPO-OSMOLALITY AND HYPONATREMIA: ICD-10-CM

## 2018-06-14 DIAGNOSIS — Z51.5 ENCOUNTER FOR PALLIATIVE CARE: ICD-10-CM

## 2018-06-14 DIAGNOSIS — C78.7 SECONDARY MALIGNANT NEOPLASM OF LIVER AND INTRAHEPATIC BILE DUCT: ICD-10-CM

## 2018-06-14 DIAGNOSIS — A41.9 SEPSIS, UNSPECIFIED ORGANISM: ICD-10-CM

## 2018-06-14 DIAGNOSIS — C78.6 SECONDARY MALIGNANT NEOPLASM OF RETROPERITONEUM AND PERITONEUM: ICD-10-CM

## 2018-07-17 ENCOUNTER — INPATIENT (INPATIENT)
Facility: HOSPITAL | Age: 68
LOS: 10 days | Discharge: HOSPICE | DRG: 871 | End: 2018-07-28
Attending: SURGERY | Admitting: SURGERY
Payer: MEDICARE

## 2018-07-17 VITALS
DIASTOLIC BLOOD PRESSURE: 51 MMHG | TEMPERATURE: 102 F | HEART RATE: 104 BPM | SYSTOLIC BLOOD PRESSURE: 91 MMHG | OXYGEN SATURATION: 100 % | RESPIRATION RATE: 20 BRPM

## 2018-07-17 DIAGNOSIS — D50.9 IRON DEFICIENCY ANEMIA, UNSPECIFIED: ICD-10-CM

## 2018-07-17 DIAGNOSIS — I82.0 BUDD-CHIARI SYNDROME: ICD-10-CM

## 2018-07-17 DIAGNOSIS — C18.9 MALIGNANT NEOPLASM OF COLON, UNSPECIFIED: ICD-10-CM

## 2018-07-17 DIAGNOSIS — A41.9 SEPSIS, UNSPECIFIED ORGANISM: ICD-10-CM

## 2018-07-17 DIAGNOSIS — E87.3 ALKALOSIS: ICD-10-CM

## 2018-07-17 DIAGNOSIS — E87.1 HYPO-OSMOLALITY AND HYPONATREMIA: ICD-10-CM

## 2018-07-17 DIAGNOSIS — R94.31 ABNORMAL ELECTROCARDIOGRAM [ECG] [EKG]: ICD-10-CM

## 2018-07-17 LAB
ALBUMIN SERPL ELPH-MCNC: 2.8 G/DL — LOW (ref 3.3–5)
ALP SERPL-CCNC: 316 U/L — HIGH (ref 40–120)
ALT FLD-CCNC: 16 U/L — SIGNIFICANT CHANGE UP (ref 10–45)
ANION GAP SERPL CALC-SCNC: 13 MMOL/L — SIGNIFICANT CHANGE UP (ref 5–17)
APPEARANCE UR: CLEAR — SIGNIFICANT CHANGE UP
APTT BLD: 33.7 SEC — SIGNIFICANT CHANGE UP (ref 27.5–37.4)
AST SERPL-CCNC: 26 U/L — SIGNIFICANT CHANGE UP (ref 10–40)
BASE EXCESS BLDV CALC-SCNC: 5.2 MMOL/L — SIGNIFICANT CHANGE UP
BASOPHILS NFR BLD AUTO: 0.1 % — SIGNIFICANT CHANGE UP (ref 0–2)
BILIRUB DIRECT SERPL-MCNC: 0.3 MG/DL — HIGH (ref 0–0.2)
BILIRUB INDIRECT FLD-MCNC: 0.7 MG/DL — SIGNIFICANT CHANGE UP (ref 0.2–1)
BILIRUB SERPL-MCNC: 1 MG/DL — SIGNIFICANT CHANGE UP (ref 0.2–1.2)
BILIRUB SERPL-MCNC: 1 MG/DL — SIGNIFICANT CHANGE UP (ref 0.2–1.2)
BILIRUB UR-MCNC: NEGATIVE — SIGNIFICANT CHANGE UP
BUN SERPL-MCNC: 21 MG/DL — SIGNIFICANT CHANGE UP (ref 7–23)
CALCIUM SERPL-MCNC: 8.9 MG/DL — SIGNIFICANT CHANGE UP (ref 8.4–10.5)
CHLORIDE SERPL-SCNC: 91 MMOL/L — LOW (ref 96–108)
CO2 SERPL-SCNC: 26 MMOL/L — SIGNIFICANT CHANGE UP (ref 22–31)
COLOR SPEC: YELLOW — SIGNIFICANT CHANGE UP
CREAT SERPL-MCNC: 0.6 MG/DL — SIGNIFICANT CHANGE UP (ref 0.5–1.3)
DIFF PNL FLD: NEGATIVE — SIGNIFICANT CHANGE UP
GAS PNL BLDV: SIGNIFICANT CHANGE UP
GLUCOSE SERPL-MCNC: 156 MG/DL — HIGH (ref 70–99)
GLUCOSE UR QL: NEGATIVE — SIGNIFICANT CHANGE UP
HCO3 BLDV-SCNC: 28 MMOL/L — HIGH (ref 20–27)
HCT VFR BLD CALC: 29.9 % — LOW (ref 34.5–45)
HGB BLD-MCNC: 9.6 G/DL — LOW (ref 11.5–15.5)
INR BLD: 2.93 — HIGH (ref 0.88–1.16)
KETONES UR-MCNC: ABNORMAL MG/DL
LACTATE SERPL-SCNC: 1.8 MMOL/L — SIGNIFICANT CHANGE UP (ref 0.5–2)
LACTATE SERPL-SCNC: 2.8 MMOL/L — HIGH (ref 0.5–2)
LEUKOCYTE ESTERASE UR-ACNC: NEGATIVE — SIGNIFICANT CHANGE UP
LIDOCAIN IGE QN: 75 U/L — HIGH (ref 7–60)
LYMPHOCYTES # BLD AUTO: 3.7 % — LOW (ref 13–44)
MCHC RBC-ENTMCNC: 25.2 PG — LOW (ref 27–34)
MCHC RBC-ENTMCNC: 32.1 G/DL — SIGNIFICANT CHANGE UP (ref 32–36)
MCV RBC AUTO: 78.5 FL — LOW (ref 80–100)
MONOCYTES NFR BLD AUTO: 5.6 % — SIGNIFICANT CHANGE UP (ref 2–14)
NEUTROPHILS NFR BLD AUTO: 90.6 % — HIGH (ref 43–77)
NITRITE UR-MCNC: NEGATIVE — SIGNIFICANT CHANGE UP
PCO2 BLDV: 34 MMHG — LOW (ref 41–51)
PH BLDV: 7.53 — HIGH (ref 7.32–7.43)
PH UR: 6 — SIGNIFICANT CHANGE UP (ref 5–8)
PLATELET # BLD AUTO: 511 K/UL — HIGH (ref 150–400)
PO2 BLDV: 23 MMHG — SIGNIFICANT CHANGE UP
POTASSIUM SERPL-MCNC: 3.3 MMOL/L — LOW (ref 3.5–5.3)
POTASSIUM SERPL-SCNC: 3.3 MMOL/L — LOW (ref 3.5–5.3)
PROT SERPL-MCNC: 6.2 G/DL — SIGNIFICANT CHANGE UP (ref 6–8.3)
PROT UR-MCNC: NEGATIVE MG/DL — SIGNIFICANT CHANGE UP
PROTHROM AB SERPL-ACNC: 33.3 SEC — HIGH (ref 9.8–12.7)
RBC # BLD: 3.81 M/UL — SIGNIFICANT CHANGE UP (ref 3.8–5.2)
RBC # FLD: 23.1 % — HIGH (ref 10.3–16.9)
SAO2 % BLDV: 24 % — SIGNIFICANT CHANGE UP
SODIUM SERPL-SCNC: 130 MMOL/L — LOW (ref 135–145)
SP GR SPEC: <=1.005 — SIGNIFICANT CHANGE UP (ref 1–1.03)
UROBILINOGEN FLD QL: 1 E.U./DL — SIGNIFICANT CHANGE UP
WBC # BLD: 15.8 K/UL — HIGH (ref 3.8–10.5)
WBC # FLD AUTO: 15.8 K/UL — HIGH (ref 3.8–10.5)

## 2018-07-17 PROCEDURE — 74177 CT ABD & PELVIS W/CONTRAST: CPT | Mod: 26

## 2018-07-17 PROCEDURE — 99223 1ST HOSP IP/OBS HIGH 75: CPT | Mod: GC

## 2018-07-17 PROCEDURE — 99291 CRITICAL CARE FIRST HOUR: CPT

## 2018-07-17 PROCEDURE — 99222 1ST HOSP IP/OBS MODERATE 55: CPT | Mod: GC

## 2018-07-17 PROCEDURE — 71045 X-RAY EXAM CHEST 1 VIEW: CPT | Mod: 26

## 2018-07-17 RX ORDER — SODIUM CHLORIDE 9 MG/ML
500 INJECTION INTRAMUSCULAR; INTRAVENOUS; SUBCUTANEOUS ONCE
Qty: 0 | Refills: 0 | Status: COMPLETED | OUTPATIENT
Start: 2018-07-17 | End: 2018-07-17

## 2018-07-17 RX ORDER — OXYCODONE HYDROCHLORIDE 5 MG/1
5 TABLET ORAL EVERY 4 HOURS
Qty: 0 | Refills: 0 | Status: DISCONTINUED | OUTPATIENT
Start: 2018-07-17 | End: 2018-07-17

## 2018-07-17 RX ORDER — POTASSIUM CHLORIDE 20 MEQ
40 PACKET (EA) ORAL EVERY 4 HOURS
Qty: 0 | Refills: 0 | Status: DISCONTINUED | OUTPATIENT
Start: 2018-07-17 | End: 2018-07-17

## 2018-07-17 RX ORDER — SODIUM CHLORIDE 9 MG/ML
1000 INJECTION INTRAMUSCULAR; INTRAVENOUS; SUBCUTANEOUS ONCE
Qty: 0 | Refills: 0 | Status: COMPLETED | OUTPATIENT
Start: 2018-07-17 | End: 2018-07-17

## 2018-07-17 RX ORDER — EDOXABAN TOSYLATE 30 MG/1
60 TABLET, FILM COATED ORAL DAILY
Qty: 0 | Refills: 0 | Status: DISCONTINUED | OUTPATIENT
Start: 2018-07-17 | End: 2018-07-20

## 2018-07-17 RX ORDER — ACETAMINOPHEN 500 MG
650 TABLET ORAL ONCE
Qty: 0 | Refills: 0 | Status: COMPLETED | OUTPATIENT
Start: 2018-07-17 | End: 2018-07-17

## 2018-07-17 RX ORDER — POTASSIUM CHLORIDE 20 MEQ
10 PACKET (EA) ORAL
Qty: 0 | Refills: 0 | Status: COMPLETED | OUTPATIENT
Start: 2018-07-17 | End: 2018-07-18

## 2018-07-17 RX ORDER — MORPHINE SULFATE 50 MG/1
2 CAPSULE, EXTENDED RELEASE ORAL ONCE
Qty: 0 | Refills: 0 | Status: DISCONTINUED | OUTPATIENT
Start: 2018-07-17 | End: 2018-07-17

## 2018-07-17 RX ORDER — PIPERACILLIN AND TAZOBACTAM 4; .5 G/20ML; G/20ML
3.38 INJECTION, POWDER, LYOPHILIZED, FOR SOLUTION INTRAVENOUS EVERY 6 HOURS
Qty: 0 | Refills: 0 | Status: DISCONTINUED | OUTPATIENT
Start: 2018-07-17 | End: 2018-07-25

## 2018-07-17 RX ORDER — MORPHINE SULFATE 50 MG/1
2 CAPSULE, EXTENDED RELEASE ORAL EVERY 4 HOURS
Qty: 0 | Refills: 0 | Status: DISCONTINUED | OUTPATIENT
Start: 2018-07-17 | End: 2018-07-20

## 2018-07-17 RX ORDER — PIPERACILLIN AND TAZOBACTAM 4; .5 G/20ML; G/20ML
3.38 INJECTION, POWDER, LYOPHILIZED, FOR SOLUTION INTRAVENOUS ONCE
Qty: 0 | Refills: 0 | Status: COMPLETED | OUTPATIENT
Start: 2018-07-17 | End: 2018-07-17

## 2018-07-17 RX ORDER — VANCOMYCIN HCL 1 G
1250 VIAL (EA) INTRAVENOUS ONCE
Qty: 0 | Refills: 0 | Status: COMPLETED | OUTPATIENT
Start: 2018-07-17 | End: 2018-07-17

## 2018-07-17 RX ORDER — VANCOMYCIN HCL 1 G
1000 VIAL (EA) INTRAVENOUS EVERY 12 HOURS
Qty: 0 | Refills: 0 | Status: DISCONTINUED | OUTPATIENT
Start: 2018-07-18 | End: 2018-07-18

## 2018-07-17 RX ADMIN — Medication 650 MILLIGRAM(S): at 13:44

## 2018-07-17 RX ADMIN — SODIUM CHLORIDE 1000 MILLILITER(S): 9 INJECTION INTRAMUSCULAR; INTRAVENOUS; SUBCUTANEOUS at 15:01

## 2018-07-17 RX ADMIN — SODIUM CHLORIDE 2000 MILLILITER(S): 9 INJECTION INTRAMUSCULAR; INTRAVENOUS; SUBCUTANEOUS at 13:29

## 2018-07-17 RX ADMIN — MORPHINE SULFATE 2 MILLIGRAM(S): 50 CAPSULE, EXTENDED RELEASE ORAL at 21:30

## 2018-07-17 RX ADMIN — Medication 100 MILLIEQUIVALENT(S): at 22:51

## 2018-07-17 RX ADMIN — MORPHINE SULFATE 2 MILLIGRAM(S): 50 CAPSULE, EXTENDED RELEASE ORAL at 18:05

## 2018-07-17 RX ADMIN — PIPERACILLIN AND TAZOBACTAM 200 GRAM(S): 4; .5 INJECTION, POWDER, LYOPHILIZED, FOR SOLUTION INTRAVENOUS at 21:13

## 2018-07-17 RX ADMIN — Medication 166.67 MILLIGRAM(S): at 13:43

## 2018-07-17 RX ADMIN — PIPERACILLIN AND TAZOBACTAM 200 GRAM(S): 4; .5 INJECTION, POWDER, LYOPHILIZED, FOR SOLUTION INTRAVENOUS at 13:29

## 2018-07-17 RX ADMIN — MORPHINE SULFATE 2 MILLIGRAM(S): 50 CAPSULE, EXTENDED RELEASE ORAL at 18:35

## 2018-07-17 RX ADMIN — Medication 100 MILLIEQUIVALENT(S): at 20:54

## 2018-07-17 RX ADMIN — SODIUM CHLORIDE 2000 MILLILITER(S): 9 INJECTION INTRAMUSCULAR; INTRAVENOUS; SUBCUTANEOUS at 13:30

## 2018-07-17 RX ADMIN — MORPHINE SULFATE 2 MILLIGRAM(S): 50 CAPSULE, EXTENDED RELEASE ORAL at 21:13

## 2018-07-17 NOTE — H&P ADULT - HISTORY OF PRESENT ILLNESS
68 yo F w/ metastatic colon ca s/p 1 dose FOLFOX, recent acute cholecystitis c/b severe sepsis and hepatic vein thrombosis s/p percutaneous cholecystomy, and recent SBO/ ileus s/p PEG and 2 colonic stents p/w fever of 101.7 in the setting of several days of exhaustion and weakness. Denies subjective fevers/ chills/ night sweats. Pt only came to hospital because sent by her oncologist when she went to get her 2nd dose of chemo and found to be febrile with WBC of 15. While in ED, pt reports developed worsening diffuse abdominal pain 8.5/10, worse in the upper abdomen. Pain is better with oxycodone. No change w/ position or food. Denies n/v/d or constipation. Last BM last night. Endorses poor PO intake due to decreased appetite since last admission.     Pt recently admitted (5/25/18-6/16/18) for acute cholecystitis c/b hepatic vein thrombosis and severe sepsis. Was not surgical candidate because of mets. Treated w/ zosyn, percutaneous cholecystostomy, and 1 unit pRBCs. During admission, pt developed partial SBO with associated ileus s/p venting PEG tube for decompression and 2 colonic stents at the sigmoid colon and splenic flexure. Patient started chemotherapy with FOLFOX through chemoport x 1 dose.    Pt Denies abnormal drainage from perc cholecystostomy. Denies cough, chest pain. Denies urinary sx (freq, burning, hematuria). Denies ulcers on body. Denies recent alcohol use. No sick contacts. No recent travel. No blood in the stool. No recent trauma/ falls.     In the ED pt was febrile, WBC 15, lactate was 2.8. Pt was given 2L NS, 1 dose of vanc/ zosyn, w/ lactate down to 1.8. 66 yo F w/ metastatic colon ca s/p 1 dose FOLFOX, recent acute cholecystitis c/b severe sepsis and hepatic vein thrombosis s/p percutaneous cholecystomy, and recent partial SBO s/p PEG and 2 colonic stents p/w fever of 101.7 in the setting of several days of exhaustion and weakness. Denies subjective fevers/ chills/ night sweats. Pt only came to hospital because sent by her oncologist when she went to get her 2nd dose of chemo and found to be febrile with WBC of 15. While in ED, pt reports developed worsening diffuse abdominal pain 8.5/10, worse in the upper abdomen. Pain is better with oxycodone. No change w/ position or food. Denies n/v/d or constipation. Last BM last night. Endorses poor PO intake due to decreased appetite since last admission.     Pt recently admitted (5/25/18-6/16/18) for acute cholecystitis c/b hepatic vein thrombosis and severe sepsis. Was not surgical candidate because of mets. Treated w/ zosyn, percutaneous cholecystostomy, and 1 unit pRBCs. During admission, pt developed partial SBO s/p venting PEG tube for decompression and 2 colonic stents at the sigmoid colon and splenic flexure. Patient started chemotherapy with FOLFOX through chemoport x 1 dose.    Pt Denies abnormal drainage from perc cholecystostomy. Denies cough, chest pain. Denies urinary sx (freq, burning, hematuria). Denies ulcers on body. Denies recent alcohol use. No sick contacts. No recent travel. No blood in the stool. No recent trauma/ falls.     In the ED pt was febrile, WBC 15, lactate was 2.8. Pt was given 2L NS, 1 dose of vanc/ zosyn, w/ lactate down to 1.8. 68 yo F w/ metastatic colon ca s/p 1 dose FOLFOX, recent acute cholecystitis c/b severe sepsis and hepatic vein thrombosis s/p percutaneous cholecystomy, and recent partial SBO s/p PEG and 2 colonic stents p/w fever of 101.7 in the setting of several days of exhaustion and weakness. Denies subjective fevers/ chills/ night sweats. Pt only came to hospital because sent by her oncologist when she went to get her 2nd dose of chemo and found to be febrile with WBC of 15. While in ED, pt reports developed worsening diffuse abdominal pain 8.5/10, worse in the upper abdomen. Pain is better with oxycodone. No change w/ position or food. Denies n/v/d or constipation. Last BM last night. Endorses poor PO intake due to decreased appetite since last admission.     Pt recently admitted (5/25/18-6/16/18) for acute cholecystitis c/b hepatic vein thrombosis and severe sepsis. Was not surgical candidate because of mets. Treated w/ zosyn, percutaneous cholecystostomy, and 1 unit pRBCs. During admission, pt developed partial SBO s/p venting PEG tube for decompression and 2 colonic stents at the sigmoid colon and splenic flexure. Patient started chemotherapy with FOLFOX through chemoport x 1 dose.    Pt Denies abnormal drainage from perc cholecystostomy. Denies cough, chest pain. Denies urinary sx (freq, burning, hematuria). Denies ulcers on body. Denies recent alcohol use. No sick contacts. No recent travel. No blood in the stool. No recent trauma/ falls.     In the ED pt was febrile, WBC 15, lactate was 2.8. Pt was given 2L NS, 1 dose of vanc/ zosyn, w/ lactate down to 1.8.      FH: no FH of cardiac disease in father

## 2018-07-17 NOTE — H&P ADULT - PROBLEM SELECTOR PLAN 3
Hb 9, at baseline, low MCV, high RDW consistent with irone def. Not new, s/p 1 iron transfusion in the past. Hb 9, at baseline, low MCV, high RDW consistent with irone def. Not new, s/p 1 iron transfusion in the past.    #thrombocytosis  -likely in the setting of acute infection, will trend Hb 9, at baseline, low MCV, high RDW consistent with iron def. Not new, s/p 1 iron transfusion in the past.  -daily cbc  -maintain active t+s  -transfuse <7    #thrombocytosis  -likely in the setting of acute infection, will trend

## 2018-07-17 NOTE — ED ADULT NURSE NOTE - CHPI ED SYMPTOMS NEG
no loss of consciousness/no decreased eating/drinking/no vomiting/no headache/no dizziness/no back pain/no nausea/no pain/no chills

## 2018-07-17 NOTE — H&P ADULT - PROBLEM SELECTOR PLAN 4
POA. Na 130. Acute. Likely secondary to poor PO intake from decreased apetite.  -monitor BMP POA. Na 130. Acute. Likely secondary to poor PO intake from decreased apetite.  -monitor BMP    #FENC  F: Po hydration, IVF if needed  E: replete mg>2, K>4  N: regular diet as tolerated  C: FC  VTE: HSQ    1) PCP Contacted on Admission: no   Name & Phone #: Dr. Clark  2) Date of Contact with PCP:  3) PCP Contacted at Discharge: (Y/N)  4) Summary of Handoff Given to PCP:   5) Post-Discharge Appointment Date POA. Na 130. Acute. Likely secondary to poor PO intake from decreased apetite.  -monitor BMP    #FENC  F: Po hydration, IVF if needed  E: replete mg>2, K>4  N: regular diet as tolerated  C: FC  VTE ppx: on Edoxaban    1) PCP Contacted on Admission:  Dr. Nick aware pt here  Name & Phone #: Dr. Clark  2) Date of Contact with PCP:  3) PCP Contacted at Discharge: (Y/N)  4) Summary of Handoff Given to PCP:   5) Post-Discharge Appointment Date POA. Na 130. Acute. Pt appears dry. Likely hypovolemic hyponatremia. Likely secondary to poor PO intake from decreased appetite.   -monitor BMP  -serum osmolarity, urine osm, ur Na    #FENC  F: IVF (EF 60%)  E: replete mg>2, K>4  N: regular diet as tolerated  C: FC  VTE ppx: on Edoxaban    1) PCP Contacted on Admission:  Dr. Nick aware pt here  Name & Phone #: Dr. Clark  2) Date of Contact with PCP:  3) PCP Contacted at Discharge: (Y/N)  4) Summary of Handoff Given to PCP:   5) Post-Discharge Appointment Date alkalemic w/ low CO2 38 and bicarb 26. primary resp alkalosis likely 2/2 hyperventilation 2/2 pain. no AG.

## 2018-07-17 NOTE — ED PROVIDER NOTE - PROGRESS NOTE DETAILS
GI fellow shell was notified and will see the patient for consult. GI fellow Gabino was notified and will see the patient for consult. Reperfusion assessment completed. Pt mentating well, lactate normalized, and VS improved after wt based fluids and IV abx. She is stable for RMF.

## 2018-07-17 NOTE — ED PROVIDER NOTE - PHYSICAL EXAMINATION
GEN: febrile, ill but not toxic appearing, awake, alert, oriented to person, place, time/situation.  ENT: Airway patent, Nasal mucosa clear. Mouth with dry mucosa.  EYES: Clear bilaterally.  RESPIRATORY: Breathing comfortably with normal RR. No w/c/r.  CARDIAC: Regular rate and rhythm  ABDOMEN: PEG and t-tube in place, site c/d/i, no drainage or bleeding. Diffuse abdominal TTP, +bowel sounds, no rebound, rigidity, or guarding.  MSK: Range of motion is not limited, no deformities noted.  NEURO: Alert and oriented, no focal deficits.  SKIN: Skin normal color for race, warm, dry and intact. No evidence of rash.  PSYCH: Alert and oriented to person, place, time/situation. normal mood and affect. no apparent risk to self or others.

## 2018-07-17 NOTE — H&P ADULT - PROBLEM SELECTOR PLAN 7
POA. Na 130. liekly Acute given normal na on last admission 1 month ago. Pt appears dry. Likely hypovolemic hyponatremia in the setting of poor PO intake from decreased appetite. expect to improve after IVF rescuscitation  -monitor BMP  -am serum osmolarity, urine osm, ur Na    #FENC  F: IVF (EF 60%)  E: replete mg>2, K>4  N: NPO   C: FC  VTE ppx: on Edoxaban    1) PCP Contacted on Admission:  Dr. Nick aware pt here  Name & Phone #: Dr. Clark  2) Date of Contact with PCP:  3) PCP Contacted at Discharge: (Y/N)  4) Summary of Handoff Given to PCP:   5) Post-Discharge Appointment Date

## 2018-07-17 NOTE — H&P ADULT - PROBLEM SELECTOR PLAN 5
-c/w edoxaban POA. found on last admission, likely 2/2 hypercoagulable state in the setting of active untreated malignancy  -c/w edoxaban home med

## 2018-07-17 NOTE — ED PROVIDER NOTE - OBJECTIVE STATEMENT
67F with a PMHx metastatic colon cancer, recent admit for sepsis from acute cholecystitis associated with hepatic vein thrombosis, deemed poor surgical candidate 2/2 active metastatic colon Ca, s/p IR directed percutaneous cholecystostomy, c/b partial SBO  treated with venting peg-tube and GI directed stenting of narrowing in the lumen of the colon at the sigmoid colon and splenic flexure who presents from oncologist office for fever, associated with weakness and "exhaustion" for the past few days, no cough, no bowel or bladder changes, however pt does note increased abdominal pain and that drainage from t-tube has become "clearer." and improved with zosyn. 67F with a PMHx metastatic colon cancer, recent admit for sepsis from acute cholecystitis associated with hepatic vein thrombosis, deemed poor surgical candidate 2/2 active metastatic colon Ca, s/p IR directed percutaneous cholecystostomy, c/b partial SBO  treated with venting peg-tube and GI directed stenting of narrowing in the lumen of the colon at the sigmoid colon and splenic flexure who presents from oncologist office for fever, associated with weakness and "exhaustion" for the past few days, no cough, no bowel or bladder changes, however pt does note increased abdominal pain and that drainage from t-tube has become "clearer."

## 2018-07-17 NOTE — H&P ADULT - NSHPLABSRESULTS_GEN_ALL_CORE
.  LABS:                         9.6    15.8  )-----------( 511      ( 17 Jul 2018 13:43 )             29.9     07-17    130<L>  |  91<L>  |  21  ----------------------------<  156<H>  3.3<L>   |  26  |  0.60    Ca    8.9      17 Jul 2018 13:43    TPro  6.2  /  Alb  2.8<L>  /  TBili  1.0  /  DBili  0.3<H>  /  AST  26  /  ALT  16  /  AlkPhos  316<H>  07-17    PT/INR - ( 17 Jul 2018 13:43 )   PT: 33.3 sec;   INR: 2.93          PTT - ( 17 Jul 2018 13:43 )  PTT:33.7 sec  Urinalysis Basic - ( 17 Jul 2018 15:17 )    Color: Yellow / Appearance: Clear / SG: <=1.005 / pH: x  Gluc: x / Ketone: Trace mg/dL  / Bili: Negative / Urobili: 1.0 E.U./dL   Blood: x / Protein: NEGATIVE mg/dL / Nitrite: NEGATIVE   Leuk Esterase: NEGATIVE / RBC: x / WBC x   Sq Epi: x / Non Sq Epi: x / Bacteria: x    Lactate, Blood: 1.8 mmoL/L (07-17 @ 15:35)  Lactate, Blood: 2.8 mmoL/L (07-17 @ 13:49)      RADIOLOGY, EKG & ADDITIONAL TESTS: Reviewed. .  LABS:                         9.6    15.8  )-----------( 511      ( 17 Jul 2018 13:43 )             29.9     07-17    130<L>  |  91<L>  |  21  ----------------------------<  156<H>  3.3<L>   |  26  |  0.60    Ca    8.9      17 Jul 2018 13:43    TPro  6.2  /  Alb  2.8<L>  /  TBili  1.0  /  DBili  0.3<H>  /  AST  26  /  ALT  16  /  AlkPhos  316<H>  07-17    PT/INR - ( 17 Jul 2018 13:43 )   PT: 33.3 sec;   INR: 2.93          PTT - ( 17 Jul 2018 13:43 )  PTT:33.7 sec  Urinalysis Basic - ( 17 Jul 2018 15:17 )    Color: Yellow / Appearance: Clear / SG: <=1.005 / pH: x  Gluc: x / Ketone: Trace mg/dL  / Bili: Negative / Urobili: 1.0 E.U./dL   Blood: x / Protein: NEGATIVE mg/dL / Nitrite: NEGATIVE   Leuk Esterase: NEGATIVE / RBC: x / WBC x   Sq Epi: x / Non Sq Epi: x / Bacteria: x    Lactate, Blood: 1.8 mmoL/L (07-17 @ 15:35)  Lactate, Blood: 2.8 mmoL/L (07-17 @ 13:49)      RADIOLOGY, EKG & ADDITIONAL TESTS: Reviewed.  EKG: abnormal for long QT    Xray Chest 1 View AP/PA (07.17.18 @ 14:47)  IMPRESSION: No evidence of pulmonary infiltrate.    CT Abdomen and Pelvis w/ IV Cont (07.17.18 @ 14:42)  IMPRESSION:  1.  Significant interval change in appearance of the gastrointestinal   tract, with long segment of circumferential bowel wall thickening of the   distal small bowel and the ascending/transverse colon; with several areas   of luminal narrowing involving the mentioned colon; as detailed above.   Findings are either related to progression of carcinomatosis and   metastatic disease or possible ischemic etiology.    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 Sigmoid colon stent in place.    4.  Increasing amount of abdominopelvic ascites.    5.  Increasing metastatic disease to the liver.    6.  PEG tube is within the stomach lumen. Unchanged position of the   percutaneous cholecystotomy tube.

## 2018-07-17 NOTE — ED PROVIDER NOTE - MEDICAL DECISION MAKING DETAILS
Pt with metastatic colon ca who p/w sepsis. Worked up per sepsis protocol, wt based fluids and broad spectrum abx initiated. Ct a/p ordered to r/o intrabd infection given new abd pain and change in drain output per patient. Pt will require admission.

## 2018-07-17 NOTE — H&P ADULT - ATTENDING COMMENTS
Pt seen and examined at bedside on 7/17/2018 @ 2200    Agree with HPI, ROS as above.     VS, Labs, FH, SH, allergies, medications, imaging reviewed. I personally reviewed the CXR - no overt infiltrates. Agree with physical exam as above     A/P: 6 yo F w/ metastatic colon ca s/p 1 dose FOLFOX, recent acute cholecystitis c/b severe sepsis and hepatic vein thrombosis s/p percutaneous cholecystomy, and recent SBO/ ileus s/p PEG and 2 colonic stents p/w fever, white count, and abdominal pain. Met 3/4 SIRs criteria (fever, HR, WBC). Pt likely has abdominal infection. DDX includes SBP, cholecystitis, infection of stents, malignant infarcts which may cause ischemic colitis with bacterial translocation,  colitis/enteritis, malignant fever, pancreatitis.    **Sepsis  -Pt meeting 3/4 SIRS criteria on admission with possible intraabdominal source    Plan otherwise as outlined above..... Pt seen and examined at bedside on 7/17/2018 @ 2200    Agree with HPI, ROS as above.     VS, Labs, FH, SH, allergies, medications, imaging reviewed. I personally reviewed the CXR - no overt infiltrates. Agree with physical exam as above     A/P: 6 yo F w/ metastatic colon ca s/p 1 dose FOLFOX, recent acute cholecystitis c/b severe sepsis and hepatic vein thrombosis s/p percutaneous cholecystomy, and recent SBO/ ileus s/p PEG and 2 colonic stents p/w fever, white count, and abdominal pain. Met 3/4 SIRs criteria (fever, HR, WBC). Pt likely has abdominal infection. DDX includes SBP, cholecystitis, infection of stents, malignant infarcts which may cause ischemic colitis with bacterial translocation,  colitis/enteritis, malignant fever, pancreatitis.    **Sepsis  -Pt meeting 3/4 SIRS criteria on admission with possible intraabdominal source although differential is broad and includes possible fever 2/2 known malignancy  -Will c/w broad coverage Vancomycin/Zosyn  -LA initially elevated now downtrended to normal s/p IVF  -f.u cultures  -U/A without evidence of infection, CXR without clear infiltrate  -GI and vascular consulted    Plan otherwise as outlined above..... Pt seen and examined at bedside on 7/17/2018 @ 2200    Agree with HPI, ROS as above.     VS, Labs, FH, SH, allergies, medications, imaging reviewed. I personally reviewed the CXR - no overt infiltrates. Agree with physical exam as above     A/P: 8 yo F w/ metastatic colon ca s/p 1 dose FOLFOX, recent acute cholecystitis c/b severe sepsis and hepatic vein thrombosis s/p percutaneous cholecystomy, and recent SBO/ ileus s/p PEG and 2 colonic stents p/w fever, white count, and abdominal pain. Met 3/4 SIRs criteria (fever, HR, WBC). Pt likely has abdominal infection. DDX includes SBP, cholecystitis, infection of stents, malignant infarcts which may cause ischemic colitis with bacterial translocation,  colitis/enteritis, malignant fever, pancreatitis.    **Sepsis  -Pt meeting 3/4 SIRS criteria on admission with possible intraabdominal source although differential is broad and includes possible fever 2/2 known malignancy  -Will c/w broad coverage Vancomycin/Zosyn  -LA initially elevated now downtrended to normal s/p IVF  -f.u cultures  -U/A without evidence of infection, CXR without clear infiltrate  -GI and vascular consulted    Plan otherwise as outlined above.....    Addendum: 7/18 - 1230 am  Patient continues to complain of abdominal pain, with rigors noted, abdominal exam minimally changed from previous. Surgery consulted, will redraw all labs at this time including LA, repeat abdominal xray (upright) to evaluate for free air  -Will continue to monitor patient with low threshold for ICU consult if change in HD

## 2018-07-17 NOTE — H&P ADULT - PROBLEM SELECTOR PLAN 6
POA. Na 130. Acute. Pt appears dry. Likely hypovolemic hyponatremia. Likely secondary to poor PO intake from decreased appetite.   -monitor BMP  -serum osmolarity, urine osm, ur Na    #FENC  F: IVF (EF 60%)  E: replete mg>2, K>4  N: regular diet as tolerated  C: FC  VTE ppx: on Edoxaban    1) PCP Contacted on Admission:  Dr. Nick aware pt here  Name & Phone #: Dr. Clark  2) Date of Contact with PCP:  3) PCP Contacted at Discharge: (Y/N)  4) Summary of Handoff Given to PCP:   5) Post-Discharge Appointment Date POA. Na 130. liekly Acute given normal na on last admission 1 month ago. Pt appears dry. Likely hypovolemic hyponatremia in the setting of poor PO intake from decreased appetite. expect to improve after IVF rescuscitation  -monitor BMP  -am serum osmolarity, urine osm, ur Na    #FENC  F: IVF (EF 60%)  E: replete mg>2, K>4  N: regular diet as tolerated  C: FC  VTE ppx: on Edoxaban    1) PCP Contacted on Admission:  Dr. Nick aware pt here  Name & Phone #: Dr. Clark  2) Date of Contact with PCP:  3) PCP Contacted at Discharge: (Y/N)  4) Summary of Handoff Given to PCP:   5) Post-Discharge Appointment Date -long QT, otherwise normal  -repeat am EKG, monitor daily  -avoid medications that prolong QT

## 2018-07-17 NOTE — ED PROVIDER NOTE - CARE PLAN
Principal Discharge DX:	Sepsis Principal Discharge DX:	Sepsis  Secondary Diagnosis:	Metastatic colon cancer in female

## 2018-07-17 NOTE — ED ADULT NURSE NOTE - OBJECTIVE STATEMENT
Pt presents from PCP office complaining of fevers and generalized malaise. Pt denies lightheadedness, no dizziness, no sob, no cp, no n/v, no changes to bowels, no urinary complaints. Pt presents from PCP office complaining of fevers and generalized malaise and worsened abdominal pain that started today. Pt denies lightheadedness, no dizziness, no sob, no cp, no n/v, no changes to bowels, no urinary complaints.

## 2018-07-17 NOTE — H&P ADULT - PROBLEM SELECTOR PLAN 1
POA. Met 3/4 SIRS criteria: tachycardic to 80, febrile 101.7, WBC 15.8. Lactate was 2.8 s/p 2L NS--> 1.8. Likely 2/2 to abdominal infection (possible pancreatitis, SBP, cholecystitis, infected stents). UA negative. Lipase elevated.   -GI consult (paracentesis to analyze for SBP, categorize ascties)  -consider MRCP  -trend vitals, CBC w/ diff  -add on ggt   -c/w vanc/ zosyn for broad coverage until cultures come back  -f/u blood cultures POA. Met 3/4 SIRS criteria: tachycardic to 80, febrile 101.7, WBC 15.8. Lactate was 2.8 s/p 2L NS--> 1.8. Likely 2/2 to abdominal infection (possible pancreatitis, SBP, cholecystitis, infected stents). UA negative. Lipase elevated mildly to 75. GGT elevated to 100 (last admission was 30).  -GI consult (paracentesis to analyze for SBP, categorize ascties)  -consider RUQ u/s  -trend vitals, CBC w/ diff  -add on ggt   -c/w vanc/ zosyn for broad coverage until cultures come back  -f/u blood cultures    #abdominal pain  -c/w oxycodone 5mg POA. Met 3/4 SIRS criteria: tachycardic to 80, febrile 101.7, WBC 15.8. Lactate was 2.8 s/p 2L NS--> 1.8. Likely 2/2 to abdominal infection (possible pancreatitis, SBP, cholecystitis, infected stents). UA negative. Lipase elevated mildly to 75. GGT elevated to 100 (last admission was 30).  -GI consult (paracentesis to analyze for SBP, categorize ascties)  -consider RUQ u/s  -trend vitals, CBC w/ diff  -add on ggt   -c/w vanc/ zosyn for broad coverage until cultures come back  -f/u blood cultures    #abdominal pain   Pain regimen on prior admission: oxycodone 10mg q4h for severe pain, oxycodone 5mg for moderate pain.    -c/w oxycodone 5mg  -c/w bowel regimen POA. Met 3/4 SIRS criteria: tachycardic to 80, febrile 101.7, WBC 15.8. Lactate was 2.8 s/p 2L NS--> 1.8. Likely 2/2 to abdominal infection (possible pancreatitis, SBP, cholecystitis, infected stents). UA negative. Lipase elevated mildly to 75. GGT elevated to 100 (last admission was 30).  -GI consult (paracentesis to analyze for SBP, categorize ascties)  -trend vitals, CBC w/ diff  -add on ggt   -c/w vanc/ zosyn for broad coverage until cultures come back  -f/u blood cultures    #abdominal pain   Pain regimen on prior admission: oxycodone 10mg q4h for severe pain, oxycodone 5mg for moderate pain.    -c/w oxycodone 5mg  -c/w bowel regimen  -vascular consult

## 2018-07-17 NOTE — H&P ADULT - PROBLEM SELECTOR PLAN 2
-f/u with Dr. Novoselec outpt (He is aware that pt is here, because he sent her in). Pt of Dr. Nick ([phone]). recently dxd in 6/18. treating w/ chemo (FolFOX), received 1 dose so far, with 2nd tx planned for today, but not given due to fever.  -f/u with Dr. Nick recs  -will likely resume chemo outpt Pt of Dr. Nick ([phone]). recently dxd in 6/18. s/p chemoport placement 5/31, initiation of chemotherapy 6/9 with FOLFOX, received 1 dose so far, with 2nd tx planned for today, but not given due to fever.  -f/u with Dr. Nick recs  -will likely resume chemo outpt Pt of Dr. Nick ([phone]). recently dxd in 6/18. s/p chemoport placement 5/31, initiation of chemotherapy 6/9 with FOLFOX, received 1 dose so far, with 2nd tx planned for today, but not given due to fever.  -pal care consult  -f/u with Dr. Nick recs  -will likely resume chemo outpt

## 2018-07-17 NOTE — H&P ADULT - ASSESSMENT
6 yo F w/ metastatic colon ca s/p 1 dose FOLFOX, recent acute cholecystitis c/b severe sepsis and hepatic vein thrombosis s/p percutaneous cholecystomy, and recent SBO/ ileus s/p PEG and 2 colonic stents p/w fever, white count, and abdominal pain. Met 3/4 SIRs criteria (fever, HR, WBC). Pt likely has abdominal infection. DDX includes SBP, cholecystitis, infection of stents, pancreatitis. Lipase mildly elevated, Alk phos elevated above baseline. Blood and urine cultures pending. 6 yo F w/ metastatic colon ca s/p 1 dose FOLFOX, recent acute cholecystitis c/b severe sepsis and hepatic vein thrombosis s/p percutaneous cholecystomy, and recent SBO/ ileus s/p PEG and 2 colonic stents p/w fever, white count, and abdominal pain. Met 3/4 SIRs criteria (fever, HR, WBC). Pt likely has abdominal infection. DDX includes SBP, cholecystitis, infection of stents, malignant infarcts which may cause ischemic colitis with bacterial translocation,  colitis/enteritis, malignant fever, pancreatitis.

## 2018-07-17 NOTE — CONSULT NOTE ADULT - SUBJECTIVE AND OBJECTIVE BOX
HPI:  66 YO F w/ metastatic colon ca s/p 1 dose FOLFOX, recent acute cholecystitis c/b severe sepsis and hepatic vein thrombosis s/p percutaneous cholecystomy, and recent SBO/ ileus s/p PEG and 2 colonic stents sent to [facility] ED by Dr. Richards for fever of 102.5, associated with poor appetite and weakness x 4 days. Pt recently admitted (5/25/18-6/16/18) for acute cholecystitis c/b hepatic vein thrombosis and severe sepsis. Was not surgical candidate because of mets. Treated w/ zosyn, percutaneous cholecystostomy, and 1 unit pRBCs. During admission, pt developed partial SBO with associated ileus s/p venting PEG tube for decompression and 2 colonic stents at the sigmoid colon and splenic flexure. Patient started chemotherapy with FOLFOX through chemoport x 1 dose. Pt was discharged to rehab for 4 weeks and was discharged on last Thursday. Since Sunday she has noted that she has become increasingly weaker with decreased appetite. Pt reports chills, weight loss, increased urinary frequency but with small amounts of urine, and persistent abdominal pain. Pt states that her abdominal pain worsened today since presenting to the ED. Having BMs and passing flatus, denies nausea, vomiting, melena, hematochezia, diarrhea, cough, sore throat, dysuria. Last BM yesterday evening.     Allergies    allergic to cats (Rash)  No Known Drug Allergies    Home Medications:  oxyCODONE 5 mg oral tablet: 1 tab(s) orally every 4 hours, As needed, Moderate Pain (4 - 6) (12 Jun 2018 11:21)  pantoprazole 40 mg oral delayed release tablet: 1 tab(s) orally once a day (before a meal) (12 Jun 2018 11:21)  polyethylene glycol 3350 oral powder for reconstitution: 17 gram(s) orally once a day (12 Jun 2018 11:21)    MEDICATIONS:  MEDICATIONS  (STANDING):  edoxaban 60 milliGRAM(s) Oral daily    MEDICATIONS  (PRN):  oxyCODONE    IR 5 milliGRAM(s) Oral every 4 hours PRN Moderate Pain (4 - 6)    PAST MEDICAL & SURGICAL HISTORY:  SBP (spontaneous bacterial peritonitis)  Cholecystitis, acute  Colon cancer  No significant past surgical history    FAMILY HISTORY:  No pertinent family history in first degree relatives    SOCIAL HISTORY:  [place]: Former  Alcohol: Denies  Illicit Drugs: Denies    REVIEW OF SYSTEMS:   All other 10 review of systems is negative unless indicated above.    Vital Signs Last 24 Hrs  T(C): 37.2 (17 Jul 2018 15:00), Max: 38.7 (17 Jul 2018 13:11)  T(F): 99 (17 Jul 2018 15:00), Max: 101.7 (17 Jul 2018 13:11)  HR: 80 (17 Jul 2018 15:00) (80 - 104)  BP: 118/71 (17 Jul 2018 15:00) (91/51 - 128/85)  BP(mean): --  RR: 20 (17 Jul 2018 15:00) (20 - 20)  SpO2: 100% (17 Jul 2018 15:00) (100% - 100%)    07-17 @ 07:01  -  07-17 @ 19:06  --------------------------------------------------------  IN: 0 mL / OUT: 250 mL / NET: -250 mL    PHYSICAL EXAM:    General: Frail; in no acute distress  Eyes: Anicteric sclerae, moist conjunctivae  HENT: Moist mucous membranes  Neck: Trachea midline, supple  Lungs: Normal respiratory effort and no intercostal retractions  LAD: R supraclavicular LAD  Abdomen: Soft, diffuse TTP distended; No rebound or guarding; PEG site CDI with no TTP, percutaneous nephrostomy tube in place with bilious drainage, no TTP  Extremities: Normal range of motion, No clubbing, cyanosis or edema; no surrounding erythema or TTP of R chemoport site  Neurological: Alert and oriented x3  Skin: Warm and dry. No obvious rash    LABS:                        9.6    15.8  )-----------( 511      ( 17 Jul 2018 13:43 )             29.9     07-17    130<L>  |  91<L>  |  21  ----------------------------<  156<H>  3.3<L>   |  26  |  0.60    Ca    8.9      17 Jul 2018 13:43    TPro  6.2  /  Alb  2.8<L>  /  TBili  1.0  /  DBili  0.3<H>  /  AST  26  /  ALT  16  /  AlkPhos  316<H>  07-17    PT/INR - ( 17 Jul 2018 13:43 )   PT: 33.3 sec;   INR: 2.93       PTT - ( 17 Jul 2018 13:43 )  PTT:33.7 sec    RADIOLOGY & ADDITIONAL STUDIES:     Xray Chest 1 View AP/PA (07.17.18 @ 14:47)  IMPRESSION: No evidence of pulmonary infiltrate.    CT Abdomen and Pelvis w/ IV Cont (07.17.18 @ 14:42)  FINDINGS: Images of the lower chest demonstrate clear lung bases with no   pulmonary nodules. No cardiomegaly seen further appears to be some   coronary calcification/stent.    The liver has a multiple hypodense liver lesion consistent with   metastatic disease with the largest one measuring 2.1 cm transaxially in   the right lobe. There is also a 3.2 cm hepatic cyst unchanged from prior   CT; with increasing size of metastatic lesions from the most recent   comparison exam. Also noted are multiple venous collaterals in the josh   hepatis region, consistent with cavernous transformation the portal vein.   The previously seen thrombus in SMV is not identified on the current   exam. Cholecystostomy tube is in place in a collapsed gallbladder with a   1.5 cm gallstone. The pancreas is normal in appearance. No splenic   abnormalities are seen. The adrenal glands are unremarkable. The kidneys   are normal in appearance.    No abdominal aortic aneurysm is seen. No lymphadenopathy is seen.    The newly placed PEG tube is in place within the gastric lumen; and a   newly placed stent in the sigmoid colon. Evaluation of the bowel reveals   significant interval change in appearance of the gastrointestinal tract,   with long segment of circumferential mural thickening of the dilated   air-filled distal small bowel. There is also abnormal appearance of the   ascending and transverse colon, with indistinct bowel wall and areas of   luminal narrowing at different locations of the colon. Redemonstrated is   a heterogeneous collection interposed between the descending colon and   adjacent small bowel in the peripheral right mid abdomen. There is also   worsening of peritoneal carcinomatosis in the interim. Increasing amount   of abdominopelvic ascites.    Images of the pelvis demonstrate the uterus and adnexae to be grossly   unremarkable in appearance.    Evaluation of the osseous structures demonstrates degenerative changes of   the spine.  No destructive lesions are seen.      IMPRESSION:  1.  Significant interval change in appearance of the gastrointestinal   tract, with long segment of circumferential bowel wall thickening of the   distal small bowel and the ascending/transverse colon; with several areas   of luminal narrowing involving the mentioned colon; as detailed above.   Findings are either related to progression of carcinomatosis and   metastatic disease or possible ischemic etiology.    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 Sigmoid colon stent in place.    4.  Increasing amount of abdominopelvic ascites.    5.  Increasing metastatic disease to the liver.    6.  PEG tube is within the stomach lumen. Unchanged position of the   percutaneous cholecystotomy tube.

## 2018-07-17 NOTE — H&P ADULT - NSHPOUTPATIENTPROVIDERS_GEN_ALL_CORE
Dr. Clark (PMD)  Dr. Nick (oncology)   Dr. Hidalgo (GI)  Dr. Narvaez Dr. Garcia (PMD/ GI) [phone]  Dr. Nick (oncology) -[phone]  Dr. Hidalgo (GI)  Dr. Narvaez

## 2018-07-17 NOTE — CONSULT NOTE ADULT - SUBJECTIVE AND OBJECTIVE BOX
Vascular Attending: Dr. Fuentes      HPI:  66 yo F w/ metastatic colon ca s/p 1 dose FOLFOX, recent acute cholecystitis c/b severe sepsis and hepatic vein thrombosis s/p percutaneous cholecystomy, and recent partial SBO s/p PEG and 2 colonic stents p/w fever of 101.7 in the setting of several days of exhaustion and weakness. Denies subjective fevers/ chills/ night sweats. Pt only came to hospital because sent by her oncologist when she went to get her 2nd dose of chemo and found to be febrile with WBC of 15. While in ED, pt reports developed worsening diffuse abdominal pain 8.5/10, worse in the upper abdomen. Pain is better with oxycodone. No change w/ position or food. Denies n/v/d or constipation. Last BM last night. Endorses poor PO intake due to decreased appetite since last admission.     Pt recently admitted (5/25/18-6/16/18) for acute cholecystitis c/b hepatic vein thrombosis and severe sepsis. Was not surgical candidate because of mets. Treated w/ zosyn, percutaneous cholecystostomy, and 1 unit pRBCs. During admission, pt developed partial SBO s/p venting PEG tube for decompression and 2 colonic stents at the sigmoid colon and splenic flexure. Patient started chemotherapy with FOLFOX through chemoport x 1 dose.    Pt Denies abnormal drainage from perc cholecystostomy. Denies cough, chest pain. Denies urinary sx (freq, burning, hematuria). Denies ulcers on body. Denies recent alcohol use. No sick contacts. No recent travel. No blood in the stool. No recent trauma/ falls.     In the ED pt was febrile, WBC 15, lactate was 2.8. Pt was given 2L NS, 1 dose of vanc/ zosyn, w/ lactate down to 1.8. (17 Jul 2018 18:05)      PAST MEDICAL & SURGICAL HISTORY:  Cholecystitis, acute  Colon cancer  No significant past surgical history      REVIEW OF SYSTEMS      General:	    Skin/Breast:  	  Ophthalmologic:  	  ENMT:	    Respiratory and Thorax:  	  Cardiovascular:	    Gastrointestinal:	    Genitourinary:	    Musculoskeletal:	    Neurological:	    Psychiatric:	    Hematology/Lymphatics:	    Endocrine:	    Allergic/Immunologic:	    MEDICATIONS  (STANDING):  edoxaban 60 milliGRAM(s) Oral daily  piperacillin/tazobactam IVPB. 3.375 Gram(s) IV Intermittent every 6 hours  potassium chloride  10 mEq/100 mL IVPB 10 milliEquivalent(s) IV Intermittent every 1 hour    MEDICATIONS  (PRN):  morphine  - Injectable 2 milliGRAM(s) IV Push every 4 hours PRN Severe Pain (7 - 10)      Allergies    allergic to cats (Rash)  No Known Drug Allergies    Intolerances        SOCIAL HISTORY:    FAMILY HISTORY:  No pertinent family history in first degree relatives      Vital Signs Last 24 Hrs  T(C): 36.7 (17 Jul 2018 21:12), Max: 38.7 (17 Jul 2018 13:11)  T(F): 98.1 (17 Jul 2018 21:12), Max: 101.7 (17 Jul 2018 13:11)  HR: 89 (17 Jul 2018 20:23) (80 - 104)  BP: 106/67 (17 Jul 2018 20:23) (91/51 - 128/85)  BP(mean): --  RR: 16 (17 Jul 2018 20:23) (16 - 20)  SpO2: 100% (17 Jul 2018 20:23) (100% - 100%)    PHYSICAL EXAM:      Constitutional:    Eyes:    ENMT:    Neck:    Breasts:    Back:    Respiratory:    Cardiovascular:    Gastrointestinal:    Genitourinary:    Rectal:    Extremities:    Vascular:    Neurological:    Skin:    Lymph Nodes:    Musculoskeletal:    Psychiatric:        LABS:                        9.6    15.8  )-----------( 511      ( 17 Jul 2018 13:43 )             29.9     07-17    130<L>  |  91<L>  |  21  ----------------------------<  156<H>  3.3<L>   |  26  |  0.60    Ca    8.9      17 Jul 2018 13:43    TPro  6.2  /  Alb  2.8<L>  /  TBili  1.0  /  DBili  0.3<H>  /  AST  26  /  ALT  16  /  AlkPhos  316<H>  07-17    PT/INR - ( 17 Jul 2018 13:43 )   PT: 33.3 sec;   INR: 2.93          PTT - ( 17 Jul 2018 13:43 )  PTT:33.7 sec  Urinalysis Basic - ( 17 Jul 2018 15:17 )    Color: Yellow / Appearance: Clear / SG: <=1.005 / pH: x  Gluc: x / Ketone: Trace mg/dL  / Bili: Negative / Urobili: 1.0 E.U./dL   Blood: x / Protein: NEGATIVE mg/dL / Nitrite: NEGATIVE   Leuk Esterase: NEGATIVE / RBC: x / WBC x   Sq Epi: x / Non Sq Epi: x / Bacteria: x        RADIOLOGY & ADDITIONAL STUDIES Vascular Attending: Dr. Fuentes  HPI:  66 yo F w/ metastatic colon ca s/p 1 dose FOLFOX, recent acute cholecystitis c/b severe sepsis and hepatic vein thrombosis s/p percutaneous cholecystomy, and recent partial SBO s/p PEG and 2 colonic stents p/w fever of 101.7 in the setting of several days of exhaustion and weakness. Denies subjective fevers/ chills/ night sweats. Pt only came to hospital because sent by her oncologist when she went to get her 2nd dose of chemo and found to be febrile with WBC of 15. While in ED, pt reports developed worsening diffuse abdominal pain 8.5/10, worse in the upper abdomen. Pain is better with oxycodone. No change w/ position or food. Denies n/v/d or constipation. Last BM last night. Endorses poor PO intake due to decreased appetite since last admission.     Pt recently admitted (5/25/18-6/16/18) for acute cholecystitis c/b hepatic vein thrombosis and severe sepsis. Was not surgical candidate because of mets. Treated w/ zosyn, percutaneous cholecystostomy, and 1 unit pRBCs. During admission, pt developed partial SBO s/p venting PEG tube for decompression and 2 colonic stents at the sigmoid colon and splenic flexure. Patient started chemotherapy with FOLFOX through chemoport x 1 dose.    Pt Denies abnormal drainage from perc cholecystostomy. Denies cough, chest pain. Denies urinary sx (freq, burning, hematuria). Denies ulcers on body. Denies recent alcohol use. No sick contacts. No recent travel. No blood in the stool. No recent trauma/ falls.     Vascular Surgery: Pt started to have abdominal pain about one hour after being admitted to the hospital. She describes abdominal pain as diffuse pain, she denies nausea or vomiting. Her last BM was yesterday. She has decrease appetite and last meal was this morning.  Pt states that she gets intermittent abdominal pain but describes her current pain to be severe.  CT Abd/pelvis perform reveals       PAST MEDICAL & SURGICAL HISTORY:  Cholecystitis, acute  Colon cancer  No significant past surgical history  	  MEDICATIONS  (STANDING):  edoxaban 60 milliGRAM(s) Oral daily  piperacillin/tazobactam IVPB. 3.375 Gram(s) IV Intermittent every 6 hours  potassium chloride  10 mEq/100 mL IVPB 10 milliEquivalent(s) IV Intermittent every 1 hour    MEDICATIONS  (PRN):  morphine  - Injectable 2 milliGRAM(s) IV Push every 4 hours PRN Severe Pain (7 - 10)      Allergies    allergic to cats (Rash)  No Known Drug Allergies    Intolerances      SOCIAL HISTORY:    FAMILY HISTORY:  No pertinent family history in first degree relatives      Vital Signs Last 24 Hrs  T(C): 36.7 (17 Jul 2018 21:12), Max: 38.7 (17 Jul 2018 13:11)  T(F): 98.1 (17 Jul 2018 21:12), Max: 101.7 (17 Jul 2018 13:11)  HR: 89 (17 Jul 2018 20:23) (80 - 104)  BP: 106/67 (17 Jul 2018 20:23) (91/51 - 128/85)  BP(mean): --  RR: 16 (17 Jul 2018 20:23) (16 - 20)  SpO2: 100% (17 Jul 2018 20:23) (100% - 100%)    PHYSICAL EXAM:    Constitutional:  Respiratory:  Cardiovascular:  Extremities:  Vascular:      LABS:                        9.6    15.8  )-----------( 511      ( 17 Jul 2018 13:43 )             29.9     07-17    130<L>  |  91<L>  |  21  ----------------------------<  156<H>  3.3<L>   |  26  |  0.60    Ca    8.9      17 Jul 2018 13:43    TPro  6.2  /  Alb  2.8<L>  /  TBili  1.0  /  DBili  0.3<H>  /  AST  26  /  ALT  16  /  AlkPhos  316<H>  07-17    PT/INR - ( 17 Jul 2018 13:43 )   PT: 33.3 sec;   INR: 2.93          PTT - ( 17 Jul 2018 13:43 )  PTT:33.7 sec  Urinalysis Basic - ( 17 Jul 2018 15:17 )    Color: Yellow / Appearance: Clear / SG: <=1.005 / pH: x  Gluc: x / Ketone: Trace mg/dL  / Bili: Negative / Urobili: 1.0 E.U./dL   Blood: x / Protein: NEGATIVE mg/dL / Nitrite: NEGATIVE   Leuk Esterase: NEGATIVE / RBC: x / WBC x   Sq Epi: x / Non Sq Epi: x / Bacteria: x Vascular Attending: Dr. Fuentes  HPI:  68 yo F w/ metastatic colon ca s/p 1 dose FOLFOX, recent acute cholecystitis c/b severe sepsis and hepatic vein thrombosis s/p percutaneous cholecystomy, and recent partial SBO s/p PEG and 2 colonic stents p/w fever of 101.7 in the setting of several days of exhaustion and weakness. Denies subjective fevers/ chills/ night sweats. Pt only came to hospital because sent by her oncologist when she went to get her 2nd dose of chemo and found to be febrile with WBC of 15. While in ED, pt reports developed worsening diffuse abdominal pain 8.5/10, worse in the upper abdomen. Pain is better with oxycodone. No change w/ position or food. Denies n/v/d or constipation. Last BM last night. Endorses poor PO intake due to decreased appetite since last admission.     Pt recently admitted (5/25/18-6/16/18) for acute cholecystitis c/b hepatic vein thrombosis and severe sepsis. Was not surgical candidate because of mets. Treated w/ zosyn, percutaneous cholecystostomy, and 1 unit pRBCs. During admission, pt developed partial SBO s/p venting PEG tube for decompression and 2 colonic stents at the sigmoid colon and splenic flexure. Patient started chemotherapy with FOLFOX through chemoport x 1 dose.    Pt Denies abnormal drainage from perc cholecystostomy. Denies cough, chest pain. Denies urinary sx (freq, burning, hematuria). Denies ulcers on body. Denies recent alcohol use. No sick contacts. No recent travel. No blood in the stool. No recent trauma/ falls.     Vascular Surgery: Pt with the above PMHx who presented to [facility] ED with fever; Pt stated about and hour after coming to the hospital she developed abdominal pain. She describes abdominal pain as diffuse pain. Pt states that she gets intermittent abdominal pain but describes her current pain to be severe. She denies nausea or vomiting. Her last BM was yesterday. She has decrease appetite and last meal was this morning.  CT Abd/pel with IV contrast was performed which revealed bowel wall thickening of the distal small bowel and the ascending/transverse colon; with several areas of luminal narrowing; the CT scan findings was read to be either related to progression of carcinomatosis and metastatic disease or possible ischemic etiology. Vascular surgery consulted to r/o mesentery ischemia.     PAST MEDICAL & SURGICAL HISTORY:  Cholecystitis, acute  Colon cancer  No significant past surgical history  	  MEDICATIONS  (STANDING):  edoxaban 60 milliGRAM(s) Oral daily  piperacillin/tazobactam IVPB. 3.375 Gram(s) IV Intermittent every 6 hours  potassium chloride  10 mEq/100 mL IVPB 10 milliEquivalent(s) IV Intermittent every 1 hour    MEDICATIONS  (PRN):  morphine  - Injectable 2 milliGRAM(s) IV Push every 4 hours PRN Severe Pain (7 - 10)    Allergies    allergic to cats (Rash)  No Known Drug Allergies    Intolerances    SOCIAL HISTORY:    FAMILY HISTORY:  No pertinent family history in first degree relatives      Vital Signs Last 24 Hrs  T(C): 36.7 (17 Jul 2018 21:12), Max: 38.7 (17 Jul 2018 13:11)  T(F): 98.1 (17 Jul 2018 21:12), Max: 101.7 (17 Jul 2018 13:11)  HR: 89 (17 Jul 2018 20:23) (80 - 104)  BP: 106/67 (17 Jul 2018 20:23) (91/51 - 128/85)  BP(mean): --  RR: 16 (17 Jul 2018 20:23) (16 - 20)  SpO2: 100% (17 Jul 2018 20:23) (100% - 100%)    PHYSICAL EXAM:  Constitutional: Appears uncomfortable, appears to be in pain   Respiratory: No respiratory distress, unlabored breathing  Cardiovascular: RRR  Abdomen: Distended, diffuse tenderness to palpation with light touch, no guarding  Extremities: No LE swelling, palpable DP/PT pulse      LABS:                        9.6    15.8  )-----------( 511      ( 17 Jul 2018 13:43 )             29.9     07-17    130<L>  |  91<L>  |  21  ----------------------------<  156<H>  3.3<L>   |  26  |  0.60    Ca    8.9      17 Jul 2018 13:43    TPro  6.2  /  Alb  2.8<L>  /  TBili  1.0  /  DBili  0.3<H>  /  AST  26  /  ALT  16  /  AlkPhos  316<H>  07-17    PT/INR - ( 17 Jul 2018 13:43 )   PT: 33.3 sec;   INR: 2.93          PTT - ( 17 Jul 2018 13:43 )  PTT:33.7 sec  Urinalysis Basic - ( 17 Jul 2018 15:17 )    Color: Yellow / Appearance: Clear / SG: <=1.005 / pH: x  Gluc: x / Ketone: Trace mg/dL  / Bili: Negative / Urobili: 1.0 E.U./dL   Blood: x / Protein: NEGATIVE mg/dL / Nitrite: NEGATIVE   Leuk Esterase: NEGATIVE / RBC: x / WBC x   Sq Epi: x / Non Sq Epi: x / Bacteria: x      Assessment/Plan:  68 yo F w/ metastatic colon ca s/p 1 dose FOLFOX, recent acute cholecystitis c/b severe sepsis and hepatic vein thrombosis s/p percutaneous cholecystomy, and recent partial SBO s/p PEG and 2 colonic stents now with abdominal pain concerning form ischemic etiology per CT scan, vascular surgery consulted to r/o mesentery ischemia    CT scan reviewed, all major abdominal vessels are patent including SMV. Abdominal pain unlikely from mesentery ischemia, consider infectious etiology vs progression of carcinomatosis   Consider General Surgery consult   F/u GI recs  No vascular surgery intervention needed at this time.  Will sign off, please reconsult if necessary  Plan discussed with vascular surgery chief and attending on call. Vascular Attending: Dr. Fuentes  HPI:  Pt is a 66 yo F w/ metastatic colon ca s/p 1 dose FOLFOX, recent acute cholecystitis c/b severe sepsis and hepatic vein thrombosis s/p percutaneous cholecystomy, and recent partial SBO s/p PEG and 2 colonic stents presented to [facility] ED with fever; Pt stated about an hour after coming to the hospital she developed abdominal pain. She describes abdominal pain as diffuse pain. Pt states that she gets intermittent abdominal pains at home but describes her current pain to be severe. She denies nausea, vomiting, diarrhea, chest pain or SOB. Her last BM was yesterday. She has decrease appetite and last meal was this morning.  CT Abd/pel with IV contrast was performed which revealed bowel wall thickening of the distal small bowel and the ascending/transverse colon; with several areas of luminal narrowing; the CT scan findings was read to be either related to progression of carcinomatosis and metastatic disease or possible ischemic etiology. Vascular surgery consulted to r/o mesentery ischemia.     PAST MEDICAL & SURGICAL HISTORY:  Cholecystitis, acute  Colon cancer  No significant past surgical history  	  MEDICATIONS  (STANDING):  edoxaban 60 milliGRAM(s) Oral daily  piperacillin/tazobactam IVPB. 3.375 Gram(s) IV Intermittent every 6 hours  potassium chloride  10 mEq/100 mL IVPB 10 milliEquivalent(s) IV Intermittent every 1 hour    MEDICATIONS  (PRN):  morphine  - Injectable 2 milliGRAM(s) IV Push every 4 hours PRN Severe Pain (7 - 10)    Allergies    allergic to cats (Rash)  No Known Drug Allergies    Intolerances    SOCIAL HISTORY:    FAMILY HISTORY:  No pertinent family history in first degree relatives      Vital Signs Last 24 Hrs  T(C): 36.7 (17 Jul 2018 21:12), Max: 38.7 (17 Jul 2018 13:11)  T(F): 98.1 (17 Jul 2018 21:12), Max: 101.7 (17 Jul 2018 13:11)  HR: 89 (17 Jul 2018 20:23) (80 - 104)  BP: 106/67 (17 Jul 2018 20:23) (91/51 - 128/85)  BP(mean): --  RR: 16 (17 Jul 2018 20:23) (16 - 20)  SpO2: 100% (17 Jul 2018 20:23) (100% - 100%)    PHYSICAL EXAM:  Constitutional: Appears uncomfortable, appears to be in pain   Respiratory: No respiratory distress, unlabored breathing  Cardiovascular: RRR  Abdomen: Soft, distended, diffuse tenderness to palpation with light touch, no guarding, PEG tube and perc-arleen tube in place  Extremities: No LE swelling, palpable DP/PT pulse      LABS:                        9.6    15.8  )-----------( 511      ( 17 Jul 2018 13:43 )             29.9     07-17    130<L>  |  91<L>  |  21  ----------------------------<  156<H>  3.3<L>   |  26  |  0.60    Ca    8.9      17 Jul 2018 13:43    TPro  6.2  /  Alb  2.8<L>  /  TBili  1.0  /  DBili  0.3<H>  /  AST  26  /  ALT  16  /  AlkPhos  316<H>  07-17    PT/INR - ( 17 Jul 2018 13:43 )   PT: 33.3 sec;   INR: 2.93          PTT - ( 17 Jul 2018 13:43 )  PTT:33.7 sec  Urinalysis Basic - ( 17 Jul 2018 15:17 )    Color: Yellow / Appearance: Clear / SG: <=1.005 / pH: x  Gluc: x / Ketone: Trace mg/dL  / Bili: Negative / Urobili: 1.0 E.U./dL   Blood: x / Protein: NEGATIVE mg/dL / Nitrite: NEGATIVE   Leuk Esterase: NEGATIVE / RBC: x / WBC x   Sq Epi: x / Non Sq Epi: x / Bacteria: x      Assessment/Plan:  66 yo F w/ metastatic colon ca s/p 1 dose FOLFOX, recent acute cholecystitis c/b severe sepsis and hepatic vein thrombosis s/p percutaneous cholecystomy, and recent partial SBO s/p PEG and 2 colonic stents now with abdominal pain concerning form ischemic etiology per CT scan, vascular surgery consulted to r/o mesentery ischemia    CT scan reviewed, all major abdominal vessels are patent including SMV. Abdominal pain unlikely from mesentery ischemia, consider infectious etiology vs progression of carcinomatosis   Consider General Surgery consult   F/u GI recs  No vascular surgery intervention needed at this time.  Will sign off, please reconsult if necessary  Plan discussed with vascular surgery chief and attending on call.

## 2018-07-17 NOTE — PATIENT PROFILE ADULT. - HEALTHCARE INFORMATION NEEDED, PROFILE
newly diagnosed cancer pt; have not received chemo and radiation, on progress prior to this admission

## 2018-07-17 NOTE — CONSULT NOTE ADULT - ATTENDING COMMENTS
Case reviewed.    Discussed with Chief Resident and team.    Agree with above.    No vascular intervention at this time.

## 2018-07-17 NOTE — CONSULT NOTE ADULT - ASSESSMENT
66 YO F w/ metastatic colon ca s/p 1 dose FOLFOX, recent acute cholecystitis c/b severe sepsis and hepatic vein thrombosis s/p percutaneous cholecystomy, and recent SBO/ ileus s/p PEG and 2 colonic stents sent to [facility] ED by Dr. Richards for fever of 102.5, associated with poor appetite and weakness x 4 days found to have SIRS.     # SIRS (febrile, leukocytosis, tachycardia)  - Unclear etiology of SIRS at this time, however, suspect likely intra-abdominal source as pts UA and CXR are negative  - May be d/t SBP 2/2 bacterial translocation vs colitis/enteritis vs malignant fever vs cholecystitis  - Follow-up urine culture and blood culture  - C/w empiric Abx  - Consider diagnostic paracentesis  - Palliative care consult    Will discuss case with Dr. Hidalgo, addendum to follow if changes 68 YO F w/ metastatic colon ca s/p 1 dose FOLFOX, recent acute cholecystitis c/b severe sepsis and hepatic vein thrombosis s/p percutaneous cholecystomy, and recent SBO/ ileus s/p PEG and 2 colonic stents sent to [facility] ED by Dr. Richards for fever of 102.5, associated with poor appetite and weakness x 4 days found to have SIRS.     # SIRS (febrile, leukocytosis, tachycardia)  - Unclear etiology of SIRS at this time, however, suspect likely intra-abdominal source as pts UA and CXR are negative  - May be d/t SBP 2/2 bacterial translocation vs colitis/enteritis vs malignant fever vs cholecystitis  - Follow-up urine culture and blood culture  - C/w empiric Abx  - Consider diagnostic paracentesis  - Palliative care consult  - Will start clear liquid diet, can consider advancing pending clinical progress    Will discuss case with Dr. Hidalgo, addendum to follow if changes 68 YO F w/ metastatic colon ca s/p 1 dose FOLFOX, recent acute cholecystitis c/b severe sepsis and hepatic vein thrombosis s/p percutaneous cholecystomy, and recent SBO/ ileus s/p PEG and 2 colonic stents sent to [facility] ED by Dr. Richards for fever of 102.5, associated with poor appetite and weakness x 4 days found to have SIRS.     # SIRS (febrile, leukocytosis, tachycardia)  - Unclear etiology of SIRS at this time, however, suspect likely intra-abdominal source as pts UA and CXR are negative  - Pt with previous colonoscopy suspicious for malignant infarcts which may cause ischemic colitis with bacterial translocation which may explain her SIRS vs SBP 2/2 bacterial translocation vs colitis/enteritis vs malignant fever vs cholecystitis  - Follow-up urine culture and blood culture  - C/w empiric Abx  - Palliative care consult  - NPO     Case d/w Dr. Hidalgo  GI will follow

## 2018-07-17 NOTE — H&P ADULT - PMH
Cholecystitis, acute    Colon cancer    SBP (spontaneous bacterial peritonitis) Cholecystitis, acute    Colon cancer

## 2018-07-17 NOTE — H&P ADULT - NSHPPHYSICALEXAM_GEN_ALL_CORE
VITAL SIGNS:  T(C): 37.2 (07-17-18 @ 15:00), Max: 38.7 (07-17-18 @ 13:11)  T(F): 99 (07-17-18 @ 15:00), Max: 101.7 (07-17-18 @ 13:11)  HR: 80 (07-17-18 @ 15:00) (80 - 104)  BP: 118/71 (07-17-18 @ 15:00) (91/51 - 128/85)  BP(mean): --  RR: 20 (07-17-18 @ 15:00) (20 - 20)  SpO2: 100% (07-17-18 @ 15:00) (100% - 100%)  Wt(kg): --    PHYSICAL EXAM:    Constitutional: WDWN , laying in bed, uncomfortable but NAD  Head: NC/AT  Respiratory: CTA B/L; no W/R/R, no retractions  Cardiac: +S1/S2; RRR; no M/R/G  Gastrointestinal: soft, distended, diffusely tender, no rebound or guarding; +BSx4  Extremities: WWP, no clubbing or cyanosis; no peripheral edema  Dermatologic: skin warm, dry and intact; no rashes, wounds, or scars  Neurologic: AAOx3; no focal deficits VITAL SIGNS:  T(C): 37.2 (07-17-18 @ 15:00), Max: 38.7 (07-17-18 @ 13:11)  T(F): 99 (07-17-18 @ 15:00), Max: 101.7 (07-17-18 @ 13:11)  HR: 80 (07-17-18 @ 15:00) (80 - 104)  BP: 118/71 (07-17-18 @ 15:00) (91/51 - 128/85)  BP(mean): --  RR: 20 (07-17-18 @ 15:00) (20 - 20)  SpO2: 100% (07-17-18 @ 15:00) (100% - 100%)  Wt(kg): --    PHYSICAL EXAM:    Constitutional: WDWN , laying in bed, uncomfortable but NAD  Head: NC/AT  Respiratory: CTA B/L; no W/R/R, no retractions  Cardiac: +S1/S2; RRR; no M/R/G  Gastrointestinal: soft, distended, diffusely tender, no rebound or guarding; +BSx4  Extremities: WWP, no clubbing or cyanosis; no peripheral edema  Dermatologic: skin warm, dry and intact; no rashes, wounds, or scars  Neurologic: AAOx3; no focal deficits  Psych: normal affect VITAL SIGNS:  T(C): 37.2 (07-17-18 @ 15:00), Max: 38.7 (07-17-18 @ 13:11)  T(F): 99 (07-17-18 @ 15:00), Max: 101.7 (07-17-18 @ 13:11)  HR: 80 (07-17-18 @ 15:00) (80 - 104)  BP: 118/71 (07-17-18 @ 15:00) (91/51 - 128/85)  BP(mean): --  RR: 20 (07-17-18 @ 15:00) (20 - 20)  SpO2: 100% (07-17-18 @ 15:00) (100% - 100%)  Wt(kg): --    PHYSICAL EXAM:    Constitutional: WDWN , laying in bed, uncomfortable but NAD  Head: NC/AT  Respiratory: CTA B/L; no W/R/R, no retractions  Cardiac: +S1/S2; RRR; no M/R/G  Gastrointestinal: soft, distended, diffusely tender, no rebound or guarding; +BSx4  Extremities: WWP, no clubbing or cyanosis; no peripheral edema  Dermatologic: skin warm, dry and intact; no rashes, wounds, or scars  Neurologic: AAOx3; no focal deficits  Psych: normal affect  Lymph: no LAD

## 2018-07-18 DIAGNOSIS — Z51.5 ENCOUNTER FOR PALLIATIVE CARE: ICD-10-CM

## 2018-07-18 DIAGNOSIS — Z91.89 OTHER SPECIFIED PERSONAL RISK FACTORS, NOT ELSEWHERE CLASSIFIED: ICD-10-CM

## 2018-07-18 DIAGNOSIS — K59.00 CONSTIPATION, UNSPECIFIED: ICD-10-CM

## 2018-07-18 DIAGNOSIS — R52 PAIN, UNSPECIFIED: ICD-10-CM

## 2018-07-18 DIAGNOSIS — Z29.9 ENCOUNTER FOR PROPHYLACTIC MEASURES, UNSPECIFIED: ICD-10-CM

## 2018-07-18 DIAGNOSIS — R63.8 OTHER SYMPTOMS AND SIGNS CONCERNING FOOD AND FLUID INTAKE: ICD-10-CM

## 2018-07-18 DIAGNOSIS — C18.9 MALIGNANT NEOPLASM OF COLON, UNSPECIFIED: ICD-10-CM

## 2018-07-18 DIAGNOSIS — A41.9 SEPSIS, UNSPECIFIED ORGANISM: ICD-10-CM

## 2018-07-18 DIAGNOSIS — Z71.89 OTHER SPECIFIED COUNSELING: ICD-10-CM

## 2018-07-18 LAB
-  CANDIDA ALBICANS: SIGNIFICANT CHANGE UP
-  CANDIDA GLABRATA: SIGNIFICANT CHANGE UP
-  CANDIDA KRUSEI: SIGNIFICANT CHANGE UP
-  CANDIDA PARAPSILOSIS: SIGNIFICANT CHANGE UP
-  CANDIDA TROPICALIS: SIGNIFICANT CHANGE UP
-  COAGULASE NEGATIVE STAPHYLOCOCCUS: SIGNIFICANT CHANGE UP
-  K. PNEUMONIAE GROUP: SIGNIFICANT CHANGE UP
-  KPC RESISTANCE GENE: SIGNIFICANT CHANGE UP
-  STREPTOCOCCUS SP. (NOT GRP A, B OR S PNEUMONIAE): SIGNIFICANT CHANGE UP
A BAUMANNII DNA SPEC QL NAA+PROBE: SIGNIFICANT CHANGE UP
ALBUMIN SERPL ELPH-MCNC: 2.4 G/DL — LOW (ref 3.3–5)
ALP SERPL-CCNC: 231 U/L — HIGH (ref 40–120)
ALT FLD-CCNC: 13 U/L — SIGNIFICANT CHANGE UP (ref 10–45)
ANION GAP SERPL CALC-SCNC: 13 MMOL/L — SIGNIFICANT CHANGE UP (ref 5–17)
APTT BLD: 32.9 SEC — SIGNIFICANT CHANGE UP (ref 27.5–37.4)
AST SERPL-CCNC: 20 U/L — SIGNIFICANT CHANGE UP (ref 10–40)
BILIRUB SERPL-MCNC: 1 MG/DL — SIGNIFICANT CHANGE UP (ref 0.2–1.2)
BLD GP AB SCN SERPL QL: NEGATIVE — SIGNIFICANT CHANGE UP
BLD GP AB SCN SERPL QL: NEGATIVE — SIGNIFICANT CHANGE UP
BUN SERPL-MCNC: 20 MG/DL — SIGNIFICANT CHANGE UP (ref 7–23)
CALCIUM SERPL-MCNC: 7.9 MG/DL — LOW (ref 8.4–10.5)
CHLORIDE SERPL-SCNC: 99 MMOL/L — SIGNIFICANT CHANGE UP (ref 96–108)
CO2 SERPL-SCNC: 23 MMOL/L — SIGNIFICANT CHANGE UP (ref 22–31)
CREAT SERPL-MCNC: 0.51 MG/DL — SIGNIFICANT CHANGE UP (ref 0.5–1.3)
CULTURE RESULTS: NO GROWTH — SIGNIFICANT CHANGE UP
E CLOAC COMP DNA BLD POS QL NAA+PROBE: SIGNIFICANT CHANGE UP
E COLI DNA BLD POS QL NAA+NON-PROBE: SIGNIFICANT CHANGE UP
ENTEROCOC DNA BLD POS QL NAA+NON-PROBE: SIGNIFICANT CHANGE UP
ENTEROCOC DNA BLD POS QL NAA+NON-PROBE: SIGNIFICANT CHANGE UP
EOSINOPHIL NFR BLD AUTO: 0.8 % — SIGNIFICANT CHANGE UP (ref 0–6)
GLUCOSE SERPL-MCNC: 130 MG/DL — HIGH (ref 70–99)
GP B STREP DNA BLD POS QL NAA+NON-PROBE: SIGNIFICANT CHANGE UP
GRAM STN FLD: SIGNIFICANT CHANGE UP
HAEM INFLU DNA BLD POS QL NAA+NON-PROBE: SIGNIFICANT CHANGE UP
HCT VFR BLD CALC: 28.9 % — LOW (ref 34.5–45)
HCV AB S/CO SERPL IA: 0.15 S/CO — SIGNIFICANT CHANGE UP
HCV AB SERPL-IMP: SIGNIFICANT CHANGE UP
HGB BLD-MCNC: 9.2 G/DL — LOW (ref 11.5–15.5)
INR BLD: 2.52 — HIGH (ref 0.88–1.16)
K OXYTOCA DNA BLD POS QL NAA+NON-PROBE: SIGNIFICANT CHANGE UP
L MONOCYTOG DNA BLD POS QL NAA+NON-PROBE: SIGNIFICANT CHANGE UP
LACTATE SERPL-SCNC: 2.7 MMOL/L — HIGH (ref 0.5–2)
LYMPHOCYTES # BLD AUTO: 14 % — SIGNIFICANT CHANGE UP (ref 13–44)
MAGNESIUM SERPL-MCNC: 1.7 MG/DL — SIGNIFICANT CHANGE UP (ref 1.6–2.6)
MCHC RBC-ENTMCNC: 25.1 PG — LOW (ref 27–34)
MCHC RBC-ENTMCNC: 31.8 G/DL — LOW (ref 32–36)
MCV RBC AUTO: 78.7 FL — LOW (ref 80–100)
METHOD TYPE: SIGNIFICANT CHANGE UP
MONOCYTES NFR BLD AUTO: 8.8 % — SIGNIFICANT CHANGE UP (ref 2–14)
MRSA SPEC QL CULT: SIGNIFICANT CHANGE UP
MSSA DNA SPEC QL NAA+PROBE: SIGNIFICANT CHANGE UP
N MEN ISLT CULT: SIGNIFICANT CHANGE UP
NEUTROPHILS NFR BLD AUTO: 76.4 % — SIGNIFICANT CHANGE UP (ref 43–77)
OSMOLALITY SERPL: 279 MOSM/KG — LOW (ref 280–301)
OSMOLALITY UR: 475 MOSMOL/KG — SIGNIFICANT CHANGE UP (ref 100–650)
P AERUGINOSA DNA BLD POS NAA+NON-PROBE: SIGNIFICANT CHANGE UP
PHOSPHATE SERPL-MCNC: 2.2 MG/DL — LOW (ref 2.5–4.5)
PLATELET # BLD AUTO: 534 K/UL — HIGH (ref 150–400)
POTASSIUM SERPL-MCNC: 3.6 MMOL/L — SIGNIFICANT CHANGE UP (ref 3.5–5.3)
POTASSIUM SERPL-SCNC: 3.6 MMOL/L — SIGNIFICANT CHANGE UP (ref 3.5–5.3)
PROT SERPL-MCNC: 5.2 G/DL — LOW (ref 6–8.3)
PROTHROM AB SERPL-ACNC: 28.5 SEC — HIGH (ref 9.8–12.7)
RBC # BLD: 3.67 M/UL — LOW (ref 3.8–5.2)
RBC # FLD: 23.2 % — HIGH (ref 10.3–16.9)
RH IG SCN BLD-IMP: POSITIVE — SIGNIFICANT CHANGE UP
RH IG SCN BLD-IMP: POSITIVE — SIGNIFICANT CHANGE UP
S MARCESCENS DNA BLD POS NAA+NON-PROBE: SIGNIFICANT CHANGE UP
S PNEUM DNA BLD POS QL NAA+NON-PROBE: SIGNIFICANT CHANGE UP
S PYO DNA BLD POS QL NAA+NON-PROBE: SIGNIFICANT CHANGE UP
SODIUM SERPL-SCNC: 135 MMOL/L — SIGNIFICANT CHANGE UP (ref 135–145)
SODIUM UR-SCNC: 20 MMOL/L — SIGNIFICANT CHANGE UP
SPECIMEN SOURCE: SIGNIFICANT CHANGE UP
WBC # BLD: 3.6 K/UL — LOW (ref 3.8–10.5)
WBC # FLD AUTO: 3.6 K/UL — LOW (ref 3.8–10.5)

## 2018-07-18 PROCEDURE — 99233 SBSQ HOSP IP/OBS HIGH 50: CPT | Mod: GC

## 2018-07-18 PROCEDURE — 93010 ELECTROCARDIOGRAM REPORT: CPT

## 2018-07-18 PROCEDURE — 74018 RADEX ABDOMEN 1 VIEW: CPT | Mod: 26,76

## 2018-07-18 PROCEDURE — 99223 1ST HOSP IP/OBS HIGH 75: CPT

## 2018-07-18 RX ORDER — ACETAMINOPHEN 500 MG
650 TABLET ORAL EVERY 6 HOURS
Qty: 0 | Refills: 0 | Status: DISCONTINUED | OUTPATIENT
Start: 2018-07-18 | End: 2018-07-20

## 2018-07-18 RX ORDER — SALIVA SUBSTITUTE COMB NO.11 351 MG
5 POWDER IN PACKET (EA) MUCOUS MEMBRANE
Qty: 0 | Refills: 0 | Status: DISCONTINUED | OUTPATIENT
Start: 2018-07-18 | End: 2018-07-28

## 2018-07-18 RX ORDER — SODIUM CHLORIDE 9 MG/ML
1000 INJECTION INTRAMUSCULAR; INTRAVENOUS; SUBCUTANEOUS
Qty: 0 | Refills: 0 | Status: DISCONTINUED | OUTPATIENT
Start: 2018-07-18 | End: 2018-07-18

## 2018-07-18 RX ORDER — MORPHINE SULFATE 50 MG/1
1 CAPSULE, EXTENDED RELEASE ORAL
Qty: 0 | Refills: 0 | Status: DISCONTINUED | OUTPATIENT
Start: 2018-07-18 | End: 2018-07-20

## 2018-07-18 RX ORDER — MORPHINE SULFATE 50 MG/1
1 CAPSULE, EXTENDED RELEASE ORAL EVERY 4 HOURS
Qty: 0 | Refills: 0 | Status: DISCONTINUED | OUTPATIENT
Start: 2018-07-18 | End: 2018-07-20

## 2018-07-18 RX ORDER — PETROLATUM,WHITE
1 JELLY (GRAM) TOPICAL
Qty: 0 | Refills: 0 | Status: DISCONTINUED | OUTPATIENT
Start: 2018-07-18 | End: 2018-07-28

## 2018-07-18 RX ORDER — OLANZAPINE 15 MG/1
5 TABLET, FILM COATED ORAL DAILY
Qty: 0 | Refills: 0 | Status: DISCONTINUED | OUTPATIENT
Start: 2018-07-18 | End: 2018-07-28

## 2018-07-18 RX ORDER — SODIUM CHLORIDE 9 MG/ML
500 INJECTION INTRAMUSCULAR; INTRAVENOUS; SUBCUTANEOUS ONCE
Qty: 0 | Refills: 0 | Status: COMPLETED | OUTPATIENT
Start: 2018-07-18 | End: 2018-07-18

## 2018-07-18 RX ORDER — SIMETHICONE 80 MG/1
80 TABLET, CHEWABLE ORAL EVERY 8 HOURS
Qty: 0 | Refills: 0 | Status: DISCONTINUED | OUTPATIENT
Start: 2018-07-18 | End: 2018-07-28

## 2018-07-18 RX ORDER — SODIUM CHLORIDE 9 MG/ML
1000 INJECTION INTRAMUSCULAR; INTRAVENOUS; SUBCUTANEOUS
Qty: 0 | Refills: 0 | Status: DISCONTINUED | OUTPATIENT
Start: 2018-07-18 | End: 2018-07-20

## 2018-07-18 RX ORDER — POTASSIUM CHLORIDE 20 MEQ
40 PACKET (EA) ORAL ONCE
Qty: 0 | Refills: 0 | Status: COMPLETED | OUTPATIENT
Start: 2018-07-18 | End: 2018-07-19

## 2018-07-18 RX ADMIN — Medication 650 MILLIGRAM(S): at 01:31

## 2018-07-18 RX ADMIN — MORPHINE SULFATE 2 MILLIGRAM(S): 50 CAPSULE, EXTENDED RELEASE ORAL at 02:00

## 2018-07-18 RX ADMIN — MORPHINE SULFATE 2 MILLIGRAM(S): 50 CAPSULE, EXTENDED RELEASE ORAL at 11:34

## 2018-07-18 RX ADMIN — Medication 5 MILLILITER(S): at 17:48

## 2018-07-18 RX ADMIN — Medication 1 APPLICATION(S): at 17:47

## 2018-07-18 RX ADMIN — MORPHINE SULFATE 2 MILLIGRAM(S): 50 CAPSULE, EXTENDED RELEASE ORAL at 01:33

## 2018-07-18 RX ADMIN — MORPHINE SULFATE 2 MILLIGRAM(S): 50 CAPSULE, EXTENDED RELEASE ORAL at 18:40

## 2018-07-18 RX ADMIN — Medication 100 MILLIEQUIVALENT(S): at 00:35

## 2018-07-18 RX ADMIN — MORPHINE SULFATE 2 MILLIGRAM(S): 50 CAPSULE, EXTENDED RELEASE ORAL at 13:12

## 2018-07-18 RX ADMIN — SODIUM CHLORIDE 80 MILLILITER(S): 9 INJECTION INTRAMUSCULAR; INTRAVENOUS; SUBCUTANEOUS at 01:31

## 2018-07-18 RX ADMIN — SIMETHICONE 80 MILLIGRAM(S): 80 TABLET, CHEWABLE ORAL at 22:44

## 2018-07-18 RX ADMIN — Medication 250 MILLIGRAM(S): at 13:10

## 2018-07-18 RX ADMIN — PIPERACILLIN AND TAZOBACTAM 200 GRAM(S): 4; .5 INJECTION, POWDER, LYOPHILIZED, FOR SOLUTION INTRAVENOUS at 09:51

## 2018-07-18 RX ADMIN — MORPHINE SULFATE 2 MILLIGRAM(S): 50 CAPSULE, EXTENDED RELEASE ORAL at 17:45

## 2018-07-18 RX ADMIN — Medication 250 MILLIGRAM(S): at 01:33

## 2018-07-18 RX ADMIN — SODIUM CHLORIDE 100 MILLILITER(S): 9 INJECTION INTRAMUSCULAR; INTRAVENOUS; SUBCUTANEOUS at 11:51

## 2018-07-18 RX ADMIN — SODIUM CHLORIDE 2000 MILLILITER(S): 9 INJECTION INTRAMUSCULAR; INTRAVENOUS; SUBCUTANEOUS at 09:26

## 2018-07-18 RX ADMIN — PIPERACILLIN AND TAZOBACTAM 200 GRAM(S): 4; .5 INJECTION, POWDER, LYOPHILIZED, FOR SOLUTION INTRAVENOUS at 16:45

## 2018-07-18 RX ADMIN — PIPERACILLIN AND TAZOBACTAM 200 GRAM(S): 4; .5 INJECTION, POWDER, LYOPHILIZED, FOR SOLUTION INTRAVENOUS at 04:12

## 2018-07-18 RX ADMIN — PIPERACILLIN AND TAZOBACTAM 200 GRAM(S): 4; .5 INJECTION, POWDER, LYOPHILIZED, FOR SOLUTION INTRAVENOUS at 22:44

## 2018-07-18 RX ADMIN — SODIUM CHLORIDE 80 MILLILITER(S): 9 INJECTION INTRAMUSCULAR; INTRAVENOUS; SUBCUTANEOUS at 09:51

## 2018-07-18 NOTE — CONSULT NOTE ADULT - ASSESSMENT
66 yo F w/ metastatic colon ca s/p chemotherapy, acute cholecystitis s/p percutaneous cholecystomy c/b portal vein thrombosis, LBO s/p sigmoid stent, presents this admission with sepsis  - CT scan concerning for colitis; ischemic vs infectious etiology   - No evidence of bowel perforation based on radiologic findings   - No urgent surgical intervention at this time   - Would recommend IVF hydration   - Continue with IV antibiotics   - Surgery to follow (team 4c) 66 yo F w/ metastatic colon ca s/p chemotherapy, acute cholecystitis s/p percutaneous cholecystomy c/b portal vein thrombosis, LBO s/p sigmoid stent, presents this admission with sepsis  - CT scan concerning for colitis; ischemic vs infectious etiology   - No evidence of free air based on radiologic findings (x ray and CT scan)   - No urgent surgical intervention at this time   - Would recommend IVF hydration   - Continue with IV antibiotics   - Serial abdominal exams   - Surgery to follow, call with any questions (team 4)   - discussed with chief on call   - discussed with attending on call, Dr. Marci Narvaez 68 yo F w/ metastatic colon ca s/p chemotherapy, acute cholecystitis s/p percutaneous cholecystomy c/b portal vein thrombosis, LBO s/p sigmoid stent, presents this admission with sepsis, surgery consulted for worsening abdominal pain likely due to colitis   - CT scan concerning for colitis; ischemic vs infectious etiology   - No evidence of free air based on radiologic findings (x ray and CT scan)   - No urgent surgical intervention at this time   - Would recommend IVF hydration   - Continue with IV antibiotics   - Serial abdominal exams   - Surgery to follow, call with any questions (team 4)   - discussed with chief on call   - discussed with attending on call, Dr. aMrci Narvaez

## 2018-07-18 NOTE — PROGRESS NOTE ADULT - PROBLEM SELECTOR PLAN 5
POA. found on last admission, likely 2/2 hypercoagulable state in the setting of active untreated malignancy  -c/w edoxaban home med - VBG- alkalemic w/ low CO2 34 and bicarb 28. primary resp alkalosis likely 2/2 hyperventilation 2/2 pain. no AG.

## 2018-07-18 NOTE — DIETITIAN INITIAL EVALUATION ADULT. - PROBLEM SELECTOR PLAN 4
alkalemic w/ low CO2 38 and bicarb 26. primary resp alkalosis likely 2/2 hyperventilation 2/2 pain. no AG.

## 2018-07-18 NOTE — PROGRESS NOTE ADULT - PROBLEM SELECTOR PLAN 8
#FENC  F: IVF (EF 60%)  E: replete mg>2, K>4  N: NPO   C: FC - resolved w/ IVF Na 130 on admission--> 135 today .   -monitor BMP

## 2018-07-18 NOTE — PROGRESS NOTE ADULT - PROBLEM SELECTOR PLAN 6
-long QT, otherwise normal  -repeat am EKG, monitor daily  -avoid medications that prolong QT POA. found on last admission, likely 2/2 hypercoagulable state in the setting of active untreated malignancy  -c/w edoxaban home med

## 2018-07-18 NOTE — CONSULT NOTE ADULT - ASSESSMENT
67 year old F with recently diagnosed stage IV colon cancer with mets to the liver s/p 1 cycle of FOLFOX with recent admission 1 month ago for acute cholecystitis, severe sepsis and hepatic vein thrombosis s/p percutaneous cholecystomy, and partial SBO s/p vent PEG and 2 colonic stents. She was subsequently discharged to rehab and presented to oncologist's office with fever, tachycardia and abdominal pain. She was noted to be in severe sepsis possibly from GI source (?colitis). She has evidence of disease progression in abdomen.

## 2018-07-18 NOTE — PROGRESS NOTE ADULT - PROBLEM SELECTOR PLAN 1
- Source likely intraabdominal given severity of abdominal pain & pmh  - On presentation, pt was febrile (101.7), tachycardic (104), hypotensive (91/51), leukocytosis (15) and lactate 2.8  - Ucx and Bcx negative  - Pt currently leukopenic (3.6)  - CT showing evidence of colonic inflammation  - Continue w/ IVF  - C/w zosyn. Pt initially on vanc/zosyn but given likely intraabdominal source (gram negative and anaerobes) will d/c vanco  - follow up GI recommendations - Source likely intraabdominal given severity of abdominal pain & pmh  - On presentation, pt was febrile (101.7), tachycardic (104), hypotensive (91/51), leukocytosis (15) and lactate 2.8  - Ucx and Bcx negative  - Pt currently leukopenic (3.6)  - CT showing evidence of colonic inflammation  - Continue w/ IVF  - C/w zosyn. Pt initially on vanc/zosyn but given likely intraabdominal source (gram negative and anaerobes) will d/c vanco  - follow up GI recommendations  - monitor VS closely  - serial abdominal exams

## 2018-07-18 NOTE — CONSULT NOTE ADULT - PROBLEM SELECTOR RECOMMENDATION 6
Support provided to patient and family. Patient to have access to supportive services during rest of hospital stay as the patient/family deemed necessary ie. Chaplaincy, Massage therapy, Music therapy, Patient and family supportive services, Palliative SW, etc. Support provided to patient and family. Patient to have access to supportive services during rest of hospital stay as the patient/family deemed necessary ie. Chaplaincy, Massage therapy, Music therapy, Patient and family supportive services, Palliative SW, etc.    PSSA Pending.

## 2018-07-18 NOTE — PROGRESS NOTE ADULT - ASSESSMENT
66 YO F w/ metastatic colon ca s/p 1 dose FOLFOX, recent acute cholecystitis c/b severe sepsis and hepatic vein thrombosis s/p percutaneous cholecystomy, and recent SBO/ ileus s/p PEG and 2 colonic stents sent to [facility] ED by Dr. Richards for fever of 102.5, associated with poor appetite and weakness x 4 days found to have SIRS.     # Sepsis 2/2 GNR bacteremia 2/2 likely intra-abdominal etiology  - Pt with previous colonoscopy suspicious for malignant infarcts which may cause ischemic colitis with bacterial translocation which may explain her SIRS vs SBP 2/2 bacterial translocation vs colitis/enteritis vs malignant fever vs cholecystitis  - Blood culture significant for GNR, f/u speciation and sensitivities  - Urine culture negative  - C/w empiric Abx    # Abdominal pain likely d/t partial bowel obstruction vs ileus 2/2 malignancy  - Recommend placing venting PEG to gravity  - NPO excepts meds  - Avoid opiates  - No further acute endoscopic intervention at this time    # Metastatic CRC  - Recommend onc consult to discuss further treatment options if any are available  - Appreciate palliative care recs     Case d/w Dr. Gwen LEWIS will follow

## 2018-07-18 NOTE — PROGRESS NOTE ADULT - ASSESSMENT
68 yo F w/ metastatic colon ca s/p FOLFOX x1, acute cholecystitis s/p percutaneous cholecystomy c/b portal vein thrombosis, LBO s/p sigmoid stent, presents this admission with sepsis and possible colitis.    Plan:  - No urgent surgical intervention at this time   - continue NPO  - Continue IV fluids and antibiotics  - Serial abdominal exams   - Surgery to follow, call with any questions (team 4) 66 yo F w/ metastatic colon ca s/p FOLFOX x1, acute cholecystitis s/p percutaneous cholecystomy c/b portal vein thrombosis, LBO s/p sigmoid stent, presents this admission with sepsis and possible colitis.    Plan:  - No urgent surgical intervention at this time   - continue NPO until distention resolves and bowel function improves  - Continue IV fluids and antibiotics  - Serial abdominal exams   - Surgery to follow, call with any questions (team 4)

## 2018-07-18 NOTE — PROGRESS NOTE ADULT - PROBLEM SELECTOR PLAN 3
Hb 9, at baseline, low MCV, high RDW consistent with iron def. Not new, s/p 1 iron transfusion in the past.  -daily cbc  -maintain active t+s  -transfuse <7    #thrombocytosis  -likely in the setting of acute infection, will trend Hb 9, at baseline, low MCV, high RDW consistent with iron def. Not new, s/p 1 iron transfusion in the past.  -daily cbc  -maintain active t+s  -transfuse <7    #leukopenia  - likely secondary to infection in setting of severe sepsis  - continue to trend CBC and diff  #thrombocytosis  -likely reactive in the setting of acute infection, will trend Pt of Dr. Nick ([phone]). recently dxd in 6/18. s/p chemoport placement 5/31, initiation of chemotherapy 6/9 with FOLFOX, received 1 dose so far, with 2nd tx planned for yesterday but not given due to fever.  -pal care consulted- will f/u final recommendations  -f/u with Dr. Nick recs  -will likely resume chemo outpt

## 2018-07-18 NOTE — PROGRESS NOTE ADULT - ATTENDING COMMENTS
pt seen and examined by me this am and this afternoon.  reports persistent abdominal pain although not worse in intensity. no vomiting. NO BM since Monday    VS- Bp responded to IVF    abdomen- soft, distended, TTP LUQ, LLQ, no rebound or guarding    plan  1- severe sepsis- POA  2- Gram negative bacteremia  3- Metastatic colon cancer  4- hepatic vein thrombosis

## 2018-07-18 NOTE — PROGRESS NOTE ADULT - PROBLEM SELECTOR PLAN 4
alkalemic w/ low CO2 38 and bicarb 26. primary resp alkalosis likely 2/2 hyperventilation 2/2 pain. no AG. - VBG- alkalemic w/ low CO2 34 and bicarb 28. primary resp alkalosis likely 2/2 hyperventilation 2/2 pain. no AG. Hb 9, at baseline, low MCV, high RDW consistent with iron def. Not new, s/p 1 iron transfusion in the past.  -daily cbc  -maintain active t+s  -transfuse <7    #leukopenia  - likely secondary to infection in setting of severe sepsis  - continue to trend CBC and diff  #thrombocytosis  -likely reactive in the setting of acute infection, will trend

## 2018-07-18 NOTE — CONSULT NOTE ADULT - PROBLEM SELECTOR RECOMMENDATION 5
At this time, patient's performance status has declined however she has acute infectious process going on. She would like to focus on symptom control. She is currently being medically managed with IV antibiotics and IVF. She states she would take surgical intervention with colostomy bag if needed but understands colitis must be adequately treated. She would like to remain full code and we will re-address MOLST in the next few days. Pt requesting music therapy and massage therapy. Does not want pet therapy At this time, patient's performance status has declined however she has acute infectious process going on. She would like to focus on symptom control. She is currently being medically managed with IV antibiotics and IVF. She states she would take surgical intervention with colostomy bag if needed but understands colitis must be adequately treated. She would like to remain full code and we will re-address MOLST in the next few days after symptoms better controlled. Pt requesting music therapy and massage therapy. Does not want pet therapy

## 2018-07-18 NOTE — PROGRESS NOTE ADULT - SUBJECTIVE AND OBJECTIVE BOX
SUBJECTIVE: Pt examined this morning. She still complains of diffuse abdominal pain that is about the same as at 3 am. No N/V, no fevers/chills. Minimal flatus, but no BM.     Vital Signs Last 24 Hrs  T(C): 37.6 (18 Jul 2018 09:13), Max: 38.3 (18 Jul 2018 01:20)  T(F): 99.6 (18 Jul 2018 09:13), Max: 100.9 (18 Jul 2018 01:20)  HR: 90 (18 Jul 2018 10:00) (89 - 107)  BP: 108/70 (18 Jul 2018 10:00) (90/64 - 108/70)  BP(mean): --  RR: 18 (18 Jul 2018 10:00) (16 - 20)  SpO2: 98% (18 Jul 2018 10:00) (95% - 100%)    Physical Exam:  General: mild distress from abd pain  Pulmonary: Nonlabored breathing, no respiratory distress  Cardiovascular: NSR  Abdominal: distended, diffusely TTP, no guarding, no rigidity, no rebound tenderness, positive bowel sounds    I&O's Summary    17 Jul 2018 07:01  -  18 Jul 2018 07:00  --------------------------------------------------------  IN: 0 mL / OUT: 250 mL / NET: -250 mL    18 Jul 2018 07:01  -  18 Jul 2018 16:11  --------------------------------------------------------  IN: 540 mL / OUT: 0 mL / NET: 540 mL    LABS:                        9.2    3.6   )-----------( 534      ( 18 Jul 2018 01:31 )             28.9     07-18    135  |  99  |  20  ----------------------------<  130<H>  3.6   |  23  |  0.51    Ca    7.9<L>      18 Jul 2018 01:30  Phos  2.2     07-18  Mg     1.7     07-18    TPro  5.2<L>  /  Alb  2.4<L>  /  TBili  1.0  /  DBili  x   /  AST  20  /  ALT  13  /  AlkPhos  231<H>  07-18    PT/INR - ( 18 Jul 2018 01:32 )   PT: 28.5 sec;   INR: 2.52     PTT - ( 18 Jul 2018 01:32 )  PTT:32.9 sec  Urinalysis Basic - ( 17 Jul 2018 15:17 )    Color: Yellow / Appearance: Clear / SG: <=1.005 / pH: x  Gluc: x / Ketone: Trace mg/dL  / Bili: Negative / Urobili: 1.0 E.U./dL   Blood: x / Protein: NEGATIVE mg/dL / Nitrite: NEGATIVE   Leuk Esterase: NEGATIVE / RBC: x / WBC x   Sq Epi: x / Non Sq Epi: x / Bacteria: x    LIVER FUNCTIONS - ( 18 Jul 2018 01:30 )  Alb: 2.4 g/dL / Pro: 5.2 g/dL / ALK PHOS: 231 U/L / ALT: 13 U/L / AST: 20 U/L / GGT: x           RADIOLOGY & ADDITIONAL STUDIES:  < from: CT Abdomen and Pelvis w/ IV Cont (07.17.18 @ 14:42) >  IMPRESSION:  1.  Significant interval change in appearance of the gastrointestinal   tract, with long segment of circumferential bowel wall thickening of the   distal small bowel and the ascending/transverse colon; with several areas   of luminal narrowing involving the mentioned colon; as detailed above.   Findings are either related to progression of carcinomatosis and   metastatic disease or possible ischemic etiology.    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    4.  Increasing amount of abdominopelvic ascites.    5.  Increasing metastatic disease to the liver.    6.  PEG tube is within the stomach lumen. Unchanged position of the   percutaneous cholecystotomy tube.

## 2018-07-18 NOTE — PROGRESS NOTE ADULT - PROBLEM SELECTOR PLAN 2
Pt of Dr. Nick ([phone]). recently dxd in 6/18. s/p chemoport placement 5/31, initiation of chemotherapy 6/9 with FOLFOX, received 1 dose so far, with 2nd tx planned for today, but not given due to fever.  -pal care consult  -f/u with Dr. Nick recs  -will likely resume chemo outpt Pt of Dr. Nick ([phone]). recently dxd in 6/18. s/p chemoport placement 5/31, initiation of chemotherapy 6/9 with FOLFOX, received 1 dose so far, with 2nd tx planned for yesterday but not given due to fever.  -pal care consulted- will f/u final recommendations  -f/u with Dr. Nick recs  -will likely resume chemo outpt source likely GI tract  cw zosyn

## 2018-07-18 NOTE — PROGRESS NOTE ADULT - SUBJECTIVE AND OBJECTIVE BOX
Pt seen and examined at bedside. ANGELICA overnight. Pt reports persistent abdominal pain, denies nausea, vomiting. Last BM Monday, states she is passing minimal flatus.     Allergies    allergic to cats (Rash)  No Known Drug Allergies    MEDICATIONS:  MEDICATIONS  (STANDING):  Biotene Dry Mouth Oral Rinse 5 milliLiter(s) Swish and Spit four times a day  edoxaban 60 milliGRAM(s) Oral daily  morphine  - Injectable 1 milliGRAM(s) IV Push two times a day  petrolatum white Ointment 1 Application(s) Topical two times a day  piperacillin/tazobactam IVPB. 3.375 Gram(s) IV Intermittent every 6 hours  potassium chloride   Powder 40 milliEquivalent(s) Oral once  simethicone drops 80 milliGRAM(s) Enteral Tube every 8 hours  sodium chloride 0.9%. 1000 milliLiter(s) (100 mL/Hr) IV Continuous <Continuous>    MEDICATIONS  (PRN):  acetaminophen   Tablet 650 milliGRAM(s) Oral every 6 hours PRN For Temp greater than 38 C (100.4 F)  morphine  - Injectable 2 milliGRAM(s) IV Push every 4 hours PRN Severe Pain (7 - 10)  morphine  - Injectable 1 milliGRAM(s) IV Push every 4 hours PRN Moderate Pain (4 - 6)  OLANZapine Disintegrating Tablet 5 milliGRAM(s) Oral daily PRN Nausea    Vital Signs Last 24 Hrs  T(C): 37.6 (18 Jul 2018 09:13), Max: 38.3 (18 Jul 2018 01:20)  T(F): 99.6 (18 Jul 2018 09:13), Max: 100.9 (18 Jul 2018 01:20)  HR: 90 (18 Jul 2018 10:00) (89 - 107)  BP: 108/70 (18 Jul 2018 10:00) (90/64 - 108/70)  BP(mean): --  RR: 18 (18 Jul 2018 10:00) (16 - 20)  SpO2: 98% (18 Jul 2018 10:00) (95% - 100%)    07-17 @ 07:01  -  07-18 @ 07:00  --------------------------------------------------------  IN: 0 mL / OUT: 250 mL / NET: -250 mL    07-18 @ 07:01  -  07-18 @ 19:18  --------------------------------------------------------  IN: 1140 mL / OUT: 0 mL / NET: 1140 mL    PHYSICAL EXAM:    General: Frail, NAD  HEENT: MMM, conjunctiva and sclera clear  Gastrointestinal: Soft diffuse TTP, distended; No rebound or guarding  Skin: Warm and dry. No obvious rash    LABS:                        9.2    3.6   )-----------( 534      ( 18 Jul 2018 01:31 )             28.9     07-18    135  |  99  |  20  ----------------------------<  130<H>  3.6   |  23  |  0.51    Ca    7.9<L>      18 Jul 2018 01:30  Phos  2.2     07-18  Mg     1.7     07-18    TPro  5.2<L>  /  Alb  2.4<L>  /  TBili  1.0  /  DBili  x   /  AST  20  /  ALT  13  /  AlkPhos  231<H>  07-18    PT/INR - ( 18 Jul 2018 01:32 )   PT: 28.5 sec;   INR: 2.52       PTT - ( 18 Jul 2018 01:32 )  PTT:32.9 sec    Culture - Urine (collected 17 Jul 2018 15:45)  Source: .Urine Clean Catch (Midstream)  Final Report (18 Jul 2018 08:38):    No growth    Culture - Blood (collected 17 Jul 2018 14:59)  Source: .Blood Blood-Peripheral  Gram Stain (18 Jul 2018 15:08):    Anaerobic Bottle: Gram Negative Rods    Result called to and read back byLisa Anne MD  07/18/2018 15:08:07    ***Blood Panel PCR results on this specimen are available    approximately 3 hours after the Gram stain result.***    Gram stain, PCR, and/or culture results may not always    correspond due to difference in methodologies.    ************************************************************    This PCR assay was performed using [employer].    The following targets are tested for: Enterococcus,    vancomycin resistant enterococci, Listeria monocytogenes,    coagulase negative staphylococci, S. aureus,    methicillin resistant S. aureus, Streptococcus agalactiae    (Group B), S. pneumoniae, S. pyogenes (Group A),    Acinetobacter baumannii, Enterobacter cloacae, E. coli,    Klebsiella oxytoca, K. pneumoniae, Proteus sp.,    Serratia marcescens, Haemophilus influenzae,    Neisseria meningitidis, Pseudomonas aeruginosa, Candida    albicans, C. glabrata, C krusei, C parapsilosis,    C. tropicalis and the KPC resistance gene.    "Due to technical problems, Proteus sp. will Not be reported as part of    the BCID panel until further notice"  Preliminary Report (18 Jul 2018 15:49):    Culture in progress  Organism: Blood Culture PCR (18 Jul 2018 12:18)  Organism: Blood Culture PCR (18 Jul 2018 12:18)    Culture - Blood (collected 17 Jul 2018 14:59)  Source: .Blood Blood-Peripheral  Preliminary Report (18 Jul 2018 15:06):    No growth at 1 day.    RADIOLOGY & ADDITIONAL STUDIES:  Xray Abdomen 2 View PORTABLE -Urgent (07.18.18 @ 01:04)  2 frontal views of the abdomen are compared to the x-ray of 6/7/2018. Tip   of right chest port overlies the SVC. Tip of percutaneous gastrostomy   tube overlies the gastric antrum. Percutaneous pigtail drain overlies the   right upper quadrant, unchanged. 2 overlapping colorectal stents are   noted in the upper pelvis. There are is mild dilatation of small bowel   loops which are centered in the mid abdomen, suggestive of moderate   ascites. No free air is seen.    Impression: Two colorectal stents in place. Suggestion of ascites. Small   bowel ileus or early obstruction. No free air.

## 2018-07-18 NOTE — PROGRESS NOTE ADULT - ASSESSMENT
68 yo F w/ metastatic colon ca s/p 1 dose FOLFOX, recent acute cholecystitis (June 2018) c/b severe sepsis and hepatic vein thrombosis s/p percutaneous cholecystomy, and recent SBO/ ileus s/p venting PEG and 2 colonic stents admitted for severe sepsis and acute, likely secondary to intraabdominal source. DDX includes  ischemic colitis with bacterial translocation vs colitis/enteritis vs malignant fever 66 yo F w/ metastatic colon ca s/p 1 dose FOLFOX, recent acute cholecystitis (June 2018) c/b severe sepsis and hepatic vein thrombosis s/p percutaneous cholecystomy, and recent SBO/ ileus s/p venting PEG and 2 colonic stents admitted for severe sepsis and acute, likely secondary to intraabdominal source. DDX includes ischemic colitis with bacterial translocation vs colitis/enteritis vs malignant fever

## 2018-07-18 NOTE — DIETITIAN INITIAL EVALUATION ADULT. - OTHER INFO
68 y/o female admitted with increased abdominal pain/sepsis.Denied N/V/D.Noted and reported abdominal pain and poor po PTA due to pain..With PEG for meds as per patient never received enteral feeds.Skin intact.

## 2018-07-18 NOTE — PROGRESS NOTE ADULT - SUBJECTIVE AND OBJECTIVE BOX
SUBJECTIVE: Her abdominal pain has mildly improved. She denies nausea or vomiting. Her last bowel movement was 2 days ago and was dark brown in color. She continues to pass flatus and urinate normally.    edoxaban 60 milliGRAM(s) Oral daily  piperacillin/tazobactam IVPB. 3.375 Gram(s) IV Intermittent every 6 hours  vancomycin  IVPB 1000 milliGRAM(s) IV Intermittent every 12 hours      Vital Signs Last 24 Hrs  T(C): 38.3 (18 Jul 2018 01:20), Max: 38.7 (17 Jul 2018 13:11)  T(F): 100.9 (18 Jul 2018 01:20), Max: 101.7 (17 Jul 2018 13:11)  HR: 107 (18 Jul 2018 01:14) (80 - 107)  BP: 101/60 (18 Jul 2018 01:14) (91/51 - 128/85)  BP(mean): --  RR: 20 (18 Jul 2018 01:14) (16 - 20)  SpO2: 97% (18 Jul 2018 01:14) (97% - 100%)  I&O's Detail    17 Jul 2018 07:01  -  18 Jul 2018 03:10  --------------------------------------------------------  IN:  Total IN: 0 mL    OUT:    Voided: 250 mL  Total OUT: 250 mL    Total NET: -250 mL          General: NAD, resting in bed  C/V: NSR, R anterior chest wall port site soft, nonerythematous, nontender  Pulm: Nonlabored breathing, no respiratory distress  Abd: soft, moderately distended, diffusely tender and worse in the LUQ, slight LUQ rebound tenderness, nontender to percussion; PEG tube and perc arleen tube dressings CDI with no tenderness or induration; adequate bilious drainage from perc arleen tube  Extrem: WWP, no edema, mild calf tenderness, no calf asymmetry or skin changes        LABS:                        9.2    3.6   )-----------( 534      ( 18 Jul 2018 01:31 )             28.9     07-18    135  |  99  |  20  ----------------------------<  130<H>  3.6   |  23  |  0.51    Ca    7.9<L>      18 Jul 2018 01:30  Phos  2.2     07-18  Mg     1.7     07-18    TPro  5.2<L>  /  Alb  2.4<L>  /  TBili  1.0  /  DBili  x   /  AST  20  /  ALT  13  /  AlkPhos  231<H>  07-18    PT/INR - ( 18 Jul 2018 01:32 )   PT: 28.5 sec;   INR: 2.52          PTT - ( 18 Jul 2018 01:32 )  PTT:32.9 sec  Urinalysis Basic - ( 17 Jul 2018 15:17 )    Color: Yellow / Appearance: Clear / SG: <=1.005 / pH: x  Gluc: x / Ketone: Trace mg/dL  / Bili: Negative / Urobili: 1.0 E.U./dL   Blood: x / Protein: NEGATIVE mg/dL / Nitrite: NEGATIVE   Leuk Esterase: NEGATIVE / RBC: x / WBC x   Sq Epi: x / Non Sq Epi: x / Bacteria: x        RADIOLOGY & ADDITIONAL STUDIES:    EXAM:  CT ABDOMEN AND PELVIS IC                          PROCEDURE DATE:  07/17/2018    IMPRESSION:  1.  Significant interval change in appearance of the gastrointestinal   tract, with long segment of circumferential bowel wall thickening of the   distal small bowel and the ascending/transverse colon; with several areas   of luminal narrowing involving the mentioned colon; as detailed above.   Findings are either related to progression of carcinomatosis and   metastatic disease or possible ischemic etiology.    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    4.  Increasing amount of abdominopelvic ascites.    5.  Increasing metastatic disease to the liver.    6.  PEG tube is within the stomach lumen. Unchanged position of the   percutaneous cholecystotomy tube.

## 2018-07-18 NOTE — DIETITIAN INITIAL EVALUATION ADULT. - ENERGY NEEDS
Ht:5ft 2inches,IBW:110lbs+/-10%,148% of IBW.Increased kcal/protein and nutrient needs r/t CA demands

## 2018-07-18 NOTE — DIETITIAN INITIAL EVALUATION ADULT. - PROBLEM SELECTOR PLAN 1
POA. Met 3/4 SIRS criteria: tachycardic to 80, febrile 101.7, WBC 15.8. Lactate was 2.8 s/p 2L NS--> 1.8. Likely 2/2 to abdominal infection (possible pancreatitis, SBP, cholecystitis, infected stents). UA negative. Lipase elevated mildly to 75. GGT elevated to 100 (last admission was 30).  -GI consult (paracentesis to analyze for SBP, categorize ascties)  -trend vitals, CBC w/ diff  -add on ggt   -c/w vanc/ zosyn for broad coverage until cultures come back  -f/u blood cultures    #abdominal pain   Pain regimen on prior admission: oxycodone 10mg q4h for severe pain, oxycodone 5mg for moderate pain.    -c/w oxycodone 5mg  -c/w bowel regimen  -vascular consult

## 2018-07-18 NOTE — DIETITIAN INITIAL EVALUATION ADULT. - PROBLEM SELECTOR PLAN 5
POA. found on last admission, likely 2/2 hypercoagulable state in the setting of active untreated malignancy  -c/w edoxaban home med

## 2018-07-18 NOTE — CONSULT NOTE ADULT - SUBJECTIVE AND OBJECTIVE BOX
GENERAL SURGERY CONSULT NOTE  66 yo F w/ metastatic colon ca s/p chemotherapy, acute cholecystitis s/p percutaneous cholecystomy c/b portal vein thrombosis, LBO s/p sigmoid stent, presents this admission with sepsis. Unclear etiology but possibly secondary to intra abdominal source.     Patient seen and examined at bedside, she complains that her abdominal pain has been present since mid May; and has not noticed that it has become particularly worse.     PAST MEDICAL & SURGICAL HISTORY:  Cholecystitis, acute  Colon cancer  No significant past surgical history      REVIEW OF SYSTEMS:   Pertinent positives/negatives noted in HPI.     ALLERGIES:  NKDA     Vital Signs Last 24 Hrs  T(C): 36.7 (17 Jul 2018 21:12), Max: 38.7 (17 Jul 2018 13:11)  T(F): 98.1 (17 Jul 2018 21:12), Max: 101.7 (17 Jul 2018 13:11)  HR: 89 (17 Jul 2018 20:23) (80 - 104)  BP: 106/67 (17 Jul 2018 20:23) (91/51 - 128/85)  BP(mean): --  RR: 16 (17 Jul 2018 20:23) (16 - 20)  SpO2: 100% (17 Jul 2018 20:23) (100% - 100%)    PHYSICAL EXAM:  General: Cachetic  Neurology: A&Ox3, nonfocal, GILBERT x 4  Eyes: EOMI, Gross vision intact  ENT/Neck: Neck supple, trachea midline, No JVD, Gross hearing intact  Respiratory: No respiratory distress  CV: RRR, S1S2  Abdominal: Soft, Distended, tender to palpation; Perc-arleen tube with adequate drainage. PEG tube venting  Extremities: No edema, + peripheral pulses  Skin: No Rashes, Hematoma, Ecchymosis    LABS:                        9.6    15.8  )-----------( 511      ( 17 Jul 2018 13:43 )             29.9     07-17    130<L>  |  91<L>  |  21  ----------------------------<  156<H>  3.3<L>   |  26  |  0.60    Ca    8.9      17 Jul 2018 13:43    TPro  6.2  /  Alb  2.8<L>  /  TBili  1.0  /  DBili  0.3<H>  /  AST  26  /  ALT  16  /  AlkPhos  316<H>  07-17    PT/INR - ( 17 Jul 2018 13:43 )   PT: 33.3 sec;   INR: 2.93       PTT - ( 17 Jul 2018 13:43 )  PTT:33.7 sec    UA negative       CT scan --> thickening of the small bowel and colon concerning for colitis, ischemic vs infectious etiology, portal vein thrombosis (known), perc arleen tune in place, peg tube in place, worsening carcinomatosis GENERAL SURGERY CONSULT NOTE  68 yo F w/ metastatic colon ca s/p chemotherapy, acute cholecystitis s/p percutaneous cholecystomy c/b portal vein thrombosis, LBO s/p sigmoid stent, presents this admission with sepsis. Unclear etiology but possibly secondary to intra abdominal source.     Patient seen and examined at bedside, she complains that her abdominal pain has been present since mid May; and has not noticed that it has become particularly worse.     PAST MEDICAL & SURGICAL HISTORY:  Cholecystitis, acute  Colon cancer  No significant past surgical history      REVIEW OF SYSTEMS:   Pertinent positives/negatives noted in HPI.     ALLERGIES:  NKDA     Vital Signs Last 24 Hrs  T(C): 36.7 (17 Jul 2018 21:12), Max: 38.7 (17 Jul 2018 13:11)  T(F): 98.1 (17 Jul 2018 21:12), Max: 101.7 (17 Jul 2018 13:11)  HR: 89 (17 Jul 2018 20:23) (80 - 104)  BP: 106/67 (17 Jul 2018 20:23) (91/51 - 128/85)  BP(mean): --  RR: 16 (17 Jul 2018 20:23) (16 - 20)  SpO2: 100% (17 Jul 2018 20:23) (100% - 100%)    PHYSICAL EXAM:  General: Cachetic  Neurology: A&Ox3, nonfocal, GILBERT x 4  Eyes: EOMI, Gross vision intact  ENT/Neck: Neck supple, trachea midline, No JVD, Gross hearing intact  Respiratory: No respiratory distress  CV: RRR, S1S2  Abdominal: Soft, Distended, tender to palpation, no rebound / guarding Perc-arleen tube with adequate drainage. PEG tube venting  Extremities: No edema, + peripheral pulses  Skin: No Rashes, Hematoma, Ecchymosis    LABS:                        9.6    15.8  )-----------( 511      ( 17 Jul 2018 13:43 )             29.9     07-17    130<L>  |  91<L>  |  21  ----------------------------<  156<H>  3.3<L>   |  26  |  0.60    Ca    8.9      17 Jul 2018 13:43    TPro  6.2  /  Alb  2.8<L>  /  TBili  1.0  /  DBili  0.3<H>  /  AST  26  /  ALT  16  /  AlkPhos  316<H>  07-17    PT/INR - ( 17 Jul 2018 13:43 )   PT: 33.3 sec;   INR: 2.93       PTT - ( 17 Jul 2018 13:43 )  PTT:33.7 sec    UA negative       CT scan --> thickening of the small bowel and colon concerning for colitis, ischemic vs infectious etiology, portal vein thrombosis (known), perc arleen tune in place, peg tube in place, worsening carcinomatosis GENERAL SURGERY CONSULT NOTE  68 yo F w/ metastatic colon ca s/p chemotherapy, acute cholecystitis s/p percutaneous cholecystomy c/b portal vein thrombosis, LBO s/p sigmoid stent, presents this admission with worsening abdominal pain and fevers over the past two days. Admitted to the medicine service with a diagnosis of sepsis of unclear etiology but possibly secondary to an intra abdominal source.   Patient seen and examined at bedside, she complains that her abdominal pain has been present since mid May but has become particularly worse over the past couple of days. Patient denies nausea / vomiting / CP / SOB / dysuria at this time. Patient continues to report positive bowel function.     PAST MEDICAL & SURGICAL HISTORY:  Cholecystitis, acute  Colon cancer  No significant past surgical history      REVIEW OF SYSTEMS:   Pertinent positives/negatives noted in HPI.     ALLERGIES:  NKDA     Vital Signs Last 24 Hrs  T(C): 36.7 (17 Jul 2018 21:12), Max: 38.7 (17 Jul 2018 13:11)  T(F): 98.1 (17 Jul 2018 21:12), Max: 101.7 (17 Jul 2018 13:11)  HR: 89 (17 Jul 2018 20:23) (80 - 104)  BP: 106/67 (17 Jul 2018 20:23) (91/51 - 128/85)  BP(mean): --  RR: 16 (17 Jul 2018 20:23) (16 - 20)  SpO2: 100% (17 Jul 2018 20:23) (100% - 100%)    PHYSICAL EXAM:  General: Cachetic  Neurology: A&Ox3, nonfocal, GILBERT x 4  Eyes: EOMI, Gross vision intact  ENT/Neck: Neck supple, trachea midline, No JVD, Gross hearing intact  Respiratory: No respiratory distress  CV: RRR, S1S2  Abdominal: Soft, Distended, tender to palpation, no rebound / guarding Perc-arleen tube with adequate drainage. PEG tube venting  Extremities: No edema, + peripheral pulses  Skin: No Rashes, Hematoma, Ecchymosis    LABS:                        9.6    15.8  )-----------( 511      ( 17 Jul 2018 13:43 )             29.9     07-17    130<L>  |  91<L>  |  21  ----------------------------<  156<H>  3.3<L>   |  26  |  0.60    Ca    8.9      17 Jul 2018 13:43    TPro  6.2  /  Alb  2.8<L>  /  TBili  1.0  /  DBili  0.3<H>  /  AST  26  /  ALT  16  /  AlkPhos  316<H>  07-17    PT/INR - ( 17 Jul 2018 13:43 )   PT: 33.3 sec;   INR: 2.93       PTT - ( 17 Jul 2018 13:43 )  PTT:33.7 sec    UA negative       CT scan --> thickening of the small bowel and colon concerning for colitis, ischemic vs infectious etiology, portal vein thrombosis (known), perc arleen tune in place, peg tube in place, worsening carcinomatosis GENERAL SURGERY CONSULT NOTE  66 yo F w/ metastatic colon ca s/p chemotherapy, acute cholecystitis s/p percutaneous cholecystomy c/b portal vein thrombosis, LBO s/p sigmoid stent and s/p PEG placement, presents this admission with worsening abdominal pain and fevers over the past two days. Admitted to the medicine service with a diagnosis of sepsis of unclear etiology but possibly secondary to an intra abdominal source.   Patient seen and examined at bedside, she complains that her abdominal pain has been present since mid May but has become particularly worse over the past couple of days. Patient denies nausea / vomiting / CP / SOB / dysuria at this time. Patient continues to report positive bowel function.     PAST MEDICAL & SURGICAL HISTORY:  Cholecystitis, acute  Colon cancer  No significant past surgical history      REVIEW OF SYSTEMS:   Pertinent positives/negatives noted in HPI.     ALLERGIES:  NKDA     Vital Signs Last 24 Hrs  T(C): 36.7 (17 Jul 2018 21:12), Max: 38.7 (17 Jul 2018 13:11)  T(F): 98.1 (17 Jul 2018 21:12), Max: 101.7 (17 Jul 2018 13:11)  HR: 89 (17 Jul 2018 20:23) (80 - 104)  BP: 106/67 (17 Jul 2018 20:23) (91/51 - 128/85)  BP(mean): --  RR: 16 (17 Jul 2018 20:23) (16 - 20)  SpO2: 100% (17 Jul 2018 20:23) (100% - 100%)    PHYSICAL EXAM:  General: Cachetic  Neurology: A&Ox3, nonfocal, GILBERT x 4  Eyes: EOMI, Gross vision intact  ENT/Neck: Neck supple, trachea midline, No JVD, Gross hearing intact  Respiratory: No respiratory distress  CV: RRR, S1S2  Abdominal: Soft, Distended, tender to palpation, no rebound / guarding Perc-arleen tube with adequate drainage. PEG tube venting  Extremities: No edema, + peripheral pulses  Skin: No Rashes, Hematoma, Ecchymosis    LABS:                        9.6    15.8  )-----------( 511      ( 17 Jul 2018 13:43 )             29.9     07-17    130<L>  |  91<L>  |  21  ----------------------------<  156<H>  3.3<L>   |  26  |  0.60    Ca    8.9      17 Jul 2018 13:43    TPro  6.2  /  Alb  2.8<L>  /  TBili  1.0  /  DBili  0.3<H>  /  AST  26  /  ALT  16  /  AlkPhos  316<H>  07-17    PT/INR - ( 17 Jul 2018 13:43 )   PT: 33.3 sec;   INR: 2.93       PTT - ( 17 Jul 2018 13:43 )  PTT:33.7 sec    UA negative       CT scan --> thickening of the small bowel and colon concerning for colitis, ischemic vs infectious etiology, portal vein thrombosis (known), perc arleen tune in place, peg tube in place, worsening carcinomatosis

## 2018-07-18 NOTE — DIETITIAN INITIAL EVALUATION ADULT. - NS AS NUTRI INTERV MEALS SNACK
General/healthful diet/Energy - modified diet/Protein - modified diet/Other (specify)/pending EGD and po advancement potential

## 2018-07-18 NOTE — DIETITIAN INITIAL EVALUATION ADULT. - PHYSICAL APPEARANCE
NPO/Advanced CA/NPO/poor po PTA.RD deferred weight loss history at this time.Patient 148% of IBW so IBW used for calculations/debilitated

## 2018-07-18 NOTE — PROGRESS NOTE ADULT - SUBJECTIVE AND OBJECTIVE BOX
OVERNIGHT EVENTS:    SUBJECTIVE / INTERVAL HPI: Patient seen and examined at bedside.     VITAL SIGNS:  Vital Signs Last 24 Hrs  T(C): 37.6 (18 Jul 2018 09:13), Max: 38.7 (17 Jul 2018 13:11)  T(F): 99.6 (18 Jul 2018 09:13), Max: 101.7 (17 Jul 2018 13:11)  HR: 98 (18 Jul 2018 06:00) (80 - 107)  BP: 90/64 (18 Jul 2018 09:13) (90/64 - 128/85)  BP(mean): --  RR: 18 (18 Jul 2018 09:13) (16 - 20)  SpO2: 95% (18 Jul 2018 09:13) (95% - 100%)    PHYSICAL EXAM:    Constitutional: WDWN , laying in bed, visibly in pain  Head: NC/AT, no scleral icterus, dry MM  Respiratory: CTA B/L; no W/R/R, no retractions  Cardiac: +S1/S2; RRR; no M/R/G  Gastrointestinal: soft, +distended, +diffusely tender, +exquisitely tender to palpation of RUQ and LUQ, no +guarding; no reboug; +BSx4; PEG site and Percutaneous cystostomy sites are clean, dry and intact, no edema, erythema, or purulence  Extremities: WWP, no clubbing or cyanosis; no peripheral edema; b/l LE mildly tender to palpation which pt states is chronic  Dermatologic: skin warm, dry and intact; no rashes, wounds, or scars; Neurologic: AAOx3; no focal deficits  Psych: normal affect  Lymph: no LAD  Neuro: AA&Ox3, No focal deficits    MEDICATIONS:  MEDICATIONS  (STANDING):  edoxaban 60 milliGRAM(s) Oral daily  piperacillin/tazobactam IVPB. 3.375 Gram(s) IV Intermittent every 6 hours  potassium chloride   Powder 40 milliEquivalent(s) Oral once  sodium chloride 0.9%. 1000 milliLiter(s) (100 mL/Hr) IV Continuous <Continuous>  vancomycin  IVPB 1000 milliGRAM(s) IV Intermittent every 12 hours    MEDICATIONS  (PRN):  acetaminophen   Tablet 650 milliGRAM(s) Oral every 6 hours PRN For Temp greater than 38 C (100.4 F)  morphine  - Injectable 2 milliGRAM(s) IV Push every 4 hours PRN Severe Pain (7 - 10)      ALLERGIES:  Allergies    allergic to cats (Rash)  No Known Drug Allergies    Intolerances        LABS:               Complete Blood Count + Automated Diff in AM (07.18.18 @ 01:31)    WBC Count: 3.6 K/uL    RBC Count: 3.67 M/uL    Hemoglobin: 9.2 g/dL    Hematocrit: 28.9 %    Mean Cell Volume: 78.7 fL    Mean Cell Hemoglobin: 25.1 pg    Mean Cell Hemoglobin Conc: 31.8 g/dL    Red Cell Distrib Width: 23.2 %    Platelet Count - Automated: 534 K/uL    Auto Neutrophil %: 76.4: Please note that absolute numbers are not reported at [facility]. %    Auto Lymphocyte %: 14.0: Please note that absolute numbers are not reported at [facility]. %    Auto Monocyte %: 8.8: Please note that absolute numbers are not reported at [facility]. %    Auto Eosinophil %: 0.8: Please note that absolute numbers are not reported at [facility]. %      07-18    135  |  99  |  20  ----------------------------<  130<H>  3.6   |  23  |  0.51    Ca    7.9<L>      18 Jul 2018 01:30  Phos  2.2     07-18  Mg     1.7     07-18    TPro  5.2<L>  /  Alb  2.4<L>  /  TBili  1.0  /  DBili  x   /  AST  20  /  ALT  13  /  AlkPhos  231<H>  07-18    PT/INR - ( 18 Jul 2018 01:32 )   PT: 28.5 sec;   INR: 2.52          PTT - ( 18 Jul 2018 01:32 )  PTT:32.9 sec  Urinalysis Basic - ( 17 Jul 2018 15:17 )    Color: Yellow / Appearance: Clear / SG: <=1.005 / pH: x  Gluc: x / Ketone: Trace mg/dL  / Bili: Negative / Urobili: 1.0 E.U./dL   Blood: x / Protein: NEGATIVE mg/dL / Nitrite: NEGATIVE   Leuk Esterase: NEGATIVE / RBC: x / WBC x   Sq Epi: x / Non Sq Epi: x / Bacteria: x    Lactate, Blood (07.18.18 @ 01:34)    Lactate, Blood: 2.7 mmoL/L    Ucx 7/17/18-negative  Bcx 7/17/18 - negative x2 for 12 hours    RADIOLOGY & ADDITIONAL TESTS: Reviewed.  < from: CT Abdomen and Pelvis w/ IV Cont (07.17.18 @ 14:42) >  IMPRESSION:  1.  Significant interval change in appearance of the gastrointestinal   tract, with long segment of circumferential bowel wall thickening of the   distal small bowel and the ascending/transverse colon; with several areas   of luminal narrowing involving the mentioned colon; as detailed above.   Findings are either related to progression of carcinomatosis and   metastatic disease or possible ischemic etiology.    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    4.  Increasing amount of abdominopelvic ascites.    5.  Increasing metastatic disease to the liver.    6.  PEG tube is within the stomach lumen. Unchanged position of the   percutaneous cholecystotomy tube.    < end of copied text >      < from: Xray Abdomen 2 View PORTABLE -Urgent (07.18.18 @ 01:04) >    Impression: Two colorectal stents in place. Suggestion of ascites. Small   bowel ileus or early obstruction. No free air.    < end of copied text >    Consults:    Vascular 7/17- all abdominal vessels patent, no intervention at this time  Surgery 7/17- no plan for intervention at this time  GI 7/17- c/w abx and fluids, npo at this time, awaiting final recommendations OVERNIGHT EVENTS: Pt was admitted overnight.    SUBJECTIVE / INTERVAL HPI: Patient seen and examined at bedside.     VITAL SIGNS:  Vital Signs Last 24 Hrs  T(C): 37.6 (18 Jul 2018 09:13), Max: 38.7 (17 Jul 2018 13:11)  T(F): 99.6 (18 Jul 2018 09:13), Max: 101.7 (17 Jul 2018 13:11)  HR: 98 (18 Jul 2018 06:00) (80 - 107)  BP: 90/64 (18 Jul 2018 09:13) (90/64 - 128/85)  BP(mean): --  RR: 18 (18 Jul 2018 09:13) (16 - 20)  SpO2: 95% (18 Jul 2018 09:13) (95% - 100%)    PHYSICAL EXAM:    Constitutional: WDWN , laying in bed, visibly in pain  Head: NC/AT, no scleral icterus, dry MM  Respiratory: CTA B/L; no W/R/R, no retractions  Cardiac: +S1/S2; RRR; no M/R/G  Gastrointestinal: soft, +distended, +diffusely tender, +exquisitely tender to palpation of RUQ and LUQ, no +guarding; no reboug; +BSx4; PEG site and Percutaneous cystostomy sites are clean, dry and intact, no edema, erythema, or purulence  Extremities: WWP, no clubbing or cyanosis; no peripheral edema; b/l LE mildly tender to palpation which pt states is chronic  Dermatologic: skin warm, dry and intact; no rashes, wounds, or scars; Neurologic: AAOx3; no focal deficits  Psych: normal affect  Lymph: no LAD  Neuro: AA&Ox3, No focal deficits    MEDICATIONS:  MEDICATIONS  (STANDING):  edoxaban 60 milliGRAM(s) Oral daily  piperacillin/tazobactam IVPB. 3.375 Gram(s) IV Intermittent every 6 hours  potassium chloride   Powder 40 milliEquivalent(s) Oral once  sodium chloride 0.9%. 1000 milliLiter(s) (100 mL/Hr) IV Continuous <Continuous>  vancomycin  IVPB 1000 milliGRAM(s) IV Intermittent every 12 hours    MEDICATIONS  (PRN):  acetaminophen   Tablet 650 milliGRAM(s) Oral every 6 hours PRN For Temp greater than 38 C (100.4 F)  morphine  - Injectable 2 milliGRAM(s) IV Push every 4 hours PRN Severe Pain (7 - 10)      ALLERGIES:  Allergies    allergic to cats (Rash)  No Known Drug Allergies    Intolerances        LABS:               Complete Blood Count + Automated Diff in AM (07.18.18 @ 01:31)    WBC Count: 3.6 K/uL    RBC Count: 3.67 M/uL    Hemoglobin: 9.2 g/dL    Hematocrit: 28.9 %    Mean Cell Volume: 78.7 fL    Mean Cell Hemoglobin: 25.1 pg    Mean Cell Hemoglobin Conc: 31.8 g/dL    Red Cell Distrib Width: 23.2 %    Platelet Count - Automated: 534 K/uL    Auto Neutrophil %: 76.4: Please note that absolute numbers are not reported at [facility]. %    Auto Lymphocyte %: 14.0: Please note that absolute numbers are not reported at [facility]. %    Auto Monocyte %: 8.8: Please note that absolute numbers are not reported at [facility]. %    Auto Eosinophil %: 0.8: Please note that absolute numbers are not reported at [facility]. %      07-18    135  |  99  |  20  ----------------------------<  130<H>  3.6   |  23  |  0.51    Ca    7.9<L>      18 Jul 2018 01:30  Phos  2.2     07-18  Mg     1.7     07-18    TPro  5.2<L>  /  Alb  2.4<L>  /  TBili  1.0  /  DBili  x   /  AST  20  /  ALT  13  /  AlkPhos  231<H>  07-18    PT/INR - ( 18 Jul 2018 01:32 )   PT: 28.5 sec;   INR: 2.52          PTT - ( 18 Jul 2018 01:32 )  PTT:32.9 sec  Urinalysis Basic - ( 17 Jul 2018 15:17 )    Color: Yellow / Appearance: Clear / SG: <=1.005 / pH: x  Gluc: x / Ketone: Trace mg/dL  / Bili: Negative / Urobili: 1.0 E.U./dL   Blood: x / Protein: NEGATIVE mg/dL / Nitrite: NEGATIVE   Leuk Esterase: NEGATIVE / RBC: x / WBC x   Sq Epi: x / Non Sq Epi: x / Bacteria: x    Lactate, Blood (07.18.18 @ 01:34)    Lactate, Blood: 2.7 mmoL/L    Ucx 7/17/18-negative  Bcx 7/17/18 - negative x2 for 12 hours    RADIOLOGY & ADDITIONAL TESTS: Reviewed.  < from: CT Abdomen and Pelvis w/ IV Cont (07.17.18 @ 14:42) >  IMPRESSION:  1.  Significant interval change in appearance of the gastrointestinal   tract, with long segment of circumferential bowel wall thickening of the   distal small bowel and the ascending/transverse colon; with several areas   of luminal narrowing involving the mentioned colon; as detailed above.   Findings are either related to progression of carcinomatosis and   metastatic disease or possible ischemic etiology.    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    4.  Increasing amount of abdominopelvic ascites.    5.  Increasing metastatic disease to the liver.    6.  PEG tube is within the stomach lumen. Unchanged position of the   percutaneous cholecystotomy tube.    < end of copied text >      < from: Xray Abdomen 2 View PORTABLE -Urgent (07.18.18 @ 01:04) >    Impression: Two colorectal stents in place. Suggestion of ascites. Small   bowel ileus or early obstruction. No free air.    < end of copied text >    Consults:    Vascular 7/17- all abdominal vessels patent, no intervention at this time  Surgery 7/17- no plan for intervention at this time  GI 7/17- c/w abx and fluids, npo at this time, awaiting final recommendations  Palliative 7/18- pending OVERNIGHT EVENTS: Pt was admitted overnight.    SUBJECTIVE / INTERVAL HPI: Patient seen and examined at bedside.     VITAL SIGNS:  Vital Signs Last 24 Hrs  T(C): 37.6 (18 Jul 2018 09:13), Max: 38.7 (17 Jul 2018 13:11)  T(F): 99.6 (18 Jul 2018 09:13), Max: 101.7 (17 Jul 2018 13:11)  HR: 98 (18 Jul 2018 06:00) (80 - 107)  BP: 90/64 (18 Jul 2018 09:13) (90/64 - 128/85)  BP(mean): --  RR: 18 (18 Jul 2018 09:13) (16 - 20)  SpO2: 95% (18 Jul 2018 09:13) (95% - 100%)    PHYSICAL EXAM:    Constitutional: WDWN , laying in bed, visibly in pain  Head: NC/AT, no scleral icterus, dry MM  Respiratory: CTA B/L; no W/R/R, no retractions  Cardiac: +S1/S2; RRR; no M/R/G  Gastrointestinal: soft, +distended, +diffusely tender, +exquisitely tender to palpation of RUQ and LUQ, no +guarding; no reboug; +BSx4; PEG site and Percutaneous cystostomy sites are clean, dry and intact, no edema, erythema, or purulence  Extremities: WWP, no clubbing or cyanosis; no peripheral edema; b/l LE mildly tender to palpation which pt states is chronic  Dermatologic: skin warm, dry and intact; no rashes, wounds, or scars; Neurologic: AAOx3; no focal deficits  Psych: normal affect  Lymph: no LAD  Neuro: AA&Ox3, No focal deficits    MEDICATIONS:  MEDICATIONS  (STANDING):  edoxaban 60 milliGRAM(s) Oral daily  piperacillin/tazobactam IVPB. 3.375 Gram(s) IV Intermittent every 6 hours  potassium chloride   Powder 40 milliEquivalent(s) Oral once  sodium chloride 0.9%. 1000 milliLiter(s) (100 mL/Hr) IV Continuous <Continuous>  vancomycin  IVPB 1000 milliGRAM(s) IV Intermittent every 12 hours    MEDICATIONS  (PRN):  acetaminophen   Tablet 650 milliGRAM(s) Oral every 6 hours PRN For Temp greater than 38 C (100.4 F)  morphine  - Injectable 2 milliGRAM(s) IV Push every 4 hours PRN Severe Pain (7 - 10)      ALLERGIES:  Allergies    allergic to cats (Rash)  No Known Drug Allergies    Intolerances        LABS:               Complete Blood Count + Automated Diff in AM (07.18.18 @ 01:31)    WBC Count: 3.6 K/uL    RBC Count: 3.67 M/uL    Hemoglobin: 9.2 g/dL    Hematocrit: 28.9 %    Mean Cell Volume: 78.7 fL    Mean Cell Hemoglobin: 25.1 pg    Mean Cell Hemoglobin Conc: 31.8 g/dL    Red Cell Distrib Width: 23.2 %    Platelet Count - Automated: 534 K/uL    Auto Neutrophil %: 76.4: Please note that absolute numbers are not reported at [facility]. %    Auto Lymphocyte %: 14.0: Please note that absolute numbers are not reported at [facility]. %    Auto Monocyte %: 8.8: Please note that absolute numbers are not reported at [facility]. %    Auto Eosinophil %: 0.8: Please note that absolute numbers are not reported at [facility]. %      07-18    135  |  99  |  20  ----------------------------<  130<H>  3.6   |  23  |  0.51    Ca    7.9<L>      18 Jul 2018 01:30  Phos  2.2     07-18  Mg     1.7     07-18    TPro  5.2<L>  /  Alb  2.4<L>  /  TBili  1.0  /  DBili  x   /  AST  20  /  ALT  13  /  AlkPhos  231<H>  07-18    PT/INR - ( 18 Jul 2018 01:32 )   PT: 28.5 sec;   INR: 2.52          PTT - ( 18 Jul 2018 01:32 )  PTT:32.9 sec  Urinalysis Basic - ( 17 Jul 2018 15:17 )    Color: Yellow / Appearance: Clear / SG: <=1.005 / pH: x  Gluc: x / Ketone: Trace mg/dL  / Bili: Negative / Urobili: 1.0 E.U./dL   Blood: x / Protein: NEGATIVE mg/dL / Nitrite: NEGATIVE   Leuk Esterase: NEGATIVE / RBC: x / WBC x   Sq Epi: x / Non Sq Epi: x / Bacteria: x    Lactate, Blood (07.18.18 @ 01:34)    Lactate, Blood: 2.7 mmoL/L    Ucx 7/17/18-negative  Bcx 7/17/18 - negative x2 for 12 hours    RADIOLOGY & ADDITIONAL TESTS: Reviewed.  < from: CT Abdomen and Pelvis w/ IV Cont (07.17.18 @ 14:42) >  IMPRESSION:  1.  Significant interval change in appearance of the gastrointestinal   tract, with long segment of circumferential bowel wall thickening of the   distal small bowel and the ascending/transverse colon; with several areas   of luminal narrowing involving the mentioned colon; as detailed above.   Findings are either related to progression of carcinomatosis and   metastatic disease or possible ischemic etiology.    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    4.  Increasing amount of abdominopelvic ascites.    5.  Increasing metastatic disease to the liver.    6.  PEG tube is within the stomach lumen. Unchanged position of the   percutaneous cholecystotomy tube.    < end of copied text >      < from: Xray Abdomen 2 View PORTABLE -Urgent (07.18.18 @ 01:04) >    Impression: Two colorectal stents in place. Suggestion of ascites. Small   bowel ileus or early obstruction. No free air.    < end of copied text >    < from: Xray Abdomen 2 View PORTABLE -Urgent (07.18.18 @ 01:04) >  Impression: Two colorectal stents in place. Suggestion of ascites. Small   bowel ileus or early obstruction. No free air.    < end of copied text >      Consults:    Vascular 7/17- all abdominal vessels patent, no intervention at this time  Surgery 7/17- no plan for intervention at this time  GI 7/17- c/w abx and fluids, npo at this time, awaiting final recommendations  Palliative 7/18- morphine IV 1mg q4 prn, added on 1mg morphine IV pre-procedure, goals of care-"She states she would take surgical intervention with colostomy bag if needed but understands colitis must be adequately treated. She would like to remain full code and we will re-address MOLST in the next few days after symptoms better controlled. Pt requesting music therapy and massage therapy" OVERNIGHT EVENTS: Pt was admitted overnight.    SUBJECTIVE / INTERVAL HPI: Patient seen and examined at bedside.   pt reports abdominal pain generalized.  constipated.  ROS otherwise negative     VITAL SIGNS:  Vital Signs Last 24 Hrs  T(C): 37.6 (18 Jul 2018 09:13), Max: 38.7 (17 Jul 2018 13:11)  T(F): 99.6 (18 Jul 2018 09:13), Max: 101.7 (17 Jul 2018 13:11)  HR: 98 (18 Jul 2018 06:00) (80 - 107)  BP: 90/64 (18 Jul 2018 09:13) (90/64 - 128/85)  BP(mean): --  RR: 18 (18 Jul 2018 09:13) (16 - 20)  SpO2: 95% (18 Jul 2018 09:13) (95% - 100%)    PHYSICAL EXAM:    Constitutional: WDWN , laying in bed, visibly in pain  Head: NC/AT, no scleral icterus, dry MM  Respiratory: CTA B/L; no W/R/R, no retractions  Cardiac: +S1/S2; RRR; no M/R/G  Gastrointestinal: soft, +distended, +diffusely tender, +exquisitely tender to palpation of RUQ and LUQ, no +guarding; no reboug; +BSx4; PEG site and Percutaneous cystostomy sites are clean, dry and intact, no edema, erythema, or purulence  Extremities: WWP, no clubbing or cyanosis; no peripheral edema; b/l LE mildly tender to palpation which pt states is chronic  Dermatologic: skin warm, dry and intact; no rashes, wounds, or scars; Neurologic: AAOx3; no focal deficits  Psych: normal affect  Lymph: no LAD  Neuro: AA&Ox3, No focal deficits    MEDICATIONS:  MEDICATIONS  (STANDING):  edoxaban 60 milliGRAM(s) Oral daily  piperacillin/tazobactam IVPB. 3.375 Gram(s) IV Intermittent every 6 hours  potassium chloride   Powder 40 milliEquivalent(s) Oral once  sodium chloride 0.9%. 1000 milliLiter(s) (100 mL/Hr) IV Continuous <Continuous>  vancomycin  IVPB 1000 milliGRAM(s) IV Intermittent every 12 hours    MEDICATIONS  (PRN):  acetaminophen   Tablet 650 milliGRAM(s) Oral every 6 hours PRN For Temp greater than 38 C (100.4 F)  morphine  - Injectable 2 milliGRAM(s) IV Push every 4 hours PRN Severe Pain (7 - 10)      ALLERGIES:  Allergies    allergic to cats (Rash)  No Known Drug Allergies    Intolerances        LABS:               Complete Blood Count + Automated Diff in AM (07.18.18 @ 01:31)    WBC Count: 3.6 K/uL    RBC Count: 3.67 M/uL    Hemoglobin: 9.2 g/dL    Hematocrit: 28.9 %    Mean Cell Volume: 78.7 fL    Mean Cell Hemoglobin: 25.1 pg    Mean Cell Hemoglobin Conc: 31.8 g/dL    Red Cell Distrib Width: 23.2 %    Platelet Count - Automated: 534 K/uL    Auto Neutrophil %: 76.4: Please note that absolute numbers are not reported at [facility]. %    Auto Lymphocyte %: 14.0: Please note that absolute numbers are not reported at [facility]. %    Auto Monocyte %: 8.8: Please note that absolute numbers are not reported at [facility]. %    Auto Eosinophil %: 0.8: Please note that absolute numbers are not reported at [facility]. %      07-18    135  |  99  |  20  ----------------------------<  130<H>  3.6   |  23  |  0.51    Ca    7.9<L>      18 Jul 2018 01:30  Phos  2.2     07-18  Mg     1.7     07-18    TPro  5.2<L>  /  Alb  2.4<L>  /  TBili  1.0  /  DBili  x   /  AST  20  /  ALT  13  /  AlkPhos  231<H>  07-18    PT/INR - ( 18 Jul 2018 01:32 )   PT: 28.5 sec;   INR: 2.52          PTT - ( 18 Jul 2018 01:32 )  PTT:32.9 sec  Urinalysis Basic - ( 17 Jul 2018 15:17 )    Color: Yellow / Appearance: Clear / SG: <=1.005 / pH: x  Gluc: x / Ketone: Trace mg/dL  / Bili: Negative / Urobili: 1.0 E.U./dL   Blood: x / Protein: NEGATIVE mg/dL / Nitrite: NEGATIVE   Leuk Esterase: NEGATIVE / RBC: x / WBC x   Sq Epi: x / Non Sq Epi: x / Bacteria: x    Lactate, Blood (07.18.18 @ 01:34)    Lactate, Blood: 2.7 mmoL/L    Ucx 7/17/18-negative  Bcx 7/17/18 - negative x2 for 12 hours    RADIOLOGY & ADDITIONAL TESTS: Reviewed.  < from: CT Abdomen and Pelvis w/ IV Cont (07.17.18 @ 14:42) >  IMPRESSION:  1.  Significant interval change in appearance of the gastrointestinal   tract, with long segment of circumferential bowel wall thickening of the   distal small bowel and the ascending/transverse colon; with several areas   of luminal narrowing involving the mentioned colon; as detailed above.   Findings are either related to progression of carcinomatosis and   metastatic disease or possible ischemic etiology.    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    4.  Increasing amount of abdominopelvic ascites.    5.  Increasing metastatic disease to the liver.    6.  PEG tube is within the stomach lumen. Unchanged position of the   percutaneous cholecystotomy tube.    < end of copied text >      < from: Xray Abdomen 2 View PORTABLE -Urgent (07.18.18 @ 01:04) >    Impression: Two colorectal stents in place. Suggestion of ascites. Small   bowel ileus or early obstruction. No free air.    < end of copied text >    < from: Xray Abdomen 2 View PORTABLE -Urgent (07.18.18 @ 01:04) >  Impression: Two colorectal stents in place. Suggestion of ascites. Small   bowel ileus or early obstruction. No free air.    < end of copied text >      Consults:    Vascular 7/17- all abdominal vessels patent, no intervention at this time  Surgery 7/17- no plan for intervention at this time  GI 7/17- c/w abx and fluids, npo at this time, awaiting final recommendations  Palliative 7/18- morphine IV 1mg q4 prn, added on 1mg morphine IV pre-procedure, goals of care-"She states she would take surgical intervention with colostomy bag if needed but understands colitis must be adequately treated. She would like to remain full code and we will re-address MOLST in the next few days after symptoms better controlled. Pt requesting music therapy and massage therapy"

## 2018-07-18 NOTE — DIETITIAN INITIAL EVALUATION ADULT. - PROBLEM SELECTOR PLAN 3
Hb 9, at baseline, low MCV, high RDW consistent with iron def. Not new, s/p 1 iron transfusion in the past.  -daily cbc  -maintain active t+s  -transfuse <7    #thrombocytosis  -likely in the setting of acute infection, will trend

## 2018-07-18 NOTE — CONSULT NOTE ADULT - PROBLEM SELECTOR RECOMMENDATION 3
Pt on oxycodone PRN as outpatient. 2.5/10 pain at this time however exacerbated by movement   -Continue with IV morphine 2 mg q4h PRN for severe pain  -Will add on 1 mg IV morphine pre-procedure since she states she has pain when being placed on gurney  -Will add on 1 mg IV morphine for moderate pain (pain scale 4-6) Pt on oxycodone PRN as outpatient. Currently has 2.5/10 pain at this time however exacerbated by movement to 7-8/10.  -Continue with IV morphine 2 mg q4h PRN for severe pain  -Will add on 1 mg IV morphine pre-procedure since she states she has pain when being placed on gurney or moved.  -Will add on 1 mg IV morphine for moderate pain (pain scale 4-6) Sepsis possibly secondary to colitis/bacterial translocation. Patient is on IV antibiotics and IVF  -Management per medical team

## 2018-07-18 NOTE — DIETITIAN INITIAL EVALUATION ADULT. - PROBLEM SELECTOR PLAN 2
Pt of Dr. Nick ([phone]). recently dxd in 6/18. s/p chemoport placement 5/31, initiation of chemotherapy 6/9 with FOLFOX, received 1 dose so far, with 2nd tx planned for today, but not given due to fever.  -pal care consult  -f/u with Dr. Nick recs  -will likely resume chemo outpt

## 2018-07-18 NOTE — DIETITIAN INITIAL EVALUATION ADULT. - NUTRITION INTERVENTIONS
honor food requests with diet advancement/nutrient dense snacks/food preferences as requested by patient (specify)/other

## 2018-07-18 NOTE — PROGRESS NOTE ADULT - ASSESSMENT
66 yo F w/ metastatic colon ca s/p FOLFOX x1, acute cholecystitis s/p percutaneous cholecystomy c/b portal vein thrombosis, LBO s/p sigmoid stent, presents this admission with sepsis, surgery consulted for worsening abdominal pain likely due to colitis.    - No urgent surgical intervention at this time   - Recommend strict NPO (no sips or chips)  - Continue with IV fluids and antibiotics  - Serial abdominal exams     - Surgery to follow, call with any questions (team 4)   - discussed with chief on call, Dr. Dmitri Bland  - discussed with attending on call, Dr. Marci Narvaez

## 2018-07-18 NOTE — CONSULT NOTE ADULT - PROBLEM SELECTOR RECOMMENDATION 2
Stage IV colon cancer with mets to liver and peritoneal carcinomatosis s/p 1 cycle of FOLFOX when admitted 1 month ago; she tolerated chemotherapy well last time    -Chemo on hold per oncology due to severe sepsis  -Declining performance per oncology, will re-assess once acute infectious process has resolved Stage IV colon cancer with mets to liver and peritoneal carcinomatosis s/p 1 cycle of FOLFOX when admitted 1 month ago; she tolerated chemotherapy well last time.  -Chemo on hold per oncology due to severe sepsis  -Declining performance per oncology, will re-assess once acute infectious process has resolved  Management as per primary team, hemeonc, and surgery. -Recommend starting patient on Miralax and Senna if no bowel movement today  -Olanzapine 5mg ODT qdaily PRN for nausea as we cannot use other agents such as Zofran or Haldol as QTc is prolonged  -Will start patient on Simethicone drops for gas distension and pain.  -Will start Biotene for dry mouth and Vaseline for dry lips.

## 2018-07-18 NOTE — CONSULT NOTE ADULT - PROBLEM SELECTOR RECOMMENDATION 4
-Recommend starting patient on Miralax and Senna if no bowel movement today  -Olanzapine PRN for nausea as we cannot use other agents such as Zofran or Haldol as QTc is prolonged  -Will start patient on Simethcone drops and Biotene for dry mouth -Recommend starting patient on Miralax and Senna if no bowel movement today  -Olanzapine 5mg ODT qdaily PRN for nausea as we cannot use other agents such as Zofran or Haldol as QTc is prolonged  -Will start patient on Simethicone drops for gas distension and pain.  -Will start Biotene for dry mouth and Vaseline for dry lips. Stage IV colon cancer with mets to liver and peritoneal carcinomatosis s/p 1 cycle of FOLFOX when admitted 1 month ago; she tolerated chemotherapy well last time.  -Chemo on hold per oncology due to severe sepsis  -Declining performance per oncology, will re-assess once acute infectious process has resolved  Management as per primary team, hemeonc, and surgery.

## 2018-07-18 NOTE — PROGRESS NOTE ADULT - PROBLEM SELECTOR PLAN 7
POA. Na 130. liekly Acute given normal na on last admission 1 month ago. Pt appears dry. Likely hypovolemic hyponatremia in the setting of poor PO intake from decreased appetite. expect to improve after IVF rescuscitation  -monitor BMP  -am serum osmolarity, urine osm, ur Na    #FENC  F: IVF (EF 60%)  E: replete mg>2, K>4  N: NPO   C: FC  VTE ppx: on Edoxaban    1) PCP Contacted on Admission:  Dr. Nick aware pt here  Name & Phone #: Dr. Clark  2) Date of Contact with PCP:  3) PCP Contacted at Discharge: (Y/N)  4) Summary of Handoff Given to PCP:   5) Post-Discharge Appointment Date - resolved w/ IVF Na 130 on admission--> 135 today .   -monitor BMP -long QT, otherwise normal  -repeat am EKG, monitor daily  -avoid medications that prolong QT

## 2018-07-18 NOTE — PROGRESS NOTE ADULT - SUBJECTIVE AND OBJECTIVE BOX
[facility]    Chief complaint: RUQ discomfort    HPI: 67 y.o woman, with recently diagnosed aggressive metastatic colorectal cancer (BRAF +, KRAS/NRAS negative). Hospitalized at [facility] in May with acute cholecystitis, s/p cholecystotomy and colon stent and vent PEG placement. She received one cycle of  FOLFOX during that admission and was discharged to rehab for 4 weeks.     She presented to our office on 7/17 and was found to be febrile at 102.5 F, tachycardic with diffuse abdominal pain and toxic appearance.  Her WBC was 18.5.  She was sent to the ER.      PAST MEDICAL & SURGICAL HISTORY:  SBP (spontaneous bacterial peritonitis)  Cholecystitis, acute  Colon cancer  No significant past surgical history      MEDICATIONS  (STANDING):  edoxaban 60 milliGRAM(s) Oral daily  piperacillin/tazobactam IVPB. 3.375 Gram(s) IV Intermittent every 6 hours  potassium chloride   Powder 40 milliEquivalent(s) Oral once  sodium chloride 0.9%. 1000 milliLiter(s) IV Continuous <Continuous>  vancomycin  IVPB 1000 milliGRAM(s) IV Intermittent every 12 hours    Allergies    allergic to cats (Rash)  No Known Drug Allergies    Intolerances    FAMILY HISTORY:  No pertinent family history in first degree relatives      ROS:  + weakness, + abdominal pain, poor appetite, no nausea or vomiting    No SOB or CP  + fever    No history of easy bruising/bleeding.  No gingival bleeding or epistaxis.    +leg swelling.    ROS is otherwise negative.    Physical exam:    Vital signs  Vital Signs Last 24 Hrs  T(C): 37.1 (07-18-18 @ 06:00), Max: 38.7 (07-17-18 @ 13:11)  T(F): 98.8 (07-18-18 @ 06:00), Max: 101.7 (07-17-18 @ 13:11)  HR: 98 (07-18-18 @ 06:00) (80 - 107)  BP: 105/65 (07-18-18 @ 06:00) (91/51 - 128/85)  BP(mean): --  RR: 18 (07-18-18 @ 06:00) (16 - 20)  SpO2: 98% (07-18-18 @ 06:00) (97% - 100%)  Ill appearing  HEENT: PERRLA, pale mucosae  No palpable lymphadenopathy  Cardiovascular: tachy, no murmurs, rubs, gallops  Respiratory: clear to auscultation B/L  Abdomen: soft, distended,   tenderness in RUQ    Extremities:  + B/L LE peripheral edema  Neuro: alert, awake and oriented x 3, no focal abnormalities     LABS:  CBC Full  -  ( 18 Jul 2018 01:31 )  WBC Count : 3.6 K/uL  Hemoglobin : 9.2 g/dL  Hematocrit : 28.9 %  Platelet Count - Automated : 534 K/uL  Mean Cell Volume : 78.7 fL  Mean Cell Hemoglobin : 25.1 pg  Mean Cell Hemoglobin Concentration : 31.8 g/dL  Auto Neutrophil # : x  Auto Lymphocyte # : x  Auto Monocyte # : x  Auto Eosinophil # : x  Auto Basophil # : x  Auto Neutrophil % : 76.4 %  Auto Lymphocyte % : 14.0 %  Auto Monocyte % : 8.8 %  Auto Eosinophil % : 0.8 %  Auto Basophil % : x    18 Jul 2018 01:30    135    |  99     |  20     ----------------------------<  130    3.6     |  23     |  0.51     Ca    7.9        18 Jul 2018 01:30  Phos  2.2       18 Jul 2018 01:30  Mg     1.7       18 Jul 2018 01:30    TPro  5.2    /  Alb  2.4    /  TBili  1.0    /  DBili  x      /  AST  20     /  ALT  13     /  AlkPhos  231    18 Jul 2018 01:30      PERTINENT IMAGING STUDIES: reviewed    ASSESSMENT:    Metastatic colorectal cancer (BRAF+, KRAS/NRAS negative)    PLAN:    Acutely ill/septic-likely GI source  ? ischemic colitis on CT scan  Being managed conservatively on IV ABx  Will discuss with Dr. Narvaez if there is a need for surgical intervention  She has an aggressive disease and unfortunately declining ECOG PS- at this moment we can not consider systemic chemotherapy unless her condition improves significantly    Thank you    Estefania Lundy MD  [phone]

## 2018-07-18 NOTE — CONSULT NOTE ADULT - PROBLEM SELECTOR RECOMMENDATION 9
Sepsis possibly secondary to colitis/bacterial translocation. Patient is on IV antibiotics and IVF  -Management per medical team  -Agree with surgery assessment to hold off on surgery and treat conservatively Sepsis possibly secondary to colitis/bacterial translocation. Patient is on IV antibiotics and IVF  -Management per medical team Pt on oxycodone PRN as outpatient. Currently has 2.5/10 pain at this time however exacerbated by movement to 7-8/10.  -Continue with IV morphine 2 mg q4h PRN for severe pain  -Will add on 1 mg IV morphine pre-procedure since she states she has pain when being placed on gurney or moved.  -Will add on 1 mg IV morphine for moderate pain (pain scale 4-6)

## 2018-07-19 DIAGNOSIS — G47.00 INSOMNIA, UNSPECIFIED: ICD-10-CM

## 2018-07-19 DIAGNOSIS — R14.1 GAS PAIN: ICD-10-CM

## 2018-07-19 LAB
ANION GAP SERPL CALC-SCNC: 13 MMOL/L — SIGNIFICANT CHANGE UP (ref 5–17)
BASOPHILS NFR BLD AUTO: 0.1 % — SIGNIFICANT CHANGE UP (ref 0–2)
BUN SERPL-MCNC: 28 MG/DL — HIGH (ref 7–23)
CALCIUM SERPL-MCNC: 8.1 MG/DL — LOW (ref 8.4–10.5)
CHLORIDE SERPL-SCNC: 101 MMOL/L — SIGNIFICANT CHANGE UP (ref 96–108)
CO2 SERPL-SCNC: 22 MMOL/L — SIGNIFICANT CHANGE UP (ref 22–31)
CREAT SERPL-MCNC: 0.5 MG/DL — SIGNIFICANT CHANGE UP (ref 0.5–1.3)
EOSINOPHIL NFR BLD AUTO: 0 % — SIGNIFICANT CHANGE UP (ref 0–6)
GLUCOSE SERPL-MCNC: 104 MG/DL — HIGH (ref 70–99)
HCT VFR BLD CALC: 29.3 % — LOW (ref 34.5–45)
HGB BLD-MCNC: 9.1 G/DL — LOW (ref 11.5–15.5)
LYMPHOCYTES # BLD AUTO: 8.1 % — LOW (ref 13–44)
MAGNESIUM SERPL-MCNC: 1.9 MG/DL — SIGNIFICANT CHANGE UP (ref 1.6–2.6)
MCHC RBC-ENTMCNC: 25.3 PG — LOW (ref 27–34)
MCHC RBC-ENTMCNC: 31.1 G/DL — LOW (ref 32–36)
MCV RBC AUTO: 81.6 FL — SIGNIFICANT CHANGE UP (ref 80–100)
MONOCYTES NFR BLD AUTO: 6 % — SIGNIFICANT CHANGE UP (ref 2–14)
NEUTROPHILS NFR BLD AUTO: 85.8 % — HIGH (ref 43–77)
PLATELET # BLD AUTO: 481 K/UL — HIGH (ref 150–400)
POTASSIUM SERPL-MCNC: 3.8 MMOL/L — SIGNIFICANT CHANGE UP (ref 3.5–5.3)
POTASSIUM SERPL-SCNC: 3.8 MMOL/L — SIGNIFICANT CHANGE UP (ref 3.5–5.3)
RBC # BLD: 3.59 M/UL — LOW (ref 3.8–5.2)
RBC # FLD: 23 % — HIGH (ref 10.3–16.9)
SODIUM SERPL-SCNC: 136 MMOL/L — SIGNIFICANT CHANGE UP (ref 135–145)
WBC # BLD: 7.6 K/UL — SIGNIFICANT CHANGE UP (ref 3.8–10.5)
WBC # FLD AUTO: 7.6 K/UL — SIGNIFICANT CHANGE UP (ref 3.8–10.5)

## 2018-07-19 PROCEDURE — 99233 SBSQ HOSP IP/OBS HIGH 50: CPT | Mod: GC

## 2018-07-19 PROCEDURE — 99233 SBSQ HOSP IP/OBS HIGH 50: CPT

## 2018-07-19 PROCEDURE — 93010 ELECTROCARDIOGRAM REPORT: CPT

## 2018-07-19 RX ORDER — SCOPALAMINE 1 MG/3D
1 PATCH, EXTENDED RELEASE TRANSDERMAL ONCE
Qty: 0 | Refills: 0 | Status: DISCONTINUED | OUTPATIENT
Start: 2018-07-19 | End: 2018-07-21

## 2018-07-19 RX ORDER — ZALEPLON 10 MG
5 CAPSULE ORAL AT BEDTIME
Qty: 0 | Refills: 0 | Status: DISCONTINUED | OUTPATIENT
Start: 2018-07-19 | End: 2018-07-20

## 2018-07-19 RX ADMIN — SODIUM CHLORIDE 100 MILLILITER(S): 9 INJECTION INTRAMUSCULAR; INTRAVENOUS; SUBCUTANEOUS at 21:56

## 2018-07-19 RX ADMIN — PIPERACILLIN AND TAZOBACTAM 200 GRAM(S): 4; .5 INJECTION, POWDER, LYOPHILIZED, FOR SOLUTION INTRAVENOUS at 10:51

## 2018-07-19 RX ADMIN — MORPHINE SULFATE 2 MILLIGRAM(S): 50 CAPSULE, EXTENDED RELEASE ORAL at 03:33

## 2018-07-19 RX ADMIN — Medication 5 MILLILITER(S): at 12:53

## 2018-07-19 RX ADMIN — PIPERACILLIN AND TAZOBACTAM 200 GRAM(S): 4; .5 INJECTION, POWDER, LYOPHILIZED, FOR SOLUTION INTRAVENOUS at 04:40

## 2018-07-19 RX ADMIN — Medication 5 MILLILITER(S): at 17:59

## 2018-07-19 RX ADMIN — MORPHINE SULFATE 2 MILLIGRAM(S): 50 CAPSULE, EXTENDED RELEASE ORAL at 21:48

## 2018-07-19 RX ADMIN — SIMETHICONE 80 MILLIGRAM(S): 80 TABLET, CHEWABLE ORAL at 05:13

## 2018-07-19 RX ADMIN — MORPHINE SULFATE 2 MILLIGRAM(S): 50 CAPSULE, EXTENDED RELEASE ORAL at 22:10

## 2018-07-19 RX ADMIN — Medication 5 MILLILITER(S): at 23:09

## 2018-07-19 RX ADMIN — SIMETHICONE 80 MILLIGRAM(S): 80 TABLET, CHEWABLE ORAL at 23:08

## 2018-07-19 RX ADMIN — MORPHINE SULFATE 2 MILLIGRAM(S): 50 CAPSULE, EXTENDED RELEASE ORAL at 04:00

## 2018-07-19 RX ADMIN — Medication 1 APPLICATION(S): at 05:13

## 2018-07-19 RX ADMIN — Medication 5 MILLILITER(S): at 05:13

## 2018-07-19 RX ADMIN — SODIUM CHLORIDE 100 MILLILITER(S): 9 INJECTION INTRAMUSCULAR; INTRAVENOUS; SUBCUTANEOUS at 05:12

## 2018-07-19 RX ADMIN — Medication 40 MILLIEQUIVALENT(S): at 12:53

## 2018-07-19 RX ADMIN — EDOXABAN TOSYLATE 60 MILLIGRAM(S): 30 TABLET, FILM COATED ORAL at 12:53

## 2018-07-19 RX ADMIN — Medication 5 MILLILITER(S): at 01:07

## 2018-07-19 RX ADMIN — SIMETHICONE 80 MILLIGRAM(S): 80 TABLET, CHEWABLE ORAL at 13:36

## 2018-07-19 RX ADMIN — PIPERACILLIN AND TAZOBACTAM 200 GRAM(S): 4; .5 INJECTION, POWDER, LYOPHILIZED, FOR SOLUTION INTRAVENOUS at 15:42

## 2018-07-19 RX ADMIN — PIPERACILLIN AND TAZOBACTAM 200 GRAM(S): 4; .5 INJECTION, POWDER, LYOPHILIZED, FOR SOLUTION INTRAVENOUS at 21:48

## 2018-07-19 NOTE — PROGRESS NOTE ADULT - ASSESSMENT
66 yo F w/ metastatic colon ca s/p FOLFOX x1, acute cholecystitis s/p percutaneous cholecystomy c/b portal vein thrombosis, LBO s/p sigmoid stent, presents this admission with sepsis and possible colitis.    Symptoms of bowel obstruction/ileus appear to be resolving.    Plan:  - okay to try sips of clear liquids today  - IV abx per primary team  - Serial abdominal exams   - Surgery (Dr. Marci Narvaez) to follow, call with any questions (team 4)

## 2018-07-19 NOTE — PROGRESS NOTE ADULT - SUBJECTIVE AND OBJECTIVE BOX
OVERNIGHT EVENTS:    SUBJECTIVE / INTERVAL HPI: Patient seen and examined at bedside.     VITAL SIGNS:  Vital Signs Last 24 Hrs  T(C): 36.8 (19 Jul 2018 04:49), Max: 37.6 (18 Jul 2018 09:13)  T(F): 98.2 (19 Jul 2018 04:49), Max: 99.6 (18 Jul 2018 09:13)  HR: 93 (19 Jul 2018 04:49) (90 - 98)  BP: 109/73 (19 Jul 2018 04:49) (90/64 - 109/73)  BP(mean): --  RR: 18 (19 Jul 2018 04:49) (18 - 18)  SpO2: 99% (19 Jul 2018 04:49) (95% - 99%)    PHYSICAL EXAM:    General: WDWN  HEENT: NCAT; PERRL, anicteric sclera; MMM  Neck: supple, trachea midline  Cardiovascular: S1, S2 normal; RRR, no M/G/R  Respiratory: CTABL; no W/R/R  Gastrointestinal: soft, nontender, nondistended. bowel sounds present.  Skin: no ulcerations or visible rashes appreciated  Extremities: WWP; no edema, clubbing or cyanosis  Vascular: 2+ radial, DP/PT pulses B/L  Neurological: AAOx3; CN II-XII grossly intact; no focal deficits    MEDICATIONS:  MEDICATIONS  (STANDING):  Biotene Dry Mouth Oral Rinse 5 milliLiter(s) Swish and Spit four times a day  edoxaban 60 milliGRAM(s) Oral daily  morphine  - Injectable 1 milliGRAM(s) IV Push two times a day  petrolatum white Ointment 1 Application(s) Topical two times a day  piperacillin/tazobactam IVPB. 3.375 Gram(s) IV Intermittent every 6 hours  potassium chloride   Powder 40 milliEquivalent(s) Oral once  simethicone drops 80 milliGRAM(s) Enteral Tube every 8 hours  sodium chloride 0.9%. 1000 milliLiter(s) (100 mL/Hr) IV Continuous <Continuous>    MEDICATIONS  (PRN):  acetaminophen   Tablet 650 milliGRAM(s) Oral every 6 hours PRN For Temp greater than 38 C (100.4 F)  morphine  - Injectable 2 milliGRAM(s) IV Push every 4 hours PRN Severe Pain (7 - 10)  morphine  - Injectable 1 milliGRAM(s) IV Push every 4 hours PRN Moderate Pain (4 - 6)  OLANZapine Disintegrating Tablet 5 milliGRAM(s) Oral daily PRN Nausea      ALLERGIES:  Allergies    allergic to cats (Rash)  No Known Drug Allergies    Intolerances        LABS:                        9.1    7.6   )-----------( 481      ( 19 Jul 2018 06:36 )             29.3     07-19    136  |  101  |  28<H>  ----------------------------<  104<H>  3.8   |  22  |  0.50    Ca    8.1<L>      19 Jul 2018 06:36  Phos  2.2     07-18  Mg     1.9     07-19    TPro  5.2<L>  /  Alb  2.4<L>  /  TBili  1.0  /  DBili  x   /  AST  20  /  ALT  13  /  AlkPhos  231<H>  07-18    PT/INR - ( 18 Jul 2018 01:32 )   PT: 28.5 sec;   INR: 2.52          PTT - ( 18 Jul 2018 01:32 )  PTT:32.9 sec  Urinalysis Basic - ( 17 Jul 2018 15:17 )    Color: Yellow / Appearance: Clear / SG: <=1.005 / pH: x  Gluc: x / Ketone: Trace mg/dL  / Bili: Negative / Urobili: 1.0 E.U./dL   Blood: x / Protein: NEGATIVE mg/dL / Nitrite: NEGATIVE   Leuk Esterase: NEGATIVE / RBC: x / WBC x   Sq Epi: x / Non Sq Epi: x / Bacteria: x      CAPILLARY BLOOD GLUCOSE          RADIOLOGY & ADDITIONAL TESTS: Reviewed. OVERNIGHT EVENTS: No acute overnight events. Venting peg was actively draining to gravity overnight with bilious, non-sanguinous, non-purulent output.  .  SUBJECTIVE / INTERVAL HPI: Patient seen and examined at bedside. Patient states her abdominal pain is improved today and subjectively feels less distended. C/O dry mouth 2/2 NPO and would like to be able to drink. Denies fevers, chills, nausea, vomiting, blood in stool overnight.    VITAL SIGNS:  Vital Signs Last 24 Hrs  T(C): 36.8 (19 Jul 2018 04:49), Max: 37.6 (18 Jul 2018 09:13)  T(F): 98.2 (19 Jul 2018 04:49), Max: 99.6 (18 Jul 2018 09:13)  HR: 93 (19 Jul 2018 04:49) (90 - 98)  BP: 109/73 (19 Jul 2018 04:49) (90/64 - 109/73)  BP(mean): --  RR: 18 (19 Jul 2018 04:49) (18 - 18)  SpO2: 99% (19 Jul 2018 04:49) (95% - 99%)    PHYSICAL EXAM:    Constitutional: WDWN , laying in bed comfortably  Head: NC/AT, no scleral icterus, dry MM  Respiratory: CTA B/L; no W/R/R, no retractions  Cardiac: +S1/S2; RRR; no M/R/G  Gastrointestinal: soft, +distended, +moderately tender to palpation of RLQ and LLQ (most pronounced TTP in RLQ), no guarding; no rebound; + hypoactive BSx4; PEG site and Percutaneous cystostomy sites are clean, dry and intact, no edema, erythema, or purulence  Extremities: WWP, no clubbing or cyanosis; no peripheral edema; b/l LE mildly tender to palpation which pt states is chronic  Dermatologic: skin warm, dry and intact; no rashes, wounds, or scars;   Neurologic: AAOx3; no focal deficits  Psych: normal affect  Lymph: no LAD  Neuro: AA&Ox3, No focal deficits    MEDICATIONS:  MEDICATIONS  (STANDING):  Biotene Dry Mouth Oral Rinse 5 milliLiter(s) Swish and Spit four times a day  edoxaban 60 milliGRAM(s) Oral daily  morphine  - Injectable 1 milliGRAM(s) IV Push two times a day  petrolatum white Ointment 1 Application(s) Topical two times a day  piperacillin/tazobactam IVPB. 3.375 Gram(s) IV Intermittent every 6 hours  potassium chloride   Powder 40 milliEquivalent(s) Oral once  simethicone drops 80 milliGRAM(s) Enteral Tube every 8 hours  sodium chloride 0.9%. 1000 milliLiter(s) (100 mL/Hr) IV Continuous <Continuous>    MEDICATIONS  (PRN):  acetaminophen   Tablet 650 milliGRAM(s) Oral every 6 hours PRN For Temp greater than 38 C (100.4 F)  morphine  - Injectable 2 milliGRAM(s) IV Push every 4 hours PRN Severe Pain (7 - 10)  morphine  - Injectable 1 milliGRAM(s) IV Push every 4 hours PRN Moderate Pain (4 - 6)  OLANZapine Disintegrating Tablet 5 milliGRAM(s) Oral daily PRN Nausea      ALLERGIES:  Allergies    allergic to cats (Rash)  No Known Drug Allergies    Intolerances        LABS:                        9.1    7.6   )-----------( 481      ( 19 Jul 2018 06:36 )             29.3     07-19    136  |  101  |  28<H>  ----------------------------<  104<H>  3.8   |  22  |  0.50    Ca    8.1<L>      19 Jul 2018 06:36  Phos  2.2     07-18  Mg     1.9     07-19    TPro  5.2<L>  /  Alb  2.4<L>  /  TBili  1.0  /  DBili  x   /  AST  20  /  ALT  13  /  AlkPhos  231<H>  07-18    PT/INR - ( 18 Jul 2018 01:32 )   PT: 28.5 sec;   INR: 2.52          PTT - ( 18 Jul 2018 01:32 )  PTT:32.9 sec  Urinalysis Basic - ( 17 Jul 2018 15:17 )    Color: Yellow / Appearance: Clear / SG: <=1.005 / pH: x  Gluc: x / Ketone: Trace mg/dL  / Bili: Negative / Urobili: 1.0 E.U./dL   Blood: x / Protein: NEGATIVE mg/dL / Nitrite: NEGATIVE   Leuk Esterase: NEGATIVE / RBC: x / WBC x   Sq Epi: x / Non Sq Epi: x / Bacteria: x    Bcx 7/18- growing gram negative anaerobic bacilli      RADIOLOGY & ADDITIONAL TESTS: Reviewed.  < from: Xray Abdomen 2 View PORTABLE -Urgent (07.18.18 @ 01:04) >  Impression: Two colorectal stents in place. Suggestion of ascites. Small   bowel ileus or early obstruction. No free air.    < end of copied text > OVERNIGHT EVENTS: No acute overnight events. Venting peg was actively draining to gravity overnight with bilious, non-sanguinous, non-purulent output.  .  SUBJECTIVE / INTERVAL HPI: Patient seen and examined at bedside. Patient states her abdominal pain is improved today and subjectively feels less distended. C/O dry mouth 2/2 NPO and would like to be able to drink. Denies fevers, chills, nausea, vomiting, blood in stool overnight.    VITAL SIGNS:  Vital Signs Last 24 Hrs  T(C): 36.8 (19 Jul 2018 04:49), Max: 37.6 (18 Jul 2018 09:13)  T(F): 98.2 (19 Jul 2018 04:49), Max: 99.6 (18 Jul 2018 09:13)  HR: 93 (19 Jul 2018 04:49) (90 - 98)  BP: 109/73 (19 Jul 2018 04:49) (90/64 - 109/73)  BP(mean): --  RR: 18 (19 Jul 2018 04:49) (18 - 18)  SpO2: 99% (19 Jul 2018 04:49) (95% - 99%)    PHYSICAL EXAM:    Constitutional: WDWN , laying in bed comfortably  Head: NC/AT, no scleral icterus, dry MM  Respiratory: CTA B/L; no W/R/R, no retractions  Cardiac: +S1/S2; RRR; no M/R/G  Gastrointestinal: soft, +distended, +moderately tender to palpation of RLQ and LLQ (most pronounced TTP in RLQ), no guarding; no rebound; + hypoactive BSx4; PEG site and Percutaneous cystostomy sites are clean, dry and intact, no edema, erythema, or purulence  Extremities: WWP, no clubbing or cyanosis; no peripheral edema; b/l LE mildly tender to palpation which pt states is chronic  Dermatologic: skin warm, dry and intact; no rashes, wounds, or scars;   Neurologic: AAOx3; no focal deficits  Psych: normal affect  Lymph: no LAD  Neuro: AA&Ox3, No focal deficits    MEDICATIONS:  MEDICATIONS  (STANDING):  Biotene Dry Mouth Oral Rinse 5 milliLiter(s) Swish and Spit four times a day  edoxaban 60 milliGRAM(s) Oral daily  morphine  - Injectable 1 milliGRAM(s) IV Push two times a day  petrolatum white Ointment 1 Application(s) Topical two times a day  piperacillin/tazobactam IVPB. 3.375 Gram(s) IV Intermittent every 6 hours  potassium chloride   Powder 40 milliEquivalent(s) Oral once  simethicone drops 80 milliGRAM(s) Enteral Tube every 8 hours  sodium chloride 0.9%. 1000 milliLiter(s) (100 mL/Hr) IV Continuous <Continuous>    MEDICATIONS  (PRN):  acetaminophen   Tablet 650 milliGRAM(s) Oral every 6 hours PRN For Temp greater than 38 C (100.4 F)  morphine  - Injectable 2 milliGRAM(s) IV Push every 4 hours PRN Severe Pain (7 - 10)  morphine  - Injectable 1 milliGRAM(s) IV Push every 4 hours PRN Moderate Pain (4 - 6)  OLANZapine Disintegrating Tablet 5 milliGRAM(s) Oral daily PRN Nausea      ALLERGIES:  Allergies    allergic to cats (Rash)  No Known Drug Allergies    Intolerances        LABS:                        9.1    7.6   )-----------( 481      ( 19 Jul 2018 06:36 )             29.3     07-19    136  |  101  |  28<H>  ----------------------------<  104<H>  3.8   |  22  |  0.50    Ca    8.1<L>      19 Jul 2018 06:36  Phos  2.2     07-18  Mg     1.9     07-19    TPro  5.2<L>  /  Alb  2.4<L>  /  TBili  1.0  /  DBili  x   /  AST  20  /  ALT  13  /  AlkPhos  231<H>  07-18    PT/INR - ( 18 Jul 2018 01:32 )   PT: 28.5 sec;   INR: 2.52          PTT - ( 18 Jul 2018 01:32 )  PTT:32.9 sec  Urinalysis Basic - ( 17 Jul 2018 15:17 )    Color: Yellow / Appearance: Clear / SG: <=1.005 / pH: x  Gluc: x / Ketone: Trace mg/dL  / Bili: Negative / Urobili: 1.0 E.U./dL   Blood: x / Protein: NEGATIVE mg/dL / Nitrite: NEGATIVE   Leuk Esterase: NEGATIVE / RBC: x / WBC x   Sq Epi: x / Non Sq Epi: x / Bacteria: x    Bcx 7/18- growing gram negative anaerobic bacilli      RADIOLOGY & ADDITIONAL TESTS: Reviewed.  < from: Xray Abdomen 2 View PORTABLE -Urgent (07.18.18 @ 01:04) >  Impression: Two colorectal stents in place. Suggestion of ascites. Small   bowel ileus or early obstruction. No free air.    < end of copied text >    Consults:  GI, surgery, palliative- notes read and recommendations appreciated  Heme/onc 7/18-future chemotherapy and scheduling will depend on patient's clinical status OVERNIGHT EVENTS: No acute overnight events. Venting peg was actively draining to gravity overnight with bilious, non-sanguinous, non-purulent output of ~300cc.    SUBJECTIVE / INTERVAL HPI: Patient seen and examined at bedside. Patient states her abdominal pain is improved today and subjectively feels less distended. C/O dry mouth 2/2 NPO and would like to be able to drink. Denies fevers, chills, nausea, vomiting, blood in stool overnight.    VITAL SIGNS:  Vital Signs Last 24 Hrs  T(C): 36.8 (19 Jul 2018 04:49), Max: 37.6 (18 Jul 2018 09:13)  T(F): 98.2 (19 Jul 2018 04:49), Max: 99.6 (18 Jul 2018 09:13)  HR: 93 (19 Jul 2018 04:49) (90 - 98)  BP: 109/73 (19 Jul 2018 04:49) (90/64 - 109/73)  BP(mean): --  RR: 18 (19 Jul 2018 04:49) (18 - 18)  SpO2: 99% (19 Jul 2018 04:49) (95% - 99%)    PHYSICAL EXAM:    Constitutional: WDWN , laying in bed comfortably  Head: NC/AT, no scleral icterus, dry MM  Respiratory: CTA B/L; no W/R/R, no retractions  Cardiac: +S1/S2; RRR; no M/R/G  Gastrointestinal: soft, +distended, +moderately tender to palpation of RLQ and LLQ (most pronounced TTP in RLQ), no guarding; no rebound; + hypoactive BSx4; PEG site and Percutaneous cystostomy sites are clean, dry and intact, no edema, erythema, or purulence  Extremities: WWP, no clubbing or cyanosis; no peripheral edema; b/l LE mildly tender to palpation which pt states is chronic  Dermatologic: skin warm, dry and intact; no rashes, wounds, or scars;   Neurologic: AAOx3; no focal deficits  Psych: normal affect  Lymph: no LAD  Neuro: AA&Ox3, No focal deficits    MEDICATIONS:  MEDICATIONS  (STANDING):  Biotene Dry Mouth Oral Rinse 5 milliLiter(s) Swish and Spit four times a day  edoxaban 60 milliGRAM(s) Oral daily  morphine  - Injectable 1 milliGRAM(s) IV Push two times a day  petrolatum white Ointment 1 Application(s) Topical two times a day  piperacillin/tazobactam IVPB. 3.375 Gram(s) IV Intermittent every 6 hours  potassium chloride   Powder 40 milliEquivalent(s) Oral once  simethicone drops 80 milliGRAM(s) Enteral Tube every 8 hours  sodium chloride 0.9%. 1000 milliLiter(s) (100 mL/Hr) IV Continuous <Continuous>    MEDICATIONS  (PRN):  acetaminophen   Tablet 650 milliGRAM(s) Oral every 6 hours PRN For Temp greater than 38 C (100.4 F)  morphine  - Injectable 2 milliGRAM(s) IV Push every 4 hours PRN Severe Pain (7 - 10)  morphine  - Injectable 1 milliGRAM(s) IV Push every 4 hours PRN Moderate Pain (4 - 6)  OLANZapine Disintegrating Tablet 5 milliGRAM(s) Oral daily PRN Nausea      ALLERGIES:  Allergies    allergic to cats (Rash)  No Known Drug Allergies    Intolerances        LABS:                        9.1    7.6   )-----------( 481      ( 19 Jul 2018 06:36 )             29.3     07-19    136  |  101  |  28<H>  ----------------------------<  104<H>  3.8   |  22  |  0.50    Ca    8.1<L>      19 Jul 2018 06:36  Phos  2.2     07-18  Mg     1.9     07-19    TPro  5.2<L>  /  Alb  2.4<L>  /  TBili  1.0  /  DBili  x   /  AST  20  /  ALT  13  /  AlkPhos  231<H>  07-18    PT/INR - ( 18 Jul 2018 01:32 )   PT: 28.5 sec;   INR: 2.52          PTT - ( 18 Jul 2018 01:32 )  PTT:32.9 sec  Urinalysis Basic - ( 17 Jul 2018 15:17 )    Color: Yellow / Appearance: Clear / SG: <=1.005 / pH: x  Gluc: x / Ketone: Trace mg/dL  / Bili: Negative / Urobili: 1.0 E.U./dL   Blood: x / Protein: NEGATIVE mg/dL / Nitrite: NEGATIVE   Leuk Esterase: NEGATIVE / RBC: x / WBC x   Sq Epi: x / Non Sq Epi: x / Bacteria: x    Bcx 7/18- growing gram negative anaerobic bacilli      RADIOLOGY & ADDITIONAL TESTS: Reviewed.  < from: Xray Abdomen 2 View PORTABLE -Urgent (07.18.18 @ 01:04) >  Impression: Two colorectal stents in place. Suggestion of ascites. Small   bowel ileus or early obstruction. No free air.    < end of copied text >    Consults:  GI, surgery, palliative- notes read and recommendations appreciated  Heme/onc 7/18-future chemotherapy and scheduling will depend on patient's clinical status

## 2018-07-19 NOTE — PROGRESS NOTE ADULT - PROBLEM SELECTOR PLAN 1
- Source: intraabdominal  - Clinically improving on zosyn; afebrile overnight, BP maintained >90/60  - Bcx growing gram negative anaerobic bacilli, follow up culture & sensitivities  - Leukopenia improved (WBC 3.6--> 7.6 today)  - CT showed evidence of colonic inflammation  - Continue w/ IVF  - C/w zosyn  - follow up GI and surgery recommendations  - monitor VS closely  - serial abdominal exams - Source: intraabdominal  - Clinically improving on zosyn; afebrile overnight, BP maintained >90/60  - Initial Bcx growing gram negative anaerobic bacilli, follow up culture & sensitivities  - Leukopenia improved (WBC 3.6--> 7.6 today)  - CT showed evidence of colonic inflammation  - Continue w/ IVF  - C/w zosyn  - Repeat Bcx  - follow up GI and surgery recommendations  - monitor VS closely  - serial abdominal exams

## 2018-07-19 NOTE — PROGRESS NOTE ADULT - PROBLEM SELECTOR PLAN 7
Wants to continue cancer treatments. Remains full code. Wants her sister and friend involved in her decision making.

## 2018-07-19 NOTE — PROGRESS NOTE ADULT - ATTENDING COMMENTS
pt seen and examined by me. case d/w housestaff. agree with VS, PE, assessment and plan as outlined above with following additives    1- SEvere sepsis- POA  2- Gram negative Bacteremia- c/w zosyn  fu repeat cx  3- metastatic colon cancer  4-hepatic vein thrombosis   5- Protein calorie malnutrition

## 2018-07-19 NOTE — PROGRESS NOTE ADULT - PROBLEM SELECTOR PLAN 10
1) PCP Contacted on Admission:  Dr. aSndoval rodriguez pt here  Name & Phone #: Dr. Clark  2) Date of Contact with PCP:  3) PCP Contacted at Discharge: (Y/N)  4) Summary of Handoff Given to PCP:   5) Post-Discharge Appointment Date
1) PCP Contacted on Admission:  Dr. Sandoval rodriguez pt here  Name & Phone #: Dr. Clark  2) Date of Contact with PCP:  3) PCP Contacted at Discharge: (Y/N)  4) Summary of Handoff Given to PCP:   5) Post-Discharge Appointment Date

## 2018-07-19 NOTE — PROGRESS NOTE ADULT - SUBJECTIVE AND OBJECTIVE BOX
SUBJECTIVE: Abd pain slightly improved. Minimal flatus still, no BM since Monday. No N/V, no fevers/chills. Already trying cranberry juice despite NPO order, but tolerating it.    Vital Signs Last 24 Hrs  T(C): 36.8 (19 Jul 2018 04:49), Max: 36.8 (19 Jul 2018 04:49)  T(F): 98.2 (19 Jul 2018 04:49), Max: 98.2 (19 Jul 2018 04:49)  HR: 93 (19 Jul 2018 04:49) (93 - 98)  BP: 109/73 (19 Jul 2018 04:49) (101/66 - 109/73)  BP(mean): --  RR: 18 (19 Jul 2018 04:49) (18 - 18)  SpO2: 99% (19 Jul 2018 04:49) (98% - 99%)    Physical Exam:  General: NAD  Pulmonary: Nonlabored breathing, no respiratory distress  Cardiovascular: NSR  Abdominal: soft, non-distended, diffuse mild TTP, no guarding, no rigidity, no rebound tenderness, positive bowel sounds      I&O's Summary    18 Jul 2018 07:01  -  19 Jul 2018 07:00  --------------------------------------------------------  IN: 2240 mL / OUT: 475 mL / NET: 1765 mL    19 Jul 2018 07:01  -  19 Jul 2018 10:58  --------------------------------------------------------  IN: 400 mL / OUT: 350 mL / NET: 50 mL        LABS:                        9.1    7.6   )-----------( 481      ( 19 Jul 2018 06:36 )             29.3     07-19    136  |  101  |  28<H>  ----------------------------<  104<H>  3.8   |  22  |  0.50    Ca    8.1<L>      19 Jul 2018 06:36  Phos  2.2     07-18  Mg     1.9     07-19    TPro  5.2<L>  /  Alb  2.4<L>  /  TBili  1.0  /  DBili  x   /  AST  20  /  ALT  13  /  AlkPhos  231<H>  07-18    PT/INR - ( 18 Jul 2018 01:32 )   PT: 28.5 sec;   INR: 2.52          PTT - ( 18 Jul 2018 01:32 )  PTT:32.9 sec  Urinalysis Basic - ( 17 Jul 2018 15:17 )    Color: Yellow / Appearance: Clear / SG: <=1.005 / pH: x  Gluc: x / Ketone: Trace mg/dL  / Bili: Negative / Urobili: 1.0 E.U./dL   Blood: x / Protein: NEGATIVE mg/dL / Nitrite: NEGATIVE   Leuk Esterase: NEGATIVE / RBC: x / WBC x   Sq Epi: x / Non Sq Epi: x / Bacteria: x      CAPILLARY BLOOD GLUCOSE        LIVER FUNCTIONS - ( 18 Jul 2018 01:30 )  Alb: 2.4 g/dL / Pro: 5.2 g/dL / ALK PHOS: 231 U/L / ALT: 13 U/L / AST: 20 U/L / GGT: x

## 2018-07-19 NOTE — PROGRESS NOTE ADULT - PROBLEM SELECTOR PLAN 3
Pt of Dr. Nick ([phone]). recently dxd in 6/18. s/p chemoport placement 5/31, initiation of chemotherapy 6/9 with FOLFOX, received 1 dose so far, with 2nd tx planned for 7/17 but not given due to fever.  -pal care consulted- currently recommending symptomatic treatment and -reassessment of goals of care when medically stabilized  -f/u with Dr. Nick recs  -will likely resume chemo outpt Pt of Dr. Nick ([phone]). recently dxd in 6/18. s/p chemoport placement 5/31, initiation of chemotherapy 6/9 with FOLFOX, received 1 dose so far, with 2nd tx planned for 7/17 but not given due to fever.  -pal care consulted- currently recommending symptomatic treatment and -reassessment of goals of care when medically stabilized  -Continue to f/u with Dr. Nick recs- resuming chemo outpt will depend on pts clinical status

## 2018-07-19 NOTE — PROGRESS NOTE ADULT - PROBLEM SELECTOR PLAN 4
Hb 9, at baseline, low MCV, high RDW consistent with iron def. Not new, s/p 1 iron transfusion in the past.  -daily cbc  -maintain active t+s  -transfuse <7    #leukopenia  - likely secondary to infection in setting of severe sepsis  - continue to trend CBC and diff  #thrombocytosis  -likely reactive in the setting of acute infection, will continue to trend  - improving (534--> 481 today) Hb 9, at baseline, low MCV, high RDW consistent with iron def. Not new, s/p 1 iron transfusion in the past.  -daily cbc  -maintain active t+s  -transfuse <7    #leukopenia  - clinically resolved (3.6--> 7.6 today)  - likely secondary to infection in setting of severe sepsis  - continue to trend CBC  #thrombocytosis  -clinically improving (534--> 481 today)  - likely reactive in the setting of acute infection, will continue to trend

## 2018-07-19 NOTE — PROGRESS NOTE ADULT - PROBLEM SELECTOR PLAN 5
Found to have GNR in blood culture x1. Pending sensitivities. Reports improvement in symptoms since admission.  Management as per primary team.

## 2018-07-19 NOTE — PROGRESS NOTE ADULT - SUBJECTIVE AND OBJECTIVE BOX
PILLO PEDRAZA             MRN-[id number]    CC:    HPI:  66 yo F w/ metastatic colon ca s/p 1 dose FOLFOX, recent acute cholecystitis c/b severe sepsis and hepatic vein thrombosis s/p percutaneous cholecystomy, and recent partial SBO s/p PEG and 2 colonic stents p/w fever of 101.7 in the setting of several days of exhaustion and weakness. Denies subjective fevers/ chills/ night sweats. Pt only came to hospital because sent by her oncologist when she went to get her 2nd dose of chemo and found to be febrile with WBC of 15. While in ED, pt reports developed worsening diffuse abdominal pain 8.5/10, worse in the upper abdomen. Pain is better with oxycodone. No change w/ position or food. Denies n/v/d or constipation. Last BM last night. Endorses poor PO intake due to decreased appetite since last admission.     Pt recently admitted (5/25/18-6/16/18) for acute cholecystitis c/b hepatic vein thrombosis and severe sepsis. Was not surgical candidate because of mets. Treated w/ zosyn, percutaneous cholecystostomy, and 1 unit pRBCs. During admission, pt developed partial SBO s/p venting PEG tube for decompression and 2 colonic stents at the sigmoid colon and splenic flexure. Patient started chemotherapy with FOLFOX through chemoport x 1 dose.    Pt Denies abnormal drainage from perc cholecystostomy. Denies cough, chest pain. Denies urinary sx (freq, burning, hematuria). Denies ulcers on body. Denies recent alcohol use. No sick contacts. No recent travel. No blood in the stool. No recent trauma/ falls.     In the ED pt was febrile, WBC 15, lactate was 2.8. Pt was given 2L NS, 1 dose of vanc/ zosyn, w/ lactate down to 1.8.      FH: no FH of cardiac disease in father (17 Jul 2018 18:05)    SUBJECTIVE:    ROS:  DYSPNEA: Y / N	  NAUS/VOM: Y / N	  SECRETIONS: Y / N	  AGITATION: Y / N  Pain (Y/N):       -Provocation/Palliation:  -Quality/Quantity:  -Radiating:  -Severity:  -Timing/Frequency:  -Impact on ADLs:    OTHER REVIEW OF SYSTEMS:  UNABLE TO OBTAIN  due to:    PEx:  T(C): 36.8 (07-19-18 @ 04:49), Max: 36.8 (07-19-18 @ 04:49)  HR: 93 (07-19-18 @ 04:49) (93 - 98)  BP: 109/73 (07-19-18 @ 04:49) (101/66 - 109/73)  RR: 18 (07-19-18 @ 04:49) (18 - 18)  SpO2: 99% (07-19-18 @ 04:49) (98% - 99%)  Wt(kg): --    GENERAL:    HEENT:      	  NECK:           CVS:           	  RESP:        	  GI:             	  :            	  MUSC:       	  NEURO:     	  PSYCH:          SKIN:         	   LYMPH:      	     ALLERGIES: allergic to cats (Rash)  No Known Drug Allergies      OPIATE NAÏVE (Y/N):    MEDICATIONS: REVIEWED  MEDICATIONS  (STANDING):  Biotene Dry Mouth Oral Rinse 5 milliLiter(s) Swish and Spit four times a day  edoxaban 60 milliGRAM(s) Oral daily  morphine  - Injectable 1 milliGRAM(s) IV Push two times a day  petrolatum white Ointment 1 Application(s) Topical two times a day  piperacillin/tazobactam IVPB. 3.375 Gram(s) IV Intermittent every 6 hours  potassium chloride   Powder 40 milliEquivalent(s) Oral once  simethicone drops 80 milliGRAM(s) Enteral Tube every 8 hours  sodium chloride 0.9%. 1000 milliLiter(s) (100 mL/Hr) IV Continuous <Continuous>    MEDICATIONS  (PRN):  acetaminophen   Tablet 650 milliGRAM(s) Oral every 6 hours PRN For Temp greater than 38 C (100.4 F)  morphine  - Injectable 2 milliGRAM(s) IV Push every 4 hours PRN Severe Pain (7 - 10)  morphine  - Injectable 1 milliGRAM(s) IV Push every 4 hours PRN Moderate Pain (4 - 6)  OLANZapine Disintegrating Tablet 5 milliGRAM(s) Oral daily PRN Nausea      LABS: REVIEWED          IMAGING: REVIEWED    ADVANCED DIRECTIVES:     FULL CODE     DNR     DNI     LIVING WILL     MOLST    DECISION MAKER:   LEGAL SURROGATE:    PSYCHOSOCIAL-SPIRITUAL ASSESSMENT:       Reviewed       Care plan unchanged       Care plan adjusted as above	    GOALS OF CARE DISCUSSION       Palliative care info/counseling provided	           Family meeting       Advanced Directives addressed please see Advance Care Planning Note	           See previous Palliative Medicine Note       Documentation of GOC:   	      AGENCY CHOICE DISCUSSED:           Homecare        Hospice        [facility]        [facility]        Other:    REFERRALS	        Palliative Med        Unit SW/Case Mgmt              Speech/Swallow       Patient/Family Support       Massage Therapy       Music Therapy       Hospice       Nutrition       Ethics       PT/OT        CRITICAL CARE TIME PROVIDED TO UNSTABLE PT W/ ORGAN FAILURE	   Start:               End:  	       Minutes:              > 50% OF THE TIME SPENT IN COUNSELING AND COORDINATING CARE 	   Start:               End:  	       Minutes:      PROLONGED SERVICE             FACE TO FACE:    PT            PT & FAMILY	   Start:               End:  	       Minutes:      Advance Care Planning Time: PILLO MAR             [id number]    CC: Chronic malignant pain, gas distension/pain, constipation, insomnia, GOC/AD, Support    HPI:  68 yo F w/ metastatic colon ca s/p 1 dose FOLFOX, recent acute cholecystitis c/b severe sepsis and hepatic vein thrombosis s/p percutaneous cholecystomy, and recent partial SBO s/p PEG and 2 colonic stents p/w fever of 101.7 in the setting of several days of exhaustion and weakness. Denies subjective fevers/ chills/ night sweats. Pt only came to hospital because sent by her oncologist when she went to get her 2nd dose of chemo and found to be febrile with WBC of 15. While in ED, pt reports developed worsening diffuse abdominal pain 8.5/10, worse in the upper abdomen. Pain is better with oxycodone. No change w/ position or food. Denies n/v/d or constipation. Last BM last night. Endorses poor PO intake due to decreased appetite since last admission.     Pt recently admitted (5/25/18-6/16/18) for acute cholecystitis c/b hepatic vein thrombosis and severe sepsis. Was not surgical candidate because of mets. Treated w/ zosyn, percutaneous cholecystostomy, and 1 unit pRBCs. During admission, pt developed partial SBO s/p venting PEG tube for decompression and 2 colonic stents at the sigmoid colon and splenic flexure. Patient started chemotherapy with FOLFOX through chemoport x 1 dose.    SUBJECTIVE: Saw and evaluated Mrs Mar at bedside today. She reports improvement in symptoms. She is hopeful she will be able to regain some strength and clears her infection in order to be able to receive her second dose of chemotherapy while inpatient.     ROS:  DYSPNEA: No  NAUS/VOM: No	  SECRETIONS: No	  AGITATION: No  Pain (Y/N): Yes, Improved      -Provocation/Palliation: Movement increases her pain. Medication and remaining immobile improves it.  -Quality/Quantity: Chronic malignant somatic pain currently controlled. Chronic malignant visceral pain currently controlled.   -Radiating: No  -Severity: 1-2/10  -Timing/Frequency: Incidental  -Impact on ADLs: Yes    OTHER REVIEW OF SYSTEMS: Insomnia, Thirst, Debility    PEx:  T(C): 36.8 (07-19-18 @ 04:49), Max: 36.8 (07-19-18 @ 04:49)  HR: 93 (07-19-18 @ 04:49) (93 - 98)  BP: 109/73 (07-19-18 @ 04:49) (101/66 - 109/73)  RR: 18 (07-19-18 @ 04:49) (18 - 18)  SpO2: 99% (07-19-18 @ 04:49) (98% - 99%)  Wt(kg): 73.9    General: AAOx3 Less somnolent and improved mood today. In NAD  HEENT: NCAT  PERRL EOMI Non icteric MOM  Neck: Supple, No masses  CVS: Normal S1S2, RRR, no murmurs  Resp: Unlabored CTABL  GI: Improved distension. Venting PEG to gravity with biliary output  Musc: No C/C/E    Neuro: No focal deficits. Following commands.  Psych:  Improved mood. Calm  Skin: Xerosis  Lymph: Normal.  	     ALLERGIES: allergic to cats (Rash)  No Known Drug Allergies    OPIATE NAÏVE (Y/N): No    MEDICATIONS: REVIEWED  MEDICATIONS  (STANDING):  Biotene Dry Mouth Oral Rinse 5 milliLiter(s) Swish and Spit four times a day  edoxaban 60 milliGRAM(s) Oral daily  morphine  - Injectable 1 milliGRAM(s) IV Push two times a day  petrolatum white Ointment 1 Application(s) Topical two times a day  piperacillin/tazobactam IVPB. 3.375 Gram(s) IV Intermittent every 6 hours  potassium chloride   Powder 40 milliEquivalent(s) Oral once  simethicone drops 80 milliGRAM(s) Enteral Tube every 8 hours  sodium chloride 0.9%. 1000 milliLiter(s) (100 mL/Hr) IV Continuous <Continuous>    MEDICATIONS  (PRN):  acetaminophen   Tablet 650 milliGRAM(s) Oral every 6 hours PRN For Temp greater than 38 C (100.4 F)  morphine  - Injectable 2 milliGRAM(s) IV Push every 4 hours PRN Severe Pain (7 - 10)  morphine  - Injectable 1 milliGRAM(s) IV Push every 4 hours PRN Moderate Pain (4 - 6)  OLANZapine Disintegrating Tablet 5 milliGRAM(s) Oral daily PRN Nausea    LABS: REVIEWED                        9.1    7.6   )-----------( 481      ( 19 Jul 2018 06:36 )             29.3   07-19    136  |  101  |  28<H>  ----------------------------<  104<H>  3.8   |  22  |  0.50    Ca    8.1<L>      19 Jul 2018 06:36  Phos  2.2     07-18  Mg     1.9     07-19    TPro  5.2<L>  /  Alb  2.4<L>  /  TBili  1.0  /  DBili  x   /  AST  20  /  ALT  13  /  AlkPhos  231<H>  07-18    Culture - Blood (07.17.18 @ 14:59)    Gram Stain:   Anaerobic Bottle: Gram Negative Rods  Result called to and read back byLisa Anne MD  07/18/2018 15:08:07  ***Blood Panel PCR results on this specimen are available  approximately 3 hours after the Gram stain result.***  Gram stain, PCR, and/or culture results may not always  correspond due to difference in methodologies.  ************************************************************  This PCR assay was performed using [employer].  The following targets are tested for: Enterococcus,  vancomycin resistant enterococci, Listeria monocytogenes,  coagulase negative staphylococci, S. aureus,  methicillin resistant S. aureus, Streptococcus agalactiae  (Group B), S. pneumoniae, S. pyogenes (Group A),  Acinetobacter baumannii, Enterobacter cloacae, E. coli,  Klebsiella oxytoca, K. pneumoniae, Proteus sp.,  Serratia marcescens, Haemophilus influenzae,  Neisseria meningitidis, Pseudomonas aeruginosa, Candida  albicans, C. glabrata, C krusei, C parapsilosis,  C. tropicalis and the KPC resistance gene.  "Due to technical problems, Proteus sp. will Not be reported as part of  the BCID panel until further notice"    -  Multidrug (KPC pos) resistant organism: Nondet    -  Staphylococcus aureus: Nondet    -  Methicillin resistant Staphylococcus aureus (MRSA): Nondet    -  Coagulase negative Staphylococcus: Nondet    -  Enterococcus species: Nondet    -  Vancomycin resistant Enterococcus sp.: Nondet    -  Escherichia coli: Nondet    -  Klebsiella oxytoca: Nondet    -  Klebsiella pneumoniae: Nondet    -  Serratia marcescens: Nondet    -  Haemophilus influenzae: Nondet    -  Listeria monocytogenes: Nondet    -  Neisseria meningitidis: Nondet    -  Pseudomonas aeruginosa: Nondet    -  Acinetobacter baumanii: Nondet    -  Enterobacter cloacae complex: Nondet    -  Streptococcus sp. (Not Grp A, B or S pneumoniae): Nondet    -  Streptococcus agalactiae (Group B): Nondet    -  Streptococcus pyogenes (Group A): Nondet    -  Streptococcus pneumoniae: Nondet    -  Candida albicans: Nondet    -  Candida glabrata: Nondet    -  Candida krusei: Nondet    -  Candida parapsilosis: Nondet    -  Candida tropicalis: Nondet    Specimen Source: .Blood Blood-Peripheral    Organism: Blood Culture PCR    Culture Results:   Culture in progress    Organism Identification: Blood Culture PCR    Method Type: PCR    IMAGING: REVIEWED    EXAM:  XR KUB 1 VIEW                        PROCEDURE DATE:  07/18/2018    INTERPRETATION:  X-ray of the abdomen  Indication: Rule out obstruction. Postoperative patient.  2 frontal views of the abdomen are compared to the prior study of the   same day. Left upper quadrant pigtail drain and gastrostomy tube are   unchanged. Colorectal stents are again noted in the pelvis. There is   again mild dilatation of a few small bowel loops in the left upper   quadrant and mid abdomen. Small amount of gas is present in the and   rectum.  Impression: Persistent mild small bowel dilatation.    EXAM:  XR ABDOMEN PORTABLE URGENT 2V                        PROCEDURE DATE:  07/18/2018    INTERPRETATION:  X-ray of the abdomen  Indication: Status post colonic stent placement. Evaluate for free air.  2 frontal views of the abdomen are compared to the x-ray of 6/7/2018. Tip   of right chest port overlies the SVC. Tip of percutaneous gastrostomy   tube overlies the gastric antrum. Percutaneous pigtail drain overlies the   right upper quadrant, unchanged. 2 overlapping colorectal stents are   noted in the upper pelvis. There are is mild dilatation of small bowel   loops which are centered in the mid abdomen, suggestive of moderate   ascites. No free air is seen.  Impression: Two colorectal stents in place. Suggestion of ascites. Small   bowel ileus or early obstruction. No free air.    EXAM:  XR CHEST AP OR PA 1V                        PROCEDURE DATE:  07/17/2018    INTERPRETATION:  HISTORY: fever; evaluate for pneumonia; history of   metastatic colon carcinoma and cholecystitis  A single AP view of the chest is compared to prior radiograph of   5/31/2018.  Right-sided port catheter tip is projected near the junction of the   superior vena cava and right atrium. Heart is normal in size. There is no   evidence of pulmonary vascular congestion, focal infiltrate, mass,   pleural fluid, or pneumothorax. There is the tortuosity and calcification   of the aorta and a slight convex left thoracic spinal curvature. There is   a right upper quadrant pigtail drainage catheter.  IMPRESSION: No evidence of pulmonary infiltrate.    EXAM:  CT ABDOMEN AND PELVIS IC                        PROCEDURE DATE:  07/17/2018    INTERPRETATION:  CT of the ABDOMEN and PELVIS with intravenous contrast   dated 7/17/2018 2:42 PM  INDICATION: sepsis  r/o intrabd infection   TECHNIQUE: CT of the abdomen and pelvis was performed using intravenous   contrast. Axial, sagittal and coronal images were produced and reviewed.   100 cc of Optiray 350 used 8 cc discarded.  COMPARISON: Multiple prior CTs of the abdomen and pelvis from 6/1/7/18   and 5/24/2017.  FINDINGS: Images of the lower chest demonstrate clear lung bases with no   pulmonary nodules. No cardiomegaly seen further appears to be some   coronary calcification/stent.  The liver has a multiple hypodense liver lesion consistent with   metastatic disease with the largest one measuring 2.1 cm transaxially in   the right lobe. There is also a 3.2 cm hepatic cyst unchanged from prior   CT; with increasing size of metastatic lesions from the most recent   comparison exam. Also noted are multiple venous collaterals in the josh   hepatis region, consistent with cavernous transformation the portal vein.   The previously seen thrombus in SMV is not identified on the current   exam. Cholecystostomy tube is in place in a collapsed gallbladder with a   1.5 cm gallstone. The pancreas is normal in appearance. No splenic   abnormalities are seen. The adrenal glands are unremarkable. The kidneys   are normal in appearance.  No abdominal aortic aneurysm is seen. No lymphadenopathy is seen.  The newly placed PEG tube is in place within the gastric lumen; and a   newly placed stent in the sigmoid colon. Evaluation of the bowel reveals   significant interval change in appearance of the gastrointestinal tract,   with long segment of circumferential mural thickening of the dilated   air-filled distal small bowel. There is also abnormal appearance of the   ascending and transverse colon, with indistinct bowel wall and areas of   luminal narrowing at different locations of the colon. Redemonstrated is   a heterogeneous collection interposed between the descending colon and   adjacent small bowel in the peripheral right mid abdomen. There is also   worsening of peritoneal carcinomatosis in the interim. Increasing amount   of abdominopelvic ascites.  Images of the pelvis demonstrate the uterus and adnexae to be grossly   unremarkable in appearance.  Evaluation of the osseous structures demonstrates degenerative changes of   the spine.  No destructive lesions are seen.  IMPRESSION:  1.  Significant interval change in appearance of the gastrointestinal   tract, with long segment of circumferential bowel wall thickening of the   distal small bowel and the ascending/transverse colon; with several areas   of luminal narrowing involving the mentioned colon; as detailed above.   Findings are either related to progression of carcinomatosis and   metastatic disease or possible ischemic etiology.  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  4.  Increasing amount of abdominopelvic ascites.  5.  Increasing metastatic disease to the liver.  6.  PEG tube is within the stomach lumen. Unchanged position of the   percutaneous cholecystotomy tube.    ADVANCED DIRECTIVES:     FULL CODE      MOLST    Advanced Directives:     Full Code     MOLST in [place]    Decision maker: The patient is able to participate in complex medical decision making conversations  Legal surrogate: No designated HCP, but the patient was thinking of assigning her sister Rama Fine [phone] from [place] and her friend Robyn Sam [phone] in [facility].    GOALS OF CARE DISCUSSION       Palliative care info/counseling provided	             See previous Palliative Medicine Note       Documentation of GOC: Pt would like to have underlying sepsis treated and then continue with cancer targeted treatments.          REFERRALS	        Unit SW/Case Mgmt       Patient/Family Support       Massage Therapy       Music Therapy       Nutrition / Dietician       PT/OT

## 2018-07-19 NOTE — PROGRESS NOTE ADULT - PROBLEM SELECTOR PLAN 7
-long QT, otherwise normal  -follow up repeat EKG, monitor daily  -avoid medications that prolong QT

## 2018-07-19 NOTE — PROGRESS NOTE ADULT - PROBLEM SELECTOR PLAN 5
-  VBG on admission with primary resp alkalosis (bicarb 23) likely 2/2 hyperventilation 2/2 pain. no AG.  -Bicarb 22 today, likely 2/2 hyperventilation 2/2 pain  - No anion gap today  - Breathing not labored, no need to trend VBG  - Continue to monitor VS closely  - Monitor for improvement

## 2018-07-19 NOTE — PROGRESS NOTE ADULT - PROBLEM SELECTOR PLAN 2
Remains obstipated. Encouraged to ambulate and frequent visits to WC. Currently not on a bowel regimen.   Wrote for bedside commode. Remains obstipated. Encouraged to ambulate and frequent visits to . Currently not on a bowel regimen.   Recommend bedside commode.

## 2018-07-19 NOTE — PROGRESS NOTE ADULT - PROBLEM SELECTOR PLAN 1
Improved since yesterday. Has used 3 doses of MSIR 2mg IV in the last 24h. MDD: 18. Also written for PRN and pre procedure (PT/Imaging) doses.  Wrote for Scopolamine patch PRN cramping pain.  Continue current management.

## 2018-07-19 NOTE — PROGRESS NOTE ADULT - ASSESSMENT
68 YO F w/ metastatic colon ca s/p 1 dose FOLFOX, recent acute cholecystitis c/b severe sepsis and hepatic vein thrombosis s/p percutaneous cholecystomy, and recent SBO/ ileus s/p PEG and 2 colonic stents sent to [facility] ED by Dr. Richards for fever of 102.5, associated with poor appetite and weakness x 4 days found to have SIRS.     # Sepsis 2/2 GNR bacteremia 2/2 likely intra-abdominal etiology  - Pt with previous colonoscopy suspicious for malignant infarcts which may cause ischemic colitis with bacterial translocation which may explain her SIRS vs SBP 2/2 bacterial translocation vs colitis/enteritis vs malignant fever vs cholecystitis  - Blood culture significant for GNR, f/u speciation and sensitivities  - Urine culture negative  - C/w empiric Abx    # Abdominal pain likely d/t partial bowel obstruction vs ileus 2/2 malignancy  - Recommend placing venting PEG to gravity  - Can initiate clear liquid diet,   - Avoid opiates  - No further acute endoscopic intervention at this time    # Metastatic CRC  - Appreciate Onc recs  - Appreciate palliative care recs     Case d/w Dr. Gwen LEWIS will follow

## 2018-07-19 NOTE — PROGRESS NOTE ADULT - PROBLEM SELECTOR PLAN 4
Reports vast improvement. Currently on PEG to gravity and Simethicone q8h.  Continue current regimen.

## 2018-07-19 NOTE — PROGRESS NOTE ADULT - SUBJECTIVE AND OBJECTIVE BOX
coverage for Dr Lundy    66 yo F w/ metastatic colon ca s/p 1 dose FOLFOX, recent acute cholecystitis (June 2018) c/b severe sepsis and hepatic vein thrombosis   s/p percutaneous cholecystomy, and recent SBO/ ileus s/p venting PEG and 2 colonic stents   admitted for severe sepsis   ? ischemic colitis with bacterial translocation vs colitis/enteritis      ROS:  frustrated about being NPO    Vital Signs Last 24 Hrs  T(C): 36.8 (19 Jul 2018 04:49), Max: 37.6 (18 Jul 2018 09:13)  T(F): 98.2 (19 Jul 2018 04:49), Max: 99.6 (18 Jul 2018 09:13)  HR: 93 (19 Jul 2018 04:49) (90 - 98)  BP: 109/73 (19 Jul 2018 04:49) (90/64 - 109/73)  BP(mean): --  RR: 18 (19 Jul 2018 04:49) (18 - 18)  SpO2: 99% (19 Jul 2018 04:49) (95% - 99%)    NAD  s1s2 nml  abdo: soft  PEG  ext: no edema, mild tenderness to palpation b/l      MEDICATIONS  (STANDING):  Biotene Dry Mouth Oral Rinse 5 milliLiter(s) Swish and Spit four times a day  edoxaban 60 milliGRAM(s) Oral daily  morphine  - Injectable 1 milliGRAM(s) IV Push two times a day  petrolatum white Ointment 1 Application(s) Topical two times a day  piperacillin/tazobactam IVPB. 3.375 Gram(s) IV Intermittent every 6 hours  potassium chloride   Powder 40 milliEquivalent(s) Oral once  simethicone drops 80 milliGRAM(s) Enteral Tube every 8 hours  sodium chloride 0.9%. 1000 milliLiter(s) (100 mL/Hr) IV Continuous <Continuous>    MEDICATIONS  (PRN):  acetaminophen   Tablet 650 milliGRAM(s) Oral every 6 hours PRN For Temp greater than 38 C (100.4 F)  morphine  - Injectable 2 milliGRAM(s) IV Push every 4 hours PRN Severe Pain (7 - 10)  morphine  - Injectable 1 milliGRAM(s) IV Push every 4 hours PRN Moderate Pain (4 - 6)  OLANZapine Disintegrating Tablet 5 milliGRAM(s) Oral daily PRN Nausea        CBC Full  -  ( 19 Jul 2018 06:36 )  WBC Count : 7.6 K/uL  Hemoglobin : 9.1 g/dL  Hematocrit : 29.3 %  Platelet Count - Automated : 481 K/uL  Mean Cell Volume : 81.6 fL  Mean Cell Hemoglobin : 25.3 pg  Mean Cell Hemoglobin Concentration : 31.1 g/dL  Auto Neutrophil # : x  Auto Lymphocyte # : x  Auto Monocyte # : x  Auto Eosinophil # : x  Auto Basophil # : x  Auto Neutrophil % : 85.8 %  Auto Lymphocyte % : 8.1 %  Auto Monocyte % : 6.0 %  Auto Eosinophil % : 0.0 %  Auto Basophil % : 0.1 %      07-19    136  |  101  |  28<H>  ----------------------------<  104<H>  3.8   |  22  |  0.50    Ca    8.1<L>      19 Jul 2018 06:36  Phos  2.2     07-18  Mg     1.9     07-19    TPro  5.2<L>  /  Alb  2.4<L>  /  TBili  1.0  /  DBili  x   /  AST  20  /  ALT  13  /  AlkPhos  231<H>  07-18      PT/INR - ( 18 Jul 2018 01:32 )   PT: 28.5 sec;   INR: 2.52          PTT - ( 18 Jul 2018 01:32 )  PTT:32.9 sec

## 2018-07-19 NOTE — PROGRESS NOTE ADULT - ASSESSMENT
68 yo F w/ metastatic colon ca s/p 1 dose FOLFOX, recent acute cholecystitis (June 2018) c/b severe sepsis and hepatic vein thrombosis s/p percutaneous cholecystomy, and recent SBO/ ileus s/p venting PEG and 2 colonic stents admitted for severe sepsis and acute, likely secondary to intraabdominal source. DDX includes ischemic colitis with bacterial translocation vs colitis/enteritis vs malignant fever 68 yo F w/ metastatic colon ca s/p 1 dose FOLFOX, recent acute cholecystitis (June 2018) c/b severe sepsis and hepatic vein thrombosis s/p percutaneous cholecystomy, and recent SBO/ ileus s/p venting PEG and 2 colonic stents admitted for severe sepsis and acute 2/2 GNR bacteremia, likely secondary to intraabdominal source and abdominal pain likely d/t partial bowel obstruction vs ileus 2/2 malignancy.

## 2018-07-19 NOTE — PROGRESS NOTE ADULT - SUBJECTIVE AND OBJECTIVE BOX
Pt seen and examined at bedside. ANGELICA overnight. Pt states that she feels better, abdominal pain improved. Denies nausea, vomiting. Pt had 300 cc of bilious output via venting PEG.    Allergies    allergic to cats (Rash)  No Known Drug Allergies    MEDICATIONS:  MEDICATIONS  (STANDING):  Biotene Dry Mouth Oral Rinse 5 milliLiter(s) Swish and Spit four times a day  edoxaban 60 milliGRAM(s) Oral daily  morphine  - Injectable 1 milliGRAM(s) IV Push two times a day  petrolatum white Ointment 1 Application(s) Topical two times a day  piperacillin/tazobactam IVPB. 3.375 Gram(s) IV Intermittent every 6 hours  potassium chloride   Powder 40 milliEquivalent(s) Oral once  simethicone drops 80 milliGRAM(s) Enteral Tube every 8 hours  sodium chloride 0.9%. 1000 milliLiter(s) (100 mL/Hr) IV Continuous <Continuous>    MEDICATIONS  (PRN):  acetaminophen   Tablet 650 milliGRAM(s) Oral every 6 hours PRN For Temp greater than 38 C (100.4 F)  morphine  - Injectable 2 milliGRAM(s) IV Push every 4 hours PRN Severe Pain (7 - 10)  morphine  - Injectable 1 milliGRAM(s) IV Push every 4 hours PRN Moderate Pain (4 - 6)  OLANZapine Disintegrating Tablet 5 milliGRAM(s) Oral daily PRN Nausea    Vital Signs Last 24 Hrs  T(C): 36.8 (19 Jul 2018 04:49), Max: 36.8 (19 Jul 2018 04:49)  T(F): 98.2 (19 Jul 2018 04:49), Max: 98.2 (19 Jul 2018 04:49)  HR: 93 (19 Jul 2018 04:49) (90 - 98)  BP: 109/73 (19 Jul 2018 04:49) (101/66 - 109/73)  BP(mean): --  RR: 18 (19 Jul 2018 04:49) (18 - 18)  SpO2: 99% (19 Jul 2018 04:49) (98% - 99%)    07-18 @ 07:01  -  07-19 @ 07:00  --------------------------------------------------------  IN: 2240 mL / OUT: 475 mL / NET: 1765 mL      PHYSICAL EXAM:    General: Temporal wasting, NAD  HEENT: MMM, conjunctiva and sclera clear  Gastrointestinal: Soft diffuse TTP, distended; No rebound or guarding; PEG in place with bilious drainage  Skin: Warm and dry. No obvious rash    LABS:                        9.1    7.6   )-----------( 481      ( 19 Jul 2018 06:36 )             29.3     07-19    136  |  101  |  28<H>  ----------------------------<  104<H>  3.8   |  22  |  0.50    Ca    8.1<L>      19 Jul 2018 06:36  Phos  2.2     07-18  Mg     1.9     07-19    TPro  5.2<L>  /  Alb  2.4<L>  /  TBili  1.0  /  DBili  x   /  AST  20  /  ALT  13  /  AlkPhos  231<H>  07-18    PT/INR - ( 18 Jul 2018 01:32 )   PT: 28.5 sec;   INR: 2.52          PTT - ( 18 Jul 2018 01:32 )  PTT:32.9 sec      Urinalysis Basic - ( 17 Jul 2018 15:17 )    Color: Yellow / Appearance: Clear / SG: <=1.005 / pH: x  Gluc: x / Ketone: Trace mg/dL  / Bili: Negative / Urobili: 1.0 E.U./dL   Blood: x / Protein: NEGATIVE mg/dL / Nitrite: NEGATIVE   Leuk Esterase: NEGATIVE / RBC: x / WBC x   Sq Epi: x / Non Sq Epi: x / Bacteria: x    Culture - Urine (collected 17 Jul 2018 15:45)  Source: .Urine Clean Catch (Midstream)  Final Report (18 Jul 2018 08:38):    No growth    Culture - Blood (collected 17 Jul 2018 14:59)  Source: .Blood Blood-Peripheral  Gram Stain (18 Jul 2018 15:08):    Anaerobic Bottle: Gram Negative Rods    Result called to and read back byLisa Anne MD  07/18/2018 15:08:07    ***Blood Panel PCR results on this specimen are available    approximately 3 hours after the Gram stain result.***    Gram stain, PCR, and/or culture results may not always    correspond due to difference in methodologies.    ************************************************************    This PCR assay was performed using [employer].    The following targets are tested for: Enterococcus,    vancomycin resistant enterococci, Listeria monocytogenes,    coagulase negative staphylococci, S. aureus,    methicillin resistant S. aureus, Streptococcus agalactiae    (Group B), S. pneumoniae, S. pyogenes (Group A),    Acinetobacter baumannii, Enterobacter cloacae, E. coli,    Klebsiella oxytoca, K. pneumoniae, Proteus sp.,    Serratia marcescens, Haemophilus influenzae,    Neisseria meningitidis, Pseudomonas aeruginosa, Candida    albicans, C. glabrata, C krusei, C parapsilosis,    C. tropicalis and the KPC resistance gene.    "Due to technical problems, Proteus sp. will Not be reported as part of    the BCID panel until further notice"  Preliminary Report (18 Jul 2018 15:49):    Culture in progress  Organism: Blood Culture PCR (18 Jul 2018 12:18)  Organism: Blood Culture PCR (18 Jul 2018 12:18)    Culture - Blood (collected 17 Jul 2018 14:59)  Source: .Blood Blood-Peripheral  Preliminary Report (18 Jul 2018 15:06):    No growth at 1 day.      RADIOLOGY & ADDITIONAL STUDIES:

## 2018-07-19 NOTE — PROGRESS NOTE ADULT - PROBLEM SELECTOR PLAN 8
Support provided to patient and family. Patient to have access to supportive services during rest of hospital stay as the patient/family deemed necessary ie. Chaplaincy, Massage therapy, Music therapy, Patient and family supportive services, Palliative SW, etc.    As discussed during the palliative IDT meeting and as identified during the patients PSSA screening the patient would benefit from massage therapy, music therapy, and visitor/volunteers.

## 2018-07-19 NOTE — PROGRESS NOTE ADULT - PROBLEM SELECTOR PLAN 2
-As above  - Source likely GI tract (bcx growing anaerobic gram negative bacilli)  - c/w zosyn    #Small bowel ileus vs developing small bowel obstruction  - Likely 2/2 malignancy  - Pt clinically improving-less distended today and in less pain after venting peg to gravity overnight  - AXR yesterday pm still showing ileus vs developing sbo -As above  - Source likely GI tract (bcx growing anaerobic gram negative bacilli)  - c/w zosyn    #Abdominal pain  - Etiology: Small bowel ileus vs partial small bowel obstruction, likely 2/2 malignancy  - Pt clinically improving- abdomen less distended today and reduced pain after venting peg to gravity overnight  - AXR yesterday pm still showing ileus vs developing sbo  - Advance to clears per GI  - C/W venting peg to gravity

## 2018-07-19 NOTE — PROGRESS NOTE ADULT - ASSESSMENT
66 yo F with metastatic colon cancer, BRAF mutated.   s/p 1 cycle of FOLFOX.   recent cholecystitis   hx of SBO/ileus s/p venting PEG  2 colonic stents  admitted with severe sepsis - high suspicion for intraabdominal source    on vanc and zosyn  blood cultures: GNB    future chemotherapy and scheduling will depend on patient's clinical status.

## 2018-07-20 DIAGNOSIS — E46 UNSPECIFIED PROTEIN-CALORIE MALNUTRITION: ICD-10-CM

## 2018-07-20 DIAGNOSIS — R78.81 BACTEREMIA: ICD-10-CM

## 2018-07-20 LAB
ALBUMIN SERPL ELPH-MCNC: 1.6 G/DL — LOW (ref 3.3–5)
ALP SERPL-CCNC: 212 U/L — HIGH (ref 40–120)
ALT FLD-CCNC: <5 U/L — LOW (ref 10–45)
ANION GAP SERPL CALC-SCNC: 18 MMOL/L — HIGH (ref 5–17)
ANION GAP SERPL CALC-SCNC: 19 MMOL/L — HIGH (ref 5–17)
APTT BLD: 37.6 SEC — HIGH (ref 27.5–37.4)
AST SERPL-CCNC: 41 U/L — HIGH (ref 10–40)
BILIRUB DIRECT SERPL-MCNC: 0.4 MG/DL — HIGH (ref 0–0.2)
BILIRUB INDIRECT FLD-MCNC: 0.6 MG/DL — SIGNIFICANT CHANGE UP (ref 0.2–1)
BILIRUB SERPL-MCNC: 1 MG/DL — SIGNIFICANT CHANGE UP (ref 0.2–1.2)
BUN SERPL-MCNC: 39 MG/DL — HIGH (ref 7–23)
BUN SERPL-MCNC: 41 MG/DL — HIGH (ref 7–23)
CALCIUM SERPL-MCNC: 8.3 MG/DL — LOW (ref 8.4–10.5)
CALCIUM SERPL-MCNC: 8.4 MG/DL — SIGNIFICANT CHANGE UP (ref 8.4–10.5)
CHLORIDE SERPL-SCNC: 101 MMOL/L — SIGNIFICANT CHANGE UP (ref 96–108)
CHLORIDE SERPL-SCNC: 102 MMOL/L — SIGNIFICANT CHANGE UP (ref 96–108)
CO2 SERPL-SCNC: 19 MMOL/L — LOW (ref 22–31)
CO2 SERPL-SCNC: 21 MMOL/L — LOW (ref 22–31)
CREAT SERPL-MCNC: 0.61 MG/DL — SIGNIFICANT CHANGE UP (ref 0.5–1.3)
CREAT SERPL-MCNC: 0.8 MG/DL — SIGNIFICANT CHANGE UP (ref 0.5–1.3)
GLUCOSE SERPL-MCNC: 124 MG/DL — HIGH (ref 70–99)
GLUCOSE SERPL-MCNC: 144 MG/DL — HIGH (ref 70–99)
HCT VFR BLD CALC: 34 % — LOW (ref 34.5–45)
HGB BLD-MCNC: 10.4 G/DL — LOW (ref 11.5–15.5)
INR BLD: 1.56 — HIGH (ref 0.88–1.16)
LACTATE SERPL-SCNC: 2.8 MMOL/L — HIGH (ref 0.5–2)
LACTATE SERPL-SCNC: 4.1 MMOL/L — CRITICAL HIGH (ref 0.5–2)
LACTATE SERPL-SCNC: 4.3 MMOL/L — CRITICAL HIGH (ref 0.5–2)
MAGNESIUM SERPL-MCNC: 2.2 MG/DL — SIGNIFICANT CHANGE UP (ref 1.6–2.6)
MCHC RBC-ENTMCNC: 24.8 PG — LOW (ref 27–34)
MCHC RBC-ENTMCNC: 30.6 G/DL — LOW (ref 32–36)
MCV RBC AUTO: 81 FL — SIGNIFICANT CHANGE UP (ref 80–100)
PLATELET # BLD AUTO: 620 K/UL — HIGH (ref 150–400)
POTASSIUM SERPL-MCNC: 3.3 MMOL/L — LOW (ref 3.5–5.3)
POTASSIUM SERPL-MCNC: 4.9 MMOL/L — SIGNIFICANT CHANGE UP (ref 3.5–5.3)
POTASSIUM SERPL-SCNC: 3.3 MMOL/L — LOW (ref 3.5–5.3)
POTASSIUM SERPL-SCNC: 4.9 MMOL/L — SIGNIFICANT CHANGE UP (ref 3.5–5.3)
PROT SERPL-MCNC: 6.1 G/DL — SIGNIFICANT CHANGE UP (ref 6–8.3)
PROTHROM AB SERPL-ACNC: 17.5 SEC — HIGH (ref 9.8–12.7)
RBC # BLD: 4.2 M/UL — SIGNIFICANT CHANGE UP (ref 3.8–5.2)
RBC # FLD: 22.8 % — HIGH (ref 10.3–16.9)
SODIUM SERPL-SCNC: 139 MMOL/L — SIGNIFICANT CHANGE UP (ref 135–145)
SODIUM SERPL-SCNC: 141 MMOL/L — SIGNIFICANT CHANGE UP (ref 135–145)
WBC # BLD: 5.4 K/UL — SIGNIFICANT CHANGE UP (ref 3.8–10.5)
WBC # FLD AUTO: 5.4 K/UL — SIGNIFICANT CHANGE UP (ref 3.8–10.5)

## 2018-07-20 PROCEDURE — 99233 SBSQ HOSP IP/OBS HIGH 50: CPT

## 2018-07-20 PROCEDURE — 99356: CPT

## 2018-07-20 PROCEDURE — 74018 RADEX ABDOMEN 1 VIEW: CPT | Mod: 26,59

## 2018-07-20 PROCEDURE — 99357: CPT

## 2018-07-20 PROCEDURE — 99233 SBSQ HOSP IP/OBS HIGH 50: CPT | Mod: GC

## 2018-07-20 PROCEDURE — 74019 RADEX ABDOMEN 2 VIEWS: CPT | Mod: 26

## 2018-07-20 RX ORDER — ZALEPLON 10 MG
5 CAPSULE ORAL AT BEDTIME
Qty: 0 | Refills: 0 | Status: DISCONTINUED | OUTPATIENT
Start: 2018-07-20 | End: 2018-07-27

## 2018-07-20 RX ORDER — POTASSIUM CHLORIDE 20 MEQ
10 PACKET (EA) ORAL
Qty: 0 | Refills: 0 | Status: COMPLETED | OUTPATIENT
Start: 2018-07-20 | End: 2018-07-20

## 2018-07-20 RX ORDER — HEPARIN SODIUM 5000 [USP'U]/ML
INJECTION INTRAVENOUS; SUBCUTANEOUS
Qty: 25000 | Refills: 0 | Status: DISCONTINUED | OUTPATIENT
Start: 2018-07-20 | End: 2018-07-21

## 2018-07-20 RX ORDER — IOHEXOL 300 MG/ML
30 INJECTION, SOLUTION INTRAVENOUS ONCE
Qty: 0 | Refills: 0 | Status: COMPLETED | OUTPATIENT
Start: 2018-07-20 | End: 2018-07-20

## 2018-07-20 RX ORDER — SODIUM CHLORIDE 9 MG/ML
1000 INJECTION INTRAMUSCULAR; INTRAVENOUS; SUBCUTANEOUS
Qty: 0 | Refills: 0 | Status: DISCONTINUED | OUTPATIENT
Start: 2018-07-20 | End: 2018-07-23

## 2018-07-20 RX ORDER — HEPARIN SODIUM 5000 [USP'U]/ML
6000 INJECTION INTRAVENOUS; SUBCUTANEOUS ONCE
Qty: 0 | Refills: 0 | Status: COMPLETED | OUTPATIENT
Start: 2018-07-20 | End: 2018-07-20

## 2018-07-20 RX ORDER — HYDROMORPHONE HYDROCHLORIDE 2 MG/ML
0.25 INJECTION INTRAMUSCULAR; INTRAVENOUS; SUBCUTANEOUS EVERY 4 HOURS
Qty: 0 | Refills: 0 | Status: DISCONTINUED | OUTPATIENT
Start: 2018-07-20 | End: 2018-07-24

## 2018-07-20 RX ORDER — POTASSIUM CHLORIDE 20 MEQ
10 PACKET (EA) ORAL
Qty: 0 | Refills: 0 | Status: DISCONTINUED | OUTPATIENT
Start: 2018-07-20 | End: 2018-07-20

## 2018-07-20 RX ORDER — SODIUM CHLORIDE 9 MG/ML
1000 INJECTION INTRAMUSCULAR; INTRAVENOUS; SUBCUTANEOUS ONCE
Qty: 0 | Refills: 0 | Status: COMPLETED | OUTPATIENT
Start: 2018-07-20 | End: 2018-07-20

## 2018-07-20 RX ORDER — POLYETHYLENE GLYCOL 3350 17 G/17G
17 POWDER, FOR SOLUTION ORAL DAILY
Qty: 0 | Refills: 0 | Status: DISCONTINUED | OUTPATIENT
Start: 2018-07-20 | End: 2018-07-28

## 2018-07-20 RX ORDER — SODIUM CHLORIDE 9 MG/ML
500 INJECTION INTRAMUSCULAR; INTRAVENOUS; SUBCUTANEOUS ONCE
Qty: 0 | Refills: 0 | Status: COMPLETED | OUTPATIENT
Start: 2018-07-20 | End: 2018-07-20

## 2018-07-20 RX ADMIN — HEPARIN SODIUM 6000 UNIT(S): 5000 INJECTION INTRAVENOUS; SUBCUTANEOUS at 14:13

## 2018-07-20 RX ADMIN — Medication 5 MILLIGRAM(S): at 01:22

## 2018-07-20 RX ADMIN — MORPHINE SULFATE 2 MILLIGRAM(S): 50 CAPSULE, EXTENDED RELEASE ORAL at 06:40

## 2018-07-20 RX ADMIN — Medication 1 APPLICATION(S): at 17:30

## 2018-07-20 RX ADMIN — POLYETHYLENE GLYCOL 3350 17 GRAM(S): 17 POWDER, FOR SOLUTION ORAL at 12:57

## 2018-07-20 RX ADMIN — Medication 1 APPLICATION(S): at 05:38

## 2018-07-20 RX ADMIN — Medication 650 MILLIGRAM(S): at 04:32

## 2018-07-20 RX ADMIN — MORPHINE SULFATE 2 MILLIGRAM(S): 50 CAPSULE, EXTENDED RELEASE ORAL at 02:45

## 2018-07-20 RX ADMIN — MORPHINE SULFATE 1 MILLIGRAM(S): 50 CAPSULE, EXTENDED RELEASE ORAL at 01:39

## 2018-07-20 RX ADMIN — MORPHINE SULFATE 1 MILLIGRAM(S): 50 CAPSULE, EXTENDED RELEASE ORAL at 00:37

## 2018-07-20 RX ADMIN — OLANZAPINE 5 MILLIGRAM(S): 15 TABLET, FILM COATED ORAL at 06:40

## 2018-07-20 RX ADMIN — Medication 100 MILLIEQUIVALENT(S): at 11:29

## 2018-07-20 RX ADMIN — SODIUM CHLORIDE 2000 MILLILITER(S): 9 INJECTION INTRAMUSCULAR; INTRAVENOUS; SUBCUTANEOUS at 09:56

## 2018-07-20 RX ADMIN — HYDROMORPHONE HYDROCHLORIDE 0.25 MILLIGRAM(S): 2 INJECTION INTRAMUSCULAR; INTRAVENOUS; SUBCUTANEOUS at 21:52

## 2018-07-20 RX ADMIN — SIMETHICONE 80 MILLIGRAM(S): 80 TABLET, CHEWABLE ORAL at 05:38

## 2018-07-20 RX ADMIN — PIPERACILLIN AND TAZOBACTAM 200 GRAM(S): 4; .5 INJECTION, POWDER, LYOPHILIZED, FOR SOLUTION INTRAVENOUS at 12:56

## 2018-07-20 RX ADMIN — SODIUM CHLORIDE 2000 MILLILITER(S): 9 INJECTION INTRAMUSCULAR; INTRAVENOUS; SUBCUTANEOUS at 14:09

## 2018-07-20 RX ADMIN — HYDROMORPHONE HYDROCHLORIDE 0.25 MILLIGRAM(S): 2 INJECTION INTRAMUSCULAR; INTRAVENOUS; SUBCUTANEOUS at 13:12

## 2018-07-20 RX ADMIN — Medication 5 MILLILITER(S): at 12:57

## 2018-07-20 RX ADMIN — SIMETHICONE 80 MILLIGRAM(S): 80 TABLET, CHEWABLE ORAL at 21:52

## 2018-07-20 RX ADMIN — HYDROMORPHONE HYDROCHLORIDE 0.25 MILLIGRAM(S): 2 INJECTION INTRAMUSCULAR; INTRAVENOUS; SUBCUTANEOUS at 12:57

## 2018-07-20 RX ADMIN — HYDROMORPHONE HYDROCHLORIDE 0.25 MILLIGRAM(S): 2 INJECTION INTRAMUSCULAR; INTRAVENOUS; SUBCUTANEOUS at 22:07

## 2018-07-20 RX ADMIN — Medication 100 MILLIEQUIVALENT(S): at 10:15

## 2018-07-20 RX ADMIN — SIMETHICONE 80 MILLIGRAM(S): 80 TABLET, CHEWABLE ORAL at 13:04

## 2018-07-20 RX ADMIN — Medication 5 MILLILITER(S): at 05:38

## 2018-07-20 RX ADMIN — HEPARIN SODIUM 1300 UNIT(S)/HR: 5000 INJECTION INTRAVENOUS; SUBCUTANEOUS at 14:14

## 2018-07-20 RX ADMIN — PIPERACILLIN AND TAZOBACTAM 200 GRAM(S): 4; .5 INJECTION, POWDER, LYOPHILIZED, FOR SOLUTION INTRAVENOUS at 17:31

## 2018-07-20 RX ADMIN — MORPHINE SULFATE 2 MILLIGRAM(S): 50 CAPSULE, EXTENDED RELEASE ORAL at 01:39

## 2018-07-20 RX ADMIN — HYDROMORPHONE HYDROCHLORIDE 0.25 MILLIGRAM(S): 2 INJECTION INTRAMUSCULAR; INTRAVENOUS; SUBCUTANEOUS at 17:55

## 2018-07-20 RX ADMIN — HYDROMORPHONE HYDROCHLORIDE 0.25 MILLIGRAM(S): 2 INJECTION INTRAMUSCULAR; INTRAVENOUS; SUBCUTANEOUS at 17:25

## 2018-07-20 RX ADMIN — MORPHINE SULFATE 2 MILLIGRAM(S): 50 CAPSULE, EXTENDED RELEASE ORAL at 07:35

## 2018-07-20 RX ADMIN — Medication 100 MILLIEQUIVALENT(S): at 09:19

## 2018-07-20 RX ADMIN — Medication 5 MILLIGRAM(S): at 21:52

## 2018-07-20 RX ADMIN — SODIUM CHLORIDE 2000 MILLILITER(S): 9 INJECTION INTRAMUSCULAR; INTRAVENOUS; SUBCUTANEOUS at 16:46

## 2018-07-20 RX ADMIN — Medication 5 MILLILITER(S): at 17:31

## 2018-07-20 RX ADMIN — PIPERACILLIN AND TAZOBACTAM 200 GRAM(S): 4; .5 INJECTION, POWDER, LYOPHILIZED, FOR SOLUTION INTRAVENOUS at 04:16

## 2018-07-20 NOTE — PROGRESS NOTE ADULT - PROBLEM SELECTOR PLAN 2
- Initial Bcx (7/18) growing bacteroides fragillis (likely intraabdominal source)  - Repeat Bcx (7/19) negative to date  - c/w zosyn  - consider PICC line placement for continuation of abx therapy (pt has prolonged QT thus cannot take quinolones)    #Abdominal pain  - Etiology: Small bowel ileus vs partial small bowel obstruction, likely 2/2 malignancy  - Pt clinically improving- still c/o 6-9/10 pain  - c/w venting peg to gravity and monitor output q6hrs  - AXR yesterday pm still showing ileus vs developing sbo  - Advance to clears per GI  - C/W venting peg to gravity - Initial Bcx (7/18) growing bacteroides fragillis (likely intraabdominal source)  - Repeat Bcx (7/19) negative to date  - c/w zosyn  - consider PICC line placement for continuation of abx therapy (pt has prolonged QT thus cannot take quinolones)    #Abdominal pain  - Etiology: Small bowel ileus vs partial small bowel obstruction, likely 2/2 malignancy  - Pt clinically improving but still c/o 6/10 pain  - c/w venting peg to gravity and monitor output q6hrs  - f/u repeat AXR    #hypokalemia  - likely secondary to venting peg to gravity  - K 3.3 today  - will give [id number] over 6 hours to replete to goal K of 4  - check f/u BMP - Initial Bcx (7/18) growing bacteroides fragillis (likely intraabdominal source)  - Repeat Bcx (7/19) negative to date  - c/w zosyn  - consider PICC line placement for continuation of abx therapy (pt has prolonged QT thus cannot take quinolones)    #Abdominal pain  - Etiology: Small bowel ileus vs partial small bowel obstruction, likely 2/2 malignancy  - Pt clinically improving but still c/o 6/10 pain  - c/w venting peg to gravity and monitor output q6hrs  - f/u repeat AXR  - surgery considering intervention at this time    #hypokalemia  - likely secondary to venting peg to gravity  - K 3.3 today  - gave [id number]--> recheck BMP--> consider 10meq x3 again if needed  to replete to goal K of 4  - check f/u BMP - Initial Bcx (7/18) growing bacteroides fragillis (likely intraabdominal source)  - Repeat Bcx (7/19) negative to date  - c/w zosyn  - consider PICC line placement for continuation of abx therapy (pt has prolonged QT thus cannot take quinolones) once afebrile    #Abdominal pain  - Etiology: Small bowel ileus vs partial small bowel obstruction, likely 2/2 malignancy  - Pt clinically improving but still c/o 6/10 pain  - c/w venting peg to gravity and monitor output q6hrs  - f/u repeat AXR  - surgery considering intervention at this time    #hypokalemia  - likely secondary to venting peg to gravity  - K 3.3 today  - gave [id number]--> recheck BMP--> consider 10meq x3 again if needed  to replete to goal K of 4  - check f/u BMP

## 2018-07-20 NOTE — PROGRESS NOTE ADULT - PROBLEM SELECTOR PLAN 2
Remains obstipated. Encouraged to ambulate and frequent visits to . Currently not Miralax daily.  Recommend bedside commode.

## 2018-07-20 NOTE — PROGRESS NOTE ADULT - PROBLEM SELECTOR PLAN 5
Found to have GNR in blood culture x1. Pending sensitivities. Reports improvement in symptoms since admission except for last nights complaint of chills and had fever episode at 4am.   Management as per primary team.

## 2018-07-20 NOTE — PROGRESS NOTE ADULT - PROBLEM SELECTOR PLAN 5
- metastatic colon cancer, peg to gravity, poor PO intake  - nutrition consult - albumin 2.4  - metastatic colon cancer, peg to gravity, poor PO intake  - nutrition consult

## 2018-07-20 NOTE — PROGRESS NOTE ADULT - SUBJECTIVE AND OBJECTIVE BOX
PALLIATIVE MEDICINE ADDENDUM    Follow up same day prolonged service visit. A face to face encounter was performed on PILLO PEDRAZA  Active issue: Pain, Anxiety, Insomnia, GOC/AD, Support  Start time: 3:50pm  End time: 4:55pm  Visit + Prolonged time spent: 100 min  Management: Assessed the patients symptoms after initial morning visit when I placed the patient on standing pain medication.   Had a family meeting at the patient's bedside today in the afternoon. Present at bedside is the patients sister and friend; Also present is the music therapist and massage therapist. Dr Arteaga medicine attending was present for part of the meeting.  Meeting was prompted by receiving a call from surgery stating that surgery was not a given decision. The patient so far thinks she is getting the diverting ostomy and had a visit from an advocate to talk to her about the ostomy bag and care. We discussed the different options she was contemplating including but not limited to the chemotherapy and surgery as well as discussing hospice services if she chooses to forgo cancer treatments. We discussed what her understanding was of her disease that she could be cured from the stage 4 colon cancer so she was basing her decisions of going through surgery in order to have chemotherapy. She just realized that one would not assured the other and that the procedure if done is meant to improve her disease burden and symptoms. She states that she would like to discuss with her sister and friend and think it over the weekend and would discuss again on Monday.

## 2018-07-20 NOTE — PHYSICAL THERAPY INITIAL EVALUATION ADULT - LIVES WITH, PROFILE
Cousin in elevator apartment building. No stairs to enter. Cousin works so not available to help at all time.

## 2018-07-20 NOTE — PROGRESS NOTE ADULT - PROBLEM SELECTOR PLAN 7
Wants to continue cancer targeted treatmetns. Remains full code. Wants her sister and friend involved in her decision making.

## 2018-07-20 NOTE — PROGRESS NOTE ADULT - SUBJECTIVE AND OBJECTIVE BOX
Pt seen and examined at bedside. ANGELICA overnight. Pt states that her abdominal pain was worsening overnight and she was unable to sleep. Pt was given pain medication and sleep aide. Pt states that her abodminal pain is improved this AM, passing minimal amounts of flatus, however, has not had a BM since Monday. Pt states that she has tolerated clear liquid, denies abdominal pain, nausea, or vomiting associated with liquid intake.     Allergies    allergic to cats (Rash)  No Known Drug Allergies    MEDICATIONS:  MEDICATIONS  (STANDING):  Biotene Dry Mouth Oral Rinse 5 milliLiter(s) Swish and Spit four times a day  heparin  Infusion.  Unit(s)/Hr (13 mL/Hr) IV Continuous <Continuous>  heparin  Injectable 6000 Unit(s) IV Push once  HYDROmorphone  Injectable 0.25 milliGRAM(s) IV Push every 4 hours  petrolatum white Ointment 1 Application(s) Topical two times a day  piperacillin/tazobactam IVPB. 3.375 Gram(s) IV Intermittent every 6 hours  polyethylene glycol 3350 17 Gram(s) Oral daily  simethicone drops 80 milliGRAM(s) Enteral Tube every 8 hours  sodium chloride 0.9%. 1000 milliLiter(s) (100 mL/Hr) IV Continuous <Continuous>  zaleplon 5 milliGRAM(s) Oral at bedtime    MEDICATIONS  (PRN):  OLANZapine Disintegrating Tablet 5 milliGRAM(s) Oral daily PRN Nausea  scopolamine   Patch 1 Patch Transdermal once PRN Abdominal cramps.    Vital Signs Last 24 Hrs  T(C): 37.3 (20 Jul 2018 08:45), Max: 38.3 (20 Jul 2018 04:39)  T(F): 99.1 (20 Jul 2018 08:45), Max: 100.9 (20 Jul 2018 04:39)  HR: 128 (20 Jul 2018 08:45) (98 - 128)  BP: 111/80 (20 Jul 2018 08:45) (104/74 - 127/89)  BP(mean): --  RR: 18 (20 Jul 2018 08:45) (18 - 20)  SpO2: 98% (20 Jul 2018 08:45) (97% - 98%)    07-19 @ 07:01  -  07-20 @ 07:00  --------------------------------------------------------  IN: 2200 mL / OUT: 3900 mL / NET: -1700 mL    07-20 @ 07:01  -  07-20 @ 13:04  --------------------------------------------------------  IN: 0 mL / OUT: 175 mL / NET: -175 mL    PHYSICAL EXAM:    General: Temporal wasting, NAD  HEENT: MMM, conjunctiva and sclera clear  Gastrointestinal: Soft diffuse TTP, distended; No rebound or guarding; PEG in place with bilious drainage  Skin: Warm and dry. No obvious rash    LABS:                        10.4   5.4   )-----------( 620      ( 20 Jul 2018 06:57 )             34.0     07-20    141  |  101  |  39<H>  ----------------------------<  124<H>  3.3<L>   |  21<L>  |  0.61    Ca    8.4      20 Jul 2018 06:57  Mg     2.2     07-20    Culture - Blood (collected 19 Jul 2018 15:52)  Source: .Blood Blood  Preliminary Report (20 Jul 2018 04:01):    No growth at 12 hours    Culture - Urine (collected 17 Jul 2018 15:45)  Source: .Urine Clean Catch (Midstream)  Final Report (18 Jul 2018 08:38):    No growth    Culture - Blood (collected 17 Jul 2018 14:59)  Source: .Blood Blood-Peripheral  Gram Stain (18 Jul 2018 15:08):    Anaerobic Bottle: Gram Negative Rods    Result called to and read back byLisa Anne MD  07/18/2018 15:08:07    ***Blood Panel PCR results on this specimen are available    approximately 3 hours after the Gram stain result.***    Gram stain, PCR, and/or culture results may not always    correspond due to difference in methodologies.    ************************************************************    This PCR assay was performed using [employer].    The following targets are tested for: Enterococcus,    vancomycin resistant enterococci, Listeria monocytogenes,    coagulase negative staphylococci, S. aureus,    methicillin resistant S. aureus, Streptococcus agalactiae    (Group B), S. pneumoniae, S. pyogenes (Group A),    Acinetobacter baumannii, Enterobacter cloacae, E. coli,    Klebsiella oxytoca, K. pneumoniae, Proteus sp.,    Serratia marcescens, Haemophilus influenzae,    Neisseria meningitidis, Pseudomonas aeruginosa, Candida    albicans, C. glabrata, C krusei, C parapsilosis,    C. tropicalis and the KPC resistance gene.    "Due to technical problems, Proteus sp. will Not be reported as part of    the BCID panel until further notice"  Preliminary Report (19 Jul 2018 15:39):    Growth in anaerobic bottle: Bacteroides fragilis    Beta lactamase negative    Susceptibility to follow.  Organism: Blood Culture PCR (18 Jul 2018 12:18)  Organism: Blood Culture PCR (18 Jul 2018 12:18)    Culture - Blood (collected 17 Jul 2018 14:59)  Source: .Blood Blood-Peripheral  Preliminary Report (19 Jul 2018 15:01):    No growth at 2 days.    RADIOLOGY & ADDITIONAL STUDIES: No new radiologic studies.

## 2018-07-20 NOTE — PROGRESS NOTE ADULT - PROBLEM SELECTOR PLAN 1
- Source: intraabdominal  - Clinically improving on zosyn; afebrile overnight, BP maintained >90/60  - Initial Bcx growing gram negative anaerobic bacilli, follow up culture & sensitivities  - Leukopenia improved (WBC 3.6--> 7.6 today)  - CT showed evidence of colonic inflammation  - Continue w/ IVF  - C/w zosyn  - Repeat Bcx  - follow up GI and surgery recommendations  - monitor VS closely  - serial abdominal exams - Severe sepsis present on admission, pt no longer meets criteria for severe sepsis  - Source: intraabdominal (initial cx growing bacteroides fragilis)  - Clinically improving on zosyn; afebrile overnight, BP maintained >90/60  - Initial Bcx growing bacteroides fragillis, f/u sensitivities  - Leukopenia improved, no leukocytosis  - CT showed evidence of colonic inflammation  - Continue w/ IVF  - C/w zosyn  - follow up GI and surgery recommendations  - monitor VS closely  - serial abdominal exams - Severe sepsis present on admission, pt no longer meets criteria for severe sepsis  - Source: intraabdominal (initial cx growing bacteroides fragilis)  - Clinically improving on zosyn; afebrile overnight, BP maintained >90/60  - Initial Bcx growing bacteroides fragillis, f/u sensitivities  - Leukopenia improved, no leukocytosis  - CT showed evidence of colonic inflammation  - Continue w/ IVF, gave 500cc bolus pt appears dry  - C/w zosyn  - follow up GI and surgery recommendations  - monitor VS closely  - serial abdominal exams    #metabolic acidosis  - likely secondary to lactic acidosis (last lactate 2.7) secondary to infection, pt febrile to 100.9 overnight  - bicarb 21  - repeat lactate  - f/u culture sensitivities to ensure coverage

## 2018-07-20 NOTE — PROGRESS NOTE ADULT - PROBLEM SELECTOR PLAN 6
Patient states she wants to continue cancer treatments and hopes would receive second dose of chemo during this admission after her surgery next week. The patient states not hearing otherwise from heme onc or surgery about her options. She believes these options are still being offered. She states heme onc today deferred to having the procedure first before considering further chemotherapy. As her hemeonc note: "future chemotherapy and scheduling will depend on patient's clinical status."   Patient is NOT a candidate for hospice services if pursuing chemotherapy. Please have hemeonc make a final decision regarding chemotherapy and discuss with the patient.

## 2018-07-20 NOTE — PHYSICAL THERAPY INITIAL EVALUATION ADULT - LEVEL OF INDEPENDENCE: SIT/STAND, REHAB EVAL
Slightly unsteady; increased time to complete task; posterior trunk and B/L axillary support./minimum assist (75% patients effort)/moderate assist (50% patients effort)

## 2018-07-20 NOTE — ADVANCED PRACTICE NURSE CONSULT - REASON FOR CONSULT
[facility] nurse consult to provide instruction on ileostomy pending patient's decision regarding surgery. Patient is a 68 yo F w/ metastatic colon ca s/p 1 dose of FOLFOX admitted for sepsis. She presented with fever of 101.7 in the setting of several days of exhaustion and weakness. Pt was sent to the ER by Dr. Thompson after she was seen in his office and was noted to be febrile with temp of 102 and tachycardic as well as having diffuse abdominal pain. She was noted to have leukocytosis and thrombocytosis and CT abdomen/pelvis showed colitis (ischemic vs infectious) and increase in disease burden in colon, liver and peritoneal carcinomatosis.

## 2018-07-20 NOTE — PROGRESS NOTE ADULT - ASSESSMENT
68 yo F w/ metastatic colon ca s/p 1 dose FOLFOX, recent acute cholecystitis (June 2018) c/b severe sepsis and hepatic vein thrombosis s/p percutaneous cholecystomy, and recent SBO/ ileus s/p venting PEG and 2 colonic stents admitted with severe sepsis and acute 2/2 GNR bacteremia, likely secondary to intraabdominal source and abdominal pain likely d/t partial bowel obstruction vs ileus 2/2 malignancy. Pt currently no longer meeting criteria for severe sepsis, clinically improving.

## 2018-07-20 NOTE — ADVANCED PRACTICE NURSE CONSULT - ASSESSMENT
Patient and friend receptive to teaching. Instructed patient on creation of ileostomy, using stoma model and photos of stomas. Also instructed patient on ostomy pouching system and what to expect post surgery related to the ostomy. Provided patient with Ileostomy teaching guide and sample of pouching system. Informed patient the WOCN would return to teach about ostomy care should she decided to have surgery. Patient verbalized understanding.

## 2018-07-20 NOTE — PHYSICAL THERAPY INITIAL EVALUATION ADULT - LEVEL OF INDEPENDENCE, REHAB EVAL
Pt rolled to left with trunk support and (R) UE reaching across; increased abdominal pain with rolling however pt wished to continue treatment; increased time to complete; fairly steady/moderate assist (50% patients effort)

## 2018-07-20 NOTE — PROGRESS NOTE ADULT - ASSESSMENT
68 yo F Colon ca with Liver and Peritoneal mets s/p FOLFOX x1, acute cholecystitis s/p percutaneous cholecystomy c/b portal vein thrombosis, LBO s/p sigmoid stent, presents this admission with sepsis and possible colitis.    in the last 24 hours patient is complaining from increased abdominal pain, passing minimal amount of gas, Tmax is 38.3, No evidence of leukocytosis, Abdomen is Soft, with generalized tenderness and distention, the patient current sepsis might be 2/2 to her colitis, given the patient peritoneal mets and generalized condition this will be a high risk procedure, would appreciate palliative and Hem/Onc follow up to establish goals of care with the patient (Hospice Vs diverting Ileostomy)    Plan:  - Keep NPO  - Cont IV Abx  - Serial abdominal exams   - Switch to heparin Drip and stop other Anticoagulant in case the patient needs surgical intervention   - Surgery (Dr. Marci Narvaez) to follow, call with any questions (team 4)    Seen and Examined with the Chief

## 2018-07-20 NOTE — PROGRESS NOTE ADULT - SUBJECTIVE AND OBJECTIVE BOX
HemOnc    Interval Hx: worsening pain last night and this am. She was trying to take spis of water. No vomiting. No BM    66 yo F w/ metastatic colon ca s/p 1 dose FOLFOX, recent acute cholecystitis (June 2018) c/b severe sepsis and hepatic vein thrombosis   s/p percutaneous cholecystomy, and recent SBO/ ileus s/p venting PEG and 2 colonic stents   admitted for severe sepsis   ? ischemic colitis with bacterial translocation vs colitis/enteritis      ROS:  + abdominal pain, no nausea or vomiting, no cough. No CP      Vital Signs Last 24 Hrs  T(C): 37.2 (20 Jul 2018 06:51), Max: 38.3 (20 Jul 2018 04:39)  T(F): 98.9 (20 Jul 2018 06:51), Max: 100.9 (20 Jul 2018 04:39)  HR: 120 (20 Jul 2018 06:51) (98 - 120)  BP: 127/89 (20 Jul 2018 06:51) (104/74 - 127/89)  BP(mean): --  RR: 20 (20 Jul 2018 06:51) (18 - 20)  SpO2: 97% (20 Jul 2018 06:51) (97% - 98%)  pale, ill appearing  s1s2 nml  abdo: distended, diffuse tenderness  PEG  ext: no edema, mild tenderness to palpation b/l      MEDICATIONS  (STANDING):  Biotene Dry Mouth Oral Rinse 5 milliLiter(s) Swish and Spit four times a day  edoxaban 60 milliGRAM(s) Oral daily  morphine  - Injectable 1 milliGRAM(s) IV Push two times a day  petrolatum white Ointment 1 Application(s) Topical two times a day  piperacillin/tazobactam IVPB. 3.375 Gram(s) IV Intermittent every 6 hours  potassium chloride   Powder 40 milliEquivalent(s) Oral once  simethicone drops 80 milliGRAM(s) Enteral Tube every 8 hours  sodium chloride 0.9%. 1000 milliLiter(s) (100 mL/Hr) IV Continuous <Continuous>    MEDICATIONS  (PRN):  acetaminophen   Tablet 650 milliGRAM(s) Oral every 6 hours PRN For Temp greater than 38 C (100.4 F)  morphine  - Injectable 2 milliGRAM(s) IV Push every 4 hours PRN Severe Pain (7 - 10)  morphine  - Injectable 1 milliGRAM(s) IV Push every 4 hours PRN Moderate Pain (4 - 6)  OLANZapine Disintegrating Tablet 5 milliGRAM(s) Oral daily PRN Nausea                                9.1    7.6   )-----------( 481      ( 19 Jul 2018 06:36 )             29.3         CBC Full  -  ( 19 Jul 2018 06:36 )  WBC Count : 7.6 K/uL  Hemoglobin : 9.1 g/dL  Hematocrit : 29.3 %  Platelet Count - Automated : 481 K/uL  Mean Cell Volume : 81.6 fL  Mean Cell Hemoglobin : 25.3 pg  Mean Cell Hemoglobin Concentration : 31.1 g/dL  Auto Neutrophil # : x  Auto Lymphocyte # : x  Auto Monocyte # : x  Auto Eosinophil # : x  Auto Basophil # : x  Auto Neutrophil % : 85.8 %  Auto Lymphocyte % : 8.1 %  Auto Monocyte % : 6.0 %  Auto Eosinophil % : 0.0 %  Auto Basophil % : 0.1 %        07-19    136  |  101  |  28<H>  ----------------------------<  104<H>  3.8   |  22  |  0.50    Ca    8.1<L>      19 Jul 2018 06:36  Mg     1.9     07-19

## 2018-07-20 NOTE — PHYSICAL THERAPY INITIAL EVALUATION ADULT - ADDITIONAL COMMENTS
PLOF of function received from patient. Modified independent with ambulation and transfers using a rollator which she owns. For ADLs/IADLs patient received help from a part-time HHA or cousin. Unclear on hours/days HHA available.

## 2018-07-20 NOTE — PROGRESS NOTE ADULT - PROBLEM SELECTOR PLAN 6
1) PCP Contacted on Admission:  Dr. Sandoval rodriguez pt here  Name & Phone #: Dr. Clark  2) Date of Contact with PCP:  3) PCP Contacted at Discharge: (Y/N)  4) Summary of Handoff Given to PCP:   5) Post-Discharge Appointment Date

## 2018-07-20 NOTE — PHYSICAL THERAPY INITIAL EVALUATION ADULT - LEVEL OF INDEPENDENCE: SUPINE/SIT, REHAB EVAL
moderate assist (50% patients effort)/Increased time to complete task; pt c/o increased abdominal pain 8/10 with movement however wished to continue PT session; once in left sidelying demo good use of UE to push off bed surface.

## 2018-07-20 NOTE — PROGRESS NOTE ADULT - PROBLEM SELECTOR PLAN 3
Reports pain and other symptoms prevents her from falling asleep.   Wrote for PRN Zaleplon qHS. Received dose of Zyprexa and Zaleplon last night.

## 2018-07-20 NOTE — PROGRESS NOTE ADULT - PROBLEM SELECTOR PLAN 1
Worsened since yesterday. Has used 3 doses of MSIR 2mg IV and 1 dose of 1mg in the last 24h. MDD: 21. Also written for PRN and pre procedure (PT/Imaging) doses.  Will change to standing regimen and patient may decline dose.

## 2018-07-20 NOTE — PHYSICAL THERAPY INITIAL EVALUATION ADULT - PERTINENT HX OF CURRENT PROBLEM, REHAB EVAL
66 yo F w/ metastatic colon ca s/p 1 dose FOLFOX, recent acute cholecystitis c/b severe sepsis and hepatic vein thrombosis s/p percutaneous cholecystomy, and recent partial SBO s/p PEG and 2 colonic stents. Please refer to H&P in [place] for additional details.

## 2018-07-20 NOTE — PROGRESS NOTE ADULT - SUBJECTIVE AND OBJECTIVE BOX
PILLO MAR             MRN-[id number]    CC: Chronic malignant pain, gas distension/pain, constipation, insomnia, GOC/AD, Support    HPI:  68 yo F w/ metastatic colon ca s/p 1 dose FOLFOX, recent acute cholecystitis c/b severe sepsis and hepatic vein thrombosis s/p percutaneous cholecystomy, and recent partial SBO s/p PEG and 2 colonic stents p/w fever of 101.7 in the setting of several days of exhaustion and weakness. Denies subjective fevers/ chills/ night sweats. Pt only came to hospital because sent by her oncologist when she went to get her 2nd dose of chemo and found to be febrile with WBC of 15. While in ED, pt reports developed worsening diffuse abdominal pain 8.5/10, worse in the upper abdomen. Pain is better with oxycodone. No change w/ position or food. Denies n/v/d or constipation. Last BM last night. Endorses poor PO intake due to decreased appetite since last admission.      SUBJECTIVE: Saw and evaluated Mrs Mar at bedside. She reports worsening of her symptoms since yesterday late in the day/night. She reports not sleeping because of anxiety, pain, chills, and insomnia. Patient received a dose of Zaleplon and reports some improvement.     ROS:  DYSPNEA: No  NAUS/VOM: Yes	  SECRETIONS: No	  AGITATION: No  Pain (Y/N):  Yes     -Provocation/Palliation: Movement increases her pain. Medication and remaining immobile improves it.  -Quality/Quantity: Chronic malignant somatic pain currently uncontrolled. Chronic malignant visceral pain currently uncontrolled.   -Radiating: No  -Severity: 7-8/10  -Timing/Frequency: Incidental  -Impact on ADLs: Yes, prevented from sleep    OTHER REVIEW OF SYSTEMS: Anxiety    PEx:  T(C): 37.3 (07-20-18 @ 08:45), Max: 38.3 (07-20-18 @ 04:39)  HR: 128 (07-20-18 @ 08:45) (98 - 128)  BP: 111/80 (07-20-18 @ 08:45) (104/74 - 127/89)  RR: 18 (07-20-18 @ 08:45) (18 - 20)  SpO2: 98% (07-20-18 @ 08:45) (97% - 98%)  Wt(kg): 73.9    General: AAOx3 In moderate pain and anxiety.  HEENT: NCAT  PERRL EOMI Non icteric Dry lips/mucosas  Neck: Supple, No masses  CVS: Tachycardic S1S2, no murmurs  Resp: CTABL  GI: Soft TTP Distension+. Venting PEG+  Musc: No C/C/E    Neuro: No focal deficits. Following commands.  Psych:  Anxious and Concerned  Skin: Xerosis  Lymph: Normal.  	 	     ALLERGIES: allergic to cats (Rash) No Known Drug Allergies    OPIATE NAÏVE (Y/N): No    MEDICATIONS: REVIEWED  MEDICATIONS  (STANDING):  Biotene Dry Mouth Oral Rinse 5 milliLiter(s) Swish and Spit four times a day  heparin  Infusion.  Unit(s)/Hr (13 mL/Hr) IV Continuous <Continuous>  heparin  Injectable 6000 Unit(s) IV Push once  morphine  - Injectable 1 milliGRAM(s) IV Push two times a day  petrolatum white Ointment 1 Application(s) Topical two times a day  piperacillin/tazobactam IVPB. 3.375 Gram(s) IV Intermittent every 6 hours  polyethylene glycol 3350 17 Gram(s) Oral daily  simethicone drops 80 milliGRAM(s) Enteral Tube every 8 hours  sodium chloride 0.9%. 1000 milliLiter(s) (100 mL/Hr) IV Continuous <Continuous>    MEDICATIONS  (PRN):  acetaminophen   Tablet 650 milliGRAM(s) Oral every 6 hours PRN For Temp greater than 38 C (100.4 F)  morphine  - Injectable 2 milliGRAM(s) IV Push every 4 hours PRN Severe Pain (7 - 10)  morphine  - Injectable 1 milliGRAM(s) IV Push every 4 hours PRN Moderate Pain (4 - 6)  OLANZapine Disintegrating Tablet 5 milliGRAM(s) Oral daily PRN Nausea  scopolamine   Patch 1 Patch Transdermal once PRN Abdominal cramps.  zaleplon 5 milliGRAM(s) Oral at bedtime PRN Insomnia    LABS: REVIEWED                        10.4   5.4   )-----------( 620      ( 20 Jul 2018 06:57 )             34.0   07-20    141  |  101  |  39<H>  ----------------------------<  124<H>  3.3<L>   |  21<L>  |  0.61    Ca    8.4      20 Jul 2018 06:57  Mg     2.2     07-20    Sodium, Random Urine: 20 mmol/L (07.18.18 @ 05:02)  Osmolality, Random Urine: 475 mosmol/kg (07.18.18 @ 04:57)    Lactate, Blood: 2.7 mmoL/L (07.18.18 @ 01:34)  Lactate, Blood: 1.8 mmoL/L (07.17.18 @ 15:35)  Lactate, Blood: 2.8 mmoL/L (07.17.18 @ 13:49)    Culture - Blood (07.17.18 @ 14:59)    Gram Stain: Anaerobic Bottle: Gram Negative Rods  Result called to and read back byLisa Anne MD  07/18/2018 15:08:07  ***Blood Panel PCR results on this specimen are available  approximately 3 hours after the Gram stain result.***  Gram stain, PCR, and/or culture results may not always  correspond due to difference in methodologies.  ************************************************************  This PCR assay was performed using [employer].  The following targets are tested for: Enterococcus,  vancomycin resistant enterococci, Listeria monocytogenes,  coagulase negative staphylococci, S. aureus,  methicillin resistant S. aureus, Streptococcus agalactiae  (Group B), S. pneumoniae, S. pyogenes (Group A),  Acinetobacter baumannii, Enterobacter cloacae, E. coli,  Klebsiella oxytoca, K. pneumoniae, Proteus sp.,  Serratia marcescens, Haemophilus influenzae,  Neisseria meningitidis, Pseudomonas aeruginosa, Candida  albicans, C. glabrata, C krusei, C parapsilosis,  C. tropicalis and the KPC resistance gene.  "Due to technical problems, Proteus sp. will Not be reported as part of  the BCID panel until further notice"    Specimen Source: .Blood Blood-Peripheral    Organism: Blood Culture PCR    Culture Results:   Growth in anaerobic bottle: Bacteroides fragilis  Beta lactamase negative  Susceptibility to follow.    Organism Identification: Blood Culture PCR    Method Type: PCR    IMAGING: REVIEWED    Advanced Directives:     Full Code     MOLST in [place]    Decision maker: The patient is able to participate in complex medical decision making conversations  Legal surrogate: No designated HCP, but the patient was thinking of assigning her sister Rama Fine [phone] from [place] and her friend Robyn Sam [phone] in [facility].    GOALS OF CARE DISCUSSION       Palliative care info/counseling provided	             See previous Palliative Medicine Note       Documentation of GOC: Pt would like to have underlying sepsis treated and then continue with cancer targeted treatments.          REFERRALS	        Unit SW/Case Mgmt       Patient/Family Support       Massage Therapy       Music Therapy       Nutrition / Dietician       PT/OT

## 2018-07-20 NOTE — PROGRESS NOTE ADULT - PROBLEM SELECTOR PLAN 4
POA. found on last admission, likely 2/2 hypercoagulable state in the setting of active untreated malignancy  -c/w edoxaban home med POA. found on last admission, likely 2/2 hypercoagulable state in the setting of active untreated malignancy  -d/c edoxaban and switch to heparin drip per surgery

## 2018-07-20 NOTE — PROGRESS NOTE ADULT - SUBJECTIVE AND OBJECTIVE BOX
Subjective:  Complaining from abdominal pain that's worse than yesterday, passing a minimal amount of gas, didn't pass any stool since monday, Tmax 38.3 yesterday    Vital Signs Last 24 Hrs  T(C): 37.3 (20 Jul 2018 08:45), Max: 38.3 (20 Jul 2018 04:39)  T(F): 99.1 (20 Jul 2018 08:45), Max: 100.9 (20 Jul 2018 04:39)  HR: 128 (20 Jul 2018 08:45) (98 - 128)  BP: 111/80 (20 Jul 2018 08:45) (104/74 - 127/89)  BP(mean): --  RR: 18 (20 Jul 2018 08:45) (18 - 20)  SpO2: 98% (20 Jul 2018 08:45) (97% - 98%)    Physical Exam:  General: NAD, resting comfortably in bed  Pulmonary: Nonlabored breathing, no respiratory distress  Cardiovascular: NSR  Abdominal:  Venting PEG in place  Abdomen is Soft, mild distention and mild generalized tenderness        LABS:                        10.4   5.4   )-----------( 620      ( 20 Jul 2018 06:57 )             34.0     07-20    141  |  101  |  39<H>  ----------------------------<  124<H>  3.3<L>   |  21<L>  |  0.61    Ca    8.4      20 Jul 2018 06:57  Mg     2.2     07-20        CAPILLARY BLOOD GLUCOSE

## 2018-07-20 NOTE — PHYSICAL THERAPY INITIAL EVALUATION ADULT - LEVEL OF INDEPENDENCE: GAIT, REHAB EVAL
minimum assist (75% patients effort)/Fairly steady with no LOBs noted and increased time to complete task; distance limited by pain; patient stated "legs felt OK" but 8/10 abdominal pain with movement

## 2018-07-20 NOTE — PROGRESS NOTE ADULT - SUBJECTIVE AND OBJECTIVE BOX
OVERNIGHT EVENTS: Overnight, pt had a fever to 100.9 at 4:30am, was given tylenol with reduction in temperature to 98.9. Pt was c/o of abdominal pain and increasing distension which she attributes to having had outside beverages (winston beer and cranberry juice). Pain improved with morphine.    SUBJECTIVE / INTERVAL HPI: Patient seen and examined at bedside. Currently c/o 6/10 diffuse abdominal pain also c/o constipation and no BM since Monday night. Venting peg was connected to gravity overnight with output of 1500cc brown/pinkish fluid likely secondary to cranberry juice. Currently denies nausea, vomiting, chills, diarrhea.     VITAL SIGNS:  Vital Signs Last 24 Hrs  T(C): 37.2 (20 Jul 2018 06:51), Max: 38.3 (20 Jul 2018 04:39)  T(F): 98.9 (20 Jul 2018 06:51), Max: 100.9 (20 Jul 2018 04:39)  HR: 120 (20 Jul 2018 06:51) (98 - 120)  BP: 127/89 (20 Jul 2018 06:51) (104/74 - 127/89)  BP(mean): --  RR: 20 (20 Jul 2018 06:51) (18 - 20)  SpO2: 97% (20 Jul 2018 06:51) (97% - 98%)    PHYSICAL EXAM:  Constitutional: WDWN , laying in bed, visibly pain (pt was just previously bathed and stated the motion exacerbated her pain)  Head: NC/AT, no scleral icterus, MMM  Respiratory: CTA B/L; no W/R/R, no retractions  Cardiac: +S1/S2; RRR; no M/R/G  Gastrointestinal: soft, +distended, +moderately tender to palpation (most pronounced today in RUQ and RLQ), no guarding; no rebound; + hypoactive BSx4; PEG site and percutaneous cystostomy sites are clean, dry and intact, no edema, erythema, or purulence  Extremities: WWP, no clubbing or cyanosis; no peripheral edema; b/l LE mildly tender to palpation which pt states is chronic  Dermatologic: skin warm, dry and intact; no rashes, wounds, or scars;   Neurologic: AAOx3; no focal deficits  Psych: normal affect  Lymph: no LAD  Neuro: AA&Ox3, No focal deficits    MEDICATIONS:  MEDICATIONS  (STANDING):  Biotene Dry Mouth Oral Rinse 5 milliLiter(s) Swish and Spit four times a day  edoxaban 60 milliGRAM(s) Oral daily  morphine  - Injectable 1 milliGRAM(s) IV Push two times a day  petrolatum white Ointment 1 Application(s) Topical two times a day  piperacillin/tazobactam IVPB. 3.375 Gram(s) IV Intermittent every 6 hours  polyethylene glycol 3350 17 Gram(s) Oral daily  simethicone drops 80 milliGRAM(s) Enteral Tube every 8 hours  sodium chloride 0.9%. 1000 milliLiter(s) (100 mL/Hr) IV Continuous <Continuous>    MEDICATIONS  (PRN):  acetaminophen   Tablet 650 milliGRAM(s) Oral every 6 hours PRN For Temp greater than 38 C (100.4 F)  morphine  - Injectable 2 milliGRAM(s) IV Push every 4 hours PRN Severe Pain (7 - 10)  morphine  - Injectable 1 milliGRAM(s) IV Push every 4 hours PRN Moderate Pain (4 - 6)  OLANZapine Disintegrating Tablet 5 milliGRAM(s) Oral daily PRN Nausea  scopolamine   Patch 1 Patch Transdermal once PRN Abdominal cramps.  zaleplon 5 milliGRAM(s) Oral at bedtime PRN Insomnia      ALLERGIES:  Allergies    allergic to cats (Rash)  No Known Drug Allergies    Intolerances        LABS:                        10.4   5.4   )-----------( 620      ( 20 Jul 2018 06:57 )             34.0     07-20    141  |  101  |  39<H>  ----------------------------<  124<H>  3.3<L>   |  21<L>  |  0.61    Ca    8.4      20 Jul 2018 06:57  Mg     2.2     07-20      CAPILLARY BLOOD GLUCOSE      RADIOLOGY & ADDITIONAL TESTS: Reviewed.    Consultations:  GI-  Surgery-  Hem/onc-  PT-   Palliative- OVERNIGHT EVENTS: Overnight, pt had a fever to 100.9 at 4:30am, was given tylenol with reduction in temperature to 98.9. Pt was c/o of worsening 8/10 abdominal pain and increasing distension which she attributes to having had outside beverages (winston beer and cranberry juice). Pain improved with morphine.    SUBJECTIVE / INTERVAL HPI: Patient seen and examined at bedside. Currently c/o 6/10 diffuse abdominal pain worsened from yesterday also c/o constipation and no BM since Monday night. Venting peg was connected to gravity overnight with output of 1500cc brown/pinkish fluid likely secondary to cranberry juice. Currently denies nausea, vomiting, chills, diarrhea.     VITAL SIGNS:  Vital Signs Last 24 Hrs  T(C): 37.2 (20 Jul 2018 06:51), Max: 38.3 (20 Jul 2018 04:39)  T(F): 98.9 (20 Jul 2018 06:51), Max: 100.9 (20 Jul 2018 04:39)  HR: 120 (20 Jul 2018 06:51) (98 - 120)  BP: 127/89 (20 Jul 2018 06:51) (104/74 - 127/89)  BP(mean): --  RR: 20 (20 Jul 2018 06:51) (18 - 20)  SpO2: 97% (20 Jul 2018 06:51) (97% - 98%)    PHYSICAL EXAM:  Constitutional: WDWN , laying in bed, visibly pain (pt was just previously bathed and stated the motion exacerbated her pain)  Head: NC/AT, no scleral icterus, dry MM  Respiratory: CTA B/L; no W/R/R, no retractions  Cardiac: +S1/S2; RRR; no M/R/G  Gastrointestinal: soft, +distended, +moderately tender to palpation (most pronounced today in RUQ and RLQ), no guarding; no rebound; + hypoactive BSx4; PEG site and percutaneous cystostomy sites are clean, dry and intact, no edema, erythema, or purulence  Extremities: WWP, no clubbing or cyanosis; no peripheral edema; b/l LE mildly tender to palpation which pt states is chronic  Dermatologic: skin warm, dry and intact; no rashes, wounds, or scars;   Neurologic: AAOx3; no focal deficits  Psych: normal affect  Lymph: no LAD  Neuro: AA&Ox3, No focal deficits    MEDICATIONS:  MEDICATIONS  (STANDING):  Biotene Dry Mouth Oral Rinse 5 milliLiter(s) Swish and Spit four times a day  edoxaban 60 milliGRAM(s) Oral daily  morphine  - Injectable 1 milliGRAM(s) IV Push two times a day  petrolatum white Ointment 1 Application(s) Topical two times a day  piperacillin/tazobactam IVPB. 3.375 Gram(s) IV Intermittent every 6 hours  polyethylene glycol 3350 17 Gram(s) Oral daily  simethicone drops 80 milliGRAM(s) Enteral Tube every 8 hours  sodium chloride 0.9%. 1000 milliLiter(s) (100 mL/Hr) IV Continuous <Continuous>    MEDICATIONS  (PRN):  acetaminophen   Tablet 650 milliGRAM(s) Oral every 6 hours PRN For Temp greater than 38 C (100.4 F)  morphine  - Injectable 2 milliGRAM(s) IV Push every 4 hours PRN Severe Pain (7 - 10)  morphine  - Injectable 1 milliGRAM(s) IV Push every 4 hours PRN Moderate Pain (4 - 6)  OLANZapine Disintegrating Tablet 5 milliGRAM(s) Oral daily PRN Nausea  scopolamine   Patch 1 Patch Transdermal once PRN Abdominal cramps.  zaleplon 5 milliGRAM(s) Oral at bedtime PRN Insomnia      ALLERGIES:  Allergies    allergic to cats (Rash)  No Known Drug Allergies    Intolerances        LABS:                        10.4   5.4   )-----------( 620      ( 20 Jul 2018 06:57 )             34.0     07-20    141  |  101  |  39<H>  ----------------------------<  124<H>  3.3<L>   |  21<L>  |  0.61    Ca    8.4      20 Jul 2018 06:57  Mg     2.2     07-20      CAPILLARY BLOOD GLUCOSE      RADIOLOGY & ADDITIONAL TESTS: Reviewed.    Consultations: notes reviewed and recommendations appreciated  GI-continuing to follow  Surgery- considering intervention  Hem/onc- will consider resuming chemo if condition improves significantly  PT- recommended 2-3x/week and dispo to [facility]   Palliative-goals of care, pt would like to be treated for current infection and ultimately resume chemotherapy for cancer treatment; music therapy, massage therapy, nutrition, PT/OT

## 2018-07-20 NOTE — PROGRESS NOTE ADULT - PROBLEM SELECTOR PLAN 7
Hb 9, at baseline, low MCV, high RDW consistent with iron def. Not new, s/p 1 iron transfusion in the past.  -daily cbc  -maintain active t+s  -transfuse <7    #leukopenia  - clinically resolved (3.6--> 7.6 today)  - likely secondary to infection in setting of severe sepsis  - continue to trend CBC  #thrombocytosis  -clinically improving (534--> 481 today)  - likely reactive in the setting of acute infection, will continue to trend

## 2018-07-20 NOTE — PHYSICAL THERAPY INITIAL EVALUATION ADULT - THERAPY FREQUENCY, PT EVAL
Patient educated on frequency of inpatient therapy at [facility], patient verbalized understanding./2-3x/week

## 2018-07-20 NOTE — PHYSICAL THERAPY INITIAL EVALUATION ADULT - GENERAL OBSERVATIONS, REHAB EVAL
Chart reviewed. IE Completed. RACHEL Ibarra cleared for tx. C/o abdominal pain at rest, yet agreeable to PT. Pt received supine, NAD, +PEG, +cholecystomy. Chart reviewed. IE Completed. RACHEL Ibarra cleared for tx. C/o abdominal pain at rest, yet agreeable to PT. Pt received supine, NAD, +(L) IV, +PEG, +cholecystomy.

## 2018-07-20 NOTE — PROGRESS NOTE ADULT - ASSESSMENT
68 YO F w/ metastatic colon ca s/p 1 dose FOLFOX, recent acute cholecystitis c/b severe sepsis and hepatic vein thrombosis s/p percutaneous cholecystomy, and recent SBO/ ileus s/p PEG and 2 colonic stents sent to [facility] ED by Dr. Richards for fever of 102.5, associated with poor appetite and weakness x 4 days found to have SIRS.     # Sepsis 2/2 GNR bacteremia 2/2 likely intra-abdominal etiology  - Pt with previous colonoscopy suspicious for malignant infarcts which may cause ischemic colitis with bacterial translocation which may explain her SIRS vs SBP 2/2 bacterial translocation vs colitis/enteritis vs malignant fever vs cholecystitis  - Blood culture significant for GNR, speciation consistent with B. fragilis  - Urine culture negative  - C/w empiric Abx    # Abdominal pain likely d/t partial bowel obstruction vs ileus 2/2 malignancy  - Recommend placing venting PEG to gravity and can use PRN for abdominal distension and pain  - Per surgery NPO, pending possible diverting ileostomy  - Recommend trial of octreotide 30 mcg SQ TID to aid in small bowel motility  - Avoid opiates  - No further acute endoscopic intervention at this time    # Metastatic CRC  - Appreciate Onc recs  - Appreciate palliative care recs   - Possible diverting ileostomy per surgery    Case d/w Dr. Hidalgo

## 2018-07-20 NOTE — PROGRESS NOTE ADULT - ASSESSMENT
68 yo F with metastatic colon cancer, BRAF mutated.   s/p 1 cycle of FOLFOX.   recent cholecystitis   hx of SBO/ileus s/p venting PEG  2 colonic stents  admitted with severe sepsis - high suspicion for intraabdominal source    Worsening abdominal pain/distention this am  on vanc and zosyn  blood cultures: GNB  To [id number] with Dr. Narvaez    future chemotherapy and scheduling will depend on patient's clinical status.   I doubt that she can be a candidate for treatment unless her condition improves significantly

## 2018-07-20 NOTE — PROGRESS NOTE ADULT - ATTENDING COMMENTS
Patient seen and examined at bedside.     Agree with exam and plan as above with following addendum:    Spiked to 100.9 overnight, with tachycardia and worsening abdominal pain. Concern for worsening SBO vs infection. Is on Zosyn and awaiting sensitivities of Bacteroides. Patient currently would like to pursue further care. Hem/Onc stating that overall she would be a poor candidate for further intervention. Surgery also awaiting GOC discussion for further management (possible OR for diverting ileostomy).     In the interim, with new anion gap acidosis, Would repeat BMP and obtain lactate, mild JIM as well so will give additional IVF. Switch NOAC to heparin gtt in case she goes to OR. Will call lab for sensitivities and de-escalate if possible. Repeat EKG for Qtc. Palliative assisting with pain control.

## 2018-07-20 NOTE — PROGRESS NOTE ADULT - PROBLEM SELECTOR PLAN 3
Pt of Dr. Nick ([phone]). recently dxd in 6/18. s/p chemoport placement 5/31, initiation of chemotherapy 6/9 with FOLFOX, received 1 dose so far, with 2nd tx planned for 7/17 but not given due to fever.  -pal care consulted- currently recommending symptomatic treatment and -reassessment of goals of care when medically stabilized  -Continue to f/u with Dr. Nick recs- resuming chemo outpt will depend on pts clinical status Pt of Dr. Nick ([phone]). recently dxd in 6/18. s/p chemoport placement 5/31, initiation of chemotherapy 6/9 with FOLFOX, received 1 dose so far, with 2nd tx planned for 7/17 but not given due to fever.  -pal care consulted- currently recommending symptomatic treatment and -reassessment of goals of care when medically stabilized  -f/u recs from gi, hem/onc, palliative  - f/u PT evaluation

## 2018-07-21 LAB
-  AMOXICILLIN/CLAVULANIC ACID: SIGNIFICANT CHANGE UP
-  CLINDAMYCIN: SIGNIFICANT CHANGE UP
-  METRONIDAZOLE: SIGNIFICANT CHANGE UP
ALBUMIN SERPL ELPH-MCNC: 1.8 G/DL — LOW (ref 3.3–5)
ALP SERPL-CCNC: 203 U/L — HIGH (ref 40–120)
ALT FLD-CCNC: 12 U/L — SIGNIFICANT CHANGE UP (ref 10–45)
ANION GAP SERPL CALC-SCNC: 14 MMOL/L — SIGNIFICANT CHANGE UP (ref 5–17)
APTT BLD: 155.5 SEC — CRITICAL HIGH (ref 27.5–37.4)
APTT BLD: 43.8 SEC — HIGH (ref 27.5–37.4)
AST SERPL-CCNC: 24 U/L — SIGNIFICANT CHANGE UP (ref 10–40)
BILIRUB SERPL-MCNC: 1 MG/DL — SIGNIFICANT CHANGE UP (ref 0.2–1.2)
BLD GP AB SCN SERPL QL: NEGATIVE — SIGNIFICANT CHANGE UP
BUN SERPL-MCNC: 43 MG/DL — HIGH (ref 7–23)
CALCIUM SERPL-MCNC: 7.9 MG/DL — LOW (ref 8.4–10.5)
CHLORIDE SERPL-SCNC: 105 MMOL/L — SIGNIFICANT CHANGE UP (ref 96–108)
CO2 SERPL-SCNC: 21 MMOL/L — LOW (ref 22–31)
CREAT SERPL-MCNC: 0.93 MG/DL — SIGNIFICANT CHANGE UP (ref 0.5–1.3)
GLUCOSE SERPL-MCNC: 126 MG/DL — HIGH (ref 70–99)
HCT VFR BLD CALC: 28.9 % — LOW (ref 34.5–45)
HGB BLD-MCNC: 9 G/DL — LOW (ref 11.5–15.5)
INR BLD: 1.64 — HIGH (ref 0.88–1.16)
LACTATE SERPL-SCNC: 2.6 MMOL/L — HIGH (ref 0.5–2)
MAGNESIUM SERPL-MCNC: 2.1 MG/DL — SIGNIFICANT CHANGE UP (ref 1.6–2.6)
MCHC RBC-ENTMCNC: 25.1 PG — LOW (ref 27–34)
MCHC RBC-ENTMCNC: 31.1 G/DL — LOW (ref 32–36)
MCV RBC AUTO: 80.7 FL — SIGNIFICANT CHANGE UP (ref 80–100)
METHOD TYPE: SIGNIFICANT CHANGE UP
PLATELET # BLD AUTO: 536 K/UL — HIGH (ref 150–400)
POTASSIUM SERPL-MCNC: 3.9 MMOL/L — SIGNIFICANT CHANGE UP (ref 3.5–5.3)
POTASSIUM SERPL-SCNC: 3.9 MMOL/L — SIGNIFICANT CHANGE UP (ref 3.5–5.3)
PROT SERPL-MCNC: 5.5 G/DL — LOW (ref 6–8.3)
PROTHROM AB SERPL-ACNC: 18.4 SEC — HIGH (ref 9.8–12.7)
RBC # BLD: 3.58 M/UL — LOW (ref 3.8–5.2)
RBC # FLD: 23.3 % — HIGH (ref 10.3–16.9)
RH IG SCN BLD-IMP: POSITIVE — SIGNIFICANT CHANGE UP
SODIUM SERPL-SCNC: 140 MMOL/L — SIGNIFICANT CHANGE UP (ref 135–145)
WBC # BLD: 10.6 K/UL — HIGH (ref 3.8–10.5)
WBC # FLD AUTO: 10.6 K/UL — HIGH (ref 3.8–10.5)

## 2018-07-21 PROCEDURE — 99233 SBSQ HOSP IP/OBS HIGH 50: CPT

## 2018-07-21 PROCEDURE — 74177 CT ABD & PELVIS W/CONTRAST: CPT | Mod: 26

## 2018-07-21 RX ORDER — FLUCONAZOLE 150 MG/1
200 TABLET ORAL ONCE
Qty: 0 | Refills: 0 | Status: COMPLETED | OUTPATIENT
Start: 2018-07-21 | End: 2018-07-21

## 2018-07-21 RX ORDER — HEPARIN SODIUM 5000 [USP'U]/ML
1100 INJECTION INTRAVENOUS; SUBCUTANEOUS
Qty: 25000 | Refills: 0 | Status: DISCONTINUED | OUTPATIENT
Start: 2018-07-21 | End: 2018-07-27

## 2018-07-21 RX ORDER — ONDANSETRON 8 MG/1
4 TABLET, FILM COATED ORAL EVERY 6 HOURS
Qty: 0 | Refills: 0 | Status: DISCONTINUED | OUTPATIENT
Start: 2018-07-21 | End: 2018-07-28

## 2018-07-21 RX ORDER — FLUCONAZOLE 150 MG/1
TABLET ORAL
Qty: 0 | Refills: 0 | Status: DISCONTINUED | OUTPATIENT
Start: 2018-07-21 | End: 2018-07-25

## 2018-07-21 RX ORDER — FLUCONAZOLE 150 MG/1
200 TABLET ORAL EVERY 24 HOURS
Qty: 0 | Refills: 0 | Status: DISCONTINUED | OUTPATIENT
Start: 2018-07-22 | End: 2018-07-25

## 2018-07-21 RX ADMIN — HYDROMORPHONE HYDROCHLORIDE 0.25 MILLIGRAM(S): 2 INJECTION INTRAMUSCULAR; INTRAVENOUS; SUBCUTANEOUS at 10:46

## 2018-07-21 RX ADMIN — HYDROMORPHONE HYDROCHLORIDE 0.25 MILLIGRAM(S): 2 INJECTION INTRAMUSCULAR; INTRAVENOUS; SUBCUTANEOUS at 01:37

## 2018-07-21 RX ADMIN — HYDROMORPHONE HYDROCHLORIDE 0.25 MILLIGRAM(S): 2 INJECTION INTRAMUSCULAR; INTRAVENOUS; SUBCUTANEOUS at 23:00

## 2018-07-21 RX ADMIN — POLYETHYLENE GLYCOL 3350 17 GRAM(S): 17 POWDER, FOR SOLUTION ORAL at 12:27

## 2018-07-21 RX ADMIN — HYDROMORPHONE HYDROCHLORIDE 0.25 MILLIGRAM(S): 2 INJECTION INTRAMUSCULAR; INTRAVENOUS; SUBCUTANEOUS at 14:08

## 2018-07-21 RX ADMIN — HYDROMORPHONE HYDROCHLORIDE 0.25 MILLIGRAM(S): 2 INJECTION INTRAMUSCULAR; INTRAVENOUS; SUBCUTANEOUS at 01:27

## 2018-07-21 RX ADMIN — Medication 5 MILLILITER(S): at 06:26

## 2018-07-21 RX ADMIN — Medication 5 MILLILITER(S): at 18:19

## 2018-07-21 RX ADMIN — HYDROMORPHONE HYDROCHLORIDE 0.25 MILLIGRAM(S): 2 INJECTION INTRAMUSCULAR; INTRAVENOUS; SUBCUTANEOUS at 18:19

## 2018-07-21 RX ADMIN — IOHEXOL 30 MILLILITER(S): 300 INJECTION, SOLUTION INTRAVENOUS at 00:11

## 2018-07-21 RX ADMIN — SODIUM CHLORIDE 125 MILLILITER(S): 9 INJECTION INTRAMUSCULAR; INTRAVENOUS; SUBCUTANEOUS at 00:18

## 2018-07-21 RX ADMIN — HYDROMORPHONE HYDROCHLORIDE 0.25 MILLIGRAM(S): 2 INJECTION INTRAMUSCULAR; INTRAVENOUS; SUBCUTANEOUS at 06:27

## 2018-07-21 RX ADMIN — PIPERACILLIN AND TAZOBACTAM 200 GRAM(S): 4; .5 INJECTION, POWDER, LYOPHILIZED, FOR SOLUTION INTRAVENOUS at 12:27

## 2018-07-21 RX ADMIN — Medication 1 APPLICATION(S): at 18:02

## 2018-07-21 RX ADMIN — HYDROMORPHONE HYDROCHLORIDE 0.25 MILLIGRAM(S): 2 INJECTION INTRAMUSCULAR; INTRAVENOUS; SUBCUTANEOUS at 06:42

## 2018-07-21 RX ADMIN — PIPERACILLIN AND TAZOBACTAM 200 GRAM(S): 4; .5 INJECTION, POWDER, LYOPHILIZED, FOR SOLUTION INTRAVENOUS at 23:37

## 2018-07-21 RX ADMIN — PIPERACILLIN AND TAZOBACTAM 200 GRAM(S): 4; .5 INJECTION, POWDER, LYOPHILIZED, FOR SOLUTION INTRAVENOUS at 06:26

## 2018-07-21 RX ADMIN — SIMETHICONE 80 MILLIGRAM(S): 80 TABLET, CHEWABLE ORAL at 14:10

## 2018-07-21 RX ADMIN — HYDROMORPHONE HYDROCHLORIDE 0.25 MILLIGRAM(S): 2 INJECTION INTRAMUSCULAR; INTRAVENOUS; SUBCUTANEOUS at 14:20

## 2018-07-21 RX ADMIN — FLUCONAZOLE 100 MILLIGRAM(S): 150 TABLET ORAL at 19:37

## 2018-07-21 RX ADMIN — Medication 5 MILLILITER(S): at 23:36

## 2018-07-21 RX ADMIN — HYDROMORPHONE HYDROCHLORIDE 0.25 MILLIGRAM(S): 2 INJECTION INTRAMUSCULAR; INTRAVENOUS; SUBCUTANEOUS at 22:29

## 2018-07-21 RX ADMIN — Medication 5 MILLILITER(S): at 12:27

## 2018-07-21 RX ADMIN — SIMETHICONE 80 MILLIGRAM(S): 80 TABLET, CHEWABLE ORAL at 06:26

## 2018-07-21 RX ADMIN — Medication 1 APPLICATION(S): at 06:31

## 2018-07-21 RX ADMIN — HEPARIN SODIUM 1100 UNIT(S)/HR: 5000 INJECTION INTRAVENOUS; SUBCUTANEOUS at 21:30

## 2018-07-21 RX ADMIN — PIPERACILLIN AND TAZOBACTAM 200 GRAM(S): 4; .5 INJECTION, POWDER, LYOPHILIZED, FOR SOLUTION INTRAVENOUS at 18:19

## 2018-07-21 RX ADMIN — HYDROMORPHONE HYDROCHLORIDE 0.25 MILLIGRAM(S): 2 INJECTION INTRAMUSCULAR; INTRAVENOUS; SUBCUTANEOUS at 10:26

## 2018-07-21 RX ADMIN — HYDROMORPHONE HYDROCHLORIDE 0.25 MILLIGRAM(S): 2 INJECTION INTRAMUSCULAR; INTRAVENOUS; SUBCUTANEOUS at 18:49

## 2018-07-21 RX ADMIN — SODIUM CHLORIDE 125 MILLILITER(S): 9 INJECTION INTRAMUSCULAR; INTRAVENOUS; SUBCUTANEOUS at 07:35

## 2018-07-21 RX ADMIN — PIPERACILLIN AND TAZOBACTAM 200 GRAM(S): 4; .5 INJECTION, POWDER, LYOPHILIZED, FOR SOLUTION INTRAVENOUS at 00:16

## 2018-07-21 RX ADMIN — Medication 5 MILLILITER(S): at 00:51

## 2018-07-21 NOTE — PROGRESS NOTE ADULT - PROBLEM SELECTOR PLAN 3
Pt of Dr. Nick ([phone]). recently dxd in 6/18. s/p chemoport placement 5/31, initiation of chemotherapy 6/9 with FOLFOX, received 1 dose so far, with 2nd tx planned for 7/17 but not given due to fever.  -pal care consulted- currently recommending symptomatic treatment and -reassessment of goals of care when medically stabilized  -f/u recs from gi, hem/onc, palliative  - c/w PT

## 2018-07-21 NOTE — PROGRESS NOTE ADULT - PROBLEM SELECTOR PLAN 4
POA. found on last admission, likely 2/2 hypercoagulable state in the setting of active untreated malignancy  -c/w heparin

## 2018-07-21 NOTE — PROGRESS NOTE ADULT - ASSESSMENT
66 yo F w/ metastatic colon ca s/p 1 dose FOLFOX, recent acute cholecystitis (June 2018) c/b severe sepsis and hepatic vein thrombosis s/p percutaneous cholecystomy, and recent SBO/ ileus s/p venting PEG and 2 colonic stents admitted with severe sepsis and acute 2/2 GNR bacteremia, likely secondary to intraabdominal source and abdominal pain likely d/t partial bowel obstruction vs ileus 2/2 malignancy. Pt currently no longer meeting criteria for severe sepsis, clinically improving.

## 2018-07-21 NOTE — PROGRESS NOTE ADULT - ASSESSMENT
68 yo F Colon ca with Liver and Peritoneal mets s/p FOLFOX x1, acute cholecystitis s/p percutaneous cholecystomy c/b portal vein thrombosis, LBO s/p sigmoid stent, presents this admission with sepsis and possible colitis.    CT revealed pneumoperitoneum with ascites. Team discussed options with patient including OR vs. IR. Patient is not operative candidate 2/2 carcinomatosis involving mesentery.     Plan:  - Recommend IR drainage of ascites fluid  - Keep NPO  - Cont IV Abx  - Serial abdominal exams   - Switch to heparin Drip and stop other Anticoagulant in case the patient needs surgical intervention   - Surgery (Dr. Marci Narvaez) to follow, call with any questions (team 4)  - d/w chief and attending

## 2018-07-21 NOTE — PROGRESS NOTE ADULT - ASSESSMENT
68 yo F with metastatic colon cancer, BRAF mutated.   s/p 1 cycle of FOLFOX.   recent cholecystitis   hx of SBO/ileus s/p venting PEG  2 colonic stents  admitted with severe sepsis - high suspicion for intraabdominal source    Today - pain and distension better.  on broad spectrum antibiotics  blood cultures: GNB    s/p CT scan - await reading    future chemotherapy and scheduling will depend on patient's clinical status.   I doubt that she can be a candidate for treatment unless her condition improves significantly.  Discussed with patient today.

## 2018-07-21 NOTE — PROGRESS NOTE ADULT - SUBJECTIVE AND OBJECTIVE BOX
HemOnc    Interval Hx:   feeling much better this morning  no nausea or vomiting    s/p CT scan - awaiting reading     66 yo F w/ metastatic colon ca s/p 1 dose FOLFOX, recent acute cholecystitis (June 2018) c/b severe sepsis and hepatic vein thrombosis   s/p percutaneous cholecystomy, and recent SBO/ ileus s/p venting PEG and 2 colonic stents   admitted for severe sepsis   ? ischemic colitis with bacterial translocation vs colitis/enteritis      ROS:  + abdominal pain, no nausea or vomiting, no cough. No CP      Vital Signs Last 24 Hrs  T(C): 36.8 (21 Jul 2018 05:47), Max: 37.8 (20 Jul 2018 19:45)  T(F): 98.3 (21 Jul 2018 05:47), Max: 100.1 (20 Jul 2018 19:45)  HR: 104 (21 Jul 2018 05:47) (104 - 125)  BP: 130/92 (21 Jul 2018 05:47) (102/73 - 130/92)  BP(mean): --  RR: 19 (21 Jul 2018 05:47) (17 - 20)  SpO2: 96% (21 Jul 2018 05:47) (96% - 98%)      pale, ill appearing  s1s2 nml  abdo: soft, generalized tenderness   PEG  ext: no edema, mild tenderness to palpation b/l      MEDICATIONS  (STANDING):  Biotene Dry Mouth Oral Rinse 5 milliLiter(s) Swish and Spit four times a day  edoxaban 60 milliGRAM(s) Oral daily  morphine  - Injectable 1 milliGRAM(s) IV Push two times a day  petrolatum white Ointment 1 Application(s) Topical two times a day  piperacillin/tazobactam IVPB. 3.375 Gram(s) IV Intermittent every 6 hours  potassium chloride   Powder 40 milliEquivalent(s) Oral once  simethicone drops 80 milliGRAM(s) Enteral Tube every 8 hours    CBC Full  -  ( 21 Jul 2018 07:37 )  WBC Count : 10.6 K/uL  Hemoglobin : 9.0 g/dL  Hematocrit : 28.9 %  Platelet Count - Automated : 536 K/uL  Mean Cell Volume : 80.7 fL  Mean Cell Hemoglobin : 25.1 pg  Mean Cell Hemoglobin Concentration : 31.1 g/dL      07-21    140  |  105  |  43<H>  ----------------------------<  126<H>  3.9   |  21<L>  |  0.93    Ca    7.9<L>      21 Jul 2018 07:37  Mg     2.1     07-21    TPro  5.5<L>  /  Alb  1.8<L>  /  TBili  1.0  /  DBili  x   /  AST  24  /  ALT  12  /  AlkPhos  203<H>  07-21      PT/INR - ( 21 Jul 2018 08:03 )   PT: 18.4 sec;   INR: 1.64          PTT - ( 20 Jul 2018 12:52 )  PTT:37.6 secsodium chloride 0.9%. 1000 milliLiter(s) (100 mL/Hr) IV Continuous <Continuous>    MEDICATIONS  (PRN):  acetaminophen   Tablet 650 milliGRAM(s) Oral every 6 hours PRN For Temp greater than 38 C (100.4 F)  morphine  - Injectable 2 milliGRAM(s) IV Push every 4 hours PRN Severe Pain (7 - 10)  morphine  - Injectable 1 milliGRAM(s) IV Push every 4 hours PRN Moderate Pain (4 - 6)  OLANZapine Disintegrating Tablet 5 milliGRAM(s) Oral daily PRN Nausea

## 2018-07-21 NOTE — PROGRESS NOTE ADULT - ATTENDING COMMENTS
Patient was seen and examined by me at bedside. I agree with resident's note, subjective, objective physical exam, assessment and plan with following modifications/additions.    1) Bowel perforation  2) Severe sepsis 2/2 bacteremia  3) SBO with bowel ischemia  4) Bacterial Peritonitis  5) Metastatic colon cancer  6) Anemia of chronic disease.     Plan: Surgery re-consulted for surgical intervention. Needs transfer to Surgery. c/w Dianesyn. Hep drip, npo.

## 2018-07-21 NOTE — PROGRESS NOTE ADULT - SUBJECTIVE AND OBJECTIVE BOX
OVERNIGHT EVENTS: No acute overnight events.    SUBJECTIVE / INTERVAL HPI: Patient seen and examined at bedside. Patient seen and examined at bedside. Currently states bloating and pain are much improved (2/10). Pt states she has been passing "debris" but not formed stool as BM overnight. Pt was npo overnight and venting peg was clamped for most of the night as pt was awaiting CT. Currently peg is connected to gravity with good output.  Currently denies fever, nausea, vomiting, chills, diarrhea.     VITAL SIGNS:  Vital Signs Last 24 Hrs  T(C): 36.8 (21 Jul 2018 05:47), Max: 37.8 (20 Jul 2018 19:45)  T(F): 98.3 (21 Jul 2018 05:47), Max: 100.1 (20 Jul 2018 19:45)  HR: 104 (21 Jul 2018 05:47) (104 - 128)  BP: 130/92 (21 Jul 2018 05:47) (102/73 - 130/92)  BP(mean): --  RR: 19 (21 Jul 2018 05:47) (17 - 20)  SpO2: 96% (21 Jul 2018 05:47) (96% - 98%)    PHYSICAL EXAM:  Constitutional: WDWN , laying in bed comfortably, no acute distress  Head: NC/AT, no scleral icterus, dry MM  Respiratory: CTA B/L; no W/R/R, no retractions  Cardiac: +S1/S2; RRR; no M/R/G  Gastrointestinal: soft, + moderately distended (improved from yesterday), +moderately tender to palpation in RLQ- remainder of abdomen non-tender to palpation, no guarding; no rebound; + hypoactive BSx4; PEG site (venting to gravity) and percutaneous cystostomy sites are clean, dry and intact, no edema, erythema, or purulence  Extremities: WWP, no clubbing or cyanosis; no peripheral edema; b/l LE mildly tender to palpation which pt states is chronic  Dermatologic: skin warm, dry and intact; no rashes, wounds, or scars;   Neurologic: AAOx3; no focal deficits  Psych: normal affect  Lymph: no LAD  Neuro: AA&Ox3, No focal deficits    MEDICATIONS:  MEDICATIONS  (STANDING):  Biotene Dry Mouth Oral Rinse 5 milliLiter(s) Swish and Spit four times a day  heparin  Infusion.  Unit(s)/Hr (13 mL/Hr) IV Continuous <Continuous>  HYDROmorphone  Injectable 0.25 milliGRAM(s) IV Push every 4 hours  petrolatum white Ointment 1 Application(s) Topical two times a day  piperacillin/tazobactam IVPB. 3.375 Gram(s) IV Intermittent every 6 hours  polyethylene glycol 3350 17 Gram(s) Oral daily  simethicone drops 80 milliGRAM(s) Enteral Tube every 8 hours  sodium chloride 0.9%. 1000 milliLiter(s) (125 mL/Hr) IV Continuous <Continuous>  zaleplon 5 milliGRAM(s) Oral at bedtime    MEDICATIONS  (PRN):  OLANZapine Disintegrating Tablet 5 milliGRAM(s) Oral daily PRN Nausea  scopolamine   Patch 1 Patch Transdermal once PRN Abdominal cramps.      ALLERGIES:  Allergies    allergic to cats (Rash)  No Known Drug Allergies    Intolerances        LABS:                        10.4   5.4   )-----------( 620      ( 20 Jul 2018 06:57 )             34.0     07-21    140  |  105  |  43<H>  ----------------------------<  126<H>  3.9   |  21<L>  |  0.93    Ca    7.9<L>      21 Jul 2018 07:37  Mg     2.1     07-21    TPro  5.5<L>  /  Alb  1.8<L>  /  TBili  1.0  /  DBili  x   /  AST  24  /  ALT  12  /  AlkPhos  203<H>  07-21    PT/INR - ( 21 Jul 2018 08:03 )   PT: 18.4 sec;   INR: 1.64       PTT - ( 20 Jul 2018 12:52 )  PTT:37.6 sec    Lactate, Blood (07.21.18 @ 07:37)    Lactate, Blood: 2.6 mmoL/L    Repeat CT A/P 7/20 -results pending    CAPILLARY BLOOD GLUCOSE        RADIOLOGY & ADDITIONAL TESTS: Reviewed. OVERNIGHT EVENTS: No acute overnight events.    SUBJECTIVE / INTERVAL HPI: Patient seen and examined at bedside. Patient seen and examined at bedside. Currently states bloating and pain are much improved (2/10). Pt states she has been passing "debris" but not formed stool as BM overnight. Pt was npo overnight and venting peg was clamped for most of the night as pt was awaiting CT. Currently peg is connected to gravity with good output.  Currently denies fever, nausea, vomiting, chills, diarrhea.     VITAL SIGNS:  Vital Signs Last 24 Hrs  T(C): 36.8 (21 Jul 2018 05:47), Max: 37.8 (20 Jul 2018 19:45)  T(F): 98.3 (21 Jul 2018 05:47), Max: 100.1 (20 Jul 2018 19:45)  HR: 104 (21 Jul 2018 05:47) (104 - 128)  BP: 130/92 (21 Jul 2018 05:47) (102/73 - 130/92)  BP(mean): --  RR: 19 (21 Jul 2018 05:47) (17 - 20)  SpO2: 96% (21 Jul 2018 05:47) (96% - 98%)    PHYSICAL EXAM:  Constitutional: WDWN , laying in bed comfortably, no acute distress  Head: NC/AT, no scleral icterus, dry MM  Respiratory: CTA B/L; no W/R/R, no retractions  Cardiac: +S1/S2; RRR; no M/R/G  Gastrointestinal: soft, + moderately distended (improved from yesterday), +moderately tender to palpation in RLQ- remainder of abdomen non-tender to palpation, no guarding; no rebound; + hypoactive BSx4; PEG site (venting to gravity) and percutaneous cystostomy sites are clean, dry and intact, no edema, erythema, or purulence  Extremities: WWP, no clubbing or cyanosis; no peripheral edema; b/l LE mildly tender to palpation which pt states is chronic  Dermatologic: skin warm, dry and intact; no rashes, wounds, or scars;   Neurologic: AAOx3; no focal deficits  Psych: normal affect  Lymph: no LAD  Neuro: AA&Ox3, No focal deficits    MEDICATIONS:  MEDICATIONS  (STANDING):  Biotene Dry Mouth Oral Rinse 5 milliLiter(s) Swish and Spit four times a day  heparin  Infusion.  Unit(s)/Hr (13 mL/Hr) IV Continuous <Continuous>  HYDROmorphone  Injectable 0.25 milliGRAM(s) IV Push every 4 hours  petrolatum white Ointment 1 Application(s) Topical two times a day  piperacillin/tazobactam IVPB. 3.375 Gram(s) IV Intermittent every 6 hours  polyethylene glycol 3350 17 Gram(s) Oral daily  simethicone drops 80 milliGRAM(s) Enteral Tube every 8 hours  sodium chloride 0.9%. 1000 milliLiter(s) (125 mL/Hr) IV Continuous <Continuous>  zaleplon 5 milliGRAM(s) Oral at bedtime    MEDICATIONS  (PRN):  OLANZapine Disintegrating Tablet 5 milliGRAM(s) Oral daily PRN Nausea  scopolamine   Patch 1 Patch Transdermal once PRN Abdominal cramps.      ALLERGIES:  Allergies    allergic to cats (Rash)  No Known Drug Allergies    Intolerances        LABS:                        10.4   5.4   )-----------( 620      ( 20 Jul 2018 06:57 )             34.0     07-21    140  |  105  |  43<H>  ----------------------------<  126<H>  3.9   |  21<L>  |  0.93    Ca    7.9<L>      21 Jul 2018 07:37  Mg     2.1     07-21    TPro  5.5<L>  /  Alb  1.8<L>  /  TBili  1.0  /  DBili  x   /  AST  24  /  ALT  12  /  AlkPhos  203<H>  07-21    PT/INR - ( 21 Jul 2018 08:03 )   PT: 18.4 sec;   INR: 1.64       PTT - ( 20 Jul 2018 12:52 )  PTT:37.6 sec    Lactate, Blood (07.21.18 @ 07:37)    Lactate, Blood: 2.6 mmoL/L    Repeat CT A/P 7/20 -results pending    CAPILLARY BLOOD GLUCOSE.    RADIOLOGY & ADDITIONAL TESTS: Reviewed.    Consultations: notes reviewed and recommendations appreciated  GI-continuing to follow  Surgery- considering intervention  Hem/onc- will consider resuming chemo if condition improves significantly  PT- recommended 2-3x/week and dispo to [facility]   Palliative-goals of care, pt would like to be treated for current infection and ultimately resume chemotherapy for cancer treatment; music therapy, massage therapy, nutrition, PT/OT

## 2018-07-21 NOTE — PROGRESS NOTE ADULT - SUBJECTIVE AND OBJECTIVE BOX
SUBJECTIVE: No complaints overnight. Denies n/v. +belching, +small flatus, -BM.     Vital Signs Last 24 Hrs  T(C): 36.8 (21 Jul 2018 12:20), Max: 37.8 (20 Jul 2018 19:45)  T(F): 98.2 (21 Jul 2018 12:20), Max: 100.1 (20 Jul 2018 19:45)  HR: 92 (21 Jul 2018 12:20) (92 - 125)  BP: 113/72 (21 Jul 2018 12:20) (102/73 - 130/92)  BP(mean): --  RR: 19 (21 Jul 2018 12:20) (17 - 20)  SpO2: 96% (21 Jul 2018 12:20) (96% - 98%)      General: NAD, resting comfortably in bed  Pulm: Nonlabored breathing, no respiratory distress  Abd: soft, ND, diffusely mildly ttp greatest supraumbilcally, PEG and perc arleen draining to gravity    LABS:                        9.0    10.6  )-----------( 536      ( 21 Jul 2018 07:37 )             28.9     07-21    140  |  105  |  43<H>  ----------------------------<  126<H>  3.9   |  21<L>  |  0.93    Ca    7.9<L>      21 Jul 2018 07:37  Mg     2.1     07-21    TPro  5.5<L>  /  Alb  1.8<L>  /  TBili  1.0  /  DBili  x   /  AST  24  /  ALT  12  /  AlkPhos  203<H>  07-21    PT/INR - ( 21 Jul 2018 08:03 )   PT: 18.4 sec;   INR: 1.64          PTT - ( 21 Jul 2018 12:30 )  PTT:43.8 sec    < from: CT Abdomen and Pelvis w/ Oral Cont and w/ IV Cont (07.21.18 @ 06:03) >  IMPRESSION:  1.  Interval development of moderate pneumoperitoneum.  2.  Distal small bowel probable ileus pattern versus very low-grade   obstruction with diffuse mural thickening concerning for metastatic   disease. Bowel wall ischemia and perforation however cannot be excluded   in view of the finding of new onset of pneumoperitoneum. No portal venous   gas.  3.  Increasing abdominal ascites and peritonitis.  4.  Additional unchanged ancillary findings as above.

## 2018-07-21 NOTE — PROGRESS NOTE ADULT - PROBLEM SELECTOR PLAN 2
- Initial Bcx (7/18) growing bacteroides fragillis (likely intraabdominal source)- f/u sensitivities  - Repeat Bcx (7/19) negative to date  - c/w zosyn  - consider PICC line placement for continuation of abx therapy (pt has prolonged QT thus cannot take quinolones) once afebrile    #Abdominal pain  - Etiology: Small bowel ileus vs partial small bowel obstruction, likely 2/2 malignancy  - Pt clinically improving, distension and pain improving  - c/w venting peg to gravity and monitor output q6hrs  - f/u CT A/P  - f/u surgery (considering possible diverting ileostomy) and GI recommendations    #hypokalemia  - currently resolved (K3.9 today) likely secondary to venting peg to gravity  - trend bmp

## 2018-07-22 LAB
ANION GAP SERPL CALC-SCNC: 14 MMOL/L — SIGNIFICANT CHANGE UP (ref 5–17)
APTT BLD: 75.2 SEC — HIGH (ref 27.5–37.4)
APTT BLD: 85.3 SEC — HIGH (ref 27.5–37.4)
APTT BLD: 94.9 SEC — HIGH (ref 27.5–37.4)
APTT BLD: 96.1 SEC — HIGH (ref 27.5–37.4)
BUN SERPL-MCNC: 38 MG/DL — HIGH (ref 7–23)
CALCIUM SERPL-MCNC: 7.9 MG/DL — LOW (ref 8.4–10.5)
CHLORIDE SERPL-SCNC: 109 MMOL/L — HIGH (ref 96–108)
CO2 SERPL-SCNC: 22 MMOL/L — SIGNIFICANT CHANGE UP (ref 22–31)
CREAT SERPL-MCNC: 0.48 MG/DL — LOW (ref 0.5–1.3)
CULTURE RESULTS: SIGNIFICANT CHANGE UP
CULTURE RESULTS: SIGNIFICANT CHANGE UP
GLUCOSE SERPL-MCNC: 111 MG/DL — HIGH (ref 70–99)
HCT VFR BLD CALC: 29.9 % — LOW (ref 34.5–45)
HGB BLD-MCNC: 9.1 G/DL — LOW (ref 11.5–15.5)
LACTATE SERPL-SCNC: 2.5 MMOL/L — HIGH (ref 0.5–2)
MAGNESIUM SERPL-MCNC: 2.3 MG/DL — SIGNIFICANT CHANGE UP (ref 1.6–2.6)
MCHC RBC-ENTMCNC: 24.8 PG — LOW (ref 27–34)
MCHC RBC-ENTMCNC: 30.4 G/DL — LOW (ref 32–36)
MCV RBC AUTO: 81.5 FL — SIGNIFICANT CHANGE UP (ref 80–100)
ORGANISM # SPEC MICROSCOPIC CNT: SIGNIFICANT CHANGE UP
PHOSPHATE SERPL-MCNC: 1.7 MG/DL — LOW (ref 2.5–4.5)
PLATELET # BLD AUTO: 548 K/UL — HIGH (ref 150–400)
POTASSIUM SERPL-MCNC: 3.7 MMOL/L — SIGNIFICANT CHANGE UP (ref 3.5–5.3)
POTASSIUM SERPL-SCNC: 3.7 MMOL/L — SIGNIFICANT CHANGE UP (ref 3.5–5.3)
RBC # BLD: 3.67 M/UL — LOW (ref 3.8–5.2)
RBC # FLD: 23.3 % — HIGH (ref 10.3–16.9)
SODIUM SERPL-SCNC: 145 MMOL/L — SIGNIFICANT CHANGE UP (ref 135–145)
SPECIMEN SOURCE: SIGNIFICANT CHANGE UP
SPECIMEN SOURCE: SIGNIFICANT CHANGE UP
WBC # BLD: 11.2 K/UL — HIGH (ref 3.8–10.5)
WBC # FLD AUTO: 11.2 K/UL — HIGH (ref 3.8–10.5)

## 2018-07-22 RX ORDER — POTASSIUM PHOSPHATE, MONOBASIC POTASSIUM PHOSPHATE, DIBASIC 236; 224 MG/ML; MG/ML
15 INJECTION, SOLUTION INTRAVENOUS ONCE
Qty: 0 | Refills: 0 | Status: COMPLETED | OUTPATIENT
Start: 2018-07-22 | End: 2018-07-22

## 2018-07-22 RX ORDER — POTASSIUM CHLORIDE 20 MEQ
10 PACKET (EA) ORAL
Qty: 0 | Refills: 0 | Status: COMPLETED | OUTPATIENT
Start: 2018-07-22 | End: 2018-07-22

## 2018-07-22 RX ADMIN — Medication 5 MILLILITER(S): at 13:46

## 2018-07-22 RX ADMIN — HEPARIN SODIUM 1100 UNIT(S)/HR: 5000 INJECTION INTRAVENOUS; SUBCUTANEOUS at 23:44

## 2018-07-22 RX ADMIN — PIPERACILLIN AND TAZOBACTAM 200 GRAM(S): 4; .5 INJECTION, POWDER, LYOPHILIZED, FOR SOLUTION INTRAVENOUS at 14:38

## 2018-07-22 RX ADMIN — HYDROMORPHONE HYDROCHLORIDE 0.25 MILLIGRAM(S): 2 INJECTION INTRAMUSCULAR; INTRAVENOUS; SUBCUTANEOUS at 10:31

## 2018-07-22 RX ADMIN — Medication 5 MILLILITER(S): at 23:45

## 2018-07-22 RX ADMIN — Medication 1 APPLICATION(S): at 18:08

## 2018-07-22 RX ADMIN — Medication 1 APPLICATION(S): at 05:56

## 2018-07-22 RX ADMIN — FLUCONAZOLE 100 MILLIGRAM(S): 150 TABLET ORAL at 18:31

## 2018-07-22 RX ADMIN — HYDROMORPHONE HYDROCHLORIDE 0.25 MILLIGRAM(S): 2 INJECTION INTRAMUSCULAR; INTRAVENOUS; SUBCUTANEOUS at 06:07

## 2018-07-22 RX ADMIN — PIPERACILLIN AND TAZOBACTAM 200 GRAM(S): 4; .5 INJECTION, POWDER, LYOPHILIZED, FOR SOLUTION INTRAVENOUS at 22:06

## 2018-07-22 RX ADMIN — SODIUM CHLORIDE 125 MILLILITER(S): 9 INJECTION INTRAMUSCULAR; INTRAVENOUS; SUBCUTANEOUS at 22:14

## 2018-07-22 RX ADMIN — HYDROMORPHONE HYDROCHLORIDE 0.25 MILLIGRAM(S): 2 INJECTION INTRAMUSCULAR; INTRAVENOUS; SUBCUTANEOUS at 01:36

## 2018-07-22 RX ADMIN — Medication 5 MILLILITER(S): at 18:09

## 2018-07-22 RX ADMIN — Medication 5 MILLILITER(S): at 05:55

## 2018-07-22 RX ADMIN — HYDROMORPHONE HYDROCHLORIDE 0.25 MILLIGRAM(S): 2 INJECTION INTRAMUSCULAR; INTRAVENOUS; SUBCUTANEOUS at 10:45

## 2018-07-22 RX ADMIN — Medication 5 MILLIGRAM(S): at 22:06

## 2018-07-22 RX ADMIN — PIPERACILLIN AND TAZOBACTAM 200 GRAM(S): 4; .5 INJECTION, POWDER, LYOPHILIZED, FOR SOLUTION INTRAVENOUS at 05:38

## 2018-07-22 RX ADMIN — HYDROMORPHONE HYDROCHLORIDE 0.25 MILLIGRAM(S): 2 INJECTION INTRAMUSCULAR; INTRAVENOUS; SUBCUTANEOUS at 18:31

## 2018-07-22 RX ADMIN — POLYETHYLENE GLYCOL 3350 17 GRAM(S): 17 POWDER, FOR SOLUTION ORAL at 14:09

## 2018-07-22 RX ADMIN — HYDROMORPHONE HYDROCHLORIDE 0.25 MILLIGRAM(S): 2 INJECTION INTRAMUSCULAR; INTRAVENOUS; SUBCUTANEOUS at 01:30

## 2018-07-22 RX ADMIN — SIMETHICONE 80 MILLIGRAM(S): 80 TABLET, CHEWABLE ORAL at 22:14

## 2018-07-22 RX ADMIN — Medication 100 MILLIEQUIVALENT(S): at 15:07

## 2018-07-22 RX ADMIN — HYDROMORPHONE HYDROCHLORIDE 0.25 MILLIGRAM(S): 2 INJECTION INTRAMUSCULAR; INTRAVENOUS; SUBCUTANEOUS at 05:37

## 2018-07-22 RX ADMIN — HYDROMORPHONE HYDROCHLORIDE 0.25 MILLIGRAM(S): 2 INJECTION INTRAMUSCULAR; INTRAVENOUS; SUBCUTANEOUS at 14:46

## 2018-07-22 RX ADMIN — SODIUM CHLORIDE 125 MILLILITER(S): 9 INJECTION INTRAMUSCULAR; INTRAVENOUS; SUBCUTANEOUS at 05:38

## 2018-07-22 RX ADMIN — HYDROMORPHONE HYDROCHLORIDE 0.25 MILLIGRAM(S): 2 INJECTION INTRAMUSCULAR; INTRAVENOUS; SUBCUTANEOUS at 22:04

## 2018-07-22 RX ADMIN — POTASSIUM PHOSPHATE, MONOBASIC POTASSIUM PHOSPHATE, DIBASIC 62.5 MILLIMOLE(S): 236; 224 INJECTION, SOLUTION INTRAVENOUS at 10:26

## 2018-07-22 RX ADMIN — HYDROMORPHONE HYDROCHLORIDE 0.25 MILLIGRAM(S): 2 INJECTION INTRAMUSCULAR; INTRAVENOUS; SUBCUTANEOUS at 15:00

## 2018-07-22 RX ADMIN — HYDROMORPHONE HYDROCHLORIDE 0.25 MILLIGRAM(S): 2 INJECTION INTRAMUSCULAR; INTRAVENOUS; SUBCUTANEOUS at 22:34

## 2018-07-22 RX ADMIN — Medication 100 MILLIEQUIVALENT(S): at 10:30

## 2018-07-22 RX ADMIN — HYDROMORPHONE HYDROCHLORIDE 0.25 MILLIGRAM(S): 2 INJECTION INTRAMUSCULAR; INTRAVENOUS; SUBCUTANEOUS at 18:50

## 2018-07-22 NOTE — PROGRESS NOTE ADULT - ASSESSMENT
A/P67 yo F w/ metastatic colon ca s/p chemotherapy, acute cholecystitis s/p percutaneous cholecystomy c/b portal vein thrombosis, LBO s/p sigmoid stent, presents this admission with sepsis, surgery consulted for worsening abdominal pain likely due to colitis     Pain/nausea control  No urgent surgical intervention at this time  IR drainage of ascites today   Continue with IV antibiotics   Serial abdominal exams   SCDs, SQH

## 2018-07-22 NOTE — PROGRESS NOTE ADULT - SUBJECTIVE AND OBJECTIVE BOX
SUBJECTIVE: Patient appears comfortable with no complaints. She denies N/V. Pain well controlled. Urinating and ambulating appropriately    fluconAZOLE IVPB 200 milliGRAM(s) IV Intermittent every 24 hours  fluconAZOLE IVPB      heparin  Infusion. 1100 Unit(s)/Hr IV Continuous <Continuous>  piperacillin/tazobactam IVPB. 3.375 Gram(s) IV Intermittent every 6 hours      Vital Signs Last 24 Hrs  T(C): 37.2 (22 Jul 2018 09:30), Max: 37.2 (22 Jul 2018 09:30)  T(F): 98.9 (22 Jul 2018 09:30), Max: 98.9 (22 Jul 2018 09:30)  HR: 94 (22 Jul 2018 08:36) (78 - 94)  BP: 124/78 (22 Jul 2018 08:36) (112/74 - 134/77)  BP(mean): 96 (22 Jul 2018 08:36) (91 - 98)  RR: 18 (22 Jul 2018 08:36) (18 - 20)  SpO2: 97% (22 Jul 2018 08:36) (96% - 97%)    General: NAD, resting comfortably in bed  Pulm: Nonlabored breathing, no respiratory distress  Abd: soft, mild bilateral mild upper abdominal tenderness, mildly distended with obvious ascites	        LABS:                        9.1    11.2  )-----------( 548      ( 22 Jul 2018 06:46 )             29.9     07-22    145  |  109<H>  |  38<H>  ----------------------------<  111<H>  3.7   |  22  |  0.48<L>    Ca    7.9<L>      22 Jul 2018 06:46  Phos  1.7     07-22  Mg     2.3     07-22    TPro  5.5<L>  /  Alb  1.8<L>  /  TBili  1.0  /  DBili  x   /  AST  24  /  ALT  12  /  AlkPhos  203<H>  07-21    PT/INR - ( 21 Jul 2018 08:03 )   PT: 18.4 sec;   INR: 1.64          PTT - ( 22 Jul 2018 10:22 )  PTT:75.2 sec

## 2018-07-23 LAB
ANION GAP SERPL CALC-SCNC: 12 MMOL/L — SIGNIFICANT CHANGE UP (ref 5–17)
APTT BLD: 36.4 SEC — SIGNIFICANT CHANGE UP (ref 27.5–37.4)
APTT BLD: 74.7 SEC — HIGH (ref 27.5–37.4)
APTT BLD: 74.8 SEC — HIGH (ref 27.5–37.4)
APTT BLD: >200 SEC — CRITICAL HIGH (ref 27.5–37.4)
BLD GP AB SCN SERPL QL: NEGATIVE — SIGNIFICANT CHANGE UP
BUN SERPL-MCNC: 27 MG/DL — HIGH (ref 7–23)
CALCIUM SERPL-MCNC: 8.2 MG/DL — LOW (ref 8.4–10.5)
CHLORIDE SERPL-SCNC: 111 MMOL/L — HIGH (ref 96–108)
CO2 SERPL-SCNC: 26 MMOL/L — SIGNIFICANT CHANGE UP (ref 22–31)
CREAT SERPL-MCNC: 0.45 MG/DL — LOW (ref 0.5–1.3)
GLUCOSE BLDC GLUCOMTR-MCNC: 124 MG/DL — HIGH (ref 70–99)
GLUCOSE SERPL-MCNC: 136 MG/DL — HIGH (ref 70–99)
GRAM STN FLD: SIGNIFICANT CHANGE UP
HCT VFR BLD CALC: 26.9 % — LOW (ref 34.5–45)
HGB BLD-MCNC: 8.3 G/DL — LOW (ref 11.5–15.5)
MAGNESIUM SERPL-MCNC: 2.4 MG/DL — SIGNIFICANT CHANGE UP (ref 1.6–2.6)
MCHC RBC-ENTMCNC: 25 PG — LOW (ref 27–34)
MCHC RBC-ENTMCNC: 30.9 G/DL — LOW (ref 32–36)
MCV RBC AUTO: 81 FL — SIGNIFICANT CHANGE UP (ref 80–100)
PHOSPHATE SERPL-MCNC: 1.7 MG/DL — LOW (ref 2.5–4.5)
PLATELET # BLD AUTO: 438 K/UL — HIGH (ref 150–400)
POTASSIUM SERPL-MCNC: 3.8 MMOL/L — SIGNIFICANT CHANGE UP (ref 3.5–5.3)
POTASSIUM SERPL-SCNC: 3.8 MMOL/L — SIGNIFICANT CHANGE UP (ref 3.5–5.3)
RBC # BLD: 3.32 M/UL — LOW (ref 3.8–5.2)
RBC # FLD: 23.3 % — HIGH (ref 10.3–16.9)
RH IG SCN BLD-IMP: POSITIVE — SIGNIFICANT CHANGE UP
SODIUM SERPL-SCNC: 149 MMOL/L — HIGH (ref 135–145)
SPECIMEN SOURCE: SIGNIFICANT CHANGE UP
WBC # BLD: 9.8 K/UL — SIGNIFICANT CHANGE UP (ref 3.8–10.5)
WBC # FLD AUTO: 9.8 K/UL — SIGNIFICANT CHANGE UP (ref 3.8–10.5)

## 2018-07-23 PROCEDURE — 99233 SBSQ HOSP IP/OBS HIGH 50: CPT | Mod: GC

## 2018-07-23 PROCEDURE — 99233 SBSQ HOSP IP/OBS HIGH 50: CPT

## 2018-07-23 RX ADMIN — HYDROMORPHONE HYDROCHLORIDE 0.25 MILLIGRAM(S): 2 INJECTION INTRAMUSCULAR; INTRAVENOUS; SUBCUTANEOUS at 17:46

## 2018-07-23 RX ADMIN — PIPERACILLIN AND TAZOBACTAM 200 GRAM(S): 4; .5 INJECTION, POWDER, LYOPHILIZED, FOR SOLUTION INTRAVENOUS at 12:53

## 2018-07-23 RX ADMIN — HYDROMORPHONE HYDROCHLORIDE 0.25 MILLIGRAM(S): 2 INJECTION INTRAMUSCULAR; INTRAVENOUS; SUBCUTANEOUS at 23:00

## 2018-07-23 RX ADMIN — Medication 5 MILLILITER(S): at 12:53

## 2018-07-23 RX ADMIN — Medication 1 APPLICATION(S): at 17:46

## 2018-07-23 RX ADMIN — HYDROMORPHONE HYDROCHLORIDE 0.25 MILLIGRAM(S): 2 INJECTION INTRAMUSCULAR; INTRAVENOUS; SUBCUTANEOUS at 14:19

## 2018-07-23 RX ADMIN — HYDROMORPHONE HYDROCHLORIDE 0.25 MILLIGRAM(S): 2 INJECTION INTRAMUSCULAR; INTRAVENOUS; SUBCUTANEOUS at 05:12

## 2018-07-23 RX ADMIN — Medication 5 MILLIGRAM(S): at 22:26

## 2018-07-23 RX ADMIN — FLUCONAZOLE 100 MILLIGRAM(S): 150 TABLET ORAL at 18:23

## 2018-07-23 RX ADMIN — HYDROMORPHONE HYDROCHLORIDE 0.25 MILLIGRAM(S): 2 INJECTION INTRAMUSCULAR; INTRAVENOUS; SUBCUTANEOUS at 14:40

## 2018-07-23 RX ADMIN — HYDROMORPHONE HYDROCHLORIDE 0.25 MILLIGRAM(S): 2 INJECTION INTRAMUSCULAR; INTRAVENOUS; SUBCUTANEOUS at 18:05

## 2018-07-23 RX ADMIN — SIMETHICONE 80 MILLIGRAM(S): 80 TABLET, CHEWABLE ORAL at 05:13

## 2018-07-23 RX ADMIN — HYDROMORPHONE HYDROCHLORIDE 0.25 MILLIGRAM(S): 2 INJECTION INTRAMUSCULAR; INTRAVENOUS; SUBCUTANEOUS at 22:26

## 2018-07-23 RX ADMIN — PIPERACILLIN AND TAZOBACTAM 200 GRAM(S): 4; .5 INJECTION, POWDER, LYOPHILIZED, FOR SOLUTION INTRAVENOUS at 07:01

## 2018-07-23 RX ADMIN — HYDROMORPHONE HYDROCHLORIDE 0.25 MILLIGRAM(S): 2 INJECTION INTRAMUSCULAR; INTRAVENOUS; SUBCUTANEOUS at 05:42

## 2018-07-23 RX ADMIN — PIPERACILLIN AND TAZOBACTAM 200 GRAM(S): 4; .5 INJECTION, POWDER, LYOPHILIZED, FOR SOLUTION INTRAVENOUS at 17:45

## 2018-07-23 RX ADMIN — Medication 1 APPLICATION(S): at 05:15

## 2018-07-23 RX ADMIN — Medication 5 MILLILITER(S): at 05:14

## 2018-07-23 RX ADMIN — Medication 5 MILLILITER(S): at 17:45

## 2018-07-23 RX ADMIN — SIMETHICONE 80 MILLIGRAM(S): 80 TABLET, CHEWABLE ORAL at 22:26

## 2018-07-23 RX ADMIN — HYDROMORPHONE HYDROCHLORIDE 0.25 MILLIGRAM(S): 2 INJECTION INTRAMUSCULAR; INTRAVENOUS; SUBCUTANEOUS at 10:45

## 2018-07-23 RX ADMIN — HYDROMORPHONE HYDROCHLORIDE 0.25 MILLIGRAM(S): 2 INJECTION INTRAMUSCULAR; INTRAVENOUS; SUBCUTANEOUS at 10:36

## 2018-07-23 NOTE — PROGRESS NOTE ADULT - PROBLEM SELECTOR PLAN 7
Remains full code. Wants her sister and friend involved in her decision making.  Patient waiting to hear from surgery regarding next steps.

## 2018-07-23 NOTE — PROGRESS NOTE ADULT - PROBLEM SELECTOR PLAN 6
1) PCP Contacted on Admission:  Dr. Sandoval rodriguez pt here  Name & Phone #: Dr. Clark  2) Date of Contact with PCP:  3) PCP Contacted at Discharge: (Y/N)  4) Summary of Handoff Given to PCP:   5) Post-Discharge Appointment Date Hb 9, at baseline  -maintain active t+s  -transfuse <7    #leukopenia- improved  - likely secondary to infection in setting of severe sepsis  - continue to trend CBC    #thrombocytosis- improving  - likely reactive in the setting of acute infection, will continue to trend

## 2018-07-23 NOTE — PROGRESS NOTE ADULT - SUBJECTIVE AND OBJECTIVE BOX
SUBJECTIVE: Pt appears comfortable with no complaints. Tolerating CLD. Denies abdominal pain.  She denies N/V. PEG and perc arleen in place. CALI draining straw colored fluid.    fluconAZOLE IVPB 200 milliGRAM(s) IV Intermittent every 24 hours  fluconAZOLE IVPB      heparin  Infusion. 1100 Unit(s)/Hr IV Continuous <Continuous>  piperacillin/tazobactam IVPB. 3.375 Gram(s) IV Intermittent every 6 hours      Vital Signs Last 24 Hrs  T(C): 36.7 (23 Jul 2018 14:07), Max: 37.1 (23 Jul 2018 05:05)  T(F): 98.1 (23 Jul 2018 14:07), Max: 98.7 (23 Jul 2018 05:05)  HR: 96 (23 Jul 2018 14:15) (78 - 106)  BP: 130/74 (23 Jul 2018 14:15) (125/85 - 138/75)  BP(mean): 94 (23 Jul 2018 14:15) (94 - 102)  RR: 18 (23 Jul 2018 14:15) (17 - 20)  SpO2: 96% (23 Jul 2018 14:15) (96% - 97%)    General: NAD, resting comfortably in bed  Pulm: Nonlabored breathing, no respiratory distress  Abd: soft, NT/ND, 10 F draining yellow fluid, PEG and perc arleen in place      LABS:                        8.3    9.8   )-----------( 438      ( 23 Jul 2018 06:59 )             26.9     07-23    149<H>  |  111<H>  |  27<H>  ----------------------------<  136<H>  3.8   |  26  |  0.45<L>    Ca    8.2<L>      23 Jul 2018 10:38  Phos  1.7     07-23  Mg     2.4     07-23      PTT - ( 23 Jul 2018 09:56 )  PTT:36.4 sec

## 2018-07-23 NOTE — PROGRESS NOTE ADULT - SUBJECTIVE AND OBJECTIVE BOX
PILLO MAR             MRN-[id number]    CC: Pain, Anxiety, Insomnia, GOC/AD, Support    HPI:  66 yo F w/ metastatic colon ca s/p 1 dose FOLFOX, recent acute cholecystitis c/b severe sepsis and hepatic vein thrombosis s/p percutaneous cholecystomy, and recent partial SBO s/p PEG and 2 colonic stents p/w fever of 101.7 in the setting of several days of exhaustion and weakness. Denies subjective fevers/ chills/ night sweats. Pt only came to hospital because sent by her oncologist when she went to get her 2nd dose of chemo and found to be febrile with WBC of 15. While in ED, pt reports developed worsening diffuse abdominal pain 8.5/10, worse in the upper abdomen. Pain is better with oxycodone. No change w/ position or food. Denies n/v/d or constipation. Last BM last night. Endorses poor PO intake due to decreased appetite since last admission.      SUBJECTIVE: Saw and evaluated Mrs Mar at bedside. She reports having pain well controlled with occasional incidental exacerbation with physical activity. She had paracentesis and IR placed drain over the weekend. She states tolerating PO intake, but has not had a BM yet. Encouraged ambulation. Patient states having had two conversations with the surgical team today. She states "we are waiting for some labwork that was done yesterday and they'll see what are the next steps". She recognizes her functional decline when working with PT but was not able to participate longer than just 5min. As per SW/CM the patient due for DEVON placement.     ROS:  DYSPNEA: No  NAUS/VOM: No	  SECRETIONS: No	  AGITATION: No  Pain (Y/N): Yes      -Provocation/Palliation: Movement increases her pain. Medication and remaining immobile improves it.  -Quality/Quantity: Chronic malignant somatic pain currently uncontrolled. Chronic malignant visceral pain currently uncontrolled.   -Radiating: No  -Severity: 3/10  -Timing/Frequency: Incidental  -Impact on ADLs: Yes, prevented from sleep    OTHER REVIEW OF SYSTEMS: Insomnia improved. Appetite improved. Anxiety occasionally.      PEx:  T(C): 36.3 (07-23-18 @ 09:13), Max: 37.1 (07-23-18 @ 05:05)  HR: 102 (07-23-18 @ 08:55) (78 - 106)  BP: 131/80 (07-23-18 @ 08:55) (125/85 - 138/75)  RR: 19 (07-23-18 @ 08:55) (17 - 20)  SpO2: 96% (07-23-18 @ 08:55) (96% - 97%)  Wt(kg): 73.9    General: AAOx3 In NAD. Improved mood.   HEENT: NCAT  PERRL EOMI Non icteric    Neck: Supple, No masses  CVS: Tachycardic S1S2, no murmurs  Resp: CTABL  GI: Soft TTP Distension+. Venting PEG+  drain+  Musc: No C/C/E    Neuro: No focal deficits. Following commands.  Psych:  Improved mood  Skin: Xerosis  Lymph: Normal.  	    ALLERGIES: allergic to cats (Rash)  No Known Drug Allergies    OPIATE NAÏVE (Y/N): No    MEDICATIONS: REVIEWED  MEDICATIONS  (STANDING):  Biotene Dry Mouth Oral Rinse 5 milliLiter(s) Swish and Spit four times a day  fluconAZOLE IVPB 200 milliGRAM(s) IV Intermittent every 24 hours  fluconAZOLE IVPB      heparin  Infusion. 1100 Unit(s)/Hr (11 mL/Hr) IV Continuous <Continuous>  HYDROmorphone  Injectable 0.25 milliGRAM(s) IV Push every 4 hours  petrolatum white Ointment 1 Application(s) Topical two times a day  piperacillin/tazobactam IVPB. 3.375 Gram(s) IV Intermittent every 6 hours  polyethylene glycol 3350 17 Gram(s) Oral daily  simethicone drops 80 milliGRAM(s) Enteral Tube every 8 hours  sodium chloride 0.9%. 1000 milliLiter(s) (125 mL/Hr) IV Continuous <Continuous>  zaleplon 5 milliGRAM(s) Oral at bedtime    MEDICATIONS  (PRN):  OLANZapine Disintegrating Tablet 5 milliGRAM(s) Oral daily PRN Nausea  ondansetron Injectable 4 milliGRAM(s) IV Push every 6 hours PRN Nausea and/or Vomiting    LABS: REVIEWED                        8.3    9.8   )-----------( 438      ( 23 Jul 2018 06:59 )             26.9   07-23    149<H>  |  111<H>  |  27<H>  ----------------------------<  136<H>  3.8   |  26  |  0.45<L>    Ca    8.2<L>      23 Jul 2018 10:38  Phos  1.7     07-23  Mg     2.4     07-23    Culture - Body Fluid with Gram Stain (07.22.18 @ 12:43)    Gram Stain:   No organisms seen  Numerous White blood cells    Specimen Source: .Body Fluid w/ gram stain    Culture Results:   Rare Gram Negative Rods  Identification and susceptibility to follow.    Lactate, Blood: 2.5 mmoL/L (07.22.18 @ 06:46)  Lactate, Blood: 2.6 mmoL/L (07.21.18 @ 07:37)  Lactate, Blood: 2.8 mmoL/L (07.20.18 @ 21:03)  Lactate, Blood: 4.1 mmoL/L (07.20.18 @ 15:56)  Lactate, Blood: 4.3 mmoL/L (07.20.18 @ 12:52)  Lactate, Blood: 2.7 mmoL/L (07.18.18 @ 01:34)  Lactate, Blood: 1.8 mmoL/L (07.17.18 @ 15:35)    IMAGING: REVIEWED    EXAM:  XR KUB 1 VIEW                        PROCEDURE DATE:  07/18/2018    INTERPRETATION:  X-ray of the abdomen  Indication: Rule out obstruction. Postoperative patient.  2 frontal views of the abdomen are compared to the prior study of the   same day. Left upper quadrant pigtail drain and gastrostomy tube are   unchanged. Colorectal stents are again noted in the pelvis. There is   again mild dilatation of a few small bowel loops in the left upper   quadrant and mid abdomen. Small amount of gas is present in the and   rectum.  Impression: Persistent mild small bowel dilatation.    EXAM:  XR ABDOMEN PORTABLE URGENT 2V                        PROCEDURE DATE:  07/18/2018    INTERPRETATION:  X-ray of the abdomen  Indication: Status post colonic stent placement. Evaluate for free air.  2 frontal views of the abdomen are compared to the x-ray of 6/7/2018. Tip   of right chest port overlies the SVC. Tip of percutaneous gastrostomy   tube overlies the gastric antrum. Percutaneous pigtail drain overlies the   right upper quadrant, unchanged. 2 overlapping colorectal stents are   noted in the upper pelvis. There are is mild dilatation of small bowel   loops which are centered in the mid abdomen, suggestive of moderate   ascites. No free air is seen.  Impression: Two colorectal stents in place. Suggestion of ascites. Small   bowel ileus or early obstruction. No free air.    EXAM:  CT ABDOMEN AND PELVIS OC IC                        PROCEDURE DATE:  07/21/2018    INTERPRETATION:  CT of the ABDOMEN and PELVIS with intravenous contrast   dated 7/21/2018 6:03 AM  INDICATION: Metastatic colon cancer. Evaluate for bowel obstruction.  TECHNIQUE: CT of the abdomen and pelvis was performed using oral and 100   cc of intravenous Optiray 350 (10 cc discarded). Axial, sagittal and   coronal images were produced and reviewed.  COMPARISON: CT of the abdomen and pelvis from 7/17/2018.  FINDINGS: Images of the lower chest demonstrate trace bilateral pleural   effusions. A central venous catheter tip is partially visualized in the   right atrium.  There are again multiple hypoattenuating lesions in the liver, consistent   with metastatic disease, largest measuring 2.1 cm in the right lobe and   unchanged from prior. A 3.2 cm cyst is unchanged. 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 Thrombus in the SMV   identified on priorexams is not demonstrated on this study.. The patient   is again status post percutaneous cholecystostomy; 1.5 cm gallstone is   again seen. The pancreas is normal in appearance. No splenic   abnormalities are seen.  The adrenal glands are unremarkable. The kidneys are normal in appearance.  No abdominal aortic aneurysm is seen. No lymphadenopathy is seen.  Evaluation of the bowel demonstrates enteric contrast reaching the ileum.   There is new onset of moderate pneumoperitoneum. Bowel wallperforation   cannot be excluded. There are dilated loops of small bowel in the mid   abdomen, with a similar configuration compared to 7/17/2018. There is   long segment increased bowel thickening of these loops. The distal bowel   is decompressed and is unopacified by contrast. These findings are   concerning for progressive metastatic disease with probable ileus as no   transition point is identified. Bowel wall ischemia cannot be excluded   although there is no evidence of mural pneumatosis or portal venous gas.   There is increasing abdominal ascites with peritoneal enhancement and   peritonitis cannot be excluded. Again noted is peritoneal carcinomatosis.   Gastrostomy tube is again noted unchanged in position as well as a   colonic sigmoid stent. There is limited evaluation of the colon due to   the absence of intraluminal contrast however there may be colonic wall   thickening and colitis cannot be excluded.    Images of the pelvis demonstrate the uterus and adnexae to be normalin   appearance.  Evaluation of the osseous structures demonstrates degenerative changes of   the spine. There is mild generalized anasarca.  IMPRESSION:  1.  Interval development of moderate pneumoperitoneum.  2.  Distal small bowel probable ileus pattern versus very low-grade   obstruction with diffuse mural thickening concerning for metastatic   disease. Bowel wall ischemia and perforation however cannot be excluded   in view of the finding of new onset of pneumoperitoneum. No portal venous   gas.  3.  Increasing abdominal ascites and peritonitis.  4.  Additional unchanged ancillary findings as above.    Advanced Directives:     Full Code     MOLST in [place]    Decision maker: The patient is able to participate in complex medical decision making conversations  Legal surrogate: No designated HCP, but the patient was thinking of assigning her sister Rama Fine [phone] from [place] and her friend Robyn Sam [phone] in [facility].    GOALS OF CARE DISCUSSION       Palliative care info/counseling provided	             See previous Palliative Medicine Note       Documentation of GOC: Full code. Would like symptomatic control.           REFERRALS	        Unit SW/Case Mgmt       Patient/Family Support       Massage Therapy       Music Therapy       Nutrition / Dietician       PT/OT

## 2018-07-23 NOTE — PROGRESS NOTE ADULT - PROBLEM SELECTOR PLAN 3
Pt of Dr. Nick ([phone]). recently dxd in 6/18. s/p chemoport placement 5/31, initiation of chemotherapy 6/9 with FOLFOX, received 1 dose so far, with 2nd tx planned for 7/17 but not given due to fever.  -pal care consulted- currently recommending symptomatic treatment and -reassessment of goals of care when medically stabilized  -f/u recs from gi, hem/onc, palliative  - c/w PT Initial Bcx (7/18) growing bacteroides fragillis (likely intraabdominal source)- f/u sensitivities  - Repeat Bcx (7/19) negative to date  - c/w zosyn

## 2018-07-23 NOTE — CHART NOTE - NSCHARTNOTEFT_GEN_A_CORE
Upon Nutritional Assessment by the Registered Dietitian your patient was determined to meet criteria / has evidence of the following diagnosis/diagnoses:          [ ]  Mild Protein Calorie Malnutrition        [ ]  Moderate Protein Calorie Malnutrition        [ x ] Severe Protein Calorie Malnutrition        [ ] Unspecified Protein Calorie Malnutrition        [ ] Underweight / BMI <19        [ ] Morbid Obesity / BMI > 40      Findings as based on:  •  Comprehensive nutrition assessment and consultation  •  Calorie counts (nutrient intake analysis)  •  Food acceptance and intake status from observations by staff  •  Follow up  •  Patient education  •  Intervention secondary to interdisciplinary rounds  •   concerns    Pt has had a significant unintentional wt loss since December 2017.   UBW was 220lbs, current BW is 163lbs (57lb wt loss/30% wt change)  Reports h/o of poor intake 2/2 reduced appetite and pain    Treatment:    The following diet has been recommended:  1) Recommend adding prostat TID (300kcal, 45g pro) -discussed w/ pt adding it to cups of ice as sherbert and then to yogurts/apple sauce once diet gets advanced   2) Please advance diet to soft, low fiber w/ EnsureEnlive TID (1050kcal, 60g pro)  3) MVI daily  4) honor pts food preferences  Recs discussed w/ team.    PROVIDER Section:     By signing this assessment you are acknowledging and agree with the diagnosis/diagnoses assigned by the Registered Dietitian    Comments:

## 2018-07-23 NOTE — PROGRESS NOTE ADULT - PROBLEM SELECTOR PLAN 2
Remains obstipated. Encouraged to ambulate and frequent visits to . Currently on Miralax daily.  Recommend bedside commode.

## 2018-07-23 NOTE — PROGRESS NOTE ADULT - PROBLEM SELECTOR PLAN 6
Spoke to patient today regarding plan of care and she states she is waiting to hear about a lab test to see what the primary surgical team would do next. Does she believe further surgical options are still being offered?.     Patient would be a candidate for hospice services if NOT pursuing chemotherapy, RT, or surgical interventions. Discussed hospice care with the patient as well as quality of life and aggressive symptom management during end of life. If patient is discharged to [facility] then hospice referral would be to be postpone for after completion of rehab. Spoke to patient today regarding plan of care and she states she is waiting to hear about a lab test to see what the primary surgical team would do next. Does she believe further surgical options are still being offered?.       Patient would be a candidate for hospice services if NOT pursuing chemotherapy, RT, or surgical interventions. Discussed hospice care with the patient as well as quality of life and aggressive symptom management during end of life. If patient is discharged to [facility] then hospice referral would be to be postpone for after completion of rehab.  In the other hand if the patient chooses to return home rather than a rehab facility she would benefit from having home hospice services implemented before discharge. These services could provide the patient with medical equipment ie. hospital bed, commode, wheelchair, walker, shower chair/bench; as well as a home health aide M-F 3-4h/day with visits from the hospice nurse twice a week or more depending on her requirements. We started to discuss hospice services on Friday, but the final decision would depend if she chooses to pursue rehab or not.   Will have several hospice agency liaisons tomorrow Tuesday here at [place] for our weekly IDT meeting so she could be evaluated and enrolled then. Spoke to patient today regarding plan of care and she states she is waiting to hear about a lab test to see what the primary surgical team would do next. Does she believe further surgical options are still being offered?.       Patient would be a candidate for hospice services if NOT pursuing chemotherapy, RT, or surgical interventions. Discussed hospice care with the patient as well as quality of life and aggressive symptom management during end of life. If patient is discharged to [facility] then hospice referral would need to be postpone for after completion of rehab.  In the other hand if the patient chooses to return home rather than a rehab facility she would benefit from having home hospice services implemented before discharge. These services could provide the patient with medical equipment ie. hospital bed, commode, wheelchair, walker, shower chair/bench; as well as a home health aide M-F 3-4h/day with visits from the hospice nurse twice a week or more depending on her requirements. We started to discuss hospice services on Friday, but the final decision would depend if she chooses to pursue rehab or not.   Will have several hospice agency liaisons tomorrow Tuesday here at [place] for our weekly IDT meeting so she could be evaluated and enrolled then.

## 2018-07-23 NOTE — PROGRESS NOTE ADULT - ATTENDING COMMENTS
Patient seen and examined at bedside.     Patient known to me from medicine service. Patient's pain is controlled, now on dilaudid, s/p IR drain for peritonitis over the weekend.     1. Metastatic colon cancer: f/u palliative recs  2. Peritonitis: f/u surgery recs, w/ IR drain in place, not a surgical candidate, c/w abx  3. GOC: ongoing discussions with palliative,

## 2018-07-23 NOTE — PROGRESS NOTE ADULT - PROBLEM SELECTOR PLAN 2
- Initial Bcx (7/18) growing bacteroides fragillis (likely intraabdominal source)- f/u sensitivities  - Repeat Bcx (7/19) negative to date  - c/w zosyn  - consider PICC line placement for continuation of abx therapy (pt has prolonged QT thus cannot take quinolones) once afebrile    #Abdominal pain  - Etiology: Small bowel ileus vs partial small bowel obstruction, likely 2/2 malignancy  - Pt clinically improving, distension and pain improving  - c/w venting peg to gravity and monitor output q6hrs  - f/u CT A/P  - f/u surgery (considering possible diverting ileostomy) and GI recommendations    #hypokalemia  - currently resolved (K3.9 today) likely secondary to venting peg to gravity  - trend bmp Resolving.  Source: intraabdominal (initial cx growing bacteroides fragilis)  - Initial Bcx growing bacteroides fragillis, f/u sensitivities  - C/w zosyn

## 2018-07-23 NOTE — CHART NOTE - NSCHARTNOTEFT_GEN_A_CORE
Admitting Diagnosis:   Patient is a 68y old  Female who presents with a chief complaint of fever (17 Jul 2018 18:05)      PAST MEDICAL & SURGICAL HISTORY:  Cholecystitis, acute  Colon cancer  No significant past surgical history      Current Nutrition Order: Clear Liquids    PO Intake: Good (%) [   ]  Fair (50-75%) [ x  ] Poor (<25%) [   ] of Clears  Poor intake PTA    GI Issues: Denies N/V at present. On bowel regimen for constipation.     Pain: abdominal pain being medically managed    Skin Integrity: surgical incision, myla score 15, multiple drain sites    Labs:   07-23    149<H>  |  111<H>  |  27<H>  ----------------------------<  136<H>  3.8   |  26  |  0.45<L>    Ca    8.2<L>      23 Jul 2018 10:38  Phos  1.7     07-23  Mg     2.4     07-23      CAPILLARY BLOOD GLUCOSE      POCT Blood Glucose.: 124 mg/dL (23 Jul 2018 17:07)      Medications:  MEDICATIONS  (STANDING):  Biotene Dry Mouth Oral Rinse 5 milliLiter(s) Swish and Spit four times a day  fluconAZOLE IVPB 200 milliGRAM(s) IV Intermittent every 24 hours  fluconAZOLE IVPB      heparin  Infusion. 1100 Unit(s)/Hr (11 mL/Hr) IV Continuous <Continuous>  HYDROmorphone  Injectable 0.25 milliGRAM(s) IV Push every 4 hours  petrolatum white Ointment 1 Application(s) Topical two times a day  piperacillin/tazobactam IVPB. 3.375 Gram(s) IV Intermittent every 6 hours  polyethylene glycol 3350 17 Gram(s) Oral daily  simethicone drops 80 milliGRAM(s) Enteral Tube every 8 hours  zaleplon 5 milliGRAM(s) Oral at bedtime    MEDICATIONS  (PRN):  OLANZapine Disintegrating Tablet 5 milliGRAM(s) Oral daily PRN Nausea  ondansetron Injectable 4 milliGRAM(s) IV Push every 6 hours PRN Nausea and/or Vomiting      Weight: 163lbs  height; 5'2", IBW:110lbs+/-10%,148% of IBW.  Daily     Weight Change:   Pt has had a significant unintentional wt loss since December 2017. UBW was 220lbs, current BW is 163lbs (57lb wt loss/30% wt change)    Estimated energy needs:   IBW used for calculations as pt >120% of IBW   Nutrient needs based on [facility] standards of care for maintenance in older adults.   Needs adjusted 2/2 age, post-op, catabolic illness and suspected severe protein calorie malnutrition  1494-1743kcal/day (30-35kcal/kg)  60-70g pro/day (1.2-1.4g/kg)  1494-1743ml fluid/day (30-35ml/kg    Subjective:   66yo F with recently dx stage IV colon cancer w/ mets to the liver s/p 1 cycle of FOLFOX w/ recent admission 1 mo ago for acute cholecystitis, severe sepsis and hepatic vein thrombosis s/p percutaneous cholecystomy, and partial SBO s/p vent PEG and 2 colonic stents. Pt readmitted w/ fever, tachycardia and abdominal pain. She was noted to be in severe sepsis suspected from colitis. Now has evidence of disease progression in abdomen. Pt seen resting in bed. Reported feeling overwhelmed by serious discussions w/ health care providers. Currently on a Clear liquid diet and tolerating PO. Having a variety of clears from hospital and drinks brought in from outside. Per RN, fluids consumed appear to be draining from PEG. Discussed w/ pt purpose of EnsureClears on CLD and option of adding prostat (100kcal, 15g pro). Pt has had a significant unintentional wt loss since December 2017. UBW was 220lbs, current BW is 163lbs (57lb wt loss/30% wt change). Pt attributes it initially to having bronchitis and then continuing 2/2 current acute medical status. Denies N/V at present. Generally constipated but has bowel regimen that works for her. Reports being allergic to pesticides and eats organically. No other food related allergies or dietary restrictions. Skin: surgical incision, myla score 15, multiple drain sites; GI: distended per flowsheet.       Previous Nutrition Diagnosis:  inadequate energy intake RT decreased appetite/abdominal pain AEB pt reporting negligible PO x1 week    Active [   ]  Resolved [   ]    More pertinent PES for intervention:  If resolved, new PES:   Suspected severe protein calorie malnutrition RT inadequate kcal/pro intake 2/2 lack of appetite, abdominal pain AEB 30% wt change over last 8 months and reported prolonged reduced PO intake    Goal:  Pt to meet >75% estimated needs PO and avoid further wt loss    Recommendations:  1) Recommend adding prostat TID (300kcal, 45g pro) -discussed w/ pt adding it to cups of ice as sherbert and then to yogurts/apple sauce once diet gets advanced   2) Please advance diet to soft, low fiber w/ EnsureEnlive TID (1050kcal, 60g pro)  3) MVI daily  4) honor pts food preferences  Recs discussed w/ team.    Education: prioritizing protein options. purpose of EnsureClears on CLD and option of prostat TID     Risk Level: High [ x  ] Moderate [   ] Low [   ] Admitting Diagnosis:   Patient is a 68y old  Female who presents with a chief complaint of fever (17 Jul 2018 18:05)      PAST MEDICAL & SURGICAL HISTORY:  Cholecystitis, acute  Colon cancer  No significant past surgical history      Current Nutrition Order: Clear Liquids    PO Intake: Good (%) [   ]  Fair (50-75%) [ x  ] Poor (<25%) [   ] of Clears  Poor intake PTA    GI Issues: Denies N/V at present. On bowel regimen for constipation.     Pain: abdominal pain being medically managed    Skin Integrity: surgical incision, myla score 15, multiple drain sites    Labs:   07-23    149<H>  |  111<H>  |  27<H>  ----------------------------<  136<H>  3.8   |  26  |  0.45<L>    Ca    8.2<L>      23 Jul 2018 10:38  Phos  1.7     07-23  Mg     2.4     07-23      CAPILLARY BLOOD GLUCOSE      POCT Blood Glucose.: 124 mg/dL (23 Jul 2018 17:07)      Medications:  MEDICATIONS  (STANDING):  Biotene Dry Mouth Oral Rinse 5 milliLiter(s) Swish and Spit four times a day  fluconAZOLE IVPB 200 milliGRAM(s) IV Intermittent every 24 hours  fluconAZOLE IVPB      heparin  Infusion. 1100 Unit(s)/Hr (11 mL/Hr) IV Continuous <Continuous>  HYDROmorphone  Injectable 0.25 milliGRAM(s) IV Push every 4 hours  petrolatum white Ointment 1 Application(s) Topical two times a day  piperacillin/tazobactam IVPB. 3.375 Gram(s) IV Intermittent every 6 hours  polyethylene glycol 3350 17 Gram(s) Oral daily  simethicone drops 80 milliGRAM(s) Enteral Tube every 8 hours  zaleplon 5 milliGRAM(s) Oral at bedtime    MEDICATIONS  (PRN):  OLANZapine Disintegrating Tablet 5 milliGRAM(s) Oral daily PRN Nausea  ondansetron Injectable 4 milliGRAM(s) IV Push every 6 hours PRN Nausea and/or Vomiting      Weight: 163lbs  height; 5'2", IBW:110lbs+/-10%,148% of IBW.  Daily     Weight Change:   Pt has had a significant unintentional wt loss since December 2017. UBW was 220lbs, current BW is 163lbs (57lb wt loss/30% wt change)    Estimated energy needs:   IBW used for calculations as pt >120% of IBW   Nutrient needs based on [facility] standards of care for maintenance in older adults.   Needs adjusted 2/2 age, post-op, catabolic illness and suspected severe protein calorie malnutrition  1494-1743kcal/day (30-35kcal/kg)  60-70g pro/day (1.2-1.4g/kg)  1494-1743ml fluid/day (30-35ml/kg    Subjective:   66yo F with recently dx stage IV colon cancer w/ mets to the liver s/p 1 cycle of FOLFOX w/ recent admission 1 mo ago for acute cholecystitis, severe sepsis and hepatic vein thrombosis s/p percutaneous cholecystomy, and partial SBO s/p vent PEG and 2 colonic stents. Pt readmitted w/ fever, tachycardia and abdominal pain. She was noted to be in severe sepsis suspected from colitis. Now has evidence of disease progression in abdomen. Pt seen resting in bed. Reported feeling overwhelmed by serious discussions w/ health care providers. Currently on a Clear liquid diet and tolerating PO. Having a variety of clears from hospital and drinks brought in from outside. Per RN, fluids consumed appear to be draining from PEG. Per team no plan for diet advancement today, possibly tomorrow. Discussed w/ pt purpose of EnsureClears on CLD and option of adding prostat (100kcal, 15g pro). Pt has had a significant unintentional wt loss since December 2017. UBW was 220lbs, current BW is 163lbs (57lb wt loss/30% wt change). Pt attributes it initially to having bronchitis and then continuing 2/2 current acute medical status. Denies N/V at present. Generally constipated but has bowel regimen that works for her. Reports being allergic to pesticides and eats organically. No other food related allergies or dietary restrictions. Skin: surgical incision, myla score 15, multiple drain sites; GI: distended per flowsheet.       Previous Nutrition Diagnosis:  inadequate energy intake RT decreased appetite/abdominal pain AEB pt reporting negligible PO x1 week    Active [   ]  Resolved [   ]    More pertinent PES for intervention:  If resolved, new PES:   Suspected severe protein calorie malnutrition RT inadequate kcal/pro intake 2/2 lack of appetite, abdominal pain AEB 30% wt change over last 8 months and reported prolonged reduced PO intake    Goal:  Pt to meet >75% estimated needs PO and avoid further wt loss    Recommendations:  1) Recommend adding prostat TID (300kcal, 45g pro) -discussed w/ pt adding it to cups of ice as sherbert and then to yogurts/apple sauce once diet gets advanced   2) Please advance diet to soft, low fiber w/ EnsureEnlive TID (1050kcal, 60g pro)  3) MVI daily  4) honor pts food preferences  Recs discussed w/ team.    Education: prioritizing protein options. purpose of EnsureClears on CLD and option of prostat TID     Risk Level: High [ x  ] Moderate [   ] Low [   ]

## 2018-07-23 NOTE — PROGRESS NOTE ADULT - SUBJECTIVE AND OBJECTIVE BOX
IM Consult Progress Note    O/N Events:  Subjective/ROS: Denies HA, CP, SOB, n/v, changes in bowel/urinary habits.  12pt ROS otherwise negative.    VITALS  Vital Signs Last 24 Hrs  T(C): 36.3 (23 Jul 2018 09:13), Max: 37.1 (23 Jul 2018 05:05)  T(F): 97.4 (23 Jul 2018 09:13), Max: 98.7 (23 Jul 2018 05:05)  HR: 102 (23 Jul 2018 08:55) (78 - 106)  BP: 131/80 (23 Jul 2018 08:55) (123/75 - 138/75)  BP(mean): 102 (23 Jul 2018 08:55) (96 - 102)  RR: 19 (23 Jul 2018 08:55) (17 - 20)  SpO2: 96% (23 Jul 2018 08:55) (96% - 98%)    CAPILLARY BLOOD GLUCOSE    PHYSICAL EXAM:  Constitutional: WDWN , laying in bed comfortably, no acute distress  Head: NC/AT, no scleral icterus, dry MM  Respiratory: CTA B/L; no W/R/R, no retractions  Cardiac: +S1/S2; RRR; no M/R/G  Gastrointestinal: soft, + moderately distended (improved from yesterday), +moderately tender to palpation in RLQ- remainder of abdomen non-tender to palpation, no guarding; no rebound; + hypoactive BSx4; PEG site (venting to gravity) and percutaneous cystostomy sites are clean, dry and intact, no edema, erythema, or purulence  Extremities: WWP, no clubbing or cyanosis; no peripheral edema; b/l LE mildly tender to palpation which pt states is chronic  Dermatologic: skin warm, dry and intact; no rashes, wounds, or scars;   Neurologic: AAOx3; no focal deficits  Psych: normal affect  Lymph: no LAD  Neuro: AA&Ox3, No focal deficits    MEDICATIONS  (STANDING):  Biotene Dry Mouth Oral Rinse 5 milliLiter(s) Swish and Spit four times a day  fluconAZOLE IVPB 200 milliGRAM(s) IV Intermittent every 24 hours  fluconAZOLE IVPB      heparin  Infusion. 1100 Unit(s)/Hr (11 mL/Hr) IV Continuous <Continuous>  HYDROmorphone  Injectable 0.25 milliGRAM(s) IV Push every 4 hours  petrolatum white Ointment 1 Application(s) Topical two times a day  piperacillin/tazobactam IVPB. 3.375 Gram(s) IV Intermittent every 6 hours  polyethylene glycol 3350 17 Gram(s) Oral daily  simethicone drops 80 milliGRAM(s) Enteral Tube every 8 hours  sodium chloride 0.9%. 1000 milliLiter(s) (125 mL/Hr) IV Continuous <Continuous>  zaleplon 5 milliGRAM(s) Oral at bedtime    MEDICATIONS  (PRN):  OLANZapine Disintegrating Tablet 5 milliGRAM(s) Oral daily PRN Nausea  ondansetron Injectable 4 milliGRAM(s) IV Push every 6 hours PRN Nausea and/or Vomiting      allergic to cats (Rash)  No Known Drug Allergies      LABS                        8.3    9.8   )-----------( 438      ( 23 Jul 2018 06:59 )             26.9     07-23    149<H>  |  111<H>  |  27<H>  ----------------------------<  136<H>  3.8   |  26  |  0.45<L>    Ca    8.2<L>      23 Jul 2018 10:38  Phos  1.7     07-23  Mg     2.4     07-23      PTT - ( 23 Jul 2018 09:56 )  PTT:36.4 sec          IMAGING/EKG/ETC: Reviewed IM Consult Progress Note    O/N Events: ANGELICA  Subjective/ROS: Feeling better, denies CP/Abdomina pain.  Denies HA, SOB, n/v, changes in bowel/urinary habits.  12pt ROS otherwise negative.    VITALS  Vital Signs Last 24 Hrs  T(C): 36.3 (23 Jul 2018 09:13), Max: 37.1 (23 Jul 2018 05:05)  T(F): 97.4 (23 Jul 2018 09:13), Max: 98.7 (23 Jul 2018 05:05)  HR: 102 (23 Jul 2018 08:55) (78 - 106)  BP: 131/80 (23 Jul 2018 08:55) (125/85 - 138/75)  BP(mean): 102 (23 Jul 2018 08:55) (97 - 102)  RR: 19 (23 Jul 2018 08:55) (17 - 20)  SpO2: 96% (23 Jul 2018 08:55) (96% - 97%)    I&O's Summary    22 Jul 2018 07:01  -  23 Jul 2018 07:00  --------------------------------------------------------  IN: 3600 mL / OUT: 3605 mL / NET: -5 mL    23 Jul 2018 07:01  -  23 Jul 2018 13:55  --------------------------------------------------------  IN: 0 mL / OUT: 250 mL / NET: -250 mL        CAPILLARY BLOOD GLUCOSE    PHYSICAL EXAM  General: NAD; comfortable  HEENT: NC/AT, supple neck, MMM, no JVD  Respiratory: CTA B/L; no wheezes/crackles w/ good air movement  Cardiovascular: Regular rhythm/rate; S1/S2  Gastrointestinal: Soft; NTND; bowel sounds normal; biliary drain in place, peritoneal drain in place w/out erythema, PEG to gravity w/ drainage  Extremities: WWP; no edema; no clubbing; radial/pedal pulses palpable  Neurological:  A&Ox3; PERRL; EOMI; CNII-XII grossly intact; 5/5 strength; sensation intact; reflexes 2+; no obvious focal deficits  Skin: No rashes or ulcers, normal tugor    MEDICATIONS  (STANDING):  Biotene Dry Mouth Oral Rinse 5 milliLiter(s) Swish and Spit four times a day  fluconAZOLE IVPB 200 milliGRAM(s) IV Intermittent every 24 hours  fluconAZOLE IVPB      heparin  Infusion. 1100 Unit(s)/Hr (11 mL/Hr) IV Continuous <Continuous>  HYDROmorphone  Injectable 0.25 milliGRAM(s) IV Push every 4 hours  petrolatum white Ointment 1 Application(s) Topical two times a day  piperacillin/tazobactam IVPB. 3.375 Gram(s) IV Intermittent every 6 hours  polyethylene glycol 3350 17 Gram(s) Oral daily  simethicone drops 80 milliGRAM(s) Enteral Tube every 8 hours  sodium chloride 0.9%. 1000 milliLiter(s) (125 mL/Hr) IV Continuous <Continuous>  zaleplon 5 milliGRAM(s) Oral at bedtime    MEDICATIONS  (PRN):  OLANZapine Disintegrating Tablet 5 milliGRAM(s) Oral daily PRN Nausea  ondansetron Injectable 4 milliGRAM(s) IV Push every 6 hours PRN Nausea and/or Vomiting      allergic to cats (Rash)  No Known Drug Allergies      LABS                        8.3    9.8   )-----------( 438      ( 23 Jul 2018 06:59 )             26.9     07-23    149<H>  |  111<H>  |  27<H>  ----------------------------<  136<H>  3.8   |  26  |  0.45<L>    Ca    8.2<L>      23 Jul 2018 10:38  Phos  1.7     07-23  Mg     2.4     07-23      PTT - ( 23 Jul 2018 09:56 )  PTT:36.4 sec    IMAGING/EKG/ETC: Reviewed

## 2018-07-23 NOTE — PROGRESS NOTE ADULT - ASSESSMENT
67F w/ metastatic colon ca s/p 1 dose FOLFOX, recent acute cholecystitis (June 2018) c/b severe sepsis and hepatic vein thrombosis s/p percutaneous cholecystomy, and recent SBO/ ileus s/p venting PEG and 2 colonic stents admitted with severe sepsis and acute 2/2 GNR bacteremia, likely secondary to intraabdominal source and abdominal pain likely d/t partial bowel obstruction vs ileus 2/2 malignancy. 67F w/ metastatic colon ca s/p 1 dose FOLFOX, recent acute cholecystitis (June 2018) c/b severe sepsis and hepatic vein thrombosis s/p percutaneous cholecystomy, and recent SBO/ ileus s/p venting PEG and 2 colonic stents admitted with severe sepsis and acute 2/2 GNR bacteremia, likely secondary to intraabdominal source and abdominal pain likely d/t partial bowel obstruction vs ileus 2/2 malignancy; now s/p peritoneal drain.

## 2018-07-23 NOTE — PROGRESS NOTE ADULT - PROBLEM SELECTOR PLAN 5
- albumin 2.4  - metastatic colon cancer, peg to gravity, poor PO intake  - nutrition consult Albumin 2.4  - metastatic colon cancer, peg to gravity, poor PO intake  - nutrition consult

## 2018-07-23 NOTE — PROGRESS NOTE ADULT - PROBLEM SELECTOR PLAN 1
- Severe sepsis present on admission, pt no longer meets criteria for severe sepsis  - Source: intraabdominal (initial cx growing bacteroides fragilis)  - Clinically improving on zosyn; afebrile overnight, BP maintained >90/60  - Initial Bcx growing bacteroides fragillis, f/u sensitivities  - Leukopenia improved, no leukocytosis  - CT showed evidence of colonic inflammation  - Continue w/ IVF  - C/w zosyn  - follow up GI and surgery recommendations  - monitor VS closely  - serial abdominal exams    #metabolic acidosis  - likely secondary to lactic acidosis (last lactate 2.6 this morning) secondary to infection  - bicarb 21  - continue to folow lactate  - f/u culture sensitivities to ensure coverage Pt of Dr. Nick ([phone]). recently dx'd in 6/18. s/p chemoport placement 5/31, initiation of chemotherapy 6/9 with FOLFOX, received 1 dose so far, with 2nd tx planned for 7/17 but not given due to fever.  -pal care consulted- currently recommending symptomatic treatment and -reassessment of goals of care when medically stabilized  - c/w care as per surgery team re: peritonitis

## 2018-07-24 DIAGNOSIS — D47.3 ESSENTIAL (HEMORRHAGIC) THROMBOCYTHEMIA: ICD-10-CM

## 2018-07-24 DIAGNOSIS — E87.0 HYPEROSMOLALITY AND HYPERNATREMIA: ICD-10-CM

## 2018-07-24 LAB
-  AMPICILLIN/SULBACTAM: SIGNIFICANT CHANGE UP
-  AMPICILLIN: SIGNIFICANT CHANGE UP
-  CEFAZOLIN: SIGNIFICANT CHANGE UP
-  CEFTRIAXONE: SIGNIFICANT CHANGE UP
-  CIPROFLOXACIN: SIGNIFICANT CHANGE UP
-  GENTAMICIN: SIGNIFICANT CHANGE UP
-  PIPERACILLIN/TAZOBACTAM: SIGNIFICANT CHANGE UP
-  TOBRAMYCIN: SIGNIFICANT CHANGE UP
-  TRIMETHOPRIM/SULFAMETHOXAZOLE: SIGNIFICANT CHANGE UP
ANION GAP SERPL CALC-SCNC: 11 MMOL/L — SIGNIFICANT CHANGE UP (ref 5–17)
ANION GAP SERPL CALC-SCNC: 9 MMOL/L — SIGNIFICANT CHANGE UP (ref 5–17)
APTT BLD: 71.3 SEC — HIGH (ref 27.5–37.4)
BUN SERPL-MCNC: 22 MG/DL — SIGNIFICANT CHANGE UP (ref 7–23)
BUN SERPL-MCNC: 23 MG/DL — SIGNIFICANT CHANGE UP (ref 7–23)
CALCIUM SERPL-MCNC: 7.5 MG/DL — LOW (ref 8.4–10.5)
CALCIUM SERPL-MCNC: 8.2 MG/DL — LOW (ref 8.4–10.5)
CHLORIDE SERPL-SCNC: 113 MMOL/L — HIGH (ref 96–108)
CHLORIDE SERPL-SCNC: 114 MMOL/L — HIGH (ref 96–108)
CO2 SERPL-SCNC: 28 MMOL/L — SIGNIFICANT CHANGE UP (ref 22–31)
CO2 SERPL-SCNC: 30 MMOL/L — SIGNIFICANT CHANGE UP (ref 22–31)
CREAT SERPL-MCNC: 0.41 MG/DL — LOW (ref 0.5–1.3)
CREAT SERPL-MCNC: 0.42 MG/DL — LOW (ref 0.5–1.3)
CULTURE RESULTS: SIGNIFICANT CHANGE UP
CULTURE RESULTS: SIGNIFICANT CHANGE UP
GLUCOSE SERPL-MCNC: 142 MG/DL — HIGH (ref 70–99)
GLUCOSE SERPL-MCNC: 148 MG/DL — HIGH (ref 70–99)
HCT VFR BLD CALC: 26.7 % — LOW (ref 34.5–45)
HGB BLD-MCNC: 8.3 G/DL — LOW (ref 11.5–15.5)
MAGNESIUM SERPL-MCNC: 2.4 MG/DL — SIGNIFICANT CHANGE UP (ref 1.6–2.6)
MCHC RBC-ENTMCNC: 24.6 PG — LOW (ref 27–34)
MCHC RBC-ENTMCNC: 31.1 G/DL — LOW (ref 32–36)
MCV RBC AUTO: 79.2 FL — LOW (ref 80–100)
METHOD TYPE: SIGNIFICANT CHANGE UP
ORGANISM # SPEC MICROSCOPIC CNT: SIGNIFICANT CHANGE UP
PHOSPHATE SERPL-MCNC: 1.8 MG/DL — LOW (ref 2.5–4.5)
PLATELET # BLD AUTO: 543 K/UL — HIGH (ref 150–400)
POTASSIUM SERPL-MCNC: 3.5 MMOL/L — SIGNIFICANT CHANGE UP (ref 3.5–5.3)
POTASSIUM SERPL-MCNC: 3.6 MMOL/L — SIGNIFICANT CHANGE UP (ref 3.5–5.3)
POTASSIUM SERPL-SCNC: 3.5 MMOL/L — SIGNIFICANT CHANGE UP (ref 3.5–5.3)
POTASSIUM SERPL-SCNC: 3.6 MMOL/L — SIGNIFICANT CHANGE UP (ref 3.5–5.3)
RBC # BLD: 3.37 M/UL — LOW (ref 3.8–5.2)
RBC # FLD: 22.8 % — HIGH (ref 10.3–16.9)
SODIUM SERPL-SCNC: 152 MMOL/L — HIGH (ref 135–145)
SODIUM SERPL-SCNC: 153 MMOL/L — HIGH (ref 135–145)
SPECIMEN SOURCE: SIGNIFICANT CHANGE UP
SPECIMEN SOURCE: SIGNIFICANT CHANGE UP
WBC # BLD: 13.8 K/UL — HIGH (ref 3.8–10.5)
WBC # FLD AUTO: 13.8 K/UL — HIGH (ref 3.8–10.5)

## 2018-07-24 PROCEDURE — 99356: CPT

## 2018-07-24 PROCEDURE — 99233 SBSQ HOSP IP/OBS HIGH 50: CPT

## 2018-07-24 PROCEDURE — 99233 SBSQ HOSP IP/OBS HIGH 50: CPT | Mod: GC

## 2018-07-24 RX ORDER — POTASSIUM PHOSPHATE, MONOBASIC POTASSIUM PHOSPHATE, DIBASIC 236; 224 MG/ML; MG/ML
15 INJECTION, SOLUTION INTRAVENOUS ONCE
Qty: 0 | Refills: 0 | Status: COMPLETED | OUTPATIENT
Start: 2018-07-24 | End: 2018-07-24

## 2018-07-24 RX ORDER — SENNA PLUS 8.6 MG/1
1 TABLET ORAL
Qty: 0 | Refills: 0 | Status: DISCONTINUED | OUTPATIENT
Start: 2018-07-24 | End: 2018-07-28

## 2018-07-24 RX ORDER — POTASSIUM CHLORIDE 20 MEQ
10 PACKET (EA) ORAL
Qty: 0 | Refills: 0 | Status: COMPLETED | OUTPATIENT
Start: 2018-07-24 | End: 2018-07-24

## 2018-07-24 RX ORDER — ALBUMIN HUMAN 25 %
500 VIAL (ML) INTRAVENOUS ONCE
Qty: 0 | Refills: 0 | Status: COMPLETED | OUTPATIENT
Start: 2018-07-24 | End: 2018-07-24

## 2018-07-24 RX ORDER — MORPHINE SULFATE 50 MG/1
15 CAPSULE, EXTENDED RELEASE ORAL
Qty: 0 | Refills: 0 | Status: DISCONTINUED | OUTPATIENT
Start: 2018-07-24 | End: 2018-07-28

## 2018-07-24 RX ORDER — SODIUM CHLORIDE 9 MG/ML
1000 INJECTION, SOLUTION INTRAVENOUS
Qty: 0 | Refills: 0 | Status: DISCONTINUED | OUTPATIENT
Start: 2018-07-24 | End: 2018-07-24

## 2018-07-24 RX ORDER — OXYCODONE AND ACETAMINOPHEN 5; 325 MG/1; MG/1
1 TABLET ORAL EVERY 4 HOURS
Qty: 0 | Refills: 0 | Status: DISCONTINUED | OUTPATIENT
Start: 2018-07-24 | End: 2018-07-28

## 2018-07-24 RX ORDER — SODIUM CHLORIDE 9 MG/ML
1000 INJECTION, SOLUTION INTRAVENOUS
Qty: 0 | Refills: 0 | Status: DISCONTINUED | OUTPATIENT
Start: 2018-07-24 | End: 2018-07-26

## 2018-07-24 RX ADMIN — POLYETHYLENE GLYCOL 3350 17 GRAM(S): 17 POWDER, FOR SOLUTION ORAL at 12:45

## 2018-07-24 RX ADMIN — HYDROMORPHONE HYDROCHLORIDE 0.25 MILLIGRAM(S): 2 INJECTION INTRAMUSCULAR; INTRAVENOUS; SUBCUTANEOUS at 15:30

## 2018-07-24 RX ADMIN — HYDROMORPHONE HYDROCHLORIDE 0.25 MILLIGRAM(S): 2 INJECTION INTRAMUSCULAR; INTRAVENOUS; SUBCUTANEOUS at 02:08

## 2018-07-24 RX ADMIN — PIPERACILLIN AND TAZOBACTAM 200 GRAM(S): 4; .5 INJECTION, POWDER, LYOPHILIZED, FOR SOLUTION INTRAVENOUS at 18:16

## 2018-07-24 RX ADMIN — Medication 5 MILLILITER(S): at 06:36

## 2018-07-24 RX ADMIN — Medication 5 MILLIGRAM(S): at 21:49

## 2018-07-24 RX ADMIN — PIPERACILLIN AND TAZOBACTAM 200 GRAM(S): 4; .5 INJECTION, POWDER, LYOPHILIZED, FOR SOLUTION INTRAVENOUS at 23:42

## 2018-07-24 RX ADMIN — MORPHINE SULFATE 15 MILLIGRAM(S): 50 CAPSULE, EXTENDED RELEASE ORAL at 18:57

## 2018-07-24 RX ADMIN — FLUCONAZOLE 100 MILLIGRAM(S): 150 TABLET ORAL at 17:10

## 2018-07-24 RX ADMIN — PIPERACILLIN AND TAZOBACTAM 200 GRAM(S): 4; .5 INJECTION, POWDER, LYOPHILIZED, FOR SOLUTION INTRAVENOUS at 12:45

## 2018-07-24 RX ADMIN — Medication 1 APPLICATION(S): at 06:34

## 2018-07-24 RX ADMIN — HYDROMORPHONE HYDROCHLORIDE 0.25 MILLIGRAM(S): 2 INJECTION INTRAMUSCULAR; INTRAVENOUS; SUBCUTANEOUS at 15:10

## 2018-07-24 RX ADMIN — SIMETHICONE 80 MILLIGRAM(S): 80 TABLET, CHEWABLE ORAL at 21:49

## 2018-07-24 RX ADMIN — HYDROMORPHONE HYDROCHLORIDE 0.25 MILLIGRAM(S): 2 INJECTION INTRAMUSCULAR; INTRAVENOUS; SUBCUTANEOUS at 11:00

## 2018-07-24 RX ADMIN — HYDROMORPHONE HYDROCHLORIDE 0.25 MILLIGRAM(S): 2 INJECTION INTRAMUSCULAR; INTRAVENOUS; SUBCUTANEOUS at 06:35

## 2018-07-24 RX ADMIN — Medication 100 MILLIEQUIVALENT(S): at 14:35

## 2018-07-24 RX ADMIN — SIMETHICONE 80 MILLIGRAM(S): 80 TABLET, CHEWABLE ORAL at 06:35

## 2018-07-24 RX ADMIN — Medication 1 APPLICATION(S): at 18:18

## 2018-07-24 RX ADMIN — HYDROMORPHONE HYDROCHLORIDE 0.25 MILLIGRAM(S): 2 INJECTION INTRAMUSCULAR; INTRAVENOUS; SUBCUTANEOUS at 02:38

## 2018-07-24 RX ADMIN — SIMETHICONE 80 MILLIGRAM(S): 80 TABLET, CHEWABLE ORAL at 15:10

## 2018-07-24 RX ADMIN — MORPHINE SULFATE 15 MILLIGRAM(S): 50 CAPSULE, EXTENDED RELEASE ORAL at 18:21

## 2018-07-24 RX ADMIN — Medication 5 MILLILITER(S): at 23:43

## 2018-07-24 RX ADMIN — SODIUM CHLORIDE 87 MILLILITER(S): 9 INJECTION, SOLUTION INTRAVENOUS at 23:47

## 2018-07-24 RX ADMIN — Medication 5 MILLILITER(S): at 00:59

## 2018-07-24 RX ADMIN — SENNA PLUS 1 TABLET(S): 8.6 TABLET ORAL at 18:16

## 2018-07-24 RX ADMIN — Medication 100 MILLIEQUIVALENT(S): at 16:42

## 2018-07-24 RX ADMIN — HYDROMORPHONE HYDROCHLORIDE 0.25 MILLIGRAM(S): 2 INJECTION INTRAMUSCULAR; INTRAVENOUS; SUBCUTANEOUS at 07:36

## 2018-07-24 RX ADMIN — SODIUM CHLORIDE 110 MILLILITER(S): 9 INJECTION, SOLUTION INTRAVENOUS at 10:28

## 2018-07-24 RX ADMIN — HEPARIN SODIUM 1100 UNIT(S)/HR: 5000 INJECTION INTRAVENOUS; SUBCUTANEOUS at 06:35

## 2018-07-24 RX ADMIN — POTASSIUM PHOSPHATE, MONOBASIC POTASSIUM PHOSPHATE, DIBASIC 62.5 MILLIMOLE(S): 236; 224 INJECTION, SOLUTION INTRAVENOUS at 18:18

## 2018-07-24 RX ADMIN — Medication 5 MILLILITER(S): at 12:45

## 2018-07-24 RX ADMIN — Medication 250 MILLILITER(S): at 10:28

## 2018-07-24 RX ADMIN — Medication 5 MILLILITER(S): at 18:18

## 2018-07-24 RX ADMIN — PIPERACILLIN AND TAZOBACTAM 200 GRAM(S): 4; .5 INJECTION, POWDER, LYOPHILIZED, FOR SOLUTION INTRAVENOUS at 06:35

## 2018-07-24 RX ADMIN — PIPERACILLIN AND TAZOBACTAM 200 GRAM(S): 4; .5 INJECTION, POWDER, LYOPHILIZED, FOR SOLUTION INTRAVENOUS at 01:00

## 2018-07-24 RX ADMIN — HYDROMORPHONE HYDROCHLORIDE 0.25 MILLIGRAM(S): 2 INJECTION INTRAMUSCULAR; INTRAVENOUS; SUBCUTANEOUS at 10:36

## 2018-07-24 NOTE — PROGRESS NOTE ADULT - PROBLEM SELECTOR PLAN 1
Improved. Has used 5 doses of Dilaudid 0.25mg in the last 24h. MDD: 25   Consider transitioning to long acting oral agents now that patient is tolerating PO and in anticipation to discharge.  Recommend STANDING MS Contin 15mg PO q12h or OxyCONTIN 10mg PO q12h;  Could also consider Fentanyl patch 12mcg/hr q72 as a long term pain management.   Recommend PRN Oxycodone 5mg PO q4h PRN breakthrough pain.

## 2018-07-24 NOTE — PROGRESS NOTE ADULT - PROBLEM SELECTOR PLAN 2
Remains obstipated. Encouraged to ambulate and frequent visits to . Currently on Miralax daily.  Recommend Senna BID  Recommend bedside commode.

## 2018-07-24 NOTE — PROGRESS NOTE ADULT - PROBLEM SELECTOR PLAN 5
Albumin 2.4  - metastatic colon cancer, peg to gravity, poor PO intake  - nutrition consult POA. Likely 2/2 hypercoagulable state in the setting of active untreated malignancy.  -c/w heparin ggt

## 2018-07-24 NOTE — PROGRESS NOTE ADULT - ASSESSMENT
66 yo F with metastatic colon cancer, BRAF mutated.   s/p 1 cycle of FOLFOX.   recent cholecystitis   hx of SBO/ileus s/p venting PEG  2 colonic stents  admitted with severe sepsis - high suspicion for intraabdominal source    Clinically considerably better  S/p peritoneal drain placement  on broad spectrum antibiotics    future chemotherapy and scheduling will depend on patient's clinical status.   I doubt that she can be a candidate for treatment unless her condition improves significantly.      I would concur with palliative care/hospice

## 2018-07-24 NOTE — PROGRESS NOTE ADULT - ASSESSMENT
67F w/ metastatic colon ca s/p 1 dose FOLFOX, recent acute cholecystitis (June 2018) c/b severe sepsis and hepatic vein thrombosis s/p percutaneous cholecystomy, and recent SBO/ ileus s/p venting PEG and 2 colonic stents admitted with severe sepsis and acute 2/2 GNR bacteremia, likely secondary to intraabdominal source and abdominal pain likely d/t partial bowel obstruction vs ileus 2/2 malignancy; now s/p peritoneal drain.

## 2018-07-24 NOTE — PROGRESS NOTE ADULT - PROBLEM SELECTOR PLAN 3
Initial Bcx (7/18) growing bacteroides fragillis (likely intraabdominal source)- f/u sensitivities  - Repeat Bcx (7/19) negative to date  - c/w zosyn Initial Bcx (7/18) growing bacteroides fragillis. Cleared as of 7/9 NGTD.  - c/w zosyn Intraabdominal etiology. Initial Blood cx growing bacteroides fragilis. Surgical cultures growing E.coli and Klebsiella. Increased leukocytosis today, with mild tachycardia, no fever. Currently appear to be covering appropriate infectious source (intraabdominal) and unclear of other etiology at this time.   - C/w zosyn  - Would monitor left arm and consider ultrasound for eval of clot, however alrdy on heparin ggt

## 2018-07-24 NOTE — PROGRESS NOTE ADULT - SUBJECTIVE AND OBJECTIVE BOX
PILLO MAR             MRN-[id number]    CC: Pain, Anxiety, Insomnia, GOC/AD, Support    HPI:  66 yo F w/ metastatic colon ca s/p 1 dose FOLFOX, recent acute cholecystitis c/b severe sepsis and hepatic vein thrombosis s/p percutaneous cholecystomy, and recent partial SBO s/p PEG and 2 colonic stents p/w fever of 101.7 in the setting of several days of exhaustion and weakness. Denies subjective fevers/ chills/ night sweats. Pt only came to hospital because sent by her oncologist when she went to get her 2nd dose of chemo and found to be febrile with WBC of 15. While in ED, pt reports developed worsening diffuse abdominal pain 8.5/10, worse in the upper abdomen. Pain is better with oxycodone. No change w/ position or food. Denies n/v/d or constipation. Last BM last night. Endorses poor PO intake due to decreased appetite since last admission.     SUBJECTIVE: Saw and evaluated Mrs Mar at bedside. Patient's friends at bedside for a family meeting. Patient's sister on the phone as well. We discussed the patients decline over the past months as well as the fact that the patient is no longer a candidate for cancer targeted treatments. We discussed the possibility of hospice services either at home, [place], or at an institution. She opted to go to [facility] in the [place] for continued symptomatic management and transition from there back to [place] to spend the rest of her days with family and friends. She will meet with the [facility] hospice liaison today and with the palliative SW in order to discuss any further concerns she has about insurance and transition of care to [place]. Primary SW/CM aware and will be making the referral to [facility].     ROS:  DYSPNEA: No  NAUS/VOM: No	  SECRETIONS: No	  AGITATION: No  Pain (Y/N):  Yes     -Provocation/Palliation: Movement increases her pain. Medication and remaining immobile improves it.  -Quality/Quantity: Chronic malignant somatic pain currently uncontrolled. Chronic malignant visceral pain currently uncontrolled.   -Radiating: No  -Severity: 8/10  -Timing/Frequency: Incidental  -Impact on ADLs: Yes, prevented from sleep    OTHER REVIEW OF SYSTEMS:  Insomnia improved. Appetite improved. Anxiety occasionally.     PEx:  T(C): 36.4 (07-24-18 @ 14:00), Max: 36.8 (07-24-18 @ 05:39)  HR: 98 (07-24-18 @ 08:17) (86 - 108)  BP: 119/59 (07-24-18 @ 08:17) (119/59 - 138/79)  RR: 18 (07-24-18 @ 08:17) (16 - 19)  SpO2: 98% (07-24-18 @ 08:17) (94% - 98%)  Wt(kg): 73.9    General: AAOx3 In NAD. Improved mood.   HEENT: NCAT  PERRL EOMI Non icteric    Neck: Supple, No masses  CVS: RR S1S2, no murmurs  Resp: CTABL Unlabored  GI: Soft TTP Distension+. Venting PEG+  drain+  Musc: No C/C/E    Neuro: No focal deficits. Following commands.  Psych:  Improved mood  Skin: Xerosis  Lymph: Normal.  	 	     ALLERGIES: allergic to cats (Rash)  No Known Drug Allergies    OPIATE NAÏVE (Y/N): No    MEDICATIONS: REVIEWED  MEDICATIONS  (STANDING):  Biotene Dry Mouth Oral Rinse 5 milliLiter(s) Swish and Spit four times a day  dextrose 5% + sodium chloride 0.45%. 1000 milliLiter(s) (110 mL/Hr) IV Continuous <Continuous>  fluconAZOLE IVPB 200 milliGRAM(s) IV Intermittent every 24 hours  fluconAZOLE IVPB      heparin  Infusion. 1100 Unit(s)/Hr (11 mL/Hr) IV Continuous <Continuous>  HYDROmorphone  Injectable 0.25 milliGRAM(s) IV Push every 4 hours  petrolatum white Ointment 1 Application(s) Topical two times a day  piperacillin/tazobactam IVPB. 3.375 Gram(s) IV Intermittent every 6 hours  polyethylene glycol 3350 17 Gram(s) Oral daily  potassium chloride  10 mEq/100 mL IVPB 10 milliEquivalent(s) IV Intermittent every 1 hour  potassium phosphate IVPB 15 milliMole(s) IV Intermittent once  simethicone drops 80 milliGRAM(s) Enteral Tube every 8 hours  zaleplon 5 milliGRAM(s) Oral at bedtime    MEDICATIONS  (PRN):  OLANZapine Disintegrating Tablet 5 milliGRAM(s) Oral daily PRN Nausea  ondansetron Injectable 4 milliGRAM(s) IV Push every 6 hours PRN Nausea and/or Vomiting    LABS: REVIEWED                        8.3    13.8  )-----------( 543      ( 24 Jul 2018 04:22 )             26.7   07-24    152<H>  |  113<H>  |  23  ----------------------------<  148<H>  3.6   |  30  |  0.42<L>    Ca    8.2<L>      24 Jul 2018 04:22  Phos  1.8     07-24  Mg     2.4     07-24    Albumin, Serum: 1.8 g/dL (07.21.18 @ 07:37)    Lactate, Blood: 2.5 mmoL/L (07.22.18 @ 06:46)  Lactate, Blood: 2.6 mmoL/L (07.21.18 @ 07:37)  Lactate, Blood: 2.8 mmoL/L (07.20.18 @ 21:03)  Lactate, Blood: 4.1 mmoL/L (07.20.18 @ 15:56)  Lactate, Blood: 4.3 mmoL/L (07.20.18 @ 12:52)  Lactate, Blood: 2.7 mmoL/L (07.18.18 @ 01:34)    Type + Screen (07.23.18 @ 11:18)    ABO Interpretation: B    Rh Interpretation: Positive    Antibody Screen: Negative    Culture - Body Fluid with Gram Stain (07.22.18 @ 12:43)    -  Gentamicin: S <=1    -  Gentamicin: S <=1    -  Gentamicin: S <=1    -  Piperacillin/Tazobactam: S <=8    -  Piperacillin/Tazobactam: S <=8    -  Piperacillin/Tazobactam: S <=8    -  Tobramycin: S <=2    -  Tobramycin: S <=2    -  Tobramycin: S <=2    -  Trimethoprim/Sulfamethoxazole: S <=0.5/9.5    -  Trimethoprim/Sulfamethoxazole: S <=0.5/9.5    -  Trimethoprim/Sulfamethoxazole: S <=0.5/9.5    Gram Stain:   No organisms seen  Numerous White blood cells    -  Ampicillin: R >16 These ampicillin results predict results for amoxicillin    -  Ampicillin: S <=2 These ampicillin results predict results for amoxicillin    -  Ampicillin: S 4 These ampicillin results predict results for amoxicillin    -  Ampicillin/Sulbactam: S 8/4    -  Ampicillin/Sulbactam: S <=4/2    -  Ampicillin/Sulbactam: S <=4/2    -  Cefazolin: S <=2    -  Cefazolin: S <=2    -  Cefazolin: S <=2    -  Ceftriaxone: S <=1 Enterobacter, Citrobacter, and Serratia may develop resistance during prolonged therapy    -  Ceftriaxone: S <=1 Enterobacter, Citrobacter, and Serratia may develop resistance during prolonged therapy    -  Ceftriaxone: S <=1 Enterobacter, Citrobacter, and Serratia may develop resistance during prolonged therapy    -  Ciprofloxacin: S <=0.5    -  Ciprofloxacin: S <=0.5    -  Ciprofloxacin: R >2    Specimen Source: .Body Fluid w/ gram stain    Culture Results:   Rare Klebsiella pneumoniae  Rare Escherichia coli  Rare Escherichia coli #2 Strain #2    Organism Identification: Klebsiella pneumoniae  Escherichia coli  Escherichia coli    Organism: Klebsiella pneumoniae    Organism: Escherichia coli    Organism: Escherichia coli    Method Type: NINFA    Method Type: NINFA    Method Type: NINFA    IMAGING: REVIEWED    ADVANCED DIRECTIVES:     FULL CODE        Advanced Directives:     Full Code     MOLST in [place]    Decision maker: The patient is able to participate in complex medical decision making conversations  Legal surrogate: No designated HCP, but the patient was thinking of assigning her sister Rama Fine [phone] from [place] and her friend Robyn Sam [phone] in [facility].    GOALS OF CARE DISCUSSION       Palliative care info/counseling provided	             See previous Palliative Medicine Note       Documentation of GOC: Full code. Would like symptomatic control.           REFERRALS	        Palliative SW       Unit SW/Case Mgmt       Patient/Family Support       Massage Therapy       Music Therapy       Nutrition / Dietician       Hospice - [facility]    AGENCY CHOICE DISCUSSED:           [facility]

## 2018-07-24 NOTE — PROGRESS NOTE ADULT - PROBLEM SELECTOR PLAN 3
Improved. Reports pain and other symptoms prevents her from falling asleep.   Continue Zaleplon 5mg PO qHS

## 2018-07-24 NOTE — PROGRESS NOTE ADULT - PROBLEM SELECTOR PLAN 4
POA. found on last admission, likely 2/2 hypercoagulable state in the setting of active untreated malignancy  -c/w heparin POA. Likely 2/2 hypercoagulable state in the setting of active untreated malignancy.  -c/w heparin ggt Initial Bcx (7/18) growing bacteroides fragillis. Cleared as of 7/9 NGTD.  - c/w zosyn

## 2018-07-24 NOTE — PROGRESS NOTE ADULT - PROBLEM SELECTOR PLAN 6
Hb 9, at baseline  -maintain active t+s  -transfuse <7    #leukopenia- improved  - likely secondary to infection in setting of severe sepsis  - continue to trend CBC    #thrombocytosis- improving  - likely reactive in the setting of acute infection, will continue to trend Hb 9, mildly downtrended, but stable.  - Maintain active T&S  - transfusion goal Hgb>7 Albumin 2.4  - metastatic colon cancer, peg to gravity, poor PO intake  - nutrition consult

## 2018-07-24 NOTE — PROGRESS NOTE ADULT - ASSESSMENT
A/P: 66 yo F w/ metastatic colon ca s/p chemotherapy, acute cholecystitis s/p percutaneous cholecystomy c/b portal vein thrombosis, LBO s/p sigmoid stent, presents this admission with sepsis, surgery consulted for worsening abdominal pain likely due to colitis. S/P IR drainage of ascites (7/22).      No surgical intervention indicated and patient aware  IVF hydration   Discuss Palliative care options with Palliative team  Continue with IV antibiotics   Serial abdominal exams

## 2018-07-24 NOTE — PROGRESS NOTE ADULT - PROBLEM SELECTOR PLAN 1
Pt of Dr. Nick ([phone]). recently dx'd in 6/18. s/p chemoport placement 5/31, initiation of chemotherapy 6/9 with FOLFOX, received 1 dose so far, with 2nd tx planned for 7/17 but not given due to fever.  -pal care consulted- currently recommending symptomatic treatment and -reassessment of goals of care when medically stabilized  - c/w care as per surgery team re: peritonitis Pt of Dr. Nick ([phone]). recently dx'd in 6/18. s/p chemoport placement 5/31, initiation of chemotherapy 6/9 with FOLFOX, received 1 dose so far, with 2nd tx planned for 7/17 but not given due to fever. Biliary and peritoneal drain placed  - palliative care consulted  - continue pain control with dilaudid 0.25mg IV q4hrs prn  - c/w miralax daily, would add senna and if patient does not have BM would give suppository prn  - c/w care as per surgery team re: peritonitis, leukocytosis increasing today Pt of Dr. Nick ([phone]). recently dx'd in 6/18. s/p chemoport placement 5/31, initiation of chemotherapy 6/9 with FOLFOX, received 1 dose so far, with 2nd tx planned for 7/17 but not given due to fever. Biliary and peritoneal drain placed  - palliative care consulted, ongoing hospice discussion  - continue pain control as per palliative recs  - increase miralax to bid and if still without BM would add enema or suppository prn  - c/w care as per surgery team re: peritonitis, leukocytosis increasing today

## 2018-07-24 NOTE — PROGRESS NOTE ADULT - SUBJECTIVE AND OBJECTIVE BOX
*********INCOMPLETE  INTERVAL HPI/OVERNIGHT EVENTS:    VITAL SIGNS:  T(F): 96.9 (07-24-18 @ 09:28)  HR: 98 (07-24-18 @ 08:17)  BP: 119/59 (07-24-18 @ 08:17)  RR: 18 (07-24-18 @ 08:17)  SpO2: 98% (07-24-18 @ 08:17)  Wt(kg): --    PHYSICAL EXAM:    Constitutional:  Eyes:  ENMT:  Neck:  Respiratory:  Cardiovascular:  Gastrointestinal:  Extremities:  Vascular:  Neurological:  Musculoskeletal:    MEDICATIONS  (STANDING):  Biotene Dry Mouth Oral Rinse 5 milliLiter(s) Swish and Spit four times a day  fluconAZOLE IVPB 200 milliGRAM(s) IV Intermittent every 24 hours  fluconAZOLE IVPB      heparin  Infusion. 1100 Unit(s)/Hr (11 mL/Hr) IV Continuous <Continuous>  HYDROmorphone  Injectable 0.25 milliGRAM(s) IV Push every 4 hours  petrolatum white Ointment 1 Application(s) Topical two times a day  piperacillin/tazobactam IVPB. 3.375 Gram(s) IV Intermittent every 6 hours  polyethylene glycol 3350 17 Gram(s) Oral daily  simethicone drops 80 milliGRAM(s) Enteral Tube every 8 hours  zaleplon 5 milliGRAM(s) Oral at bedtime    MEDICATIONS  (PRN):  OLANZapine Disintegrating Tablet 5 milliGRAM(s) Oral daily PRN Nausea  ondansetron Injectable 4 milliGRAM(s) IV Push every 6 hours PRN Nausea and/or Vomiting      Allergies    allergic to cats (Rash)  No Known Drug Allergies    Intolerances        LABS:                        8.3    13.8  )-----------( 543      ( 24 Jul 2018 04:22 )             26.7     07-24    152<H>  |  113<H>  |  23  ----------------------------<  148<H>  3.6   |  30  |  0.42<L>    Ca    8.2<L>      24 Jul 2018 04:22  Phos  1.8     07-24  Mg     2.4     07-24      PTT - ( 24 Jul 2018 04:22 )  PTT:71.3 sec      RADIOLOGY & ADDITIONAL TESTS: *********INCOMPLETE  INTERVAL HPI/OVERNIGHT EVENTS: Patient seen and examined at bedside. She is feeling well. She has not had a BM in a week, is passing gas. Denies dizziness/lighteadedness, chest pain, shortness of breath, nausea/vomiting, abdominal pain, dysuria.    VITAL SIGNS:  T(F): 96.9 (07-24-18 @ 09:28)  HR: 98 (07-24-18 @ 08:17)  BP: 119/59 (07-24-18 @ 08:17)  RR: 18 (07-24-18 @ 08:17)  SpO2: 98% (07-24-18 @ 08:17)  Wt(kg): --    PHYSICAL EXAM:    Constitutional: well-appearing, obese, NAD  Eyes: PERRL, EOMI  ENMT: MMM, no oral lesions  Neck: supple, no JVD or LAD  Respiratory: CTAb/l, no wheezes/rales/rhonchi  Cardiovascular: RRR, normal S1/S2, no murmurs/rubs/gallops  Gastrointestinal: soft, NTND, BS normal, biliary drain in place, peritoneal drain in place w/out erythema, PEG to gravity w/ drainage  Extremities: +swelling of LUE with palpable pulse and intact sensation; no LE edema b/l  Vascular: DP 2+ b/l  Neurological: AAOx3, CNII-XII grossly intact  Musculoskeletal: no joint swelling    MEDICATIONS  (STANDING):  Biotene Dry Mouth Oral Rinse 5 milliLiter(s) Swish and Spit four times a day  fluconAZOLE IVPB 200 milliGRAM(s) IV Intermittent every 24 hours  fluconAZOLE IVPB      heparin  Infusion. 1100 Unit(s)/Hr (11 mL/Hr) IV Continuous <Continuous>  HYDROmorphone  Injectable 0.25 milliGRAM(s) IV Push every 4 hours  petrolatum white Ointment 1 Application(s) Topical two times a day  piperacillin/tazobactam IVPB. 3.375 Gram(s) IV Intermittent every 6 hours  polyethylene glycol 3350 17 Gram(s) Oral daily  simethicone drops 80 milliGRAM(s) Enteral Tube every 8 hours  zaleplon 5 milliGRAM(s) Oral at bedtime    MEDICATIONS  (PRN):  OLANZapine Disintegrating Tablet 5 milliGRAM(s) Oral daily PRN Nausea  ondansetron Injectable 4 milliGRAM(s) IV Push every 6 hours PRN Nausea and/or Vomiting      Allergies    allergic to cats (Rash)  No Known Drug Allergies    Intolerances        LABS:                        8.3    13.8  )-----------( 543      ( 24 Jul 2018 04:22 )             26.7     07-24    152<H>  |  113<H>  |  23  ----------------------------<  148<H>  3.6   |  30  |  0.42<L>    Ca    8.2<L>      24 Jul 2018 04:22  Phos  1.8     07-24  Mg     2.4     07-24      PTT - ( 24 Jul 2018 04:22 )  PTT:71.3 sec      RADIOLOGY & ADDITIONAL TESTS: no new imaging INTERVAL HPI/OVERNIGHT EVENTS: Patient seen and examined at bedside. She is feeling well. She has not had a BM in a week, is passing gas. Denies dizziness/lighteadedness, chest pain, shortness of breath, nausea/vomiting, abdominal pain, dysuria.    VITAL SIGNS:  T(F): 96.9 (07-24-18 @ 09:28)  HR: 98 (07-24-18 @ 08:17)  BP: 119/59 (07-24-18 @ 08:17)  RR: 18 (07-24-18 @ 08:17)  SpO2: 98% (07-24-18 @ 08:17)  Wt(kg): --    PHYSICAL EXAM:    Constitutional: well-appearing, obese, NAD  Eyes: PERRL, EOMI  ENMT: MMM, no oral lesions  Neck: supple, no JVD or LAD  Respiratory: CTAb/l, no wheezes/rales/rhonchi  Cardiovascular: RRR, normal S1/S2, no murmurs/rubs/gallops  Gastrointestinal: soft, NTND, BS normal, biliary drain in place, peritoneal drain in place w/out erythema, PEG to gravity w/ drainage  Extremities: +swelling of LUE with palpable pulse and intact sensation; no LE edema b/l  Vascular: DP 2+ b/l  Neurological: AAOx3, CNII-XII grossly intact  Musculoskeletal: no joint swelling    MEDICATIONS  (STANDING):  Biotene Dry Mouth Oral Rinse 5 milliLiter(s) Swish and Spit four times a day  fluconAZOLE IVPB 200 milliGRAM(s) IV Intermittent every 24 hours  fluconAZOLE IVPB      heparin  Infusion. 1100 Unit(s)/Hr (11 mL/Hr) IV Continuous <Continuous>  HYDROmorphone  Injectable 0.25 milliGRAM(s) IV Push every 4 hours  petrolatum white Ointment 1 Application(s) Topical two times a day  piperacillin/tazobactam IVPB. 3.375 Gram(s) IV Intermittent every 6 hours  polyethylene glycol 3350 17 Gram(s) Oral daily  simethicone drops 80 milliGRAM(s) Enteral Tube every 8 hours  zaleplon 5 milliGRAM(s) Oral at bedtime    MEDICATIONS  (PRN):  OLANZapine Disintegrating Tablet 5 milliGRAM(s) Oral daily PRN Nausea  ondansetron Injectable 4 milliGRAM(s) IV Push every 6 hours PRN Nausea and/or Vomiting      Allergies    allergic to cats (Rash)  No Known Drug Allergies    Intolerances        LABS:                        8.3    13.8  )-----------( 543      ( 24 Jul 2018 04:22 )             26.7     07-24    152<H>  |  113<H>  |  23  ----------------------------<  148<H>  3.6   |  30  |  0.42<L>    Ca    8.2<L>      24 Jul 2018 04:22  Phos  1.8     07-24  Mg     2.4     07-24      PTT - ( 24 Jul 2018 04:22 )  PTT:71.3 sec      RADIOLOGY & ADDITIONAL TESTS: no new imaging

## 2018-07-24 NOTE — PROGRESS NOTE ADULT - PROBLEM SELECTOR PLAN 7
Wants her sister and friend involved in her decision making. Patient agreed to place referral to [facility] for hospice care and later transitioning to [place] to be with family and friends and likely continue hospice services there.

## 2018-07-24 NOTE — PROGRESS NOTE ADULT - ATTENDING COMMENTS
C/w present care as above. C/w present care as above.  Severe protein calorie malnutrition.  IV heparin can eventually be changed to LMWH SQ.  Rest as above.

## 2018-07-24 NOTE — PROGRESS NOTE ADULT - SUBJECTIVE AND OBJECTIVE BOX
HemOnc    Interval Hx:   The patient feels considerably better, no significant abdominal pain now. No nausea or vomiting  s/p drainage of ascities/peritoneal drain placement    68 yo F w/ metastatic colon ca s/p 1 dose FOLFOX, recent acute cholecystitis (June 2018) c/b severe sepsis and hepatic vein thrombosis   s/p percutaneous cholecystomy, and recent SBO/ ileus s/p venting PEG and 2 colonic stents   admitted for severe sepsis   ? ischemic colitis with bacterial translocation vs colitis/enteritis      ROS:  + abdominal pain, no nausea or vomiting, no cough. No CP      Vital Signs Last 24 Hrs  T(C): 36.8 (24 Jul 2018 05:39), Max: 36.8 (24 Jul 2018 05:39)  T(F): 98.2 (24 Jul 2018 05:39), Max: 98.2 (24 Jul 2018 05:39)  HR: 102 (24 Jul 2018 05:51) (86 - 108)  BP: 122/72 (24 Jul 2018 05:51) (120/75 - 138/79)  BP(mean): 92 (24 Jul 2018 05:51) (92 - 102)  RR: 17 (24 Jul 2018 05:51) (16 - 19)  SpO2: 97% (24 Jul 2018 05:51) (94% - 97%)      pale, ill appearing  s1s2 nml  abdo: soft, generalized mild tenderness   PEG  ext: no edema, mild tenderness to palpation b/l      MEDICATIONS  (STANDING):  Biotene Dry Mouth Oral Rinse 5 milliLiter(s) Swish and Spit four times a day  edoxaban 60 milliGRAM(s) Oral daily  morphine  - Injectable 1 milliGRAM(s) IV Push two times a day  petrolatum white Ointment 1 Application(s) Topical two times a day  piperacillin/tazobactam IVPB. 3.375 Gram(s) IV Intermittent every 6 hours  potassium chloride   Powder 40 milliEquivalent(s) Oral once  simethicone drops 80 milliGRAM(s) Enteral Tube every 8 hours                           8.3    13.8  )-----------( 543      ( 24 Jul 2018 04:22 )             26.7         CBC Full  -  ( 24 Jul 2018 04:22 )  WBC Count : 13.8 K/uL  Hemoglobin : 8.3 g/dL  Hematocrit : 26.7 %  Platelet Count - Automated : 543 K/uL  Mean Cell Volume : 79.2 fL  Mean Cell Hemoglobin : 24.6 pg  Mean Cell Hemoglobin Concentration : 31.1 g/dL  Auto Neutrophil # : x  Auto Lymphocyte # : x  Auto Monocyte # : x  Auto Eosinophil # : x  Auto Basophil # : x  Auto Neutrophil % : x  Auto Lymphocyte % : x  Auto Monocyte % : x  Auto Eosinophil % : x  Auto Basophil % : x        07-24    152<H>  |  113<H>  |  23  ----------------------------<  148<H>  3.6   |  30  |  0.42<L>    Ca    8.2<L>      24 Jul 2018 04:22  Phos  1.8     07-24  Mg     2.4     07-24          PTT - ( 24 Jul 2018 04:22 )  PTT:71.3 sec    MEDICATIONS  (PRN):  acetaminophen   Tablet 650 milliGRAM(s) Oral every 6 hours PRN For Temp greater than 38 C (100.4 F)  morphine  - Injectable 2 milliGRAM(s) IV Push every 4 hours PRN Severe Pain (7 - 10)  morphine  - Injectable 1 milliGRAM(s) IV Push every 4 hours PRN Moderate Pain (4 - 6)  OLANZapine Disintegrating Tablet 5 milliGRAM(s) Oral daily PRN Nausea

## 2018-07-24 NOTE — PROGRESS NOTE ADULT - PROBLEM SELECTOR PLAN 2
Resolving.  Source: intraabdominal (initial cx growing bacteroides fragilis)  - Initial Bcx growing bacteroides fragillis, f/u sensitivities  - C/w zosyn Intraabdominal etiology. Initial Blood cx growing bacteroides fragilis. Surgical cultures growing E.coli and Klebsiella. Increased leukocytosis today, with mild tachycardia, no fever. Currently appear to be covering appropriate infectious source (intraabdominal) and unclear of other etiology at this time.   - C/w zosyn  - Would monitor left arm and consider ultrasound for eval of clot, however alrdy on heparin ggt Worsening hypernatremia with free water deficit 3.2L.  - Agree with starting D5 1/2 NS  - Wound repeat BMPs at least q8hrs to monitor changes in sodium until at goal. Na 152 this morning, would repeat BMP now and would not overcorrect more than 6-8 meq (lowest 144 by 7/25 am) to prevent cerebral edema Worsening hypernatremia with free water deficit 2.2L.  - Agree with starting D5 1/2 NS  - Wound repeat BMPs at least q8hrs to monitor changes in sodium until at goal. Na 152 this morning, would repeat BMP now and would not overcorrect more than 6-8 meq (lowest 144 by 7/25 am) to prevent cerebral edema. If not improving, would switch to D5W @ 87cc/hr Worsening hypernatremia with free water deficit 2.2L.  - Wound repeat BMPs at least q8hrs to monitor changes in sodium until at goal. Na 152 this morning, would repeat BMP now and would not overcorrect more than 6-8 meq (lowest 144 by 7/25 am) to prevent cerebral edema. If not improving, would switch to D5W @ 87cc/hr

## 2018-07-24 NOTE — PROGRESS NOTE ADULT - PROBLEM SELECTOR PLAN 7
Elevated but stable. Likely in setting of active malignancy. Hb 9, mildly downtrended, but stable.  - Maintain active T&S  - transfusion goal Hgb>7

## 2018-07-24 NOTE — PROGRESS NOTE ADULT - SUBJECTIVE AND OBJECTIVE BOX
SUBJECTIVE: Patient appears comfortable with no complaints. She denies N/V. Tolerating CLD. Perc Iwona with adequete drainage, PEG tube venting.     fluconAZOLE IVPB 200 milliGRAM(s) IV Intermittent every 24 hours  fluconAZOLE IVPB      heparin  Infusion. 1100 Unit(s)/Hr IV Continuous <Continuous>  piperacillin/tazobactam IVPB. 3.375 Gram(s) IV Intermittent every 6 hours      Vital Signs Last 24 Hrs  T(C): 36.1 (24 Jul 2018 09:28), Max: 36.8 (24 Jul 2018 05:39)  T(F): 96.9 (24 Jul 2018 09:28), Max: 98.2 (24 Jul 2018 05:39)  HR: 98 (24 Jul 2018 08:17) (86 - 108)  BP: 119/59 (24 Jul 2018 08:17) (119/59 - 138/79)  BP(mean): 85 (24 Jul 2018 08:17) (85 - 102)  RR: 18 (24 Jul 2018 08:17) (16 - 19)  SpO2: 98% (24 Jul 2018 08:17) (94% - 98%)    General: NAD, resting comfortably in bed  Pulm: Nonlabored breathing, no respiratory distress  Abd: soft, mild tenderness around umbilicus, nondistended, no rebound, no guarding. Perc Iwona with adequete drainage, PEG tube venting.       LABS:                        8.3    13.8  )-----------( 543      ( 24 Jul 2018 04:22 )             26.7     07-24    152<H>  |  113<H>  |  23  ----------------------------<  148<H>  3.6   |  30  |  0.42<L>    Ca    8.2<L>      24 Jul 2018 04:22  Phos  1.8     07-24  Mg     2.4     07-24      PTT - ( 24 Jul 2018 04:22 )  PTT:71.3 sec

## 2018-07-24 NOTE — PROGRESS NOTE ADULT - PROBLEM SELECTOR PLAN 6
Spoke to patient today regarding plan of care and was able to express that she understood that she is no longer a candidate for surgeries or chemotherapy.   Prognosis remains poor, weeks to months (<6m)

## 2018-07-25 LAB
ANION GAP SERPL CALC-SCNC: 10 MMOL/L — SIGNIFICANT CHANGE UP (ref 5–17)
APTT BLD: 82.5 SEC — HIGH (ref 27.5–37.4)
BUN SERPL-MCNC: 21 MG/DL — SIGNIFICANT CHANGE UP (ref 7–23)
CALCIUM SERPL-MCNC: 7.6 MG/DL — LOW (ref 8.4–10.5)
CHLORIDE SERPL-SCNC: 110 MMOL/L — HIGH (ref 96–108)
CO2 SERPL-SCNC: 29 MMOL/L — SIGNIFICANT CHANGE UP (ref 22–31)
CREAT SERPL-MCNC: 0.42 MG/DL — LOW (ref 0.5–1.3)
GLUCOSE BLDC GLUCOMTR-MCNC: 143 MG/DL — HIGH (ref 70–99)
GLUCOSE SERPL-MCNC: 157 MG/DL — HIGH (ref 70–99)
HCT VFR BLD CALC: 26.9 % — LOW (ref 34.5–45)
HGB BLD-MCNC: 8.2 G/DL — LOW (ref 11.5–15.5)
MAGNESIUM SERPL-MCNC: 2.2 MG/DL — SIGNIFICANT CHANGE UP (ref 1.6–2.6)
MCHC RBC-ENTMCNC: 24.8 PG — LOW (ref 27–34)
MCHC RBC-ENTMCNC: 30.5 G/DL — LOW (ref 32–36)
MCV RBC AUTO: 81.5 FL — SIGNIFICANT CHANGE UP (ref 80–100)
PHOSPHATE SERPL-MCNC: 2.4 MG/DL — LOW (ref 2.5–4.5)
PLATELET # BLD AUTO: 393 K/UL — SIGNIFICANT CHANGE UP (ref 150–400)
POTASSIUM SERPL-MCNC: 3.4 MMOL/L — LOW (ref 3.5–5.3)
POTASSIUM SERPL-SCNC: 3.4 MMOL/L — LOW (ref 3.5–5.3)
RBC # BLD: 3.3 M/UL — LOW (ref 3.8–5.2)
RBC # FLD: 22.9 % — HIGH (ref 10.3–16.9)
SODIUM SERPL-SCNC: 149 MMOL/L — HIGH (ref 135–145)
WBC # BLD: 11.6 K/UL — HIGH (ref 3.8–10.5)
WBC # FLD AUTO: 11.6 K/UL — HIGH (ref 3.8–10.5)

## 2018-07-25 PROCEDURE — 99233 SBSQ HOSP IP/OBS HIGH 50: CPT | Mod: GC

## 2018-07-25 PROCEDURE — 36569 INSJ PICC 5 YR+ W/O IMAGING: CPT

## 2018-07-25 PROCEDURE — 76937 US GUIDE VASCULAR ACCESS: CPT | Mod: 26

## 2018-07-25 RX ORDER — SODIUM CHLORIDE 9 MG/ML
10 INJECTION INTRAMUSCULAR; INTRAVENOUS; SUBCUTANEOUS EVERY 12 HOURS
Qty: 0 | Refills: 0 | Status: DISCONTINUED | OUTPATIENT
Start: 2018-07-25 | End: 2018-07-28

## 2018-07-25 RX ORDER — SODIUM CHLORIDE 9 MG/ML
10 INJECTION INTRAMUSCULAR; INTRAVENOUS; SUBCUTANEOUS
Qty: 0 | Refills: 0 | Status: DISCONTINUED | OUTPATIENT
Start: 2018-07-25 | End: 2018-07-28

## 2018-07-25 RX ORDER — PIPERACILLIN AND TAZOBACTAM 4; .5 G/20ML; G/20ML
3.38 INJECTION, POWDER, LYOPHILIZED, FOR SOLUTION INTRAVENOUS EVERY 6 HOURS
Qty: 0 | Refills: 0 | Status: DISCONTINUED | OUTPATIENT
Start: 2018-07-25 | End: 2018-07-28

## 2018-07-25 RX ORDER — POTASSIUM PHOSPHATE, MONOBASIC POTASSIUM PHOSPHATE, DIBASIC 236; 224 MG/ML; MG/ML
30 INJECTION, SOLUTION INTRAVENOUS ONCE
Qty: 0 | Refills: 0 | Status: COMPLETED | OUTPATIENT
Start: 2018-07-25 | End: 2018-07-25

## 2018-07-25 RX ORDER — SODIUM CHLORIDE 9 MG/ML
20 INJECTION INTRAMUSCULAR; INTRAVENOUS; SUBCUTANEOUS ONCE
Qty: 0 | Refills: 0 | Status: DISCONTINUED | OUTPATIENT
Start: 2018-07-25 | End: 2018-07-28

## 2018-07-25 RX ADMIN — SENNA PLUS 1 TABLET(S): 8.6 TABLET ORAL at 18:08

## 2018-07-25 RX ADMIN — PIPERACILLIN AND TAZOBACTAM 200 GRAM(S): 4; .5 INJECTION, POWDER, LYOPHILIZED, FOR SOLUTION INTRAVENOUS at 05:26

## 2018-07-25 RX ADMIN — Medication 5 MILLILITER(S): at 18:10

## 2018-07-25 RX ADMIN — SIMETHICONE 80 MILLIGRAM(S): 80 TABLET, CHEWABLE ORAL at 21:55

## 2018-07-25 RX ADMIN — Medication 1 APPLICATION(S): at 18:10

## 2018-07-25 RX ADMIN — MORPHINE SULFATE 15 MILLIGRAM(S): 50 CAPSULE, EXTENDED RELEASE ORAL at 07:20

## 2018-07-25 RX ADMIN — MORPHINE SULFATE 15 MILLIGRAM(S): 50 CAPSULE, EXTENDED RELEASE ORAL at 19:01

## 2018-07-25 RX ADMIN — PIPERACILLIN AND TAZOBACTAM 200 GRAM(S): 4; .5 INJECTION, POWDER, LYOPHILIZED, FOR SOLUTION INTRAVENOUS at 23:03

## 2018-07-25 RX ADMIN — SODIUM CHLORIDE 87 MILLILITER(S): 9 INJECTION, SOLUTION INTRAVENOUS at 23:03

## 2018-07-25 RX ADMIN — PIPERACILLIN AND TAZOBACTAM 200 GRAM(S): 4; .5 INJECTION, POWDER, LYOPHILIZED, FOR SOLUTION INTRAVENOUS at 12:59

## 2018-07-25 RX ADMIN — Medication 1 APPLICATION(S): at 05:28

## 2018-07-25 RX ADMIN — MORPHINE SULFATE 15 MILLIGRAM(S): 50 CAPSULE, EXTENDED RELEASE ORAL at 18:08

## 2018-07-25 RX ADMIN — POLYETHYLENE GLYCOL 3350 17 GRAM(S): 17 POWDER, FOR SOLUTION ORAL at 13:00

## 2018-07-25 RX ADMIN — POTASSIUM PHOSPHATE, MONOBASIC POTASSIUM PHOSPHATE, DIBASIC 85 MILLIMOLE(S): 236; 224 INJECTION, SOLUTION INTRAVENOUS at 10:12

## 2018-07-25 RX ADMIN — PIPERACILLIN AND TAZOBACTAM 200 GRAM(S): 4; .5 INJECTION, POWDER, LYOPHILIZED, FOR SOLUTION INTRAVENOUS at 18:09

## 2018-07-25 RX ADMIN — SIMETHICONE 80 MILLIGRAM(S): 80 TABLET, CHEWABLE ORAL at 13:03

## 2018-07-25 RX ADMIN — Medication 5 MILLILITER(S): at 05:27

## 2018-07-25 RX ADMIN — Medication 5 MILLIGRAM(S): at 21:55

## 2018-07-25 RX ADMIN — Medication 5 MILLILITER(S): at 13:00

## 2018-07-25 RX ADMIN — MORPHINE SULFATE 15 MILLIGRAM(S): 50 CAPSULE, EXTENDED RELEASE ORAL at 06:49

## 2018-07-25 RX ADMIN — SIMETHICONE 80 MILLIGRAM(S): 80 TABLET, CHEWABLE ORAL at 05:27

## 2018-07-25 RX ADMIN — SENNA PLUS 1 TABLET(S): 8.6 TABLET ORAL at 05:26

## 2018-07-25 NOTE — PROGRESS NOTE ADULT - PROBLEM SELECTOR PLAN 7
Spoke to patient today regarding plan of care and was able to express that she understood that she is no longer a candidate for surgeries or chemotherapy. Dr. Thompson concurs with plan to transition to hospice given poor performance status and extensive tumor burden.   -Patient agreeable with plan to transition to hospice care

## 2018-07-25 NOTE — PROGRESS NOTE ADULT - PROBLEM SELECTOR PLAN 1
Pain has improved, currently on MS Contin 15 mg BID and has not required PRN IV breakthrough pain medication.     -c/w MS Contin 15 mg BID  -Consider adding PRN PO morphine for breakthrough pain

## 2018-07-25 NOTE — PROGRESS NOTE ADULT - PROBLEM SELECTOR PLAN 1
s/p chemoport placement 5/31, s/p initiation of chemotherapy 6/9 with FOLFOX. Biliary and peritoneal drain placed. Now plan for likely hospice placement  - palliative care consulted, ongoing hospice discussion  - continue pain control as per palliative recs  - increase miralax to bid and if still without BM would add enema or suppository prn  - c/w care as per surgery team re: peritonitis, leukocytosis persistent but improving s/p chemoport placement 5/31, s/p initiation of chemotherapy 6/9 with FOLFOX. Biliary and peritoneal drain placed. Now plan for likely hospice placement  - palliative care consulted, ongoing hospice discussion- likely placement today or tomorrow  - continue pain control as per palliative recs  - no BMs likely 2/2 peritonitis, but peritoneal drain in place with fecal material and good output  - c/w care as per surgery team re: peritonitis, leukocytosis persistent but improving

## 2018-07-25 NOTE — PROGRESS NOTE ADULT - PROBLEM SELECTOR PLAN 4
Patient currently on heparin gtt, she has evidence of LUE swelling, ?clot  -Patient can be transitioned to oral AC or Lovenox to reduce number of IV's and allow acceptance at [facility] Patient currently on heparin gtt, she has evidence of LUE swelling, ?clot  -Consideration given to switching to Lovenox or another aticoagulant, but after discussion with surgical team, heparin drip to be discontinued

## 2018-07-25 NOTE — CONSULT NOTE ADULT - CONSULT REASON
evaluation for PICC placement
Concern for colitis
Ischemic colitis r/o mesentery ischemia
Stage IV colorectal cancer, Pain control
h/o CRC

## 2018-07-25 NOTE — PROGRESS NOTE ADULT - PROBLEM SELECTOR PLAN 5
POA. Likely 2/2 hypercoagulable state in the setting of active untreated malignancy.  -c/w heparin ggt POA. Likely 2/2 hypercoagulable state in the setting of active untreated malignancy.  -c/w heparin ggt, surgery not planning to continue anticoagulation on discharge. POA. Likely 2/2 hypercoagulable state in the setting of active untreated malignancy.  -c/w heparin ggt, could consider doac; with further discussion w/ surgery- not planning to continue anticoagulation on discharge.

## 2018-07-25 NOTE — PROGRESS NOTE ADULT - SUBJECTIVE AND OBJECTIVE BOX
**********INCOMPLETE  INTERVAL HPI/OVERNIGHT EVENTS:    VITAL SIGNS:  T(F): 99.1 (07-25-18 @ 09:07)  HR: 96 (07-25-18 @ 08:38)  BP: 116/70 (07-25-18 @ 08:38)  RR: 18 (07-25-18 @ 08:38)  SpO2: 100% (07-25-18 @ 08:38)  Wt(kg): --    PHYSICAL EXAM:    Constitutional:  Eyes:  ENMT:  Neck:  Respiratory:  Cardiovascular:  Gastrointestinal:  Extremities:  Vascular:  Neurological:  Musculoskeletal:    MEDICATIONS  (STANDING):  Biotene Dry Mouth Oral Rinse 5 milliLiter(s) Swish and Spit four times a day  dextrose 5%. 1000 milliLiter(s) (87 mL/Hr) IV Continuous <Continuous>  fluconAZOLE IVPB 200 milliGRAM(s) IV Intermittent every 24 hours  fluconAZOLE IVPB      heparin  Infusion. 1100 Unit(s)/Hr (11 mL/Hr) IV Continuous <Continuous>  morphine ER Tablet 15 milliGRAM(s) Oral two times a day  petrolatum white Ointment 1 Application(s) Topical two times a day  piperacillin/tazobactam IVPB. 3.375 Gram(s) IV Intermittent every 6 hours  polyethylene glycol 3350 17 Gram(s) Oral daily  potassium phosphate IVPB 30 milliMole(s) IV Intermittent once  senna 1 Tablet(s) Oral two times a day  simethicone drops 80 milliGRAM(s) Enteral Tube every 8 hours  zaleplon 5 milliGRAM(s) Oral at bedtime    MEDICATIONS  (PRN):  OLANZapine Disintegrating Tablet 5 milliGRAM(s) Oral daily PRN Nausea  ondansetron Injectable 4 milliGRAM(s) IV Push every 6 hours PRN Nausea and/or Vomiting  oxyCODONE    5 mG/acetaminophen 325 mG 1 Tablet(s) Oral every 4 hours PRN Severe Pain (7 - 10)      Allergies    allergic to cats (Rash)  No Known Drug Allergies    Intolerances        LABS:                        8.2    11.6  )-----------( 393      ( 25 Jul 2018 08:00 )             26.9     07-25    149<H>  |  110<H>  |  21  ----------------------------<  157<H>  3.4<L>   |  29  |  0.42<L>    Ca    7.6<L>      25 Jul 2018 08:00  Phos  2.4     07-25  Mg     2.2     07-25      PTT - ( 25 Jul 2018 08:00 )  PTT:82.5 sec      RADIOLOGY & ADDITIONAL TESTS: INTERVAL HPI/OVERNIGHT EVENTS: No acute events overnight. Patient is feeling well this morning. She states her pain is well controlled. ROS negative for fever/chills, nausea/vomiting, chest pain, shortness of breath. She states she has not had a bowel movement in 1 week.    VITAL SIGNS:  T(F): 99.1 (07-25-18 @ 09:07)  HR: 96 (07-25-18 @ 08:38)  BP: 116/70 (07-25-18 @ 08:38)  RR: 18 (07-25-18 @ 08:38)  SpO2: 100% (07-25-18 @ 08:38)  Wt(kg): --    PHYSICAL EXAM:    Constitutional: well-appearing, obese, NAD  Eyes: PERRL, EOMI  ENMT: MMM, no oral lesions  Neck: supple, no JVD or LAD  Respiratory: CTAb/l, no wheezes/rales/rhonchi  Cardiovascular: RRR, normal S1/S2, no murmurs/rubs/gallops  Gastrointestinal: soft, NTND, BS normal, biliary drain in place, peritoneal drain in place w/out erythema, PEG to gravity w/ drainage  Extremities: +swelling of LUE with palpable pulse and intact sensation; no LE edema b/l  Vascular: DP 2+ b/l  Neurological: AAOx3, CNII-XII grossly intact  Musculoskeletal: no joint swelling    MEDICATIONS  (STANDING):  Biotene Dry Mouth Oral Rinse 5 milliLiter(s) Swish and Spit four times a day  dextrose 5%. 1000 milliLiter(s) (87 mL/Hr) IV Continuous <Continuous>  fluconAZOLE IVPB 200 milliGRAM(s) IV Intermittent every 24 hours  fluconAZOLE IVPB      heparin  Infusion. 1100 Unit(s)/Hr (11 mL/Hr) IV Continuous <Continuous>  morphine ER Tablet 15 milliGRAM(s) Oral two times a day  petrolatum white Ointment 1 Application(s) Topical two times a day  piperacillin/tazobactam IVPB. 3.375 Gram(s) IV Intermittent every 6 hours  polyethylene glycol 3350 17 Gram(s) Oral daily  potassium phosphate IVPB 30 milliMole(s) IV Intermittent once  senna 1 Tablet(s) Oral two times a day  simethicone drops 80 milliGRAM(s) Enteral Tube every 8 hours  zaleplon 5 milliGRAM(s) Oral at bedtime    MEDICATIONS  (PRN):  OLANZapine Disintegrating Tablet 5 milliGRAM(s) Oral daily PRN Nausea  ondansetron Injectable 4 milliGRAM(s) IV Push every 6 hours PRN Nausea and/or Vomiting  oxyCODONE    5 mG/acetaminophen 325 mG 1 Tablet(s) Oral every 4 hours PRN Severe Pain (7 - 10)      Allergies    allergic to cats (Rash)  No Known Drug Allergies    Intolerances        LABS:                        8.2    11.6  )-----------( 393      ( 25 Jul 2018 08:00 )             26.9     07-25    149<H>  |  110<H>  |  21  ----------------------------<  157<H>  3.4<L>   |  29  |  0.42<L>    Ca    7.6<L>      25 Jul 2018 08:00  Phos  2.4     07-25  Mg     2.2     07-25      PTT - ( 25 Jul 2018 08:00 )  PTT:82.5 sec      RADIOLOGY & ADDITIONAL TESTS: No new imaging.

## 2018-07-25 NOTE — PROGRESS NOTE ADULT - PROBLEM SELECTOR PLAN 6
Albumin 2.4  - metastatic colon cancer, peg to gravity, poor PO intake  - nutrition consult Albumin 2.4  - metastatic colon cancer, peg to gravity, poor PO intake  - nutrition recs

## 2018-07-25 NOTE — PROGRESS NOTE ADULT - PROBLEM SELECTOR PLAN 2
Remains obstipated. Encouraged to ambulate and frequent visits to . Currently on Miralax daily.  Recommend Senna BID  Recommend bedside commode. Remains obstipated. Encouraged to ambulate and frequent visits to WC. Currently on Miralax daily.  Recommend Senna BID  Recommend bedside commode.  Likely related to large tumor burden

## 2018-07-25 NOTE — PROGRESS NOTE ADULT - ASSESSMENT
67F w/ metastatic colon ca s/p 1 dose FOLFOX, recent acute cholecystitis (June 2018) c/b severe sepsis and hepatic vein thrombosis s/p percutaneous cholecystomy, and recent SBO/ ileus s/p venting PEG and 2 colonic stents admitted with severe sepsis and acute 2/2 GNR bacteremia, likely secondary to intraabdominal source and abdominal pain likely d/t partial bowel obstruction vs ileus 2/2 malignancy; s/p biliary + peritoneal drain.

## 2018-07-25 NOTE — PROGRESS NOTE ADULT - SUBJECTIVE AND OBJECTIVE BOX
PILLO MAR             MRN-[id number]    CC: Pain, Anxiety, Insomnia, GOC/AD, Support    HPI:  68 yo F w/ metastatic colon ca s/p 1 dose FOLFOX, recent acute cholecystitis c/b severe sepsis and hepatic vein thrombosis s/p percutaneous cholecystomy, and recent partial SBO s/p PEG and 2 colonic stents p/w fever of 101.7 in the setting of several days of exhaustion and weakness. Denies subjective fevers/ chills/ night sweats. Pt only came to hospital because sent by her oncologist when she went to get her 2nd dose of chemo and found to be febrile with WBC of 15. While in ED, pt reports developed worsening diffuse abdominal pain 8.5/10, worse in the upper abdomen. Pain is better with oxycodone. No change w/ position or food. Denies n/v/d or constipation. Last BM last night. Endorses poor PO intake due to decreased appetite since last admission.     SUBJECTIVE: Saw and evaluated Mrs Mar at bedside with Dr. Truong and Dr. Porter. Pt agreeable to go to hospice at [facility] after discussion w/ Dr. Patton on 7/24. Patient currently on heparin gtt and IV Zosyn. LUE more swollen this AM. Pt transitioned to MS Contin 15 mg BID without intermittent IV medications for breakthrough pain. She states pain is adequately controlled. She is drinking fluids, states she had yogurt yesterday. Pt states that her ultimate goal is to go to [facility] and get stronger and perhaps go to [place] to continue her hospice care there.         ROS:  DYSPNEA: No  NAUS/VOM: No	  SECRETIONS: No	  AGITATION: No  Pain (Y/N):  Yes; improved from yesterday  -Provocation/Palliation: Pain provoked by movement   -Quality/Quantity: Crampy, dull pain  -Radiating: No  -Severity:   -Timing/F/requency: Incidental  -Impact on ADLs: No    OTHER REVIEW OF SYSTEMS:  Insomnia improved. Appetite improved. Anxiety occasionally.     PEx:  T(C): 36.4 (07-24-18 @ 14:00), Max: 36.8 (07-24-18 @ 05:39)  HR: 98 (07-24-18 @ 08:17) (86 - 108)  BP: 119/59 (07-24-18 @ 08:17) (119/59 - 138/79)  RR: 18 (07-24-18 @ 08:17) (16 - 19)  SpO2: 98% (07-24-18 @ 08:17) (94% - 98%)  Wt(kg): 73.9    General: AAOx3 In NAD. Improved mood.   HEENT: NCAT  PERRL EOMI Non icteric    Neck: Supple, No masses  CVS: RR S1S2, no murmurs  Resp: CTABL Unlabored  GI: Soft TTP Distension+. Venting PEG+  drain+  Musc: No C/C/E    Neuro: No focal deficits. Following commands.  Psych:  Improved mood  Skin: Xerosis  Lymph: Normal.  	 	     ALLERGIES: allergic to cats (Rash)  No Known Drug Allergies    OPIATE NAÏVE (Y/N): No    MEDICATIONS: REVIEWED  MEDICATIONS  (STANDING):  Biotene Dry Mouth Oral Rinse 5 milliLiter(s) Swish and Spit four times a day  dextrose 5%. 1000 milliLiter(s) (87 mL/Hr) IV Continuous <Continuous>  heparin  Infusion. 1100 Unit(s)/Hr (11 mL/Hr) IV Continuous <Continuous>  morphine ER Tablet 15 milliGRAM(s) Oral two times a day  petrolatum white Ointment 1 Application(s) Topical two times a day  piperacillin/tazobactam IVPB. 3.375 Gram(s) IV Intermittent every 6 hours  polyethylene glycol 3350 17 Gram(s) Oral daily  senna 1 Tablet(s) Oral two times a day  simethicone drops 80 milliGRAM(s) Enteral Tube every 8 hours  zaleplon 5 milliGRAM(s) Oral at bedtime    MEDICATIONS  (PRN):  OLANZapine Disintegrating Tablet 5 milliGRAM(s) Oral daily PRN Nausea  ondansetron Injectable 4 milliGRAM(s) IV Push every 6 hours PRN Nausea and/or Vomiting  oxyCODONE    5 mG/acetaminophen 325 mG 1 Tablet(s) Oral every 4 hours PRN Severe Pain (7 - 10)      LABS:                           8.2    11.6  )-----------( 393      ( 25 Jul 2018 08:00 )             26.9         CBC Full  -  ( 25 Jul 2018 08:00 )  WBC Count : 11.6 K/uL  Hemoglobin : 8.2 g/dL  Hematocrit : 26.9 %  Platelet Count - Automated : 393 K/uL  Mean Cell Volume : 81.5 fL  Mean Cell Hemoglobin : 24.8 pg  Mean Cell Hemoglobin Concentration : 30.5 g/dL  Auto Neutrophil # : x  Auto Lymphocyte # : x  Auto Monocyte # : x  Auto Eosinophil # : x  Auto Basophil # : x  Auto Neutrophil % : x  Auto Lymphocyte % : x  Auto Monocyte % : x  Auto Eosinophil % : x  Auto Basophil % : x        07-25    149<H>  |  110<H>  |  21  ----------------------------<  157<H>  3.4<L>   |  29  |  0.42<L>    Ca    7.6<L>      25 Jul 2018 08:00  Phos  2.4     07-25  Mg     2.2     07-25          PTT - ( 25 Jul 2018 08:00 )  PTT:82.5 sec             IMAGING: REVIEWED    ADVANCED DIRECTIVES: Pt aware of poor prognosis and terminal nature of disease; she wishes to be DNR/DNI    Advanced Directives:     DNR/DNI      Will update MOLST form     Decision maker: The patient is able to participate in complex medical decision making conversations  Legal surrogate: No designated HCP, but the patient was thinking of assigning her sister Rama Fine [phone] from [place] and her friend Robyn Sam [phone] in [facility].    GOALS OF CARE DISCUSSION       Palliative care info/counseling provided	             Documentation of GOC: Pt transitioning to hospice at [facility] after a room opens up           REFERRALS	        Palliative SW       Unit SW/Case Mgmt       Patient/Family Support       Massage Therapy       Music Therapy       Nutrition / Dietician       Hospice - [facility]    AGENCY CHOICE DISCUSSED:           [facility] PILLO MAR             MRN-[id number]    CC: Pain, Anxiety, Insomnia, GOC/AD, Support    HPI:  68 yo F w/ metastatic colon ca s/p 1 dose FOLFOX, recent acute cholecystitis c/b severe sepsis and hepatic vein thrombosis s/p percutaneous cholecystomy, and recent partial SBO s/p PEG and 2 colonic stents p/w fever of 101.7 in the setting of several days of exhaustion and weakness. Denies subjective fevers/ chills/ night sweats. Pt only came to hospital because sent by her oncologist when she went to get her 2nd dose of chemo and found to be febrile with WBC of 15. While in ED, pt reports developed worsening diffuse abdominal pain 8.5/10, worse in the upper abdomen. Pain is better with oxycodone. No change w/ position or food. Denies n/v/d or constipation. Last BM last night. Endorses poor PO intake due to decreased appetite since last admission.     SUBJECTIVE: Saw and evaluated Mrs Mar at bedside with Dr. Truong and Dr. Porter. Pt agreeable to go to hospice at [facility] after discussion w/ Dr. Patton on 7/24. Patient currently on heparin gtt and IV Zosyn. LUE more swollen this AM. Pt transitioned to MS Contin 15 mg BID without intermittent IV medications for breakthrough pain. She states pain is adequately controlled. She is drinking fluids, states she had yogurt yesterday. Pt states that her ultimate goal is to go to [facility] and get stronger and perhaps go to [place] to continue her hospice care there.         ROS:  DYSPNEA: No  NAUS/VOM: No	  SECRETIONS: No	  AGITATION: No  Pain (Y/N):  Yes; improved from yesterday  -Provocation/Palliation: Pain provoked by movement   -Quality/Quantity: Crampy, dull pain  -Radiating: No  -Severity:   -Timing/F/requency: Incidental  -Impact on ADLs: No    OTHER REVIEW OF SYSTEMS:  Insomnia improved. Appetite improved. Anxiety occasionally.     PEx:  T(C): 36.4 (07-24-18 @ 14:00), Max: 36.8 (07-24-18 @ 05:39)  HR: 98 (07-24-18 @ 08:17) (86 - 108)  BP: 119/59 (07-24-18 @ 08:17) (119/59 - 138/79)  RR: 18 (07-24-18 @ 08:17) (16 - 19)  SpO2: 98% (07-24-18 @ 08:17) (94% - 98%)  Wt(kg): 73.9    General: AAOx3 In NAD. Improved mood. Appears severely ill and weak  HEENT: NCAT  PERRL EOMI Non icteric    Neck: Supple, No masses  CVS: RR S1S2, no murmurs  Resp: CTABL Unlabored  GI: Soft TTP Distension+. Venting PEG+  drain+  swelling both UE left > right, non tender  IV's in both thumbs  Musc: No C/C/E    Neuro: No focal deficits. Following commands.  Psych:  Improved mood  Skin: Xerosis  Lymph: Normal.  	 	     ALLERGIES: allergic to cats (Rash)  No Known Drug Allergies    OPIATE NAÏVE (Y/N): No    MEDICATIONS: REVIEWED  MEDICATIONS  (STANDING):  Biotene Dry Mouth Oral Rinse 5 milliLiter(s) Swish and Spit four times a day  dextrose 5%. 1000 milliLiter(s) (87 mL/Hr) IV Continuous <Continuous>  heparin  Infusion. 1100 Unit(s)/Hr (11 mL/Hr) IV Continuous <Continuous>  morphine ER Tablet 15 milliGRAM(s) Oral two times a day  petrolatum white Ointment 1 Application(s) Topical two times a day  piperacillin/tazobactam IVPB. 3.375 Gram(s) IV Intermittent every 6 hours  polyethylene glycol 3350 17 Gram(s) Oral daily  senna 1 Tablet(s) Oral two times a day  simethicone drops 80 milliGRAM(s) Enteral Tube every 8 hours  zaleplon 5 milliGRAM(s) Oral at bedtime    MEDICATIONS  (PRN):  OLANZapine Disintegrating Tablet 5 milliGRAM(s) Oral daily PRN Nausea  ondansetron Injectable 4 milliGRAM(s) IV Push every 6 hours PRN Nausea and/or Vomiting  oxyCODONE    5 mG/acetaminophen 325 mG 1 Tablet(s) Oral every 4 hours PRN Severe Pain (7 - 10)      LABS:                           8.2    11.6  )-----------( 393      ( 25 Jul 2018 08:00 )             26.9         CBC Full  -  ( 25 Jul 2018 08:00 )  WBC Count : 11.6 K/uL  Hemoglobin : 8.2 g/dL  Hematocrit : 26.9 %  Platelet Count - Automated : 393 K/uL  Mean Cell Volume : 81.5 fL  Mean Cell Hemoglobin : 24.8 pg  Mean Cell Hemoglobin Concentration : 30.5 g/dL  Auto Neutrophil # : x  Auto Lymphocyte # : x  Auto Monocyte # : x  Auto Eosinophil # : x  Auto Basophil # : x  Auto Neutrophil % : x  Auto Lymphocyte % : x  Auto Monocyte % : x  Auto Eosinophil % : x  Auto Basophil % : x        07-25    149<H>  |  110<H>  |  21  ----------------------------<  157<H>  3.4<L>   |  29  |  0.42<L>    Ca    7.6<L>      25 Jul 2018 08:00  Phos  2.4     07-25  Mg     2.2     07-25          PTT - ( 25 Jul 2018 08:00 )  PTT:82.5 sec             IMAGING: REVIEWED    ADVANCED DIRECTIVES: Pt aware of poor prognosis and terminal nature of disease; after discussion with our team today she agrees  to be DNR/DNI    Advanced Directives:     DNR/DNI      Will update MOLST form     Decision maker: The patient is able to participate in complex medical decision making conversations  Legal surrogate: No designated HCP, but the patient was thinking of assigning her sister Rama Fine [phone] from [place] and her friend Robyn Sam [phone] in [facility].    GOALS OF CARE DISCUSSION       Palliative care info/counseling provided	             Documentation of GOC: Pt transitioning to hospice at [facility] after a room opens up           REFERRALS	        Palliative SW       Unit SW/Case Mgmt       Patient/Family Support       Massage Therapy       Music Therapy       Nutrition / Dietician       Hospice - [facility]    AGENCY CHOICE DISCUSSED:           [facility]

## 2018-07-25 NOTE — PROGRESS NOTE ADULT - PROBLEM SELECTOR PLAN 3
Intraabdominal etiology. Initial Blood cx growing bacteroides fragilis. Surgical cultures growing E.coli and Klebsiella. Increased leukocytosis today, with mild tachycardia, no fever. Currently appear to be covering appropriate infectious source (intraabdominal) and unclear of other etiology at this time.   - C/w zosyn  - Would monitor left arm and consider ultrasound for eval of clot, however alrdy on heparin ggt Intraabdominal etiology. Initial Blood cx growing bacteroides fragilis. Surgical cultures growing E.coli and Klebsiella. Increased leukocytosis today, with mild tachycardia, no fever. Currently appear to be covering appropriate infectious source (intraabdominal) and unclear of other etiology at this time.   - C/w zosyn, picc line placed for 2 weeks of abx  - Would monitor left arm and consider ultrasound for eval of clot, however alrdy on heparin ggt

## 2018-07-25 NOTE — CONSULT NOTE ADULT - SUBJECTIVE AND OBJECTIVE BOX
Vascular Access Service Consult Note    68yFemaleHEALTH ISSUES - PROBLEM Dx:  Hypernatremia: Hypernatremia  Thrombocytosis: Thrombocytosis  Protein calorie malnutrition: Protein calorie malnutrition  Bacteremia due to Gram-negative bacteria: Bacteremia due to Gram-negative bacteria  Gas pain: Gas pain  Insomnia: Insomnia  Palliative care by specialist: Palliative care by specialist  Goals of care, counseling/discussion: Goals of care, counseling/discussion  Constipation, unspecified constipation type: Constipation, unspecified constipation type  Pain: Pain  Colon cancer: Colon cancer  Transition of care performed with sharing of clinical summary: Transition of care performed with sharing of clinical summary  Need for prophylactic measure: Need for prophylactic measure  Nutrition, metabolism, and development symptoms: Nutrition, metabolism, and development symptoms  Severe sepsis: Severe sepsis  EKG, abnormal: EKG, abnormal  Hepatic vein thrombosis: Hepatic vein thrombosis  Respiratory alkalosis: Respiratory alkalosis  Hyponatremia: Hyponatremia  Anemia, iron deficiency: Anemia, iron deficiency  Metastatic colon cancer in female: Metastatic colon cancer in female  Sepsis: Sepsis             Diagnosis: metastatic colon CA, hospice    Indications for Vascular Access (Check all that apply)  [ X ]  Antibiotic Therapy       Antibiotic Prescribed:  zosyn                                                                           Expected Duration of Therapy:               [  ]  IV Hydration  [  ]  Total Parenteral Nutrition  [  ]  Chemotherapy  [  ]  Difficult Venous Access  [  ]  CVP monitoring  [  ]  Medications with high potential for tissue necrosis on extravasation  [  ]  Other    Screening (Check all that apply)  Previous Radiation to chest  [  ] Yes      [ X ]  No  Breast Cancer                          [  ] Left     [  ]  Right    [  X]  No  Lymph Node Dissection         [  ] Left     [  ]  Right    [ X ]  No  Pacemaker or ICD                   [  ] Left     [  ]  Right    [ X ]  No  Upper Extremity DVT             [  ] Left     [  ]  Right    [  X]  No  Chronic Kidney Disease         [  ]  Yes     [ X ]  No  Hemodialysis                           [  ]  Yes     [X  ]  No  AV Fistula/ Graft                     [  ]  Left    [  ]  Right    [X  ]  No  Temp>101F in past 24 H       [  ]  Yes     [ X ]  No  H/O PICC/Midline                   [  ]  Yes     [X  ]  No    Lab data:                        8.2    11.6  )-----------( 393      ( 25 Jul 2018 08:00 )             26.9     07-25    149<H>  |  110<H>  |  21  ----------------------------<  157<H>  3.4<L>   |  29  |  0.42<L>    Ca    7.6<L>      25 Jul 2018 08:00  Phos  2.4     07-25  Mg     2.2     07-25      PTT - ( 25 Jul 2018 08:00 )  PTT:82.5 sec          I have reviewed the chart, interviewed and examined the patient and determined that this patient:  [ X ] Is a candidate for a PICC line  [  ] Is a candidate for a Midline  [  ] Is not a candidate for vascular access device (reason)    Lumens:    [X  ] Single  [  ] Double

## 2018-07-25 NOTE — PROGRESS NOTE ADULT - PROBLEM SELECTOR PLAN 6
Currently on Fluconazole and Zosyn.   -Medicine consulted; we had discussion with internal medicine and surgery and recommend transitioning to PO Fluconazole and PO augmentin prior to discharge to [facility] Currently on Fluconazole and Zosyn.   -Discussion held with Medicine COnsult who would consider PO antibiotics, however surgery team feel strongly that IV antibiotics should be continued to avoid symptoms of sepsis.  Patient will need a PICC line for continued medicaiton

## 2018-07-25 NOTE — PROGRESS NOTE ADULT - SUBJECTIVE AND OBJECTIVE BOX
HemOnc    Interval Hx:     The patient feels OK, she is comfortable, no abdominal pain now. No nausea or vomiting  s/p drainage of ascities/peritoneal drain placement    66 yo F w/ metastatic colon ca s/p 1 dose FOLFOX, recent acute cholecystitis (June 2018) c/b severe sepsis and hepatic vein thrombosis   s/p percutaneous cholecystomy, and recent SBO/ ileus s/p venting PEG and 2 colonic stents   admitted for severe sepsis   ? ischemic colitis with bacterial translocation vs colitis/enteritis      ROS:  + abdominal pain, no nausea or vomiting, no cough. No CP      Vital Signs Last 24 Hrs  ICU Vital Signs Last 24 Hrs  T(C): 36.8 (25 Jul 2018 04:36), Max: 37.2 (24 Jul 2018 17:12)  T(F): 98.2 (25 Jul 2018 04:36), Max: 98.9 (24 Jul 2018 17:12)  HR: 103 (25 Jul 2018 04:04) (90 - 104)  BP: 121/73 (25 Jul 2018 04:04) (113/69 - 124/72)  BP(mean): 92 (25 Jul 2018 04:04) (85 - 93)  ABP: --  ABP(mean): --  RR: 20 (25 Jul 2018 04:04) (18 - 20)  SpO2: 87% (25 Jul 2018 04:04) (87% - 98%)    pale, ill appearing  s1s2 nml  abdo: soft, generalized mild tenderness   PEG  ext: no edema, mild tenderness to palpation b/l    MEDICATIONS  (STANDING):  Biotene Dry Mouth Oral Rinse 5 milliLiter(s) Swish and Spit four times a day  edoxaban 60 milliGRAM(s) Oral daily  morphine  - Injectable 1 milliGRAM(s) IV Push two times a day  petrolatum white Ointment 1 Application(s) Topical two times a day  piperacillin/tazobactam IVPB. 3.375 Gram(s) IV Intermittent every 6 hours  potassium chloride   Powder 40 milliEquivalent(s) Oral once  simethicone drops 80 milliGRAM(s) Enteral Tube every 8 hours                              8.3    13.8  )-----------( 543      ( 24 Jul 2018 04:22 )             26.7         CBC Full  -  ( 24 Jul 2018 04:22 )  WBC Count : 13.8 K/uL  Hemoglobin : 8.3 g/dL  Hematocrit : 26.7 %  Platelet Count - Automated : 543 K/uL  Mean Cell Volume : 79.2 fL  Mean Cell Hemoglobin : 24.6 pg  Mean Cell Hemoglobin Concentration : 31.1 g/dL  Auto Neutrophil # : x  Auto Lymphocyte # : x  Auto Monocyte # : x  Auto Eosinophil # : x  Auto Basophil # : x  Auto Neutrophil % : x  Auto Lymphocyte % : x  Auto Monocyte % : x  Auto Eosinophil % : x  Auto Basophil % : x        07-24    153<H>  |  114<H>  |  22  ----------------------------<  142<H>  3.5   |  28  |  0.41<L>    Ca    7.5<L>      24 Jul 2018 20:26  Phos  1.8     07-24  Mg     2.4     07-24          PTT - ( 24 Jul 2018 04:22 )  PTT:71.3 sec  MEDICATIONS  (PRN):  acetaminophen   Tablet 650 milliGRAM(s) Oral every 6 hours PRN For Temp greater than 38 C (100.4 F)  morphine  - Injectable 2 milliGRAM(s) IV Push every 4 hours PRN Severe Pain (7 - 10)  morphine  - Injectable 1 milliGRAM(s) IV Push every 4 hours PRN Moderate Pain (4 - 6)  OLANZapine Disintegrating Tablet 5 milliGRAM(s) Oral daily PRN Nausea

## 2018-07-25 NOTE — PROGRESS NOTE ADULT - PROBLEM SELECTOR PLAN 9
Support provided to patient and family. Patient to have access to supportive services during rest of hospital stay as the patient/family deemed necessary ie. Chaplaincy, Massage therapy, Music therapy, Patient and family supportive services, Palliative SW, etc.    As discussed during the palliative IDT meeting and as identified during the patients PSSA screening the patient would benefit from massage therapy, music therapy, and visitor/volunteers. Support provided to patient and family. Patient to have access to supportive services during rest of hospital stay as the patient/family deemed necessary ie. Chaplaincy, Massage therapy, Music therapy, Patient and family supportive services, Palliative SW, etc.    As discussed during the palliative IDT meeting and as identified during the patients PSSA screening the patient would benefit from massage therapy, music therapy, and visitor/volunteers. Patient has spiritual concerns which she is sharing with chaplaincy intern    Also spoke with family friend in [place] who is a gastroenterologist.  Discussed patient present condition and the low likelihood that she can actually get well enough to travel.  Friend has been invovled in discussions previously and will convey these concerns to the family

## 2018-07-25 NOTE — PROGRESS NOTE ADULT - PROBLEM SELECTOR PLAN 2
Improved, started on D5W, free water deficit 2.1L.  - Increase rate of D5W to _____  - Wound repeat BMPs at least q6-8hrs to monitor changes in sodium until at goal. Na 149 this morning, ______ Improved, started on D5W, free water deficit 2.2L for a goal Na of 140 by 7/26 8am.  - s/p 1L D5W over 12 hours and corrected 4meq; will need 2 more liters of free water  - Continue D5W @ 87cc/hr and stop at 8am on 7/26.  - Wound repeat BMPs at least q8hrs to monitor changes in sodium until at goal Improved, started on D5W, free water deficit 2.2L for a goal Na of 140 by 7/26 8am.  - s/p 1L D5W over 12 hours and corrected 4meq; will need 2 more liters of free water  - Continue D5W @ 87cc/hr and stop at 8am on 7/26.  - Ok for BMPs [id number] to limit blood draws

## 2018-07-25 NOTE — PROGRESS NOTE ADULT - SUBJECTIVE AND OBJECTIVE BOX
SUBJECTIVE: Patient feeling ok. Tolerating diet. Patient seen and examined bedside by chief resident.    fluconAZOLE IVPB 200 milliGRAM(s) IV Intermittent every 24 hours  fluconAZOLE IVPB      heparin  Infusion. 1100 Unit(s)/Hr IV Continuous <Continuous>  piperacillin/tazobactam IVPB. 3.375 Gram(s) IV Intermittent every 6 hours      Vital Signs Last 24 Hrs  T(C): 36.8 (25 Jul 2018 04:36), Max: 37.2 (24 Jul 2018 17:12)  T(F): 98.2 (25 Jul 2018 04:36), Max: 98.9 (24 Jul 2018 17:12)  HR: 103 (25 Jul 2018 04:04) (90 - 104)  BP: 121/73 (25 Jul 2018 04:04) (113/69 - 124/72)  BP(mean): 92 (25 Jul 2018 04:04) (85 - 93)  RR: 20 (25 Jul 2018 04:04) (18 - 20)  SpO2: 87% (25 Jul 2018 04:04) (87% - 98%)  I&O's Detail    24 Jul 2018 07:01  -  25 Jul 2018 07:00  --------------------------------------------------------  IN:    dextrose 5% + sodium chloride 0.45%.: 900 mL    dextrose 5%.: 870 mL    heparin  Infusion.: 220 mL    IV PiggyBack: 200 mL    Oral Fluid: 1000 mL  Total IN: 3190 mL    OUT:    Drain: 60 mL    Other: 5 mL    PEG (Percutaneous Endoscopic Gastrostomy) Tube: 1400 mL    Voided: 900 mL  Total OUT: 2365 mL    Total NET: 825 mL        General: NAD, resting comfortably in bed  Pulm: Nonlabored breathing, no respiratory distress  Abd: soft, nontender, nondistended, no rebound, no guarding. Perc [place] with adequate drainage, PEG tube venting.         LABS:                        8.3    13.8  )-----------( 543      ( 24 Jul 2018 04:22 )             26.7     07-24    153<H>  |  114<H>  |  22  ----------------------------<  142<H>  3.5   |  28  |  0.41<L>    Ca    7.5<L>      24 Jul 2018 20:26  Phos  1.8     07-24  Mg     2.4     07-24      PTT - ( 24 Jul 2018 04:22 )  PTT:71.3 sec

## 2018-07-25 NOTE — PROGRESS NOTE ADULT - PROBLEM SELECTOR PLAN 8
She has been accepted to hospice at [facility]; patient still expresses that she would like to eventually move to [place] where most of her friends are. She would continue hospice care there. We discussed that at this time she may be too weak however if she gets stronger at [facility] this can be revisited.

## 2018-07-25 NOTE — PROGRESS NOTE ADULT - ASSESSMENT
66 yo F with metastatic colon cancer, BRAF mutated.   s/p 1 cycle of FOLFOX.   recent cholecystitis   hx of SBO/ileus s/p venting PEG  2 colonic stents  admitted with severe sepsis - high suspicion for intraabdominal source      S/p peritoneal drain placement  Clinically better and stable  on broad spectrum antibiotics    future chemotherapy is unlikely given her clinical decline and poor ECOG PS    I doubt that she can be a candidate for treatment      I would concur with palliative care/hospice     Discussed with the patient at length

## 2018-07-25 NOTE — PROGRESS NOTE ADULT - ASSESSMENT
67F w/ PMH metastatic colon ca s/p 1 dose FOLFOX, recent acute cholecystitis c/b severe sepsis and hepatic vein thrombosis s/p percutaneous cholecystomy, and recent partial SBO s/p PEG and 2 colonic stents p/w fever and acute abdominal pain found to have free air on CT 7/21, transferred to gen surg    Pain/nausea control  IS/OOB  Hep gtt  CLD  Zosyn, fluconazole  SCDs  Perc arleen, PEG  Disposition: [facility]  DNR/DNI 67F w/ PMH metastatic colon ca s/p 1 dose FOLFOX, recent acute cholecystitis c/b severe sepsis and hepatic vein thrombosis s/p percutaneous cholecystomy, and recent partial SBO s/p PEG and 2 colonic stents p/w fever and acute abdominal pain found to have free air on CT 7/21, transferred to gen surg    Pain/nausea control  IS/OOB  Hep gtt  CLD  Zosyn/ PICC for continued therapy  SCDs  Perc arleen, PEG  [facility]  DNR/DNI

## 2018-07-25 NOTE — PROGRESS NOTE ADULT - ATTENDING COMMENTS
Abdominal pain improved s/p percutaneous drain placement. The IR drain output is not feculent in appearance. The PEG is to gravity with continued high output.   Discussed at length the poor prognosis of any invasive intervention. Given the degree of metastatic disease (carcinomatosis in addition to solid organ-liver-involvement), the likelihood of improvement is poor. Diversion may not be possible or helpful given degree of bowel involvement, and this would not control the sepsis.  Discussed palliative care measures, pain control, anti-nausea medications; Ms. Paul is in understanding. Will continue to manage conservatively and plan for hospice.

## 2018-07-26 ENCOUNTER — TRANSCRIPTION ENCOUNTER (OUTPATIENT)
Age: 68
End: 2018-07-26

## 2018-07-26 DIAGNOSIS — R60.9 EDEMA, UNSPECIFIED: ICD-10-CM

## 2018-07-26 LAB
ANION GAP SERPL CALC-SCNC: 10 MMOL/L — SIGNIFICANT CHANGE UP (ref 5–17)
APTT BLD: 84.4 SEC — HIGH (ref 27.5–37.4)
BUN SERPL-MCNC: 19 MG/DL — SIGNIFICANT CHANGE UP (ref 7–23)
CALCIUM SERPL-MCNC: 7.9 MG/DL — LOW (ref 8.4–10.5)
CHLORIDE SERPL-SCNC: 108 MMOL/L — SIGNIFICANT CHANGE UP (ref 96–108)
CO2 SERPL-SCNC: 31 MMOL/L — SIGNIFICANT CHANGE UP (ref 22–31)
CREAT SERPL-MCNC: 0.4 MG/DL — LOW (ref 0.5–1.3)
GLUCOSE SERPL-MCNC: 145 MG/DL — HIGH (ref 70–99)
HCT VFR BLD CALC: 29.8 % — LOW (ref 34.5–45)
HGB BLD-MCNC: 8.8 G/DL — LOW (ref 11.5–15.5)
MAGNESIUM SERPL-MCNC: 2.4 MG/DL — SIGNIFICANT CHANGE UP (ref 1.6–2.6)
MCHC RBC-ENTMCNC: 24.7 PG — LOW (ref 27–34)
MCHC RBC-ENTMCNC: 29.5 G/DL — LOW (ref 32–36)
MCV RBC AUTO: 83.7 FL — SIGNIFICANT CHANGE UP (ref 80–100)
PHOSPHATE SERPL-MCNC: 2.2 MG/DL — LOW (ref 2.5–4.5)
PLATELET # BLD AUTO: 398 K/UL — SIGNIFICANT CHANGE UP (ref 150–400)
POTASSIUM SERPL-MCNC: 3.5 MMOL/L — SIGNIFICANT CHANGE UP (ref 3.5–5.3)
POTASSIUM SERPL-SCNC: 3.5 MMOL/L — SIGNIFICANT CHANGE UP (ref 3.5–5.3)
RBC # BLD: 3.56 M/UL — LOW (ref 3.8–5.2)
RBC # FLD: 23.2 % — HIGH (ref 10.3–16.9)
SODIUM SERPL-SCNC: 149 MMOL/L — HIGH (ref 135–145)
WBC # BLD: 11.3 K/UL — HIGH (ref 3.8–10.5)
WBC # FLD AUTO: 11.3 K/UL — HIGH (ref 3.8–10.5)

## 2018-07-26 PROCEDURE — 99233 SBSQ HOSP IP/OBS HIGH 50: CPT | Mod: GC

## 2018-07-26 RX ORDER — PIPERACILLIN AND TAZOBACTAM 4; .5 G/20ML; G/20ML
3.38 INJECTION, POWDER, LYOPHILIZED, FOR SOLUTION INTRAVENOUS
Qty: 0 | Refills: 0 | COMMUNITY
Start: 2018-07-26

## 2018-07-26 RX ORDER — SENNA PLUS 8.6 MG/1
1 TABLET ORAL
Qty: 0 | Refills: 0 | COMMUNITY
Start: 2018-07-26

## 2018-07-26 RX ORDER — POTASSIUM PHOSPHATE, MONOBASIC POTASSIUM PHOSPHATE, DIBASIC 236; 224 MG/ML; MG/ML
15 INJECTION, SOLUTION INTRAVENOUS ONCE
Qty: 0 | Refills: 0 | Status: COMPLETED | OUTPATIENT
Start: 2018-07-26 | End: 2018-07-26

## 2018-07-26 RX ORDER — CHLORHEXIDINE GLUCONATE 213 G/1000ML
1 SOLUTION TOPICAL DAILY
Qty: 0 | Refills: 0 | Status: DISCONTINUED | OUTPATIENT
Start: 2018-07-26 | End: 2018-07-28

## 2018-07-26 RX ADMIN — Medication 5 MILLILITER(S): at 17:52

## 2018-07-26 RX ADMIN — SIMETHICONE 80 MILLIGRAM(S): 80 TABLET, CHEWABLE ORAL at 06:15

## 2018-07-26 RX ADMIN — Medication 5 MILLIGRAM(S): at 23:27

## 2018-07-26 RX ADMIN — SENNA PLUS 1 TABLET(S): 8.6 TABLET ORAL at 17:52

## 2018-07-26 RX ADMIN — OXYCODONE AND ACETAMINOPHEN 1 TABLET(S): 5; 325 TABLET ORAL at 02:40

## 2018-07-26 RX ADMIN — PIPERACILLIN AND TAZOBACTAM 200 GRAM(S): 4; .5 INJECTION, POWDER, LYOPHILIZED, FOR SOLUTION INTRAVENOUS at 06:14

## 2018-07-26 RX ADMIN — SIMETHICONE 80 MILLIGRAM(S): 80 TABLET, CHEWABLE ORAL at 13:55

## 2018-07-26 RX ADMIN — PIPERACILLIN AND TAZOBACTAM 200 GRAM(S): 4; .5 INJECTION, POWDER, LYOPHILIZED, FOR SOLUTION INTRAVENOUS at 23:27

## 2018-07-26 RX ADMIN — OXYCODONE AND ACETAMINOPHEN 1 TABLET(S): 5; 325 TABLET ORAL at 22:50

## 2018-07-26 RX ADMIN — MORPHINE SULFATE 15 MILLIGRAM(S): 50 CAPSULE, EXTENDED RELEASE ORAL at 17:52

## 2018-07-26 RX ADMIN — Medication 1 APPLICATION(S): at 06:17

## 2018-07-26 RX ADMIN — SIMETHICONE 80 MILLIGRAM(S): 80 TABLET, CHEWABLE ORAL at 23:28

## 2018-07-26 RX ADMIN — POTASSIUM PHOSPHATE, MONOBASIC POTASSIUM PHOSPHATE, DIBASIC 62.5 MILLIMOLE(S): 236; 224 INJECTION, SOLUTION INTRAVENOUS at 13:00

## 2018-07-26 RX ADMIN — Medication 5 MILLILITER(S): at 12:31

## 2018-07-26 RX ADMIN — Medication 1 APPLICATION(S): at 18:46

## 2018-07-26 RX ADMIN — PIPERACILLIN AND TAZOBACTAM 200 GRAM(S): 4; .5 INJECTION, POWDER, LYOPHILIZED, FOR SOLUTION INTRAVENOUS at 18:45

## 2018-07-26 RX ADMIN — Medication 5 MILLILITER(S): at 23:28

## 2018-07-26 RX ADMIN — POLYETHYLENE GLYCOL 3350 17 GRAM(S): 17 POWDER, FOR SOLUTION ORAL at 12:29

## 2018-07-26 RX ADMIN — Medication 5 MILLILITER(S): at 06:14

## 2018-07-26 RX ADMIN — MORPHINE SULFATE 15 MILLIGRAM(S): 50 CAPSULE, EXTENDED RELEASE ORAL at 18:40

## 2018-07-26 RX ADMIN — SENNA PLUS 1 TABLET(S): 8.6 TABLET ORAL at 06:14

## 2018-07-26 RX ADMIN — PIPERACILLIN AND TAZOBACTAM 200 GRAM(S): 4; .5 INJECTION, POWDER, LYOPHILIZED, FOR SOLUTION INTRAVENOUS at 12:29

## 2018-07-26 RX ADMIN — MORPHINE SULFATE 15 MILLIGRAM(S): 50 CAPSULE, EXTENDED RELEASE ORAL at 06:31

## 2018-07-26 RX ADMIN — OXYCODONE AND ACETAMINOPHEN 1 TABLET(S): 5; 325 TABLET ORAL at 02:19

## 2018-07-26 RX ADMIN — OXYCODONE AND ACETAMINOPHEN 1 TABLET(S): 5; 325 TABLET ORAL at 21:50

## 2018-07-26 NOTE — PROGRESS NOTE ADULT - PROBLEM SELECTOR PLAN 9
Support provided to patient and family. Patient to have access to supportive services during rest of hospital stay as the patient/family deemed necessary ie. Chaplaincy, Massage therapy, Music therapy, Patient and family supportive services, Palliative SW, etc.    As discussed during the palliative IDT meeting and as identified during the patients PSSA screening the patient would benefit from massage therapy, music therapy, and visitor/volunteers. Patient has spiritual concerns which she is sharing with chaplaincy intern

## 2018-07-26 NOTE — DISCHARGE NOTE ADULT - MEDICATION SUMMARY - MEDICATIONS TO TAKE
I will START or STAY ON the medications listed below when I get home from the hospital:    oxyCODONE 5 mg oral tablet  -- 1 tab(s) by mouth every 4 hours, As needed, Moderate Pain (4 - 6)  -- Indication: For Pain    ondansetron  -- 4 milligram(s) by mouth every 6 hours MDD:4  -- Indication: For Nausea    senna oral tablet  -- 1 tab(s) by mouth 2 times a day  -- Indication: For Constipation, unspecified constipation type    Zosyn  -- 3.375 gram(s) intravenous every 6 hours  -- Indication: For Antibiotics    pantoprazole 40 mg oral delayed release tablet  -- 1 tab(s) by mouth once a day (before a meal)  -- Indication: For Home meds

## 2018-07-26 NOTE — DISCHARGE NOTE ADULT - CARE PROVIDER_API CALL
Marci Narvaez), Surgery; Surgical Oncology  [address]  [address] [place]  [place] [address]  Phone: [phone]  Fax: [phone]

## 2018-07-26 NOTE — PROGRESS NOTE ADULT - SUBJECTIVE AND OBJECTIVE BOX
HemOnc    Interval Hx:     The patient remains comfortable, no abdominal pain. No nausea or vomiting. LUE edema is improving.  s/p drainage of ascities/peritoneal drain placement    68 yo F w/ metastatic colon ca s/p 1 dose FOLFOX, recent acute cholecystitis (June 2018) c/b severe sepsis and hepatic vein thrombosis   s/p percutaneous cholecystomy, and recent SBO/ ileus s/p venting PEG and 2 colonic stents   admitted for severe sepsis   ? ischemic colitis with bacterial translocation vs colitis/enteritis      ROS:  + abdominal pain, no nausea or vomiting, no cough. No CP      Vital Signs Last 24 Hrs  T(C): 36.9 (26 Jul 2018 05:30), Max: 37.3 (25 Jul 2018 09:07)  T(F): 98.4 (26 Jul 2018 05:30), Max: 99.1 (25 Jul 2018 09:07)  HR: 100 (26 Jul 2018 04:35) (96 - 104)  BP: 116/79 (26 Jul 2018 04:35) (102/64 - 137/66)  BP(mean): 94 (26 Jul 2018 04:35) (79 - 94)  RR: 18 (26 Jul 2018 04:35) (16 - 18)  SpO2: 96% (26 Jul 2018 04:35) (95% - 100%)    pale, ill appearing  s1s2 nml  abdo: soft, generalized mild tenderness   PEG  ext: no edema, mild tenderness to palpation b/l    MEDICATIONS  (STANDING):  Biotene Dry Mouth Oral Rinse 5 milliLiter(s) Swish and Spit four times a day  edoxaban 60 milliGRAM(s) Oral daily  morphine  - Injectable 1 milliGRAM(s) IV Push two times a day  petrolatum white Ointment 1 Application(s) Topical two times a day  piperacillin/tazobactam IVPB. 3.375 Gram(s) IV Intermittent every 6 hours  potassium chloride   Powder 40 milliEquivalent(s) Oral once  simethicone drops 80 milliGRAM(s) Enteral Tube every 8 hours                            8.8    11.3  )-----------( 398      ( 26 Jul 2018 06:11 )             29.8         CBC Full  -  ( 26 Jul 2018 06:11 )  WBC Count : 11.3 K/uL  Hemoglobin : 8.8 g/dL  Hematocrit : 29.8 %  Platelet Count - Automated : 398 K/uL  Mean Cell Volume : 83.7 fL  Mean Cell Hemoglobin : 24.7 pg  Mean Cell Hemoglobin Concentration : 29.5 g/dL  Auto Neutrophil # : x  Auto Lymphocyte # : x  Auto Monocyte # : x  Auto Eosinophil # : x  Auto Basophil # : x  Auto Neutrophil % : x  Auto Lymphocyte % : x  Auto Monocyte % : x  Auto Eosinophil % : x  Auto Basophil % : x    07-25    149<H>  |  110<H>  |  21  ----------------------------<  157<H>  3.4<L>   |  29  |  0.42<L>    Ca    7.6<L>      25 Jul 2018 08:00  Phos  2.4     07-25  Mg     2.2     07-25    PTT - ( 26 Jul 2018 06:11 )  PTT:84.4 sec     MEDICATIONS  (PRN):  acetaminophen   Tablet 650 milliGRAM(s) Oral every 6 hours PRN For Temp greater than 38 C (100.4 F)  morphine  - Injectable 2 milliGRAM(s) IV Push every 4 hours PRN Severe Pain (7 - 10)  morphine  - Injectable 1 milliGRAM(s) IV Push every 4 hours PRN Moderate Pain (4 - 6)  OLANZapine Disintegrating Tablet 5 milliGRAM(s) Oral daily PRN Nausea

## 2018-07-26 NOTE — PROGRESS NOTE ADULT - PROBLEM SELECTOR PLAN 4
Patient currently on heparin gtt, she has evidence of LUE swelling however that has improved  -Surgery team wants to continue with heparin gtt until discharge; patient will be discharged to [facility] off anticoagulation

## 2018-07-26 NOTE — PROGRESS NOTE ADULT - PROBLEM SELECTOR PLAN 8
She has been accepted to hospice at [facility]; patient still expresses that she would like to eventually move to [place] where most of her friends are. She would continue hospice care there. We discussed that at this time she may be too weak however if she gets stronger at [facility] this can be revisited.

## 2018-07-26 NOTE — PROGRESS NOTE ADULT - SUBJECTIVE AND OBJECTIVE BOX
*****INCOMPLETE  INTERVAL HPI/OVERNIGHT EVENTS: Patient made DNR/DNI yesterday, planning for hospice. PICC line also placed yesterday.    VITAL SIGNS:  T(F): 98.4 (07-26-18 @ 05:30)  HR: 100 (07-26-18 @ 04:35)  BP: 116/79 (07-26-18 @ 04:35)  RR: 18 (07-26-18 @ 04:35)  SpO2: 96% (07-26-18 @ 04:35)  Wt(kg): --    PHYSICAL EXAM:    Constitutional:  Eyes:  ENMT:  Neck:  Respiratory:  Cardiovascular:  Gastrointestinal:  Extremities:  Vascular:  Neurological:  Musculoskeletal:    MEDICATIONS  (STANDING):  Biotene Dry Mouth Oral Rinse 5 milliLiter(s) Swish and Spit four times a day  dextrose 5%. 1000 milliLiter(s) (87 mL/Hr) IV Continuous <Continuous>  heparin  Infusion. 1100 Unit(s)/Hr (11 mL/Hr) IV Continuous <Continuous>  morphine ER Tablet 15 milliGRAM(s) Oral two times a day  petrolatum white Ointment 1 Application(s) Topical two times a day  piperacillin/tazobactam IVPB. 3.375 Gram(s) IV Intermittent every 6 hours  polyethylene glycol 3350 17 Gram(s) Oral daily  senna 1 Tablet(s) Oral two times a day  simethicone drops 80 milliGRAM(s) Enteral Tube every 8 hours  sodium chloride 0.9% lock flush 20 milliLiter(s) IV Push once  sodium chloride 0.9% lock flush 20 milliLiter(s) IV Push once  zaleplon 5 milliGRAM(s) Oral at bedtime    MEDICATIONS  (PRN):  OLANZapine Disintegrating Tablet 5 milliGRAM(s) Oral daily PRN Nausea  ondansetron Injectable 4 milliGRAM(s) IV Push every 6 hours PRN Nausea and/or Vomiting  oxyCODONE    5 mG/acetaminophen 325 mG 1 Tablet(s) Oral every 4 hours PRN Severe Pain (7 - 10)  sodium chloride 0.9% lock flush 10 milliLiter(s) IV Push every 1 hour PRN After each medication administration  sodium chloride 0.9% lock flush 10 milliLiter(s) IV Push every 12 hours PRN Lumen of catheter NOT used  sodium chloride 0.9% lock flush 10 milliLiter(s) IV Push every 1 hour PRN After each medication administration  sodium chloride 0.9% lock flush 10 milliLiter(s) IV Push every 12 hours PRN Lumen of catheter NOT used      Allergies    allergic to cats (Rash)  No Known Drug Allergies    Intolerances        LABS:                        8.8    11.3  )-----------( 398      ( 26 Jul 2018 06:11 )             29.8     07-25    149<H>  |  110<H>  |  21  ----------------------------<  157<H>  3.4<L>   |  29  |  0.42<L>    Ca    7.6<L>      25 Jul 2018 08:00  Phos  2.4     07-25  Mg     2.2     07-25      PTT - ( 26 Jul 2018 06:11 )  PTT:84.4 sec      RADIOLOGY & ADDITIONAL TESTS: No new imaging. *****INCOMPLETE  INTERVAL HPI/OVERNIGHT EVENTS: Patient made DNR/DNI yesterday, planning for hospice. PICC line also placed yesterday. She states her pain is well controlled. She denies chest pain, shortness of breath, nausea/vomiting. She is passing gas, denies BM, dysuria.    VITAL SIGNS:  T(F): 98.4 (07-26-18 @ 05:30)  HR: 100 (07-26-18 @ 04:35)  BP: 116/79 (07-26-18 @ 04:35)  RR: 18 (07-26-18 @ 04:35)  SpO2: 96% (07-26-18 @ 04:35)  Wt(kg): --    PHYSICAL EXAM:    Constitutional:  Eyes:  ENMT:  Neck:  Respiratory:  Cardiovascular:  Gastrointestinal:  Extremities:  Vascular:  Neurological:  Musculoskeletal:    MEDICATIONS  (STANDING):  Biotene Dry Mouth Oral Rinse 5 milliLiter(s) Swish and Spit four times a day  dextrose 5%. 1000 milliLiter(s) (87 mL/Hr) IV Continuous <Continuous>  heparin  Infusion. 1100 Unit(s)/Hr (11 mL/Hr) IV Continuous <Continuous>  morphine ER Tablet 15 milliGRAM(s) Oral two times a day  petrolatum white Ointment 1 Application(s) Topical two times a day  piperacillin/tazobactam IVPB. 3.375 Gram(s) IV Intermittent every 6 hours  polyethylene glycol 3350 17 Gram(s) Oral daily  senna 1 Tablet(s) Oral two times a day  simethicone drops 80 milliGRAM(s) Enteral Tube every 8 hours  sodium chloride 0.9% lock flush 20 milliLiter(s) IV Push once  sodium chloride 0.9% lock flush 20 milliLiter(s) IV Push once  zaleplon 5 milliGRAM(s) Oral at bedtime    MEDICATIONS  (PRN):  OLANZapine Disintegrating Tablet 5 milliGRAM(s) Oral daily PRN Nausea  ondansetron Injectable 4 milliGRAM(s) IV Push every 6 hours PRN Nausea and/or Vomiting  oxyCODONE    5 mG/acetaminophen 325 mG 1 Tablet(s) Oral every 4 hours PRN Severe Pain (7 - 10)  sodium chloride 0.9% lock flush 10 milliLiter(s) IV Push every 1 hour PRN After each medication administration  sodium chloride 0.9% lock flush 10 milliLiter(s) IV Push every 12 hours PRN Lumen of catheter NOT used  sodium chloride 0.9% lock flush 10 milliLiter(s) IV Push every 1 hour PRN After each medication administration  sodium chloride 0.9% lock flush 10 milliLiter(s) IV Push every 12 hours PRN Lumen of catheter NOT used      Allergies    allergic to cats (Rash)  No Known Drug Allergies    Intolerances        LABS:                        8.8    11.3  )-----------( 398      ( 26 Jul 2018 06:11 )             29.8     07-25    149<H>  |  110<H>  |  21  ----------------------------<  157<H>  3.4<L>   |  29  |  0.42<L>    Ca    7.6<L>      25 Jul 2018 08:00  Phos  2.4     07-25  Mg     2.2     07-25      PTT - ( 26 Jul 2018 06:11 )  PTT:84.4 sec      RADIOLOGY & ADDITIONAL TESTS: No new imaging. INTERVAL HPI/OVERNIGHT EVENTS: Patient made DNR/DNI yesterday, planning for hospice. PICC line also placed yesterday. She states her pain is well controlled. She denies chest pain, shortness of breath, nausea/vomiting. She is passing gas, denies BM, dysuria.    VITAL SIGNS:  T(F): 98.4 (07-26-18 @ 05:30)  HR: 100 (07-26-18 @ 04:35)  BP: 116/79 (07-26-18 @ 04:35)  RR: 18 (07-26-18 @ 04:35)  SpO2: 96% (07-26-18 @ 04:35)  Wt(kg): --    PHYSICAL EXAM:    Constitutional: well-appearing, obese, NAD  Eyes: PERRL, EOMI  ENMT: MMM, no oral lesions  Neck: supple, no JVD or LAD  Respiratory: CTAb/l, no wheezes/rales/rhonchi  Cardiovascular: RRR, normal S1/S2, no murmurs/rubs/gallops  Gastrointestinal: soft, NTND, BS normal, biliary drain in place, peritoneal drain in place w/out erythema, PEG to gravity w/ drainage  Extremities: +swelling of LUE with palpable pulse and intact sensation; no LE edema b/l  Vascular: DP 2+ b/l  Neurological: AAOx3, CNII-XII grossly intact  Musculoskeletal: no joint swelling    MEDICATIONS  (STANDING):  Biotene Dry Mouth Oral Rinse 5 milliLiter(s) Swish and Spit four times a day  dextrose 5%. 1000 milliLiter(s) (87 mL/Hr) IV Continuous <Continuous>  heparin  Infusion. 1100 Unit(s)/Hr (11 mL/Hr) IV Continuous <Continuous>  morphine ER Tablet 15 milliGRAM(s) Oral two times a day  petrolatum white Ointment 1 Application(s) Topical two times a day  piperacillin/tazobactam IVPB. 3.375 Gram(s) IV Intermittent every 6 hours  polyethylene glycol 3350 17 Gram(s) Oral daily  senna 1 Tablet(s) Oral two times a day  simethicone drops 80 milliGRAM(s) Enteral Tube every 8 hours  sodium chloride 0.9% lock flush 20 milliLiter(s) IV Push once  sodium chloride 0.9% lock flush 20 milliLiter(s) IV Push once  zaleplon 5 milliGRAM(s) Oral at bedtime    MEDICATIONS  (PRN):  OLANZapine Disintegrating Tablet 5 milliGRAM(s) Oral daily PRN Nausea  ondansetron Injectable 4 milliGRAM(s) IV Push every 6 hours PRN Nausea and/or Vomiting  oxyCODONE    5 mG/acetaminophen 325 mG 1 Tablet(s) Oral every 4 hours PRN Severe Pain (7 - 10)  sodium chloride 0.9% lock flush 10 milliLiter(s) IV Push every 1 hour PRN After each medication administration  sodium chloride 0.9% lock flush 10 milliLiter(s) IV Push every 12 hours PRN Lumen of catheter NOT used  sodium chloride 0.9% lock flush 10 milliLiter(s) IV Push every 1 hour PRN After each medication administration  sodium chloride 0.9% lock flush 10 milliLiter(s) IV Push every 12 hours PRN Lumen of catheter NOT used      Allergies    allergic to cats (Rash)  No Known Drug Allergies    Intolerances        LABS:                        8.8    11.3  )-----------( 398      ( 26 Jul 2018 06:11 )             29.8     07-25    149<H>  |  110<H>  |  21  ----------------------------<  157<H>  3.4<L>   |  29  |  0.42<L>    Ca    7.6<L>      25 Jul 2018 08:00  Phos  2.4     07-25  Mg     2.2     07-25      PTT - ( 26 Jul 2018 06:11 )  PTT:84.4 sec      RADIOLOGY & ADDITIONAL TESTS: No new imaging.

## 2018-07-26 NOTE — PROGRESS NOTE ADULT - PROBLEM SELECTOR PLAN 1
Pain has improved, currently on MS Contin 15 mg BID and has not required PRN IV breakthrough pain medication.     -c/w MS Contin 15 mg BID  -Consider adding PRN PO morphine for breakthrough pain Related to advanced carcinomatosis in abd and likely perforation Pain has improved, post placement of drain and currently on MS Contin 15 mg BID and has not required PRN IV breakthrough pain medication.     -c/w MS Contin 15 mg BID  -Consider adding PRN PO morphine for breakthrough pain

## 2018-07-26 NOTE — DISCHARGE NOTE ADULT - PATIENT PORTAL LINK FT
You can access the [facility] Portal, offered by [facility], by registering with the following website: [email or web address]

## 2018-07-26 NOTE — PROGRESS NOTE ADULT - SUBJECTIVE AND OBJECTIVE BOX
PILLO MAR             MRN-[id number]    CC: Pain, Anxiety, Insomnia, GOC/AD, Support    HPI:  68 yo F w/ metastatic colon ca s/p 1 dose FOLFOX, recent acute cholecystitis c/b severe sepsis and hepatic vein thrombosis s/p percutaneous cholecystomy, and recent partial SBO s/p PEG and 2 colonic stents p/w fever of 101.7 in the setting of several days of exhaustion and weakness. Denies subjective fevers/ chills/ night sweats. Pt only came to hospital because sent by her oncologist when she went to get her 2nd dose of chemo and found to be febrile with WBC of 15. While in ED, pt reports developed worsening diffuse abdominal pain 8.5/10, worse in the upper abdomen. Pain is better with oxycodone. No change w/ position or food. Denies n/v/d or constipation. Last BM last night. Endorses poor PO intake due to decreased appetite since last admission.     SUBJECTIVE: Saw and evaluated Mrs Mar at bedside with Dr. Truong. She had PICC line placed yesterday; per Surgery they will continue with IV antibiotics. Heparin drip continued through PICC line as well. LUE less swollen. Pt does not complain of any acute pain and has not required PRN doses of opioids to control pain. She is maintained on MS Contin 15 mg BID.  Awaiting bed at [facility]. Patient's sister and brother in law are flying in from [place].       ROS:  DYSPNEA: No  NAUS/VOM: No	  SECRETIONS: No	  AGITATION: No  Pain (Y/N):  Y, however adequately controlled on MS Contin. Pain exacerbated when changing position   -Provocation/Palliation: Pain provoked by movement   -Quality/Quantity: Crampy, dull pain  -Radiating: No  -Timing/F/requency: Incidental  -Impact on ADLs: No    OTHER REVIEW OF SYSTEMS: Appetite improving. Pain has improved    PEx:  T(C): 36.7 (07-26-18 @ 09:24), Max: 36.9 (07-26-18 @ 05:30)  T(F): 98.1 (07-26-18 @ 09:24), Max: 98.4 (07-26-18 @ 05:30)  HR: 102 (07-26-18 @ 08:29) (98 - 104)  BP: 108/71 (07-26-18 @ 08:29) (102/64 - 137/66)  ABP: --  ABP(mean): --  RR: 16 (07-26-18 @ 08:29) (16 - 18)  SpO2: 95% (07-26-18 @ 08:29) (95% - 96%)      General: AAOx3 In NAD, however nonambulatory and looks weak   HEENT: NCAT  PERRL EOMI Non icteric    Neck: Supple, No masses  CVS: RR S1S2, no murmurs  Resp: CTABL on anterior auscultation   GI: Soft TTP Distension+. Venting PEG+  drain+  Ext: LUE swelling improved from yesterday; PICC line in right arm   Musc: No C/C/E    Neuro: No focal deficits. Following commands.  Psych:  Improved mood  Skin: Xerosis  Lymph: Normal.  	 	     ALLERGIES: allergic to cats (Rash)  No Known Drug Allergies    OPIATE NAÏVE (Y/N): No    MEDICATIONS: REVIEWED  MEDICATIONS  (STANDING):  Biotene Dry Mouth Oral Rinse 5 milliLiter(s) Swish and Spit four times a day  heparin  Infusion. 1100 Unit(s)/Hr (11 mL/Hr) IV Continuous <Continuous>  morphine ER Tablet 15 milliGRAM(s) Oral two times a day  petrolatum white Ointment 1 Application(s) Topical two times a day  piperacillin/tazobactam IVPB. 3.375 Gram(s) IV Intermittent every 6 hours  polyethylene glycol 3350 17 Gram(s) Oral daily  senna 1 Tablet(s) Oral two times a day  simethicone drops 80 milliGRAM(s) Enteral Tube every 8 hours  sodium chloride 0.9% lock flush 20 milliLiter(s) IV Push once  sodium chloride 0.9% lock flush 20 milliLiter(s) IV Push once  zaleplon 5 milliGRAM(s) Oral at bedtime    MEDICATIONS  (PRN):  OLANZapine Disintegrating Tablet 5 milliGRAM(s) Oral daily PRN Nausea  ondansetron Injectable 4 milliGRAM(s) IV Push every 6 hours PRN Nausea and/or Vomiting  oxyCODONE    5 mG/acetaminophen 325 mG 1 Tablet(s) Oral every 4 hours PRN Severe Pain (7 - 10)  sodium chloride 0.9% lock flush 10 milliLiter(s) IV Push every 1 hour PRN After each medication administration  sodium chloride 0.9% lock flush 10 milliLiter(s) IV Push every 12 hours PRN Lumen of catheter NOT used  sodium chloride 0.9% lock flush 10 milliLiter(s) IV Push every 1 hour PRN After each medication administration  sodium chloride 0.9% lock flush 10 milliLiter(s) IV Push every 12 hours PRN Lumen of catheter NOT used        LABS:                IMAGING: REVIEWED    ADVANCED DIRECTIVES: Pt aware of poor prognosis and terminal nature of disease; after discussion with our team today she agrees  to be DNR/DNI    Advanced Directives:     DNR/DNI      Will update MOLST form                         8.8    11.3  )-----------( 398      ( 26 Jul 2018 06:11 )             29.8         CBC Full  -  ( 26 Jul 2018 06:11 )  WBC Count : 11.3 K/uL  Hemoglobin : 8.8 g/dL  Hematocrit : 29.8 %  Platelet Count - Automated : 398 K/uL  Mean Cell Volume : 83.7 fL  Mean Cell Hemoglobin : 24.7 pg  Mean Cell Hemoglobin Concentration : 29.5 g/dL  Auto Neutrophil # : x  Auto Lymphocyte # : x  Auto Monocyte # : x  Auto Eosinophil # : x  Auto Basophil # : x  Auto Neutrophil % : x  Auto Lymphocyte % : x  Auto Monocyte % : x  Auto Eosinophil % : x  Auto Basophil % : x        07-26    149<H>  |  108  |  19  ----------------------------<  145<H>  3.5   |  31  |  0.40<L>    Ca    7.9<L>      26 Jul 2018 11:01  Phos  2.2     07-26  Mg     2.4     07-26          PTT - ( 26 Jul 2018 06:11 )  PTT:84.4 sec    Decision maker: The patient is able to participate in complex medical decision making conversations  Legal surrogate: No designated HCP, but the patient was thinking of assigning her sister Rama Fine [phone] from [place] and her friend Robyn Sam [phone] in [facility].    GOALS OF CARE DISCUSSION       Palliative care info/counseling provided	             Documentation of GOC: Pt transitioning to hospice at [facility] after a room opens up           REFERRALS	        Palliative SW       Unit SW/Case Mgmt       Patient/Family Support       Massage Therapy       Music Therapy       Nutrition / Dietician       Hospice - [facility]    AGENCY CHOICE DISCUSSED:           [facility] PILLO MAR             MRN-[id number]    CC: Pain, Anxiety, Insomnia, GOC/AD, Support    HPI:  66 yo F w/ metastatic colon ca s/p 1 dose FOLFOX, recent acute cholecystitis c/b severe sepsis and hepatic vein thrombosis s/p percutaneous cholecystomy, and recent partial SBO s/p PEG and 2 colonic stents p/w fever of 101.7 in the setting of several days of exhaustion and weakness. Denies subjective fevers/ chills/ night sweats. Pt only came to hospital because sent by her oncologist when she went to get her 2nd dose of chemo and found to be febrile with WBC of 15. While in ED, pt reports developed worsening diffuse abdominal pain 8.5/10, worse in the upper abdomen. Pain is better with oxycodone. No change w/ position or food. Denies n/v/d or constipation. Last BM last night. Endorses poor PO intake due to decreased appetite since last admission.     SUBJECTIVE: Saw and evaluated Mrs Mar at bedside with Dr. Truong. She had PICC line placed yesterday; per Surgery they will continue with IV antibiotics. Heparin drip continued through PICC line as well but to be discontinued on discharge. LUE less swollen. Pt does not complain of any acute pain and has not required PRN doses of opioids to control pain. She is maintained on MS Contin 15 mg BID.  Awaiting bed at [facility]. Patient's sister and brother in law are flying in from [place].       ROS:  DYSPNEA: No  NAUS/VOM: No	  SECRETIONS: No	  AGITATION: No  Pain (Y/N):  Y, however adequately controlled on MS Contin. Pain exacerbated when changing position   -Provocation/Palliation: Pain provoked by movement   -Quality/Quantity: Crampy, dull pain  -Radiating: No  -Timing/F/requency: Incidental  -Impact on ADLs: No    OTHER REVIEW OF SYSTEMS: Appetite improving. Pain has improved    PEx:  T(C): 36.7 (07-26-18 @ 09:24), Max: 36.9 (07-26-18 @ 05:30)  T(F): 98.1 (07-26-18 @ 09:24), Max: 98.4 (07-26-18 @ 05:30)  HR: 102 (07-26-18 @ 08:29) (98 - 104)  BP: 108/71 (07-26-18 @ 08:29) (102/64 - 137/66)  ABP: --  ABP(mean): --  RR: 16 (07-26-18 @ 08:29) (16 - 18)  SpO2: 95% (07-26-18 @ 08:29) (95% - 96%)      General: AAOx3 In NAD, however nonambulatory and looks weak   HEENT: NCAT  PERRL EOMI Non icteric    Neck: Supple, No masses  CVS: RR S1S2, no murmurs  Resp: CTABL on anterior auscultation   GI: Soft TTP Distension+. Venting PEG+  drain+  Ext: LUE swelling improved from yesterday; PICC line in right arm , IV in right thumb  Musc: No C/C/E    Neuro: No focal deficits. Following commands.  Psych:  Improved mood  Skin: Xerosis  Lymph: Normal.  	 	     ALLERGIES: allergic to cats (Rash)  No Known Drug Allergies    OPIATE NAÏVE (Y/N): No    MEDICATIONS: REVIEWED  MEDICATIONS  (STANDING):  Biotene Dry Mouth Oral Rinse 5 milliLiter(s) Swish and Spit four times a day  heparin  Infusion. 1100 Unit(s)/Hr (11 mL/Hr) IV Continuous <Continuous>  morphine ER Tablet 15 milliGRAM(s) Oral two times a day  petrolatum white Ointment 1 Application(s) Topical two times a day  piperacillin/tazobactam IVPB. 3.375 Gram(s) IV Intermittent every 6 hours  polyethylene glycol 3350 17 Gram(s) Oral daily  senna 1 Tablet(s) Oral two times a day  simethicone drops 80 milliGRAM(s) Enteral Tube every 8 hours  sodium chloride 0.9% lock flush 20 milliLiter(s) IV Push once  sodium chloride 0.9% lock flush 20 milliLiter(s) IV Push once  zaleplon 5 milliGRAM(s) Oral at bedtime    MEDICATIONS  (PRN):  OLANZapine Disintegrating Tablet 5 milliGRAM(s) Oral daily PRN Nausea  ondansetron Injectable 4 milliGRAM(s) IV Push every 6 hours PRN Nausea and/or Vomiting  oxyCODONE    5 mG/acetaminophen 325 mG 1 Tablet(s) Oral every 4 hours PRN Severe Pain (7 - 10)  sodium chloride 0.9% lock flush 10 milliLiter(s) IV Push every 1 hour PRN After each medication administration  sodium chloride 0.9% lock flush 10 milliLiter(s) IV Push every 12 hours PRN Lumen of catheter NOT used  sodium chloride 0.9% lock flush 10 milliLiter(s) IV Push every 1 hour PRN After each medication administration  sodium chloride 0.9% lock flush 10 milliLiter(s) IV Push every 12 hours PRN Lumen of catheter NOT used        LABS:                       8.8    11.3  )-----------( 398      ( 26 Jul 2018 06:11 )             29.8   07-26    149<H>  |  108  |  19  ----------------------------<  145<H>  3.5   |  31  |  0.40<L>    Ca    7.9<L>      26 Jul 2018 11:01  Phos  2.2     07-26  Mg     2.4     07-26                   IMAGING: REVIEWED    ADVANCED DIRECTIVES: Pt aware of poor prognosis and terminal nature of disease; after discussion with our team she agrees  to be DNR/DNI    Advanced Directives:     DNR/DNI      Will update MOLST form                         8.8    11.3  )-----------( 398      ( 26 Jul 2018 06:11 )             29.8         CBC Full  -  ( 26 Jul 2018 06:11 )  WBC Count : 11.3 K/uL  Hemoglobin : 8.8 g/dL  Hematocrit : 29.8 %  Platelet Count - Automated : 398 K/uL  Mean Cell Volume : 83.7 fL  Mean Cell Hemoglobin : 24.7 pg  Mean Cell Hemoglobin Concentration : 29.5 g/dL  Auto Neutrophil # : x  Auto Lymphocyte # : x  Auto Monocyte # : x  Auto Eosinophil # : x  Auto Basophil # : x  Auto Neutrophil % : x  Auto Lymphocyte % : x  Auto Monocyte % : x  Auto Eosinophil % : x  Auto Basophil % : x        07-26    149<H>  |  108  |  19  ----------------------------<  145<H>  3.5   |  31  |  0.40<L>    Ca    7.9<L>      26 Jul 2018 11:01  Phos  2.2     07-26  Mg     2.4     07-26          PTT - ( 26 Jul 2018 06:11 )  PTT:84.4 sec    Decision maker: The patient is able to participate in complex medical decision making conversations  Legal surrogate: No designated HCP, but the patient was thinking of assigning her sister Rama Fine [phone] from [place] and her friend Robyn Sam [phone] in [facility].    GOALS OF CARE DISCUSSION       Palliative care info/counseling provided	             Documentation of GOC: Pt transitioning to hospice at [facility] after a room opens up   Will continue antibiotics for now.  Anticoagulation to be stopped          REFERRALS	        Palliative SW       Unit SW/Case Mgmt       Patient/Family Support       Massage Therapy       Music Therapy       Nutrition / Dietician       Hospice - [facility]    AGENCY CHOICE DISCUSSED:           [facility]

## 2018-07-26 NOTE — PROGRESS NOTE ADULT - PROBLEM SELECTOR PLAN 7
Spoke to patient today regarding plan of care and was able to express that she understood that she is no longer a candidate for surgeries or chemotherapy. Dr. Thompson concurs with plan to transition to hospice given poor performance status and extensive tumor burden.   -Accepted at [facility], awaiting bed Patient able to express that she understood that she is no longer a candidate for surgeries or chemotherapy. Dr. Thompson concurs with plan to transition to hospice given poor performance status and extensive tumor burden.   -Accepted at [facility], awaiting bed

## 2018-07-26 NOTE — PROGRESS NOTE ADULT - ASSESSMENT
68 yo F with metastatic colon cancer, BRAF mutated.   s/p 1 cycle of FOLFOX.   recent cholecystitis   hx of SBO/ileus s/p venting PEG  2 colonic stents  admitted with severe sepsis - high suspicion for intraabdominal source    S/p peritoneal drain placement  Clinically stable      future chemotherapy is unlikely given her clinical decline and poor ECOG PS    palliative care/hospice     Arrangements are being made for hospice ([facility])    Discussed with the patient at length

## 2018-07-26 NOTE — PROGRESS NOTE ADULT - PROBLEM SELECTOR PLAN 6
Left UE edema, improving. Pt already on heparin ggt, unclear if there is a clot. However not likely continuing A/C on d/c per surgery.  - Elevate arm

## 2018-07-26 NOTE — PROGRESS NOTE ADULT - ASSESSMENT
A/P: 68 yo F w/ metastatic colon ca s/p chemotherapy, acute cholecystitis s/p percutaneous cholecystomy c/b portal vein thrombosis, LBO s/p sigmoid stent, presents this admission with sepsis, surgery consulted for worsening abdominal pain likely due to colitis. S/P IR drainage of ascites (7/22).      No surgical intervention indicated and patient aware  IVF hydration   Discuss Palliative care options with Palliative team  Continue with IV antibiotics   Serial abdominal exams   Dispo: DC to [facility] for Hospice

## 2018-07-26 NOTE — PROGRESS NOTE ADULT - SUBJECTIVE AND OBJECTIVE BOX
SUBJECTIVE: Patient appears comfortable with no complaints. She denies N/V. Tolerating CLD. Perc Iwona with adequete drainage, PEG tube venting.     heparin  Infusion. 1100 Unit(s)/Hr IV Continuous <Continuous>  piperacillin/tazobactam IVPB. 3.375 Gram(s) IV Intermittent every 6 hours      Vital Signs Last 24 Hrs  T(C): 36.7 (26 Jul 2018 09:24), Max: 36.9 (26 Jul 2018 05:30)  T(F): 98.1 (26 Jul 2018 09:24), Max: 98.4 (26 Jul 2018 05:30)  HR: 102 (26 Jul 2018 08:29) (98 - 104)  BP: 108/71 (26 Jul 2018 08:29) (102/64 - 137/66)  BP(mean): 85 (26 Jul 2018 08:29) (79 - 94)  RR: 16 (26 Jul 2018 08:29) (16 - 18)  SpO2: 95% (26 Jul 2018 08:29) (95% - 96%)    General: NAD, resting comfortably in bed  Pulm: Nonlabored breathing, no respiratory distress  Abd: soft, mild tenderness around umbilicus, nondistended, no rebound, no guarding. Perc Iwona with adequate drainage, PEG tube venting.         LABS:                        8.8    11.3  )-----------( 398      ( 26 Jul 2018 06:11 )             29.8     07-25    149<H>  |  110<H>  |  21  ----------------------------<  157<H>  3.4<L>   |  29  |  0.42<L>    Ca    7.6<L>      25 Jul 2018 08:00  Phos  2.4     07-25  Mg     2.2     07-25      PTT - ( 26 Jul 2018 06:11 )  PTT:84.4 sec

## 2018-07-26 NOTE — DISCHARGE NOTE ADULT - CARE PLAN
Principal Discharge DX:	Colon cancer  Goal:	Hospice care  Assessment and plan of treatment:	Care per hospice team  Patient to receive IV zosyn, continue home medications besides anticoagulation

## 2018-07-26 NOTE — PROGRESS NOTE ADULT - PROBLEM SELECTOR PLAN 1
s/p chemoport placement 5/31, s/p initiation of chemotherapy 6/9 with FOLFOX. Biliary and peritoneal drain placed. Now plan for hospice placement.  - palliative care consulted, ongoing hospice discussion- ?placement today  - continue pain control as per palliative recs  - no BMs likely 2/2 peritonitis, but peritoneal drain in place with fecal material and good output  - c/w care as per surgery team re: peritonitis, leukocytosis persistent but improving

## 2018-07-26 NOTE — PROGRESS NOTE ADULT - PROBLEM SELECTOR PLAN 5
POA. Likely 2/2 hypercoagulable state in the setting of active untreated malignancy.  -c/w heparin ggt, could consider doac; with further discussion w/ surgery- not planning to continue anticoagulation on discharge.

## 2018-07-26 NOTE — PROGRESS NOTE ADULT - PROBLEM SELECTOR PLAN 6
Currently on Fluconazole and Zosyn. PICC line placed yesterday.  -c/w Fluconazole and Zosyn via PICC line; duration to be determined by surgery and medicine

## 2018-07-26 NOTE — PROGRESS NOTE ADULT - PROBLEM SELECTOR PLAN 3
Intraabdominal etiology. Initial Blood cx growing bacteroides fragilis. Surgical cultures growing E.coli and Klebsiella. Stable leukocytosis. Currently appear to be covering appropriate infectious source (intraabdominal).  - C/w zosyn, picc line placed for 2 weeks of abx

## 2018-07-26 NOTE — DISCHARGE NOTE ADULT - MEDICATION SUMMARY - MEDICATIONS TO STOP TAKING
I will STOP taking the medications listed below when I get home from the hospital:    edoxaban 60 mg oral tablet  -- 1 tab(s) by mouth once a day MDD:1  -- Check with your doctor before becoming pregnant.  Do not take aspirin or aspirin containing products without knowledge and consent of your physician.  It is very important that you take or use this exactly as directed.  Do not skip doses or discontinue unless directed by your doctor.  Obtain medical advice before taking any non-prescription drugs as some may affect the action of this medication.

## 2018-07-26 NOTE — PROGRESS NOTE ADULT - PROBLEM SELECTOR PLAN 2
Remains obstipated. Encouraged to ambulate and frequent visits to . Currently on Miralax and senna daily. Venti-Peg still draining which should help if there is any underlying obstruction.     -Continue with miralax and senna   -Recommend bedside commode.

## 2018-07-26 NOTE — PROGRESS NOTE ADULT - PROBLEM SELECTOR PLAN 2
Improved, started on D5W, total FWD 3.2L s/p ~3L free water.  - Please check BMP this am to determine if patient needs further D5W  - Ok for BMPs [id number] to limit blood draws for comfort Improved, started on D5W, total FWD 3.2L s/p ~3L free water. Stopped D5W this am given no BMP- this afternoon BMP returned Na 149.  - As patient with significant edema and plan for hospice, will hold off on restarting D5W at this time. Encourage PO fluid intake.

## 2018-07-26 NOTE — DISCHARGE NOTE ADULT - PLAN OF CARE
Hospice care Care per hospice team  Patient to receive IV zosyn, continue home medications besides anticoagulation

## 2018-07-26 NOTE — DISCHARGE NOTE ADULT - HOSPITAL COURSE
67F w/ metastatic colon ca s/p 1 dose FOLFOX, recent acute cholecystitis (June 2018) c/b severe sepsis and hepatic vein thrombosis s/p percutaneous cholecystomy, and recent SBO/ ileus s/p venting PEG and 2 colonic stents admitted with severe sepsis and acute 2/2 GNR bacteremia, likely secondary to intraabdominal source and abdominal pain likely d/t partial bowel obstruction vs ileus 2/2 malignancy; s/p biliary + peritoneal drain. Patient to be discharged to hospice for further care. 67F w/ metastatic colon ca s/p 1 dose FOLFOX, recent acute cholecystitis (June 2018) c/b severe sepsis and hepatic vein thrombosis s/p percutaneous cholecystomy, and recent SBO/ ileus s/p venting PEG and 2 colonic stents admitted with severe sepsis and acute 2/2 GNR bacteremia, likely secondary to intraabdominal source and abdominal pain likely d/t partial bowel obstruction vs ileus 2/2 malignancy; s/p biliary + peritoneal drain. Patient to be discharged to hospice for further care on zosyn without anticoagulation.

## 2018-07-26 NOTE — PROGRESS NOTE ADULT - ASSESSMENT
67F w/ metastatic colon ca s/p 1 dose FOLFOX, recent acute cholecystitis (June 2018) c/b severe sepsis and hepatic vein thrombosis s/p percutaneous cholecystomy, and recent SBO/ ileus s/p venting PEG and 2 colonic stents admitted with severe sepsis and acute 2/2 GNR bacteremia, likely secondary to intraabdominal source and abdominal pain likely d/t partial bowel obstruction vs ileus 2/2 malignancy; s/p biliary + peritoneal drain. Now DNR/DNI plan for hospice placement.

## 2018-07-27 LAB
ANION GAP SERPL CALC-SCNC: 11 MMOL/L — SIGNIFICANT CHANGE UP (ref 5–17)
APTT BLD: 130.4 SEC — CRITICAL HIGH (ref 27.5–37.4)
APTT BLD: 147.5 SEC — CRITICAL HIGH (ref 27.5–37.4)
BUN SERPL-MCNC: 21 MG/DL — SIGNIFICANT CHANGE UP (ref 7–23)
CALCIUM SERPL-MCNC: 7.9 MG/DL — LOW (ref 8.4–10.5)
CHLORIDE SERPL-SCNC: 107 MMOL/L — SIGNIFICANT CHANGE UP (ref 96–108)
CO2 SERPL-SCNC: 31 MMOL/L — SIGNIFICANT CHANGE UP (ref 22–31)
CREAT SERPL-MCNC: 0.45 MG/DL — LOW (ref 0.5–1.3)
GLUCOSE SERPL-MCNC: 126 MG/DL — HIGH (ref 70–99)
HCT VFR BLD CALC: 28.1 % — LOW (ref 34.5–45)
HGB BLD-MCNC: 8.7 G/DL — LOW (ref 11.5–15.5)
MAGNESIUM SERPL-MCNC: 2.3 MG/DL — SIGNIFICANT CHANGE UP (ref 1.6–2.6)
MCHC RBC-ENTMCNC: 25.1 PG — LOW (ref 27–34)
MCHC RBC-ENTMCNC: 31 G/DL — LOW (ref 32–36)
MCV RBC AUTO: 81 FL — SIGNIFICANT CHANGE UP (ref 80–100)
PHOSPHATE SERPL-MCNC: 2.6 MG/DL — SIGNIFICANT CHANGE UP (ref 2.5–4.5)
PLATELET # BLD AUTO: 398 K/UL — SIGNIFICANT CHANGE UP (ref 150–400)
POTASSIUM SERPL-MCNC: 3.8 MMOL/L — SIGNIFICANT CHANGE UP (ref 3.5–5.3)
POTASSIUM SERPL-SCNC: 3.8 MMOL/L — SIGNIFICANT CHANGE UP (ref 3.5–5.3)
RBC # BLD: 3.47 M/UL — LOW (ref 3.8–5.2)
RBC # FLD: 22.8 % — HIGH (ref 10.3–16.9)
SODIUM SERPL-SCNC: 149 MMOL/L — HIGH (ref 135–145)
WBC # BLD: 11 K/UL — HIGH (ref 3.8–10.5)
WBC # FLD AUTO: 11 K/UL — HIGH (ref 3.8–10.5)

## 2018-07-27 PROCEDURE — 99232 SBSQ HOSP IP/OBS MODERATE 35: CPT | Mod: GC

## 2018-07-27 RX ORDER — ZALEPLON 10 MG
5 CAPSULE ORAL AT BEDTIME
Qty: 0 | Refills: 0 | Status: DISCONTINUED | OUTPATIENT
Start: 2018-07-27 | End: 2018-07-28

## 2018-07-27 RX ORDER — HEPARIN SODIUM 5000 [USP'U]/ML
900 INJECTION INTRAVENOUS; SUBCUTANEOUS
Qty: 25000 | Refills: 0 | Status: DISCONTINUED | OUTPATIENT
Start: 2018-07-27 | End: 2018-07-27

## 2018-07-27 RX ORDER — HEPARIN SODIUM 5000 [USP'U]/ML
700 INJECTION INTRAVENOUS; SUBCUTANEOUS
Qty: 25000 | Refills: 0 | Status: DISCONTINUED | OUTPATIENT
Start: 2018-07-27 | End: 2018-07-28

## 2018-07-27 RX ORDER — POTASSIUM CHLORIDE 20 MEQ
20 PACKET (EA) ORAL ONCE
Qty: 0 | Refills: 0 | Status: COMPLETED | OUTPATIENT
Start: 2018-07-27 | End: 2018-07-27

## 2018-07-27 RX ADMIN — SIMETHICONE 80 MILLIGRAM(S): 80 TABLET, CHEWABLE ORAL at 22:58

## 2018-07-27 RX ADMIN — OXYCODONE AND ACETAMINOPHEN 1 TABLET(S): 5; 325 TABLET ORAL at 22:21

## 2018-07-27 RX ADMIN — HEPARIN SODIUM 1100 UNIT(S)/HR: 5000 INJECTION INTRAVENOUS; SUBCUTANEOUS at 05:35

## 2018-07-27 RX ADMIN — OXYCODONE AND ACETAMINOPHEN 1 TABLET(S): 5; 325 TABLET ORAL at 04:04

## 2018-07-27 RX ADMIN — MORPHINE SULFATE 15 MILLIGRAM(S): 50 CAPSULE, EXTENDED RELEASE ORAL at 05:27

## 2018-07-27 RX ADMIN — OXYCODONE AND ACETAMINOPHEN 1 TABLET(S): 5; 325 TABLET ORAL at 05:04

## 2018-07-27 RX ADMIN — SIMETHICONE 80 MILLIGRAM(S): 80 TABLET, CHEWABLE ORAL at 05:28

## 2018-07-27 RX ADMIN — Medication 5 MILLILITER(S): at 17:51

## 2018-07-27 RX ADMIN — POLYETHYLENE GLYCOL 3350 17 GRAM(S): 17 POWDER, FOR SOLUTION ORAL at 14:27

## 2018-07-27 RX ADMIN — Medication 5 MILLILITER(S): at 05:28

## 2018-07-27 RX ADMIN — SENNA PLUS 1 TABLET(S): 8.6 TABLET ORAL at 17:51

## 2018-07-27 RX ADMIN — OXYCODONE AND ACETAMINOPHEN 1 TABLET(S): 5; 325 TABLET ORAL at 21:24

## 2018-07-27 RX ADMIN — PIPERACILLIN AND TAZOBACTAM 200 GRAM(S): 4; .5 INJECTION, POWDER, LYOPHILIZED, FOR SOLUTION INTRAVENOUS at 14:27

## 2018-07-27 RX ADMIN — MORPHINE SULFATE 15 MILLIGRAM(S): 50 CAPSULE, EXTENDED RELEASE ORAL at 17:44

## 2018-07-27 RX ADMIN — HEPARIN SODIUM 700 UNIT(S)/HR: 5000 INJECTION INTRAVENOUS; SUBCUTANEOUS at 21:15

## 2018-07-27 RX ADMIN — Medication 5 MILLILITER(S): at 23:00

## 2018-07-27 RX ADMIN — MORPHINE SULFATE 15 MILLIGRAM(S): 50 CAPSULE, EXTENDED RELEASE ORAL at 06:00

## 2018-07-27 RX ADMIN — SENNA PLUS 1 TABLET(S): 8.6 TABLET ORAL at 05:27

## 2018-07-27 RX ADMIN — CHLORHEXIDINE GLUCONATE 1 APPLICATION(S): 213 SOLUTION TOPICAL at 14:14

## 2018-07-27 RX ADMIN — Medication 5 MILLILITER(S): at 14:14

## 2018-07-27 RX ADMIN — PIPERACILLIN AND TAZOBACTAM 200 GRAM(S): 4; .5 INJECTION, POWDER, LYOPHILIZED, FOR SOLUTION INTRAVENOUS at 05:28

## 2018-07-27 RX ADMIN — PIPERACILLIN AND TAZOBACTAM 200 GRAM(S): 4; .5 INJECTION, POWDER, LYOPHILIZED, FOR SOLUTION INTRAVENOUS at 23:00

## 2018-07-27 RX ADMIN — Medication 5 MILLIGRAM(S): at 22:58

## 2018-07-27 RX ADMIN — Medication 20 MILLIEQUIVALENT(S): at 22:58

## 2018-07-27 RX ADMIN — SIMETHICONE 80 MILLIGRAM(S): 80 TABLET, CHEWABLE ORAL at 14:27

## 2018-07-27 RX ADMIN — PIPERACILLIN AND TAZOBACTAM 200 GRAM(S): 4; .5 INJECTION, POWDER, LYOPHILIZED, FOR SOLUTION INTRAVENOUS at 17:44

## 2018-07-27 NOTE — PROGRESS NOTE ADULT - PROBLEM SELECTOR PLAN 1
s/p chemoport placement 5/31, s/p initiation of chemotherapy 6/9 with FOLFOX. Biliary and peritoneal drain placed. Now plan for hospice placement.  - palliative care consulted, ?placement today  - continue pain control as per palliative recs  - no BMs likely 2/2 peritonitis, but peritoneal drain in place with fecal material and good output  - c/w care as per surgery team re: peritonitis, leukocytosis persistent but improving s/p chemoport placement 5/31, s/p initiation of chemotherapy 6/9 with FOLFOX. Biliary and peritoneal drain placed. Now plan for hospice placement.  - palliative care consulted, placement likely over weekend as no beds  - continue pain control as per palliative recs  - no BMs likely 2/2 peritonitis, but peritoneal drain in place with fecal material and good output  - c/w care as per surgery team re: peritonitis, leukocytosis persistent but improving  - would limit blood draws if possible

## 2018-07-27 NOTE — PROGRESS NOTE ADULT - PROBLEM SELECTOR PLAN 2
Improved, started on D5W, total FWD 3.2L s/p ~3L free water. Stopped D5W this am given no BMP- this afternoon BMP returned Na 149.  - As patient with significant edema and plan for hospice, will hold off on restarting D5W at this time. Encourage PO fluid intake.

## 2018-07-27 NOTE — PROGRESS NOTE ADULT - ASSESSMENT
68 year old F with recently diagnosed stage IV colon cancer with mets to the liver s/p 1 cycle of FOLFOX with recent admission 1 month ago for acute cholecystitis, severe sepsis and hepatic vein thrombosis s/p percutaneous cholecystomy, and partial SBO s/p vent PEG and 2 colonic stents. She was subsequently discharged to rehab and presented to oncologist's office with fever, tachycardia and abdominal pain. She was noted to be in severe sepsis possibly from GI source (?colitis). She has evidence of disease progression in abdomen.

## 2018-07-27 NOTE — PROGRESS NOTE ADULT - SUBJECTIVE AND OBJECTIVE BOX
PILLO MAR             MRN-[id number]    CC: Pain, Anxiety, Insomnia, GOC/AD, Support    HPI:  67 yo F w/ metastatic colon ca s/p 1 dose FOLFOX, recent acute cholecystitis c/b severe sepsis and hepatic vein thrombosis s/p percutaneous cholecystomy, and recent partial SBO s/p PEG and 2 colonic stents p/w fever of 101.7 in the setting of several days of exhaustion and weakness. Denies subjective fevers/ chills/ night sweats. Pt only came to hospital because sent by her oncologist when she went to get her 2nd dose of chemo and found to be febrile with WBC of 15. While in ED, pt reports developed worsening diffuse abdominal pain 8.5/10, worse in the upper abdomen. Pain is better with oxycodone. No change w/ position or food. Denies n/v/d or constipation. Last BM last night. Endorses poor PO intake due to decreased appetite since last admission.     SUBJECTIVE: Saw and evaluated Mrs Mar at bedside with Dr. Truong. She reports occasional mild abdominal discomfort, but otherwise with no complaints. Required 2 doses of PRN percocet at 9pm yesterday and 4am this morning. She is maintained on MS Contin 15 mg BID. Heparin gtt to be continued through PICC line until discharge. Antibiotics to be continued. LUE still swollen but without tenderness. As per social work, no bed available at [place] today; will check for availability tomorrow.       ROS:  DYSPNEA: No  NAUS/VOM: No	  SECRETIONS: No	  AGITATION: No  Pain (Y/N):  Y, however adequately controlled on MS Contin. Pain exacerbated when changing position   -Provocation/Palliation: Pain provoked by movement   -Quality/Quantity: Crampy, dull pain  -Radiating: No  -Timing/F/requency: Incidental  -Impact on ADLs: No    OTHER REVIEW OF SYSTEMS: Appetite improving.    PEx:  T(C): 37.1 (07-27-18 @ 05:33), Max: 37.1 (07-26-18 @ 14:00)  HR: 110 (07-27-18 @ 05:33) (100 - 110)  BP: 113/79 (07-27-18 @ 05:33) (107/75 - 113/79)  RR: 16 (07-27-18 @ 05:33) (16 - 16)  SpO2: 95% (07-27-18 @ 05:33) (94% - 95%)  Wt(kg): --    General: AAOx3 In NAD, ill-appearing, nonambulatory    HEENT: NCAT  PERRL EOMI Non icteric    Neck: Supple, No masses  CVS: RR S1S2, no murmurs  Resp: CTABL on anterior auscultation   GI: Soft TTP Distension+. Venting PEG+  drain+  Ext: LUE swelling; nontender; PICC line in right arm, site appears clean and noninfectious, IV in right thumb  Musc: No C/C/E    Neuro: No focal deficits. Following commands.  Psych:  Improved mood  Skin: Xerosis  Lymph: Normal.  	 	     ALLERGIES: allergic to cats (Rash)  No Known Drug Allergies    OPIATE NAÏVE (Y/N): No    MEDICATIONS: REVIEWED  MEDICATIONS  (STANDING):  Biotene Dry Mouth Oral Rinse 5 milliLiter(s) Swish and Spit four times a day  chlorhexidine 2% Cloths 1 Application(s) Topical daily  heparin  Infusion 900 Unit(s)/Hr (9 mL/Hr) IV Continuous <Continuous>  morphine ER Tablet 15 milliGRAM(s) Oral two times a day  petrolatum white Ointment 1 Application(s) Topical two times a day  piperacillin/tazobactam IVPB. 3.375 Gram(s) IV Intermittent every 6 hours  polyethylene glycol 3350 17 Gram(s) Oral daily  potassium chloride   Powder 20 milliEquivalent(s) Oral once  senna 1 Tablet(s) Oral two times a day  simethicone drops 80 milliGRAM(s) Enteral Tube every 8 hours  sodium chloride 0.9% lock flush 20 milliLiter(s) IV Push once  sodium chloride 0.9% lock flush 20 milliLiter(s) IV Push once  zaleplon 5 milliGRAM(s) Oral at bedtime    MEDICATIONS  (PRN):  OLANZapine Disintegrating Tablet 5 milliGRAM(s) Oral daily PRN Nausea  ondansetron Injectable 4 milliGRAM(s) IV Push every 6 hours PRN Nausea and/or Vomiting  oxyCODONE    5 mG/acetaminophen 325 mG 1 Tablet(s) Oral every 4 hours PRN Severe Pain (7 - 10)  sodium chloride 0.9% lock flush 10 milliLiter(s) IV Push every 1 hour PRN After each medication administration  sodium chloride 0.9% lock flush 10 milliLiter(s) IV Push every 12 hours PRN Lumen of catheter NOT used  sodium chloride 0.9% lock flush 10 milliLiter(s) IV Push every 1 hour PRN After each medication administration  sodium chloride 0.9% lock flush 10 milliLiter(s) IV Push every 12 hours PRN Lumen of catheter NOT used      LABS:                           8.7    11.0  )-----------( 398      ( 27 Jul 2018 07:53 )             28.1     Basic Metabolic Panel (07.27.18 @ 07:53)    Sodium, Serum: 149 mmol/L    Potassium, Serum: 3.8 mmol/L    Chloride, Serum: 107 mmol/L    Carbon Dioxide, Serum: 31 mmol/L    Anion Gap, Serum: 11 mmol/L    Blood Urea Nitrogen, Serum: 21 mg/dL    Creatinine, Serum: 0.45 mg/dL    Glucose, Serum: 126 mg/dL    Calcium, Total Serum: 7.9 mg/dL    eGFR if Non : 102    eGFR if African American: 118    Phosphorus Level, Serum (07.27.18 @ 07:53)    Phosphorus Level, Serum: 2.6 mg/dL    Magnesium, Serum (07.27.18 @ 07:53)    Magnesium, Serum: 2.3 mg/dL    PTT - ( 27 Jul 2018 09:46 )  PTT:147.5 sec      IMAGING: REVIEWED    ADVANCED DIRECTIVES: Pt aware of poor prognosis and terminal nature of disease; after discussion with our team she agrees  to be DNR/DNI    Advanced Directives:     DNR/DNI        Decision maker: The patient is able to participate in complex medical decision making conversations  Legal surrogate: No designated HCP, but the patient was thinking of assigning her sister Rama Fine [phone] from [place] and her friend Robyn Sam [phone] in [facility].    GOALS OF CARE DISCUSSION       Palliative care info/counseling provided	             Documentation of GOC: Pt transitioning to hospice at [facility] after a room opens up   Will continue antibiotics for now.  Anticoagulation to be stopped          REFERRALS	        Palliative SW       Unit SW/Case Mgmt       Patient/Family Support       Massage Therapy       Music Therapy       Nutrition / Dietician       Hospice - [facility]    AGENCY CHOICE DISCUSSED:           [facility]       Nutrition       Ethics       PT/OT        CRITICAL CARE TIME PROVIDED TO UNSTABLE PT W/ ORGAN FAILURE	   Start:               End:  	       Minutes:              > 50% OF THE TIME SPENT IN COUNSELING AND COORDINATING CARE 	   Start:               End:  	       Minutes:      PROLONGED SERVICE             FACE TO FACE:    PT            PT & FAMILY	   Start:               End:  	       Minutes:      Advance Care Planning Time: PILLO MAR             MRN-[id number]    CC: Pain, Anxiety, Insomnia, GOC/AD, Support    HPI:  69 yo F w/ metastatic colon ca s/p 1 dose FOLFOX, recent acute cholecystitis c/b severe sepsis and hepatic vein thrombosis s/p percutaneous cholecystomy, and recent partial SBO s/p PEG and 2 colonic stents p/w fever of 101.7 in the setting of several days of exhaustion and weakness. Denies subjective fevers/ chills/ night sweats. Pt only came to hospital because sent by her oncologist when she went to get her 2nd dose of chemo and found to be febrile with WBC of 15. While in ED, pt reports developed worsening diffuse abdominal pain 8.5/10, worse in the upper abdomen. Pain is better with oxycodone. No change w/ position or food. Denies n/v/d or constipation. Last BM last night. Endorses poor PO intake due to decreased appetite since last admission.     SUBJECTIVE: Saw and evaluated Mrs Mar at bedside with Dr. Truong. She reports occasional mild abdominal discomfort, but otherwise with no complaints. Required 2 doses of PRN percocet at 9pm yesterday and 4am this morning. She is maintained on MS Contin 15 mg BID. Heparin gtt to be continued through PICC line until discharge. Antibiotics to be continued. LUE still swollen but without tenderness. As per social work, no bed available at [place] today; will check for availability tomorrow.       ROS:  DYSPNEA: No  NAUS/VOM: No	  SECRETIONS: No	  AGITATION: No  Pain (Y/N):  Y, however adequately controlled on MS Contin. Pain exacerbated when changing position   -Provocation/Palliation: Pain provoked by movement   -Quality/Quantity: Crampy, dull pain  -Radiating: No  -Timing/F/requency: Incidental  -Impact on ADLs: No    OTHER REVIEW OF SYSTEMS: Appetite improving.    PEx:  T(C): 37.1 (07-27-18 @ 05:33), Max: 37.1 (07-26-18 @ 14:00)  HR: 110 (07-27-18 @ 05:33) (100 - 110)  BP: 113/79 (07-27-18 @ 05:33) (107/75 - 113/79)  RR: 16 (07-27-18 @ 05:33) (16 - 16)  SpO2: 95% (07-27-18 @ 05:33) (94% - 95%)  Wt(kg): --    General: AAOx3 In NAD, ill-appearing, nonambulatory    HEENT: NCAT  PERRL EOMI Non icteric    Neck: Supple, No masses  CVS: RR S1S2, no murmurs  Resp: CTABL on anterior auscultation   GI: Soft TTP Distension+. Venting PEG+  drain+  Ext: LUE swelling; nontender; PICC line in right arm, site appears clean and noninfectious, IV in right thumb  Musc: No C/C/E    Neuro: No focal deficits. Following commands.  Psych:  Improved mood  Skin: Xerosis  Lymph: Normal.  	 	     ALLERGIES: allergic to cats (Rash)  No Known Drug Allergies    OPIATE NAÏVE (Y/N): No    MEDICATIONS: REVIEWED  MEDICATIONS  (STANDING):  Biotene Dry Mouth Oral Rinse 5 milliLiter(s) Swish and Spit four times a day  chlorhexidine 2% Cloths 1 Application(s) Topical daily  heparin  Infusion 900 Unit(s)/Hr (9 mL/Hr) IV Continuous <Continuous>  morphine ER Tablet 15 milliGRAM(s) Oral two times a day  petrolatum white Ointment 1 Application(s) Topical two times a day  piperacillin/tazobactam IVPB. 3.375 Gram(s) IV Intermittent every 6 hours  polyethylene glycol 3350 17 Gram(s) Oral daily  potassium chloride   Powder 20 milliEquivalent(s) Oral once  senna 1 Tablet(s) Oral two times a day  simethicone drops 80 milliGRAM(s) Enteral Tube every 8 hours  sodium chloride 0.9% lock flush 20 milliLiter(s) IV Push once  sodium chloride 0.9% lock flush 20 milliLiter(s) IV Push once  zaleplon 5 milliGRAM(s) Oral at bedtime    MEDICATIONS  (PRN):  OLANZapine Disintegrating Tablet 5 milliGRAM(s) Oral daily PRN Nausea  ondansetron Injectable 4 milliGRAM(s) IV Push every 6 hours PRN Nausea and/or Vomiting  oxyCODONE    5 mG/acetaminophen 325 mG 1 Tablet(s) Oral every 4 hours PRN Severe Pain (7 - 10)  sodium chloride 0.9% lock flush 10 milliLiter(s) IV Push every 1 hour PRN After each medication administration  sodium chloride 0.9% lock flush 10 milliLiter(s) IV Push every 12 hours PRN Lumen of catheter NOT used  sodium chloride 0.9% lock flush 10 milliLiter(s) IV Push every 1 hour PRN After each medication administration  sodium chloride 0.9% lock flush 10 milliLiter(s) IV Push every 12 hours PRN Lumen of catheter NOT used      LABS:                           8.7    11.0  )-----------( 398      ( 27 Jul 2018 07:53 )             28.1     Basic Metabolic Panel (07.27.18 @ 07:53)    Sodium, Serum: 149 mmol/L    Potassium, Serum: 3.8 mmol/L    Chloride, Serum: 107 mmol/L    Carbon Dioxide, Serum: 31 mmol/L    Anion Gap, Serum: 11 mmol/L    Blood Urea Nitrogen, Serum: 21 mg/dL    Creatinine, Serum: 0.45 mg/dL    Glucose, Serum: 126 mg/dL    Calcium, Total Serum: 7.9 mg/dL    eGFR if Non : 102    eGFR if African American: 118    Phosphorus Level, Serum (07.27.18 @ 07:53)    Phosphorus Level, Serum: 2.6 mg/dL    Magnesium, Serum (07.27.18 @ 07:53)    Magnesium, Serum: 2.3 mg/dL    PTT - ( 27 Jul 2018 09:46 )  PTT:147.5 sec      IMAGING: REVIEWED    ADVANCED DIRECTIVES: Pt aware of poor prognosis and terminal nature of disease; after discussion with our team she agrees  to be DNR/DNI    Advanced Directives:     DNR/DNI        Decision maker: The patient is able to participate in complex medical decision making conversations  Legal surrogate: No designated HCP, but the patient was thinking of assigning her sister Rama Fine [phone] from [place] and her friend Robyn Sam [phone] in [facility].    GOALS OF CARE DISCUSSION       Palliative care info/counseling provided	             Documentation of GOC: Pt transitioning to hospice at [facility] after a room opens up   Will continue antibiotics for now.  Anticoagulation to be stopped          REFERRALS	        Palliative SW       Unit SW/Case Mgmt       Patient/Family Support       Massage Therapy       Music Therapy       Nutrition / Dietician       Hospice - [facility]    AGENCY CHOICE DISCUSSED:           [facility] PILLO MAR             MRN-[id number]    CC: Pain, Anxiety, Insomnia, GOC/AD, Support    HPI:  67 yo F w/ metastatic colon ca s/p 1 dose FOLFOX, recent acute cholecystitis c/b severe sepsis and hepatic vein thrombosis s/p percutaneous cholecystomy, and recent partial SBO s/p PEG and 2 colonic stents p/w fever of 101.7 in the setting of several days of exhaustion and weakness. Denies subjective fevers/ chills/ night sweats. Pt only came to hospital because sent by her oncologist when she went to get her 2nd dose of chemo and found to be febrile with WBC of 15. While in ED, pt reports developed worsening diffuse abdominal pain 8.5/10, worse in the upper abdomen. Pain is better with oxycodone. No change w/ position or food. Denies n/v/d or constipation. Last BM last night. Endorses poor PO intake due to decreased appetite since last admission.  Patient found to have free air in the abdomen and an abdominal collection.  Drain placed and started on antibiotics for presumed perforation. Also found to have hepatic vein thrombosis.    SUBJECTIVE: Saw and evaluated Mrs Mar at bedside with Dr. Truong. She reports occasional mild abdominal discomfort, but otherwise with no complaints. Required 2 doses of PRN percocet at 9pm yesterday and 4am this morning. She is maintained on MS Contin 15 mg BID. Heparin gtt to be continued  until discharge. Antibiotics to be continued indefinitely to prevent sepsis. LUE still swollen but without tenderness. As per social work, no bed available at [place] today; will check for availability tomorrow.   Sister and brother in law have arrived from [place]      ROS:  DYSPNEA: No  NAUS/VOM: No	  SECRETIONS: No	  AGITATION: No  Pain (Y/N):  Y, however adequately controlled on MS Contin. Pain exacerbated when changing position   -Provocation/Palliation: Pain provoked by movement   -Quality/Quantity: Crampy, dull pain  -Radiating: No  -Timing/F/requency: Incidental  -Impact on ADLs: No    OTHER REVIEW OF SYSTEMS: Appetite improving.    PEx:  T(C): 37.1 (07-27-18 @ 05:33), Max: 37.1 (07-26-18 @ 14:00)  HR: 110 (07-27-18 @ 05:33) (100 - 110)  BP: 113/79 (07-27-18 @ 05:33) (107/75 - 113/79)  RR: 16 (07-27-18 @ 05:33) (16 - 16)  SpO2: 95% (07-27-18 @ 05:33) (94% - 95%)  Wt(kg): --    General: AAOx3 In NAD, ill-appearing, nonambulatory    HEENT: NCAT  PERRL EOMI Non icteric    Neck: Supple, No masses  CVS: RR S1S2, no murmurs  Resp: CTABL on anterior auscultation   GI: Soft TTP Distension+. Venting PEG+  drain+  Ext: LUE swelling; nontender; PICC line in right arm, site appears clean and noninfectious, IV in right thumb  Musc: No C/C/E    Neuro: No focal deficits. Following commands.  Psych:  Improved mood  Skin: Xerosis  Lymph: Normal.  	 	     ALLERGIES: allergic to cats (Rash)  No Known Drug Allergies    OPIATE NAÏVE (Y/N): No    MEDICATIONS: REVIEWED  MEDICATIONS  (STANDING):  Biotene Dry Mouth Oral Rinse 5 milliLiter(s) Swish and Spit four times a day  chlorhexidine 2% Cloths 1 Application(s) Topical daily  heparin  Infusion 900 Unit(s)/Hr (9 mL/Hr) IV Continuous <Continuous>  morphine ER Tablet 15 milliGRAM(s) Oral two times a day  petrolatum white Ointment 1 Application(s) Topical two times a day  piperacillin/tazobactam IVPB. 3.375 Gram(s) IV Intermittent every 6 hours  polyethylene glycol 3350 17 Gram(s) Oral daily  potassium chloride   Powder 20 milliEquivalent(s) Oral once  senna 1 Tablet(s) Oral two times a day  simethicone drops 80 milliGRAM(s) Enteral Tube every 8 hours  sodium chloride 0.9% lock flush 20 milliLiter(s) IV Push once  sodium chloride 0.9% lock flush 20 milliLiter(s) IV Push once  zaleplon 5 milliGRAM(s) Oral at bedtime    MEDICATIONS  (PRN):  OLANZapine Disintegrating Tablet 5 milliGRAM(s) Oral daily PRN Nausea  ondansetron Injectable 4 milliGRAM(s) IV Push every 6 hours PRN Nausea and/or Vomiting  oxyCODONE    5 mG/acetaminophen 325 mG 1 Tablet(s) Oral every 4 hours PRN Severe Pain (7 - 10)  sodium chloride 0.9% lock flush 10 milliLiter(s) IV Push every 1 hour PRN After each medication administration  sodium chloride 0.9% lock flush 10 milliLiter(s) IV Push every 12 hours PRN Lumen of catheter NOT used  sodium chloride 0.9% lock flush 10 milliLiter(s) IV Push every 1 hour PRN After each medication administration  sodium chloride 0.9% lock flush 10 milliLiter(s) IV Push every 12 hours PRN Lumen of catheter NOT used      LABS:                           8.7    11.0  )-----------( 398      ( 27 Jul 2018 07:53 )             28.1     Basic Metabolic Panel (07.27.18 @ 07:53)    Sodium, Serum: 149 mmol/L    Potassium, Serum: 3.8 mmol/L    Chloride, Serum: 107 mmol/L    Carbon Dioxide, Serum: 31 mmol/L    Anion Gap, Serum: 11 mmol/L    Blood Urea Nitrogen, Serum: 21 mg/dL    Creatinine, Serum: 0.45 mg/dL    Glucose, Serum: 126 mg/dL    Calcium, Total Serum: 7.9 mg/dL    eGFR if Non : 102    eGFR if African American: 118    Phosphorus Level, Serum (07.27.18 @ 07:53)    Phosphorus Level, Serum: 2.6 mg/dL    Magnesium, Serum (07.27.18 @ 07:53)    Magnesium, Serum: 2.3 mg/dL    PTT - ( 27 Jul 2018 09:46 )  PTT:147.5 sec      IMAGING: REVIEWED    ADVANCED DIRECTIVES: Pt aware of poor prognosis and terminal nature of disease; after discussion with our team she agrees  to be DNR/DNI    Advanced Directives:     DNR/DNI        Decision maker: The patient is able to participate in complex medical decision making conversations  Legal surrogate: No designated HCP, but the patient was thinking of assigning her sister Rama Fine [phone] from [place] and her friend Robyn Sam [phone] in [facility].    GOALS OF CARE DISCUSSION       Palliative care info/counseling provided	             Documentation of GOC: Pt transitioning to hospice at [facility] after a room opens up   Will continue antibiotics for now.  Anticoagulation to be stopped          REFERRALS	        Palliative SW       Unit SW/Case Mgmt       Patient/Family Support       Massage Therapy       Music Therapy       Nutrition / Dietician       Hospice - [facility]    AGENCY CHOICE DISCUSSED:           [facility]

## 2018-07-27 NOTE — PROGRESS NOTE ADULT - SUBJECTIVE AND OBJECTIVE BOX
INCOMPLETE    INTERVAL HPI/OVERNIGHT EVENTS:    VITAL SIGNS:  T(F): 98.7 (07-27-18 @ 05:33)  HR: 110 (07-27-18 @ 05:33)  BP: 113/79 (07-27-18 @ 05:33)  RR: 16 (07-27-18 @ 05:33)  SpO2: 95% (07-27-18 @ 05:33)  Wt(kg): --    PHYSICAL EXAM:    Constitutional:  Eyes:  ENMT:  Neck:  Respiratory:  Cardiovascular:  Gastrointestinal:  Extremities:  Vascular:  Neurological:  Musculoskeletal:    MEDICATIONS  (STANDING):  Biotene Dry Mouth Oral Rinse 5 milliLiter(s) Swish and Spit four times a day  chlorhexidine 2% Cloths 1 Application(s) Topical daily  heparin  Infusion. 1100 Unit(s)/Hr (11 mL/Hr) IV Continuous <Continuous>  morphine ER Tablet 15 milliGRAM(s) Oral two times a day  petrolatum white Ointment 1 Application(s) Topical two times a day  piperacillin/tazobactam IVPB. 3.375 Gram(s) IV Intermittent every 6 hours  polyethylene glycol 3350 17 Gram(s) Oral daily  potassium chloride   Powder 20 milliEquivalent(s) Oral once  senna 1 Tablet(s) Oral two times a day  simethicone drops 80 milliGRAM(s) Enteral Tube every 8 hours  sodium chloride 0.9% lock flush 20 milliLiter(s) IV Push once  sodium chloride 0.9% lock flush 20 milliLiter(s) IV Push once  zaleplon 5 milliGRAM(s) Oral at bedtime    MEDICATIONS  (PRN):  OLANZapine Disintegrating Tablet 5 milliGRAM(s) Oral daily PRN Nausea  ondansetron Injectable 4 milliGRAM(s) IV Push every 6 hours PRN Nausea and/or Vomiting  oxyCODONE    5 mG/acetaminophen 325 mG 1 Tablet(s) Oral every 4 hours PRN Severe Pain (7 - 10)  sodium chloride 0.9% lock flush 10 milliLiter(s) IV Push every 1 hour PRN After each medication administration  sodium chloride 0.9% lock flush 10 milliLiter(s) IV Push every 12 hours PRN Lumen of catheter NOT used  sodium chloride 0.9% lock flush 10 milliLiter(s) IV Push every 1 hour PRN After each medication administration  sodium chloride 0.9% lock flush 10 milliLiter(s) IV Push every 12 hours PRN Lumen of catheter NOT used      Allergies    allergic to cats (Rash)  No Known Drug Allergies    Intolerances        LABS:                        8.7    11.0  )-----------( 398      ( 27 Jul 2018 07:53 )             28.1     07-27    149<H>  |  107  |  21  ----------------------------<  126<H>  3.8   |  31  |  0.45<L>    Ca    7.9<L>      27 Jul 2018 07:53  Phos  2.6     07-27  Mg     2.3     07-27      PTT - ( 26 Jul 2018 06:11 )  PTT:84.4 sec      RADIOLOGY & ADDITIONAL TESTS: No new imaging. INTERVAL HPI/OVERNIGHT EVENTS: No acute events overnight. Patient is comfortable. Denies any pain, shortness of breath, nausea/vomiting, abdominal pain.    VITAL SIGNS:  T(F): 98.7 (07-27-18 @ 05:33)  HR: 110 (07-27-18 @ 05:33)  BP: 113/79 (07-27-18 @ 05:33)  RR: 16 (07-27-18 @ 05:33)  SpO2: 95% (07-27-18 @ 05:33)  Wt(kg): --    PHYSICAL EXAM:    Constitutional: well-appearing, obese, NAD  Eyes: PERRL, EOMI  ENMT: MMM, no oral lesions  Neck: supple, no JVD or LAD  Respiratory: CTAb/l, no wheezes/rales/rhonchi  Cardiovascular: RRR, normal S1/S2, no murmurs/rubs/gallops  Gastrointestinal: soft, NTND, BS normal, biliary drain in place, peritoneal drain in place w/out erythema, PEG to gravity w/ drainage  Extremities: +improved edema of LUE; no LE edema b/l  Vascular: DP 2+ b/l  Neurological: AAOx3, CNII-XII grossly intact  Musculoskeletal: no joint swelling    MEDICATIONS  (STANDING):  Biotene Dry Mouth Oral Rinse 5 milliLiter(s) Swish and Spit four times a day  chlorhexidine 2% Cloths 1 Application(s) Topical daily  heparin  Infusion. 1100 Unit(s)/Hr (11 mL/Hr) IV Continuous <Continuous>  morphine ER Tablet 15 milliGRAM(s) Oral two times a day  petrolatum white Ointment 1 Application(s) Topical two times a day  piperacillin/tazobactam IVPB. 3.375 Gram(s) IV Intermittent every 6 hours  polyethylene glycol 3350 17 Gram(s) Oral daily  potassium chloride   Powder 20 milliEquivalent(s) Oral once  senna 1 Tablet(s) Oral two times a day  simethicone drops 80 milliGRAM(s) Enteral Tube every 8 hours  sodium chloride 0.9% lock flush 20 milliLiter(s) IV Push once  sodium chloride 0.9% lock flush 20 milliLiter(s) IV Push once  zaleplon 5 milliGRAM(s) Oral at bedtime    MEDICATIONS  (PRN):  OLANZapine Disintegrating Tablet 5 milliGRAM(s) Oral daily PRN Nausea  ondansetron Injectable 4 milliGRAM(s) IV Push every 6 hours PRN Nausea and/or Vomiting  oxyCODONE    5 mG/acetaminophen 325 mG 1 Tablet(s) Oral every 4 hours PRN Severe Pain (7 - 10)  sodium chloride 0.9% lock flush 10 milliLiter(s) IV Push every 1 hour PRN After each medication administration  sodium chloride 0.9% lock flush 10 milliLiter(s) IV Push every 12 hours PRN Lumen of catheter NOT used  sodium chloride 0.9% lock flush 10 milliLiter(s) IV Push every 1 hour PRN After each medication administration  sodium chloride 0.9% lock flush 10 milliLiter(s) IV Push every 12 hours PRN Lumen of catheter NOT used      Allergies    allergic to cats (Rash)  No Known Drug Allergies    Intolerances        LABS:                        8.7    11.0  )-----------( 398      ( 27 Jul 2018 07:53 )             28.1     07-27    149<H>  |  107  |  21  ----------------------------<  126<H>  3.8   |  31  |  0.45<L>    Ca    7.9<L>      27 Jul 2018 07:53  Phos  2.6     07-27  Mg     2.3     07-27      PTT - ( 26 Jul 2018 06:11 )  PTT:84.4 sec      RADIOLOGY & ADDITIONAL TESTS: No new imaging.

## 2018-07-27 NOTE — PROGRESS NOTE ADULT - ASSESSMENT
67F w/ PMH metastatic colon ca s/p 1 dose FOLFOX, recent acute cholecystitis c/b severe sepsis and hepatic vein thrombosis s/p percutaneous cholecystomy, and recent partial SBO s/p PEG and 2 colonic stents p/w fever and acute abdominal pain found to have free air on CT 7/21, transferred to gen surg    Pain/nausea control  IS/OOB  Hep gtt  FLD  Zosyn, fluconazole  SCDs  Perc arleen, PEG  Disposition: [facility]  DNR/DNI

## 2018-07-27 NOTE — CHART NOTE - NSCHARTNOTEFT_GEN_A_CORE
Admitting Diagnosis:   Patient is a 68y old  Female who presents with a chief complaint of colon cancer (26 Jul 2018 09:25)      PAST MEDICAL & SURGICAL HISTORY:  Cholecystitis, acute  Colon cancer  No significant past surgical history      Current Nutrition Order: Full Liquids w/ EnsureEnlive TID (1050kcal, 60g pro), EnsurePudding TID (510kcal, 12g pro)    PO Intake: Good (%) [   ]  Fair (50-75%) [   ] Poor (<25%) [ x  ] Unable to assess as pt was seen after Clear Liquid breakfast.  Pt informed of diet advancement. Looking forward to Ensure Puddings.    GI Issues: Denies N/V at present. On bowel regimen for constipation. Denies having recent BM. Miralax and senna daily.     Pain: abdominal pain being medically managed  Per palliative note, pain has improved s/p placement of drain.     Skin Integrity: surgical incision, myla score 15, multiple drain sites      Labs:   07-27    149<H>  |  107  |  21  ----------------------------<  126<H>  3.8   |  31  |  0.45<L>    Ca    7.9<L>      27 Jul 2018 07:53  Phos  2.6     07-27  Mg     2.3     07-27      CAPILLARY BLOOD GLUCOSE          Medications:  MEDICATIONS  (STANDING):  Biotene Dry Mouth Oral Rinse 5 milliLiter(s) Swish and Spit four times a day  chlorhexidine 2% Cloths 1 Application(s) Topical daily  heparin  Infusion 900 Unit(s)/Hr (9 mL/Hr) IV Continuous <Continuous>  morphine ER Tablet 15 milliGRAM(s) Oral two times a day  petrolatum white Ointment 1 Application(s) Topical two times a day  piperacillin/tazobactam IVPB. 3.375 Gram(s) IV Intermittent every 6 hours  polyethylene glycol 3350 17 Gram(s) Oral daily  potassium chloride   Powder 20 milliEquivalent(s) Oral once  senna 1 Tablet(s) Oral two times a day  simethicone drops 80 milliGRAM(s) Enteral Tube every 8 hours  sodium chloride 0.9% lock flush 20 milliLiter(s) IV Push once  sodium chloride 0.9% lock flush 20 milliLiter(s) IV Push once  zaleplon 5 milliGRAM(s) Oral at bedtime    MEDICATIONS  (PRN):  OLANZapine Disintegrating Tablet 5 milliGRAM(s) Oral daily PRN Nausea  ondansetron Injectable 4 milliGRAM(s) IV Push every 6 hours PRN Nausea and/or Vomiting  oxyCODONE    5 mG/acetaminophen 325 mG 1 Tablet(s) Oral every 4 hours PRN Severe Pain (7 - 10)  sodium chloride 0.9% lock flush 10 milliLiter(s) IV Push every 1 hour PRN After each medication administration  sodium chloride 0.9% lock flush 10 milliLiter(s) IV Push every 12 hours PRN Lumen of catheter NOT used  sodium chloride 0.9% lock flush 10 milliLiter(s) IV Push every 1 hour PRN After each medication administration  sodium chloride 0.9% lock flush 10 milliLiter(s) IV Push every 12 hours PRN Lumen of catheter NOT used      Weight: 163lbs  height; 5'2", IBW:110lbs+/-10%,148% of IBW.  Daily     Weight Change:   Pt has had a significant unintentional wt loss since December 2017. UBW was 220lbs, current BW is 163lbs (57lb wt loss/30% wt change)    Estimated energy needs:   IBW used for calculations as pt >120% of IBW   Nutrient needs based on [facility] standards of care for maintenance in older adults.   Needs adjusted 2/2 age, post-op, catabolic illness and suspected severe protein calorie malnutrition  1494-1743kcal/day (30-35kcal/kg)  60-70g pro/day (1.2-1.4g/kg)  1494-1743ml fluid/day (30-35ml/kg    Subjective:   68yo F with recently dx stage IV colon cancer w/ mets to the liver s/p 1 cycle of FOLFOX w/ recent admission 1 mo ago for acute cholecystitis, severe sepsis and hepatic vein thrombosis s/p percutaneous cholecystomy, and partial SBO s/p vent PEG and 2 colonic stents. Pt readmitted w/ fever, tachycardia and abdominal pain. She was noted to be in severe sepsis suspected from colitis. Now has evidence of disease progression in abdomen. Pt considered not a candidate for interventional surgery or chemotherapy. Has been accepted to hospice at [facility]. Pt seen resting in bed w/ HHA at bedside. Pt had been NPO/Clears x10 days with recent advancement to Full liquids today. Discussed w/ pt what full liquid diet is and kcal/pro content in EnsureEnlive and Ensure Pudding. Pt had clear liquid breakfast consisting of tea, broth and juice.  RD to follow per policy. Nutrition intervention to align w/ GOC at all times.    Previous Nutrition Diagnosis:  Suspected severe protein calorie malnutrition RT inadequate kcal/pro intake 2/2 lack of appetite, abdominal pain AEB 30% wt change over last 8 months and reported prolonged reduced PO intake  Active [ x  ]  Resolved [   ]    If resolved, new PES:     Goal:  Pt to meet >75% estimated needs PO and avoid further wt loss    Recommendations:  1) Nutrition Intervention to align w/ GOC at all times.  2) Recommend adding prostat TID (300kcal, 45g pro) -discussed w/ pt adding it to cups of ice as sherbert and then to yogurts/apple sauce once diet gets advanced   3) Please advance diet to soft, low fiber w/ EnsureEnlive TID (1050kcal, 60g pro)  4) MVI daily  5) honor pts food preferences.    Education: prioritizing protein options. ONS on diet order.     Risk Level: High [ x  ] Moderate [   ] Low [   ].

## 2018-07-27 NOTE — PROGRESS NOTE ADULT - PROBLEM SELECTOR PLAN 7
Patient able to express that she understood that she is no longer a candidate for surgeries or chemotherapy. Dr. Nick concurs with plan to transition to hospice given poor performance status and extensive tumor burden.   -Accepted at [facility], awaiting bed

## 2018-07-27 NOTE — PROGRESS NOTE ADULT - ASSESSMENT
67F w/ metastatic colon ca s/p 1 dose FOLFOX, recent acute cholecystitis (June 2018) c/b severe sepsis and hepatic vein thrombosis s/p percutaneous cholecystomy, and recent SBO/ ileus s/p venting PEG and 2 colonic stents admitted with severe sepsis and acute 2/2 GNR bacteremia, likely secondary to intraabdominal source and abdominal pain likely d/t partial bowel obstruction vs ileus 2/2 malignancy; s/p biliary + peritoneal drain. Now DNR/DNI plan for hospice placement. 67F w/ metastatic colon ca s/p 1 dose FOLFOX, recent acute cholecystitis (June 2018) c/b severe sepsis and hepatic vein thrombosis s/p percutaneous cholecystomy, and recent SBO/ ileus s/p venting PEG and 2 colonic stents admitted with severe sepsis and acute 2/2 GNR bacteremia, likely secondary to intraabdominal source and abdominal pain likely d/t partial bowel obstruction vs ileus 2/2 malignancy; s/p biliary + peritoneal drain. DNR/DNI plan for hospice placement.

## 2018-07-27 NOTE — PROGRESS NOTE ADULT - NSHPATTENDINGPLANDISCUSS_GEN_ALL_CORE
Housestaff
HS
Medicine Consult, [facility]
HS
Surgery Team 4, Medicine Consult Dr. Sloan
HS, Palliative
House staff
housestaff
HS
House staff
housestaff

## 2018-07-27 NOTE — PROGRESS NOTE ADULT - PROBLEM SELECTOR PLAN 6
Currently on Zosyn. PICC line in place  -c/w Zosyn via PICC line; duration to be determined by surgery and medicine

## 2018-07-27 NOTE — PROGRESS NOTE ADULT - PROBLEM SELECTOR PLAN 4
Patient currently on heparin gtt, she has evidence of LUE swelling however that has improved; most recent PTT supratherapeutic to 147; heparin decreased from 11cc/hr to 9cc/hr  -Surgery team wants to continue with heparin gtt until discharge; patient will be discharged to [place] off anticoagulation  -f/u 6pm PTT; maintain goal PTT 60-80

## 2018-07-27 NOTE — PROGRESS NOTE ADULT - SUBJECTIVE AND OBJECTIVE BOX
SUBJECTIVE: Patient feeling well this morning. Patient seen and examined bedside by chief resident.    heparin  Infusion. 1100 Unit(s)/Hr IV Continuous <Continuous>  piperacillin/tazobactam IVPB. 3.375 Gram(s) IV Intermittent every 6 hours      Vital Signs Last 24 Hrs  T(C): 37.1 (27 Jul 2018 05:33), Max: 37.1 (26 Jul 2018 14:00)  T(F): 98.7 (27 Jul 2018 05:33), Max: 98.7 (26 Jul 2018 14:00)  HR: 110 (27 Jul 2018 05:33) (100 - 110)  BP: 113/79 (27 Jul 2018 05:33) (107/75 - 113/79)  BP(mean): 84 (26 Jul 2018 17:53) (82 - 84)  RR: 16 (27 Jul 2018 05:33) (16 - 16)  SpO2: 95% (27 Jul 2018 05:33) (94% - 95%)  I&O's Detail    26 Jul 2018 07:01  -  27 Jul 2018 07:00  --------------------------------------------------------  IN:    heparin  Infusion.: 187 mL    IV PiggyBack: 200 mL    Oral Fluid: 360 mL  Total IN: 747 mL    OUT:    PEG (Percutaneous Endoscopic Gastrostomy) Tube: 1300 mL    Voided: 400 mL  Total OUT: 1700 mL    Total NET: -953 mL        General: NAD, resting comfortably in bed  Pulm: Nonlabored breathing, no respiratory distress  Abd: soft, nontender, nondistended, no rebound, no guarding. Perc [place] with adequate drainage, PEG tube venting.       LABS:                        8.7    11.0  )-----------( 398      ( 27 Jul 2018 07:53 )             28.1     07-27    149<H>  |  107  |  21  ----------------------------<  126<H>  3.8   |  31  |  0.45<L>    Ca    7.9<L>      27 Jul 2018 07:53  Phos  2.6     07-27  Mg     2.3     07-27      PTT - ( 26 Jul 2018 06:11 )  PTT:84.4 sec

## 2018-07-27 NOTE — PROGRESS NOTE ADULT - PROBLEM SELECTOR PLAN 2
Remains obstipated. Encouraged to ambulate and frequent visits to . Currently on Miralax and senna daily. Venti-Peg still draining which should help if there is any underlying obstruction.     -Continue with miralax and senna

## 2018-07-27 NOTE — PROVIDER CONTACT NOTE (CRITICAL VALUE NOTIFICATION) - SITUATION
Laboratory called that the appt did not show any number, more likely result is > 200  s/p percutaneous cholecystomy, recent partial SBO  s/p PEG and 2 colonic stents, hepatic vein thrombosis

## 2018-07-27 NOTE — PROGRESS NOTE ADULT - PROBLEM SELECTOR PLAN 8
She has been accepted to hospice at [facility]; patient still expresses that she would like to eventually move to [place] where most of her friends are. She would continue hospice care there. We discussed that at this time she may be too weak however if she gets stronger at [facility] this can be revisited.

## 2018-07-27 NOTE — PROGRESS NOTE ADULT - PROBLEM SELECTOR PLAN 1
Related to advanced carcinomatosis in abd and likely perforation; Pain has improved, post placement of drain and currently on MS Contin 15 mg BID; has required 2 percocet PRNs for breakthrough    -c/w MS Contin 15 mg BID  -c/w PO percocet q4h PRN for breakthrough pain

## 2018-07-28 VITALS
HEART RATE: 97 BPM | OXYGEN SATURATION: 96 % | SYSTOLIC BLOOD PRESSURE: 113 MMHG | TEMPERATURE: 97 F | RESPIRATION RATE: 16 BRPM | DIASTOLIC BLOOD PRESSURE: 78 MMHG

## 2018-07-28 LAB
APTT BLD: 50.4 SEC — HIGH (ref 27.5–37.4)
HCT VFR BLD CALC: 27 % — LOW (ref 34.5–45)
HGB BLD-MCNC: 7.9 G/DL — LOW (ref 11.5–15.5)
MCHC RBC-ENTMCNC: 24.1 PG — LOW (ref 27–34)
MCHC RBC-ENTMCNC: 29.3 G/DL — LOW (ref 32–36)
MCV RBC AUTO: 82.3 FL — SIGNIFICANT CHANGE UP (ref 80–100)
PLATELET # BLD AUTO: 408 K/UL — HIGH (ref 150–400)
RBC # BLD: 3.28 M/UL — LOW (ref 3.8–5.2)
RBC # FLD: 22.8 % — HIGH (ref 10.3–16.9)
WBC # BLD: 5.6 K/UL — SIGNIFICANT CHANGE UP (ref 3.8–10.5)
WBC # FLD AUTO: 5.6 K/UL — SIGNIFICANT CHANGE UP (ref 3.8–10.5)

## 2018-07-28 PROCEDURE — 99232 SBSQ HOSP IP/OBS MODERATE 35: CPT | Mod: GC

## 2018-07-28 PROCEDURE — 87150 DNA/RNA AMPLIFIED PROBE: CPT

## 2018-07-28 PROCEDURE — C1769: CPT

## 2018-07-28 PROCEDURE — 83930 ASSAY OF BLOOD OSMOLALITY: CPT

## 2018-07-28 PROCEDURE — 71045 X-RAY EXAM CHEST 1 VIEW: CPT

## 2018-07-28 PROCEDURE — 84100 ASSAY OF PHOSPHORUS: CPT

## 2018-07-28 PROCEDURE — 85027 COMPLETE CBC AUTOMATED: CPT

## 2018-07-28 PROCEDURE — 87070 CULTURE OTHR SPECIMN AEROBIC: CPT

## 2018-07-28 PROCEDURE — 97530 THERAPEUTIC ACTIVITIES: CPT

## 2018-07-28 PROCEDURE — 87075 CULTR BACTERIA EXCEPT BLOOD: CPT

## 2018-07-28 PROCEDURE — 82248 BILIRUBIN DIRECT: CPT

## 2018-07-28 PROCEDURE — C1751: CPT

## 2018-07-28 PROCEDURE — 83935 ASSAY OF URINE OSMOLALITY: CPT

## 2018-07-28 PROCEDURE — 87186 SC STD MICRODIL/AGAR DIL: CPT

## 2018-07-28 PROCEDURE — 82962 GLUCOSE BLOOD TEST: CPT

## 2018-07-28 PROCEDURE — C1729: CPT

## 2018-07-28 PROCEDURE — 86901 BLOOD TYPING SEROLOGIC RH(D): CPT

## 2018-07-28 PROCEDURE — 87205 SMEAR GRAM STAIN: CPT

## 2018-07-28 PROCEDURE — 83735 ASSAY OF MAGNESIUM: CPT

## 2018-07-28 PROCEDURE — 87086 URINE CULTURE/COLONY COUNT: CPT

## 2018-07-28 PROCEDURE — 83690 ASSAY OF LIPASE: CPT

## 2018-07-28 PROCEDURE — 80076 HEPATIC FUNCTION PANEL: CPT

## 2018-07-28 PROCEDURE — 97161 PT EVAL LOW COMPLEX 20 MIN: CPT

## 2018-07-28 PROCEDURE — 84300 ASSAY OF URINE SODIUM: CPT

## 2018-07-28 PROCEDURE — 82803 BLOOD GASES ANY COMBINATION: CPT

## 2018-07-28 PROCEDURE — 86803 HEPATITIS C AB TEST: CPT

## 2018-07-28 PROCEDURE — 85610 PROTHROMBIN TIME: CPT

## 2018-07-28 PROCEDURE — 96375 TX/PRO/DX INJ NEW DRUG ADDON: CPT

## 2018-07-28 PROCEDURE — 85025 COMPLETE CBC W/AUTO DIFF WBC: CPT

## 2018-07-28 PROCEDURE — 49406 IMAGE CATH FLUID PERI/RETRO: CPT

## 2018-07-28 PROCEDURE — 82247 BILIRUBIN TOTAL: CPT

## 2018-07-28 PROCEDURE — 86850 RBC ANTIBODY SCREEN: CPT

## 2018-07-28 PROCEDURE — 74018 RADEX ABDOMEN 1 VIEW: CPT

## 2018-07-28 PROCEDURE — 80053 COMPREHEN METABOLIC PANEL: CPT

## 2018-07-28 PROCEDURE — 74019 RADEX ABDOMEN 2 VIEWS: CPT

## 2018-07-28 PROCEDURE — 74177 CT ABD & PELVIS W/CONTRAST: CPT

## 2018-07-28 PROCEDURE — 36569 INSJ PICC 5 YR+ W/O IMAGING: CPT

## 2018-07-28 PROCEDURE — P9045: CPT

## 2018-07-28 PROCEDURE — 93005 ELECTROCARDIOGRAM TRACING: CPT

## 2018-07-28 PROCEDURE — 80048 BASIC METABOLIC PNL TOTAL CA: CPT

## 2018-07-28 PROCEDURE — 87040 BLOOD CULTURE FOR BACTERIA: CPT

## 2018-07-28 PROCEDURE — 97116 GAIT TRAINING THERAPY: CPT

## 2018-07-28 PROCEDURE — 99285 EMERGENCY DEPT VISIT HI MDM: CPT | Mod: 25

## 2018-07-28 PROCEDURE — 81003 URINALYSIS AUTO W/O SCOPE: CPT

## 2018-07-28 PROCEDURE — 86900 BLOOD TYPING SEROLOGIC ABO: CPT

## 2018-07-28 PROCEDURE — 36415 COLL VENOUS BLD VENIPUNCTURE: CPT

## 2018-07-28 PROCEDURE — 85730 THROMBOPLASTIN TIME PARTIAL: CPT

## 2018-07-28 PROCEDURE — 83605 ASSAY OF LACTIC ACID: CPT

## 2018-07-28 PROCEDURE — 82977 ASSAY OF GGT: CPT

## 2018-07-28 PROCEDURE — 76937 US GUIDE VASCULAR ACCESS: CPT

## 2018-07-28 RX ORDER — HEPARIN SODIUM 5000 [USP'U]/ML
900 INJECTION INTRAVENOUS; SUBCUTANEOUS
Qty: 25000 | Refills: 0 | Status: DISCONTINUED | OUTPATIENT
Start: 2018-07-28 | End: 2018-07-28

## 2018-07-28 RX ORDER — ACETAMINOPHEN 500 MG
650 TABLET ORAL ONCE
Qty: 0 | Refills: 0 | Status: COMPLETED | OUTPATIENT
Start: 2018-07-28 | End: 2018-07-28

## 2018-07-28 RX ORDER — PANTOPRAZOLE SODIUM 20 MG/1
40 TABLET, DELAYED RELEASE ORAL
Qty: 0 | Refills: 0 | Status: DISCONTINUED | OUTPATIENT
Start: 2018-07-28 | End: 2018-07-28

## 2018-07-28 RX ADMIN — Medication 10 MILLIGRAM(S): at 06:37

## 2018-07-28 RX ADMIN — HEPARIN SODIUM 900 UNIT(S)/HR: 5000 INJECTION INTRAVENOUS; SUBCUTANEOUS at 06:40

## 2018-07-28 RX ADMIN — OXYCODONE AND ACETAMINOPHEN 1 TABLET(S): 5; 325 TABLET ORAL at 03:21

## 2018-07-28 RX ADMIN — OXYCODONE AND ACETAMINOPHEN 1 TABLET(S): 5; 325 TABLET ORAL at 10:41

## 2018-07-28 RX ADMIN — OXYCODONE AND ACETAMINOPHEN 1 TABLET(S): 5; 325 TABLET ORAL at 06:25

## 2018-07-28 RX ADMIN — Medication 1 APPLICATION(S): at 05:27

## 2018-07-28 RX ADMIN — MORPHINE SULFATE 15 MILLIGRAM(S): 50 CAPSULE, EXTENDED RELEASE ORAL at 06:24

## 2018-07-28 RX ADMIN — Medication 650 MILLIGRAM(S): at 05:20

## 2018-07-28 RX ADMIN — OXYCODONE AND ACETAMINOPHEN 1 TABLET(S): 5; 325 TABLET ORAL at 02:21

## 2018-07-28 RX ADMIN — OXYCODONE AND ACETAMINOPHEN 1 TABLET(S): 5; 325 TABLET ORAL at 07:43

## 2018-07-28 RX ADMIN — MORPHINE SULFATE 15 MILLIGRAM(S): 50 CAPSULE, EXTENDED RELEASE ORAL at 05:25

## 2018-07-28 RX ADMIN — Medication 5 MILLILITER(S): at 05:26

## 2018-07-28 RX ADMIN — SIMETHICONE 80 MILLIGRAM(S): 80 TABLET, CHEWABLE ORAL at 05:25

## 2018-07-28 RX ADMIN — SENNA PLUS 1 TABLET(S): 8.6 TABLET ORAL at 05:25

## 2018-07-28 RX ADMIN — PIPERACILLIN AND TAZOBACTAM 200 GRAM(S): 4; .5 INJECTION, POWDER, LYOPHILIZED, FOR SOLUTION INTRAVENOUS at 05:25

## 2018-07-28 RX ADMIN — Medication 650 MILLIGRAM(S): at 04:59

## 2018-07-28 RX ADMIN — PANTOPRAZOLE SODIUM 40 MILLIGRAM(S): 20 TABLET, DELAYED RELEASE ORAL at 04:59

## 2018-07-28 NOTE — PROGRESS NOTE ADULT - SUBJECTIVE AND OBJECTIVE BOX
INCOMPLETE    INTERVAL HPI/OVERNIGHT EVENTS:     VITAL SIGNS:  T(F): 96.7 (07-28-18 @ 05:20)  HR: 95 (07-28-18 @ 05:20)  BP: 114/76 (07-28-18 @ 05:20)  RR: 16 (07-28-18 @ 05:20)  SpO2: 96% (07-28-18 @ 05:20)  Wt(kg): --    PHYSICAL EXAM:    Constitutional:  Eyes:  ENMT:  Neck:  Respiratory:  Cardiovascular:  Gastrointestinal:  Extremities:  Vascular:  Neurological:  Musculoskeletal:    MEDICATIONS  (STANDING):  Biotene Dry Mouth Oral Rinse 5 milliLiter(s) Swish and Spit four times a day  chlorhexidine 2% Cloths 1 Application(s) Topical daily  heparin  Infusion. 900 Unit(s)/Hr (9 mL/Hr) IV Continuous <Continuous>  morphine ER Tablet 15 milliGRAM(s) Oral two times a day  pantoprazole  Injectable 40 milliGRAM(s) IV Push two times a day  petrolatum white Ointment 1 Application(s) Topical two times a day  piperacillin/tazobactam IVPB. 3.375 Gram(s) IV Intermittent every 6 hours  polyethylene glycol 3350 17 Gram(s) Oral daily  senna 1 Tablet(s) Oral two times a day  simethicone drops 80 milliGRAM(s) Enteral Tube every 8 hours  sodium chloride 0.9% lock flush 20 milliLiter(s) IV Push once  sodium chloride 0.9% lock flush 20 milliLiter(s) IV Push once  zaleplon 5 milliGRAM(s) Oral at bedtime    MEDICATIONS  (PRN):  OLANZapine Disintegrating Tablet 5 milliGRAM(s) Oral daily PRN Nausea  ondansetron Injectable 4 milliGRAM(s) IV Push every 6 hours PRN Nausea and/or Vomiting  oxyCODONE    5 mG/acetaminophen 325 mG 1 Tablet(s) Oral every 4 hours PRN Severe Pain (7 - 10)  sodium chloride 0.9% lock flush 10 milliLiter(s) IV Push every 1 hour PRN After each medication administration  sodium chloride 0.9% lock flush 10 milliLiter(s) IV Push every 12 hours PRN Lumen of catheter NOT used  sodium chloride 0.9% lock flush 10 milliLiter(s) IV Push every 1 hour PRN After each medication administration  sodium chloride 0.9% lock flush 10 milliLiter(s) IV Push every 12 hours PRN Lumen of catheter NOT used      Allergies    allergic to cats (Rash)  No Known Drug Allergies    Intolerances        LABS:                        7.9    5.6   )-----------( 408      ( 28 Jul 2018 05:25 )             27.0     07-27    149<H>  |  107  |  21  ----------------------------<  126<H>  3.8   |  31  |  0.45<L>    Ca    7.9<L>      27 Jul 2018 07:53  Phos  2.6     07-27  Mg     2.3     07-27      PTT - ( 28 Jul 2018 05:25 )  PTT:50.4 sec      RADIOLOGY & ADDITIONAL TESTS: No new imaging. INTERVAL HPI/OVERNIGHT EVENTS: No acute events overnight. Pending hospice.    VITAL SIGNS:  T(F): 96.7 (07-28-18 @ 05:20)  HR: 95 (07-28-18 @ 05:20)  BP: 114/76 (07-28-18 @ 05:20)  RR: 16 (07-28-18 @ 05:20)  SpO2: 96% (07-28-18 @ 05:20)  Wt(kg): --    PHYSICAL EXAM:    Constitutional: well-appearing, obese, NAD  Eyes: PERRL, EOMI  ENMT: MMM, no oral lesions  Neck: supple, no JVD or LAD  Respiratory: CTAb/l, no wheezes/rales/rhonchi  Cardiovascular: RRR, normal S1/S2, no murmurs/rubs/gallops  Gastrointestinal: soft, NTND, BS normal, biliary drain in place, peritoneal drain in place w/out erythema, PEG to gravity w/ drainage  Extremities: +improved edema of LUE; no LE edema b/l  Vascular: DP 2+ b/l  Neurological: AAOx3, CNII-XII grossly intact  Musculoskeletal: no joint swelling    MEDICATIONS  (STANDING):  Biotene Dry Mouth Oral Rinse 5 milliLiter(s) Swish and Spit four times a day  chlorhexidine 2% Cloths 1 Application(s) Topical daily  heparin  Infusion. 900 Unit(s)/Hr (9 mL/Hr) IV Continuous <Continuous>  morphine ER Tablet 15 milliGRAM(s) Oral two times a day  pantoprazole  Injectable 40 milliGRAM(s) IV Push two times a day  petrolatum white Ointment 1 Application(s) Topical two times a day  piperacillin/tazobactam IVPB. 3.375 Gram(s) IV Intermittent every 6 hours  polyethylene glycol 3350 17 Gram(s) Oral daily  senna 1 Tablet(s) Oral two times a day  simethicone drops 80 milliGRAM(s) Enteral Tube every 8 hours  sodium chloride 0.9% lock flush 20 milliLiter(s) IV Push once  sodium chloride 0.9% lock flush 20 milliLiter(s) IV Push once  zaleplon 5 milliGRAM(s) Oral at bedtime    MEDICATIONS  (PRN):  OLANZapine Disintegrating Tablet 5 milliGRAM(s) Oral daily PRN Nausea  ondansetron Injectable 4 milliGRAM(s) IV Push every 6 hours PRN Nausea and/or Vomiting  oxyCODONE    5 mG/acetaminophen 325 mG 1 Tablet(s) Oral every 4 hours PRN Severe Pain (7 - 10)  sodium chloride 0.9% lock flush 10 milliLiter(s) IV Push every 1 hour PRN After each medication administration  sodium chloride 0.9% lock flush 10 milliLiter(s) IV Push every 12 hours PRN Lumen of catheter NOT used  sodium chloride 0.9% lock flush 10 milliLiter(s) IV Push every 1 hour PRN After each medication administration  sodium chloride 0.9% lock flush 10 milliLiter(s) IV Push every 12 hours PRN Lumen of catheter NOT used      Allergies    allergic to cats (Rash)  No Known Drug Allergies    Intolerances        LABS:                        7.9    5.6   )-----------( 408      ( 28 Jul 2018 05:25 )             27.0     07-27    149<H>  |  107  |  21  ----------------------------<  126<H>  3.8   |  31  |  0.45<L>    Ca    7.9<L>      27 Jul 2018 07:53  Phos  2.6     07-27  Mg     2.3     07-27      PTT - ( 28 Jul 2018 05:25 )  PTT:50.4 sec      RADIOLOGY & ADDITIONAL TESTS: No new imaging.

## 2018-07-28 NOTE — PROGRESS NOTE ADULT - PROBLEM SELECTOR PLAN 8
Hb stable around 8-9s, 7.9 this am. Unclear if there was small bleed due to supratherapeutic PTTs yesterday. Hemodynamically stable.  - Maintain active T&S  - transfusion goal Hgb>7

## 2018-07-28 NOTE — PROGRESS NOTE ADULT - ASSESSMENT
67F w/ metastatic colon ca s/p 1 dose FOLFOX, recent acute cholecystitis (June 2018) c/b severe sepsis and hepatic vein thrombosis s/p percutaneous cholecystomy, and recent SBO/ ileus s/p venting PEG and 2 colonic stents admitted with severe sepsis and acute 2/2 GNR bacteremia, likely secondary to intraabdominal source and abdominal pain likely d/t partial bowel obstruction vs ileus 2/2 malignancy; s/p biliary + peritoneal drain. DNR/DNI plan for hospice placement.

## 2018-07-28 NOTE — PROGRESS NOTE ADULT - PROBLEM SELECTOR PLAN 2
Improved, started on D5W, total FWD 3.2L s/p ~3L free water. Stopped D5W.  - As patient with significant edema and plan for hospice, will hold off on restarting D5W at this time. Encourage PO fluid intake.

## 2018-07-28 NOTE — PROGRESS NOTE ADULT - PROBLEM SELECTOR PLAN 1
s/p chemoport placement 5/31, s/p initiation of chemotherapy 6/9 with FOLFOX. Biliary and peritoneal drain placed. Now plan for hospice placement.  - palliative care consulted, placement likely over weekend, awaiting bed  - continue pain control as per palliative recs  - no BMs likely 2/2 peritonitis, but peritoneal drain in place with fecal material and good output  - c/w care as per surgery team re: peritonitis, leukocytosis resolved  - would limit blood draws if possible

## 2018-07-28 NOTE — PROGRESS NOTE ADULT - PROVIDER SPECIALTY LIST ADULT
Gastroenterology
Gastroenterology
Heme/Onc
Internal Medicine
Palliative Care
Surgery
Gastroenterology
Surgery
Palliative Care
Internal Medicine

## 2018-07-28 NOTE — PROGRESS NOTE ADULT - PROBLEM SELECTOR PLAN 5
POA. Likely 2/2 hypercoagulable state in the setting of active untreated malignancy.  -c/w heparin ggt, could consider doac-- patient was on edoxaban previously; with further discussion w/ surgery- not planning to continue anticoagulation on discharge.

## 2018-07-31 DIAGNOSIS — K59.00 CONSTIPATION, UNSPECIFIED: ICD-10-CM

## 2018-07-31 DIAGNOSIS — E87.3 ALKALOSIS: ICD-10-CM

## 2018-07-31 DIAGNOSIS — E43 UNSPECIFIED SEVERE PROTEIN-CALORIE MALNUTRITION: ICD-10-CM

## 2018-07-31 DIAGNOSIS — K55.9 VASCULAR DISORDER OF INTESTINE, UNSPECIFIED: ICD-10-CM

## 2018-07-31 DIAGNOSIS — E87.1 HYPO-OSMOLALITY AND HYPONATREMIA: ICD-10-CM

## 2018-07-31 DIAGNOSIS — K65.9 PERITONITIS, UNSPECIFIED: ICD-10-CM

## 2018-07-31 DIAGNOSIS — R18.8 OTHER ASCITES: ICD-10-CM

## 2018-07-31 DIAGNOSIS — K63.1 PERFORATION OF INTESTINE (NONTRAUMATIC): ICD-10-CM

## 2018-07-31 DIAGNOSIS — Z66 DO NOT RESUSCITATE: ICD-10-CM

## 2018-07-31 DIAGNOSIS — A09 INFECTIOUS GASTROENTERITIS AND COLITIS, UNSPECIFIED: ICD-10-CM

## 2018-07-31 DIAGNOSIS — G47.00 INSOMNIA, UNSPECIFIED: ICD-10-CM

## 2018-07-31 DIAGNOSIS — E87.6 HYPOKALEMIA: ICD-10-CM

## 2018-07-31 DIAGNOSIS — A41.50 GRAM-NEGATIVE SEPSIS, UNSPECIFIED: ICD-10-CM

## 2018-07-31 DIAGNOSIS — I82.0 BUDD-CHIARI SYNDROME: ICD-10-CM

## 2018-07-31 DIAGNOSIS — E87.0 HYPEROSMOLALITY AND HYPERNATREMIA: ICD-10-CM

## 2018-07-31 DIAGNOSIS — C79.89 SECONDARY MALIGNANT NEOPLASM OF OTHER SPECIFIED SITES: ICD-10-CM

## 2018-07-31 DIAGNOSIS — D50.9 IRON DEFICIENCY ANEMIA, UNSPECIFIED: ICD-10-CM

## 2018-07-31 DIAGNOSIS — N17.9 ACUTE KIDNEY FAILURE, UNSPECIFIED: ICD-10-CM

## 2018-07-31 DIAGNOSIS — E87.2 ACIDOSIS: ICD-10-CM

## 2018-07-31 DIAGNOSIS — F41.9 ANXIETY DISORDER, UNSPECIFIED: ICD-10-CM

## 2018-07-31 DIAGNOSIS — K56.7 ILEUS, UNSPECIFIED: ICD-10-CM

## 2018-07-31 DIAGNOSIS — Z93.50 UNSPECIFIED CYSTOSTOMY STATUS: ICD-10-CM

## 2018-07-31 DIAGNOSIS — C18.9 MALIGNANT NEOPLASM OF COLON, UNSPECIFIED: ICD-10-CM

## 2018-07-31 DIAGNOSIS — R60.0 LOCALIZED EDEMA: ICD-10-CM

## 2018-07-31 DIAGNOSIS — D72.819 DECREASED WHITE BLOOD CELL COUNT, UNSPECIFIED: ICD-10-CM

## 2018-07-31 DIAGNOSIS — R65.20 SEVERE SEPSIS WITHOUT SEPTIC SHOCK: ICD-10-CM

## 2018-07-31 DIAGNOSIS — K56.609 UNSPECIFIED INTESTINAL OBSTRUCTION, UNSPECIFIED AS TO PARTIAL VERSUS COMPLETE OBSTRUCTION: ICD-10-CM

## 2018-07-31 DIAGNOSIS — G89.3 NEOPLASM RELATED PAIN (ACUTE) (CHRONIC): ICD-10-CM

## 2018-07-31 DIAGNOSIS — D63.0 ANEMIA IN NEOPLASTIC DISEASE: ICD-10-CM

## 2018-07-31 DIAGNOSIS — C78.7 SECONDARY MALIGNANT NEOPLASM OF LIVER AND INTRAHEPATIC BILE DUCT: ICD-10-CM

## 2018-07-31 DIAGNOSIS — Z93.1 GASTROSTOMY STATUS: ICD-10-CM

## 2019-01-01 NOTE — PROGRESS NOTE ADULT - PROBLEM SELECTOR PLAN 4
Stage 4 metastatic colon cancer with liver mets and peritoneal carcinomatosis  - plan per Dr. Nick   - s/p chemoport placement 5/31   - heme onc following  - palliative consulted given metastatic CRC, pt aware of 1-2 yr prognosis; MOLST   completed. NOT DNR/DNI: Would like compressions, intubation with time      limitation.   -CT abdomen/pelvis positive for ascites secondary to abdominal mets. Mets cause  of partial bowel obstruction. Patient for stent placement to open and relieve partial  obstruction today.  -Palliative following, recommend pain regimen as follows: 4mg decadron IV q6h for  capsular liver pain, benadryl 25mg IV push at bedtime, oxycodone IR 5mg q6h, dilaudid 0.5mg IV q4 PRN for severe pain. The patient is a 18y Female complaining of jaw pain.

## 2019-03-03 NOTE — ED ADULT NURSE NOTE - NS ED NURSE LEVEL OF CONSCIOUSNESS MENTAL STATUS
Paged team regarding peg tube not flushing. Instructed to page surgery per Dr. Lott.    Cooperative/Awake/Alert

## 2019-07-10 NOTE — CONSULT NOTE ADULT - PROBLEM SELECTOR PROBLEM 7
Health Call Center    Phone Message    May a detailed message be left on voicemail: yes    Reason for Call: Medication Question or concern regarding medication   Prescription Clarification  Name of Medication: Metformin (Glumetza)  Prescribing Provider: anand Russ   Pharm[place] [facility] [place] - [address]    What on the order needs clarification? New pharmacy. Having issues with this medication. Former pharmacy was switching out medication, and there is confusion as to what dosing and what type of metformin he should be on. Please call pharmacy to discuss.           Action Taken: Message routed to:  [facility] ([facility] P[facility]     Patient has active medical orders for life-sustaining treatment (MOLST) form

## 2020-08-17 NOTE — PROGRESS NOTE ADULT - PROBLEM/PLAN-5
DISPLAY PLAN FREE TEXT
General Sunscreen Counseling: I recommended a broad spectrum sunscreen with a SPF of 30 or higher.
Detail Level: Zone
DISPLAY PLAN FREE TEXT

## 2020-10-28 NOTE — PHYSICAL THERAPY INITIAL EVALUATION ADULT - PERTINENT HX OF CURRENT PROBLEM, REHAB EVAL
Detail Level: Simple
Gave pt and her  steroid review sheet that shows relative strengths of topical steroids and how to use them.  The can be used 2 weeks on, then she needs to take a 2 week break before resuming use.
66 y/o F admitted for abdominal pain. Diagnosed with cholecystitis and hepatic vein thrombosis upon CT scan. PMHx includes colon Ca metastasized to her liver and peritoneum.

## 2020-11-17 NOTE — DIETITIAN INITIAL EVALUATION ADULT. - NS AS NUTRI DX ORAL SUPORT IN2
Inadequate oral intake Bactrim Pregnancy And Lactation Text: This medication is Pregnancy Category D and is known to cause fetal risk.  It is also excreted in breast milk.

## 2021-01-01 NOTE — PROGRESS NOTE ADULT - PROBLEM SELECTOR PROBLEM 7
Laura was seen today (October 20, 2021) at the Pediatric Infectious Diseases clinic ([facility]) for follow-up after hospitalization (for presumed meningitis) and treatment with IV antibiotics for 14d.    The following is a brief outline of the plan as we discussed during the  visit: Laura was diagnosed with meningitis based on the presence of white blood cell (178, 38%PMN, 13%Lymph, 49%Mono)in her CSF (cerebrospinal fluid). Testing for specific pathogen (bacterial or viral by culture and molecular testing) was done and was negative. I do not think she had bacterial meningitis, but agree with the treatment she had as the risk of serious morbidity (or death) is high in such diseases. Laura completed the 14d antibiotic course (with IV ceftriaxone) and tolerated it well. Since discharge from the hospital (10/15 she has been doing well, without fever, and with good appetite and vigorous. She continues to gain weight with is also very reassuring. As she completed the recommended antibiotic course we took out the PICC line today and no further therapy is needed. As Laura had several health concerns since her birth (abnormal kidney size - resolved, persistent soft stool with 7-10 diapers each day, and the history of presumed meningitis) I would like to continue follow her growth and development, at least for the coming months to verify no other systemic or serious disease in her    We ordered the following laboratory tests: None today.    We will contact you with any pertinent results as we get them. Meanwhile  feel free to contact our clinic at any time with questions and  clarifications.    A follow up appointment was scheduled for 3 months.    Thank you,    Mark Novak MD    Pediatric Infectious Diseases clinic  [facility].    Contact info:  Clinic Coordinator (Rosalia Perez): [phone]  [facility] Fax: [phone]  Dr Novak email: [phone]       Protein calorie malnutrition

## 2021-01-01 NOTE — PROGRESS NOTE ADULT - PROBLEM SELECTOR PROBLEM 3
Call placed to parents and assisted in making a follow up appointment for tomorrow.     Marlene Austin  [facility] RN     Right upper quadrant abdominal pain

## 2021-04-23 NOTE — PROGRESS NOTE ADULT - PROBLEM SELECTOR PLAN 1
- Severe sepsis present on admission, pt no longer meets criteria for severe sepsis  - Source: intraabdominal (initial cx growing bacteroides fragilis)  - Clinically improving on zosyn; afebrile overnight, BP maintained >90/60  - Initial Bcx growing bacteroides fragillis, f/u sensitivities  - Leukopenia improved, no leukocytosis  - CT showed evidence of colonic inflammation  - Continue w/ IVF  - C/w zosyn  - follow up GI and surgery recommendations  - monitor VS closely  - serial abdominal exams    #metabolic acidosis  - likely secondary to lactic acidosis (last lactate 2.6 this morning) secondary to infection  - bicarb 21  - continue to folow lactate  - f/u culture sensitivities to ensure coverage yes

## 2021-06-23 NOTE — PROGRESS NOTE ADULT - ASSESSMENT
A/P: 66 yo F w/ metastatic colon ca s/p chemotherapy, acute cholecystitis s/p percutaneous cholecystomy c/b portal vein thrombosis, LBO s/p sigmoid stent, presents this admission with sepsis, surgery consulted for worsening abdominal pain likely due to colitis. S/P IR drainage of ascites yesterday (7/22).      No surgical intervention indicated and patient aware  Would recommend IVF hydration   Discuss Palliative care options with Palliative team  Continue with IV antibiotics   Serial abdominal exams Programs Applying For: [facility] Benefits/Check on Insurance for family to make sure it is active   Case #:  [facility] Worker:   Sven #:   PMI #:   Tracking:     Outreach:     1/29/2019: Patient's transportation did not pick her up, transportation  will look into with insurance to see why this occurred. CHETAN called patient, patient stated her food support is now active and health insurance is active. FRG looked at [place] and does not see active Health Insurance for patient. G will make outreach call to patient this week to discuss what insurance cards she received in the mail.     1/22/2019: CHETAN called patient with  and screened and scheduled. Evite sent to . Scheduled appointment 1/29 @ 10.     Health Insurance Information:     Referral:   [facility] Benefits/does patient have SNAP  Combined [place] SNAP/Cash  1.Have you applied for [facility] benefits before? If yes, what were they and have you received a letter? (Does it say you are due for renewal, need verification or denied) Yes, patient doesn't know if her EBT cards are active and may need to reapply   2.Household Income (Gross) (Employee Income, Social Security, Unemployment, workers compensation, Severance Pay, Pension)  Oldest son is working, he is a PCA and gives family $60 per month for electricity  3.How many people in the home? 7 (one of the daughters receives SSDI)  4.Do you claim any dependents or are you claimed on someone s taxes or file jointly? Patient and  file with their 4 children, not the child that works.  5.Assets ( 2nd Home, Cabin, 2nd Car, Bank account, stocks, bonds, BRIAN s, 401K) Patient will bring account 3 months statements   6.Have you applied for [facility] benefits before? If yes, what were they and have you received a letter? (Does it say you are due for renewal, need verification or denied)  7. Do you pay utilities? $1200 for rent, borrowed $3,000 from friend and does not know how to pay back.

## 2021-06-25 NOTE — PROGRESS NOTE ADULT - SUBJECTIVE AND OBJECTIVE BOX
Pt seen and examined at bedside  No new c/o  Denies n/v/d, or abdominal pain (has some pain only with palpation)  Used her venting PEG yesterday to relieve some bloating    MEDICATIONS:  MEDICATIONS  (STANDING):  ALBUTerol    90 MICROgram(s) HFA Inhaler 1 Puff(s) Inhalation every 4 hours  enoxaparin Injectable 90 milliGRAM(s) SubCutaneous every 12 hours  pantoprazole    Tablet 40 milliGRAM(s) Oral before breakfast  potassium chloride   Powder 40 milliEquivalent(s) Oral once  tiotropium 18 MICROgram(s) Capsule 1 Capsule(s) Inhalation daily    MEDICATIONS  (PRN):  acetaminophen  Suppository 650 milliGRAM(s) Rectal every 6 hours PRN For Temp greater than 38 C (100.4 F)  HYDROmorphone  Injectable 0.5 milliGRAM(s) IV Push every 4 hours PRN Severe Pain (7 - 10)  zaleplon 5 milliGRAM(s) Oral at bedtime PRN Insomnia      Allergies  allergic to cats (Rash)  No Known Drug Allergies    Intolerances      Vital Signs Last 24 Hrs  T(C): 36.9 (10 Willy 2018 05:48), Max: 36.9 (10 Willy 2018 05:48)  T(F): 98.4 (10 Willy 2018 05:48), Max: 98.4 (10 Willy 2018 05:48)  HR: 66 (10 Willy 2018 05:48) (61 - 68)  BP: 135/88 (10 Willy 2018 05:48) (118/74 - 135/88)  BP(mean): --  RR: 16 (10 Willy 2018 05:48) (16 - 16)  SpO2: 96% (10 Willy 2018 05:48) (95% - 98%)    06-09 @ 07:01  -  06-10 @ 07:00  --------------------------------------------------------  IN: 900.9 mL / OUT: 25 mL / NET: 875.9 mL      PHYSICAL EXAM:  General: Well developed; well nourished; in no acute distress  HEENT: MMM, conjunctiva and sclera clear  Gastrointestinal: Soft, moderately distended, +PEG site c/d/i, BS+, +tenderness to palpation LLQ, paraumbilical region, NR, NG, no masses or organs palpated  Skin: Warm and dry. No obvious rash      LABS:  CBC Full  -  ( 10 Willy 2018 08:34 )  WBC Count : x  Hemoglobin : 9.8 g/dL  Hematocrit : x  Platelet Count - Automated : x  Mean Cell Volume : x  Mean Cell Hemoglobin : x  Mean Cell Hemoglobin Concentration : x    138  |  x   |  x   ----------------------------<  x   3.6   |  x   |  x     Ca    8.4      09 Jun 2018 07:58  Mg     2.2     06-10          RADIOLOGY & ADDITIONAL STUDIES (The following images were personally reviewed): Metronidazole Pregnancy And Lactation Text: This medication is Pregnancy Category B and considered safe during pregnancy.  It is also excreted in breast milk.

## 2021-08-19 NOTE — PROGRESS NOTE ADULT - ASSESSMENT
66 YO F with PMHx significant for metastatic colon cancer admitted on 5/25/18 for evaluation of abdominal pain, fever, weakness, and found to have acute cholecystitis. She was deemed not to be a good surgical candidate and had a cholecystostomy tube placed and is on abx. She has had worsening abdominal distention and CT showed a partial SBO and ileus, distal ascending/proximal transverse mass with narrowing of the transverse colon now s/p colonoscopy with 2 stents placed that have opened up her GI tract and he is able to eat and have bowel movements.  - advance diet slowly  - if any evidence of obstruction, make NPO and use peg to suction, call GI  - heme/onc to start chemo tomorrow  - stop miralax in setting of diarrhea  - PPI once daily    GI following yes...

## 2022-01-07 NOTE — CONSULT NOTE ADULT - SUBJECTIVE AND OBJECTIVE BOX
Medical certificate    1/7/2022      RE:  Alicia Mazariegos  [address] [facility]  [place] [address]      This is to certify that Alicia Mazariegos has been under my care on 1/7/2022 and excused from work 1/7-1/13/22          SIGNATURE:_________________________________________________, 1/7/2022    Cristhian Vega PA-C        [address] [place] [id number]  [phone]   PILLO PEDRAZA          MRN-[id number]            (49)    HPI:  68 yo F w/ metastatic colon ca s/p 1 dose of FOLFOX admitted for sepsis. Her last admission was complicated by acute cholecystitis, severe sepsis and hepatic vein thrombosis s/p percutaneous cholecystomy, and recent partial SBO s/p vent PEG and 2 colonic stents. She presented with fever of 101.7 in the setting of several days of exhaustion and weakness. Pt was sent to the ER by Dr. Thompson after she was seen in his office and was noted to be febrile with temp of 102 and tachycardic as well as having diffuse abdominal pain. She was noted to have leukocytosis and thrombocytosis and CT abdomen/pelvis showed colitis (ischemic vs infectious) and increase in disease burden in colon, liver and peritoneal carcinomatosis. There was ascites noted on imaging as well. She was started on IV antibiotics to cover sepsis possibly secondary to GI source; UA and CXR negative.     She notes pain is 2.5/10 however this is at rest; she notes that with movement it goes up to 8/10. She was taking Oxycodone 5 mg every 6 hours as needed with good effect as outpatient. She is currently NPO and receiving morphine 2 mg IV PRN with good effect. No nausea/vomiting. No chest pain or dyspnea. Last bowel movement was 2 days ago, states she hasn't passed flatus since Monday.     Surgery consulted and recommended to keep her NPO, IVF and IV antibiotics without role for surgical intervention at this time.     Pt recently admitted (18-18) for acute cholecystitis c/b hepatic vein thrombosis and severe sepsis. Was not surgical candidate because of mets. Treated w/ zosyn, percutaneous cholecystostomy, and 1 unit pRBCs. During admission, pt developed partial SBO s/p venting PEG tube for decompression and 2 colonic stents at the sigmoid colon and splenic flexure. Patient started chemotherapy with FOLFOX through chemoport x 1 dose. She was discharged to rehab for 4 weeks.       PAST MEDICAL & SURGICAL HISTORY:  Cholecystitis, acute  Colon cancer  No significant past surgical history      FAMILY HISTORY:  No pertinent family history in first degree relatives      SOCIAL HISTORY:   Single. Never  no children. Has 2 brother and 2 sisters and her mother living in [place]. 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 Medicare.    ROS:    (+) Abdominal pain; (+) Constipation, (+) subjective fevers/chills                     Dyspnea (Bk 0-10): 0                       N/V (Y/N): N                             Secretions (Y/N) : N               Agitation(Y/N): N  Pain (Y/N): Y; at this time pain is 2.5/10 but states when moving around it's about 8/10  -Provocation/Palliation: Moving around such as being transported in gurney or walking exacerbates pain; laying down flat alleviates pain  -Quality/Quantity: Abdominal soreness  -Radiating: No radiation  -Severity: 2.5/10  -Timing/Frequency: Prevents her from walking aroun  -Impact on ADLs: Y    General:  Denied  HEENT:     Dry mouth, thirst.  Neck:  Denied  CVS:  Denied  Resp:  Denied  GI:  Distended, Soreness noted  :  Denied  Musc:  Denied  Neuro:  Denied  Psych:  Denied  Skin:  Denied  Lymph:  Denied    Allergies    allergic to cats (Rash)  No Known Drug Allergies    Intolerances        Opiate Naive (Y/N): N; Takes oxycodone   -iStop reviewed (Y/N): Y; (Ref#: [id number] )    Medications:      MEDICATIONS  (STANDING):  edoxaban 60 milliGRAM(s) Oral daily  piperacillin/tazobactam IVPB. 3.375 Gram(s) IV Intermittent every 6 hours  potassium chloride   Powder 40 milliEquivalent(s) Oral once  sodium chloride 0.9%. 1000 milliLiter(s) (100 mL/Hr) IV Continuous <Continuous>  vancomycin  IVPB 1000 milliGRAM(s) IV Intermittent every 12 hours    MEDICATIONS  (PRN):  acetaminophen   Tablet 650 milliGRAM(s) Oral every 6 hours PRN For Temp greater than 38 C (100.4 F)  morphine  - Injectable 2 milliGRAM(s) IV Push every 4 hours PRN Severe Pain (7 - 10)      Labs:    CBC:                        9.2    3.6   )-----------( 534      ( 2018 01:31 )             28.9     CMP:        135  |  99  |  20  ----------------------------<  130<H>  3.6   |  23  |  0.51    Ca    7.9<L>      2018 01:30  Phos  2.2       Mg     1.7         TPro  5.2<L>  /  Alb  2.4<L>  /  TBili  1.0  /  DBili  x   /  AST  20  /  ALT  13  /  AlkPhos  231<H>      PT/INR - ( 2018 01:32 )   PT: 28.5 sec;   INR: 2.52          PTT - ( 2018 01:32 )  PTT:32.9 sec  Urinalysis Basic - ( 2018 15:17 )    Color: Yellow / Appearance: Clear / SG: <=1.005 / pH: x  Gluc: x / Ketone: Trace mg/dL  / Bili: Negative / Urobili: 1.0 E.U./dL   Blood: x / Protein: NEGATIVE mg/dL / Nitrite: NEGATIVE   Leuk Esterase: NEGATIVE / RBC: x / WBC x   Sq Epi: x / Non Sq Epi: x / Bacteria: x        Imaging:        < from: CT Abdomen and Pelvis w/ IV Cont (18 @ 14:42) >  XAM:  CT ABDOMEN AND PELVIS IC                          PROCEDURE DATE:  2018          INTERPRETATION:  CT of the ABDOMEN and PELVIS with intravenous contrast   dated 2018 2:42 PM    INDICATION: sepsis  r/o intrabd infection     TECHNIQUE: CT of the abdomen and pelvis was performed using intravenous   contrast. Axial, sagittal and coronal images were produced and reviewed.   100 cc of Optiray 350 used 8 cc discarded.    COMPARISON: Multiple prior CTs of the abdomen and pelvis from    and 2017.    FINDINGS: Images of the lower chest demonstrate clear lung bases with no   pulmonary nodules. No cardiomegaly seen further appears to be some   coronary calcification/stent.    The liver has a multiple hypodense liver lesion consistent with   metastatic disease with the largest one measuring 2.1 cm transaxially in   the right lobe. There is also a 3.2 cm hepatic cyst unchanged from prior   CT; with increasing size of metastatic lesions from the most recent   comparison exam. Also noted are multiple venous collaterals in the josh   hepatis region, consistent with cavernous transformation the portal vein.   The previously seen thrombus in SMV is not identified on the current   exam. Cholecystostomy tube is in place in a collapsed gallbladder with a   1.5 cm gallstone. The pancreas is normal in appearance. No splenic   abnormalities are seen. The adrenal glands are unremarkable. The kidneys   are normal in appearance.    No abdominal aortic aneurysm is seen. No lymphadenopathy is seen.    The newly placed PEG tube is in place within the gastric lumen; and a   newly placed stent in the sigmoid colon. Evaluation of the bowel reveals   significant interval change in appearance of the gastrointestinal tract,   with long segment of circumferential mural thickening of the dilated   air-filled distal small bowel. There is also abnormal appearance of the   ascending and transverse colon, with indistinct bowel wall and areas of   luminal narrowing at different locations of the colon. Redemonstrated is   a heterogeneous collection interposed between the descending colon and   adjacent small bowel in the peripheral right mid abdomen. There is also   worsening of peritoneal carcinomatosis in the interim. Increasing amount   of abdominopelvic ascites.    Images of the pelvis demonstrate the uterus and adnexae to be grossly   unremarkable in appearance.    Evaluation of the osseous structures demonstrates degenerative changes of   the spine.  No destructive lesions are seen.      IMPRESSION:  1.  Significant interval change in appearance of the gastrointestinal   tract, with long segment of circumferential bowel wall thickening of the   distal small bowel and the ascending/transverse colon; with several areas   of luminal narrowing involving the mentioned colon; as detailed above.   Findings are either related to progression of carcinomatosis and   metastatic disease or possible ischemic etiology.    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    4.  Increasing amount of abdominopelvic ascites.    5.  Increasing metastatic disease to the liver.    6.  PEG tube is within the stomach lumen. Unchanged position of the   percutaneous cholecystotomy tube.        < end of copied text >      PEx:  T(C): 37.6 (18 @ 09:13), Max: 38.7 (18 @ 13:11)  HR: 98 (18 @ 06:00) (80 - 107)  BP: 90/64 (18 @ 09:13) (90/64 - 128/85)  RR: 18 (18 @ 09:13) (16 - 20)  SpO2: 95% (18 @ 09:13) (95% - 100%)  Wt(kg): --  Daily     Daily Weight in k.8 (2018 09:38)  CAPILLARY BLOOD GLUCOSE        I&O's Summary    2018 07:01  -  2018 07:00  --------------------------------------------------------  IN: 0 mL / OUT: 250 mL / NET: -250 mL        General: Talkative, AAOx3 however seems visibly tired  HEENT: NCAT, no conjunctival pallor, no scleral icterus. Dry mucous membranes  Neck: supple, no jvd  CVS: Normal S1S2, RRR, no murmurs  Resp: CTABL  GI: Visibly distended, pt deferring exam because she states it is painful when palpating    Musc: No C/C/E    Neuro: No focal deficits. N  Psych:  Concerned but with good spirits.  Skin: Xerosis    Preadmit Karnofsky:  %70           Current Karnofsky:     %50  Cachexia (Y/N): N  BMI: 30    Advanced Directives:     Full Code    Decision maker: The patient is able to participate in complex medical decision making conversations  Legal surrogate: No designated HCP, but the patient is thinking of assigning her sister in [place] and her friend in [facility].    GOALS OF CARE DISCUSSION       Palliative care info/counseling provided	           Family meeting       Advanced Directives addressed please see Advance Care Planning Note	           See previous Palliative Medicine Note       Documentation of GOC: Pt would like to have underlying sepsis treated and then make a decision 	          REFERRALS	        Palliative Med        Unit SW/Case Mgmt              Speech/Swallow       Patient/Family Support       Massage Therapy       Music Therapy       Hospice       Nutrition       Ethics       PT/OT PILLO PEDRAZA          MRN-[id number]            (48)    HPI:  68 yo F w/ metastatic colon ca s/p 1 dose of FOLFOX admitted for sepsis. Her last admission was complicated by acute cholecystitis, severe sepsis and hepatic vein thrombosis s/p percutaneous cholecystomy, and recent partial SBO s/p vent PEG and 2 colonic stents. She presented with fever of 101.7 in the setting of several days of exhaustion and weakness. Pt was sent to the ER by Dr. Thompson after she was seen in his office and was noted to be febrile with temp of 102 and tachycardic as well as having diffuse abdominal pain. She was noted to have leukocytosis and thrombocytosis and CT abdomen/pelvis showed colitis (ischemic vs infectious) and increase in disease burden in colon, liver and peritoneal carcinomatosis. There was ascites noted on imaging as well. She was started on IV antibiotics to cover sepsis possibly secondary to GI source; UA and CXR negative.     She notes pain is 2.5/10 however this is at rest; she notes that with movement it goes up to 8/10. She was taking Oxycodone 5 mg every 6 hours as needed with good effect as outpatient. She is currently NPO and receiving morphine 2 mg IV PRN with good effect. No nausea/vomiting. No chest pain or dyspnea. Last bowel movement was 2 days ago, states she hasn't passed flatus since Monday.     Surgery consulted and recommended to keep her NPO, IVF and IV antibiotics without role for surgical intervention at this time.     Pt recently admitted (18-18) for acute cholecystitis c/b hepatic vein thrombosis and severe sepsis. Was not surgical candidate because of mets. Treated w/ zosyn, percutaneous cholecystostomy, and 1 unit pRBCs. During admission, pt developed partial SBO s/p venting PEG tube for decompression and 2 colonic stents at the sigmoid colon and splenic flexure. Patient started chemotherapy with FOLFOX through chemoport x 1 dose. She was discharged to rehab for 4 weeks.       PAST MEDICAL & SURGICAL HISTORY:  Cholecystitis, acute  Colon cancer  No significant past surgical history      FAMILY HISTORY:  No pertinent family history in first degree relatives      SOCIAL HISTORY:   Single. Never  no children. Has 2 brother and 2 sisters and her mother living in [place]. 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    ROS:    (+) Abdominal pain; (+) Constipation, (+) subjective fevers/chills                     Dyspnea (Bk 0-10): 0                       N/V (Y/N): N                             Secretions (Y/N) : N               Agitation(Y/N): N  Pain (Y/N): Y; at this time pain is 2.5/10 but states when moving around it's about 8/10  -Provocation/Palliation: Moving around such as being transported in gurney or walking exacerbates pain; laying down flat alleviates pain  -Quality/Quantity: Abdominal soreness  -Radiating: No radiation  -Severity: 2.5/10  -Timing/Frequency: Prevents her from walking aroun  -Impact on ADLs: Y    General:  Denied  HEENT:     Dry mouth, thirst.  Neck:  Denied  CVS:  Denied  Resp:  Denied  GI:  Distended, Soreness noted  :  Denied  Musc:  Denied  Neuro:  Denied  Psych:  Denied  Skin:  Denied  Lymph:  Denied    Allergies    allergic to cats (Rash)  No Known Drug Allergies    Intolerances        Opiate Naive (Y/N): N; Takes oxycodone   -iStop reviewed (Y/N): Y; (Ref#: [id number] )    Medications:      MEDICATIONS  (STANDING):  edoxaban 60 milliGRAM(s) Oral daily  piperacillin/tazobactam IVPB. 3.375 Gram(s) IV Intermittent every 6 hours  potassium chloride   Powder 40 milliEquivalent(s) Oral once  sodium chloride 0.9%. 1000 milliLiter(s) (100 mL/Hr) IV Continuous <Continuous>  vancomycin  IVPB 1000 milliGRAM(s) IV Intermittent every 12 hours    MEDICATIONS  (PRN):  acetaminophen   Tablet 650 milliGRAM(s) Oral every 6 hours PRN For Temp greater than 38 C (100.4 F)  morphine  - Injectable 2 milliGRAM(s) IV Push every 4 hours PRN Severe Pain (7 - 10)      Labs:    CBC:                        9.2    3.6   )-----------( 534      ( 2018 01:31 )             28.9     CMP:        135  |  99  |  20  ----------------------------<  130<H>  3.6   |  23  |  0.51    Ca    7.9<L>      2018 01:30  Phos  2.2       Mg     1.7         TPro  5.2<L>  /  Alb  2.4<L>  /  TBili  1.0  /  DBili  x   /  AST  20  /  ALT  13  /  AlkPhos  231<H>      PT/INR - ( 2018 01:32 )   PT: 28.5 sec;   INR: 2.52          PTT - ( 2018 01:32 )  PTT:32.9 sec  Urinalysis Basic - ( 2018 15:17 )    Color: Yellow / Appearance: Clear / SG: <=1.005 / pH: x  Gluc: x / Ketone: Trace mg/dL  / Bili: Negative / Urobili: 1.0 E.U./dL   Blood: x / Protein: NEGATIVE mg/dL / Nitrite: NEGATIVE   Leuk Esterase: NEGATIVE / RBC: x / WBC x   Sq Epi: x / Non Sq Epi: x / Bacteria: x        Imaging:        < from: CT Abdomen and Pelvis w/ IV Cont (18 @ 14:42) >  XAM:  CT ABDOMEN AND PELVIS IC                          PROCEDURE DATE:  2018          INTERPRETATION:  CT of the ABDOMEN and PELVIS with intravenous contrast   dated 2018 2:42 PM    INDICATION: sepsis  r/o intrabd infection     TECHNIQUE: CT of the abdomen and pelvis was performed using intravenous   contrast. Axial, sagittal and coronal images were produced and reviewed.   100 cc of Optiray 350 used 8 cc discarded.    COMPARISON: Multiple prior CTs of the abdomen and pelvis from    and 2017.    FINDINGS: Images of the lower chest demonstrate clear lung bases with no   pulmonary nodules. No cardiomegaly seen further appears to be some   coronary calcification/stent.    The liver has a multiple hypodense liver lesion consistent with   metastatic disease with the largest one measuring 2.1 cm transaxially in   the right lobe. There is also a 3.2 cm hepatic cyst unchanged from prior   CT; with increasing size of metastatic lesions from the most recent   comparison exam. Also noted are multiple venous collaterals in the josh   hepatis region, consistent with cavernous transformation the portal vein.   The previously seen thrombus in SMV is not identified on the current   exam. Cholecystostomy tube is in place in a collapsed gallbladder with a   1.5 cm gallstone. The pancreas is normal in appearance. No splenic   abnormalities are seen. The adrenal glands are unremarkable. The kidneys   are normal in appearance.    No abdominal aortic aneurysm is seen. No lymphadenopathy is seen.    The newly placed PEG tube is in place within the gastric lumen; and a   newly placed stent in the sigmoid colon. Evaluation of the bowel reveals   significant interval change in appearance of the gastrointestinal tract,   with long segment of circumferential mural thickening of the dilated   air-filled distal small bowel. There is also abnormal appearance of the   ascending and transverse colon, with indistinct bowel wall and areas of   luminal narrowing at different locations of the colon. Redemonstrated is   a heterogeneous collection interposed between the descending colon and   adjacent small bowel in the peripheral right mid abdomen. There is also   worsening of peritoneal carcinomatosis in the interim. Increasing amount   of abdominopelvic ascites.    Images of the pelvis demonstrate the uterus and adnexae to be grossly   unremarkable in appearance.    Evaluation of the osseous structures demonstrates degenerative changes of   the spine.  No destructive lesions are seen.      IMPRESSION:  1.  Significant interval change in appearance of the gastrointestinal   tract, with long segment of circumferential bowel wall thickening of the   distal small bowel and the ascending/transverse colon; with several areas   of luminal narrowing involving the mentioned colon; as detailed above.   Findings are either related to progression of carcinomatosis and   metastatic disease or possible ischemic etiology.    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    4.  Increasing amount of abdominopelvic ascites.    5.  Increasing metastatic disease to the liver.    6.  PEG tube is within the stomach lumen. Unchanged position of the   percutaneous cholecystotomy tube.        < end of copied text >      PEx:  T(C): 37.6 (18 @ 09:13), Max: 38.7 (18 @ 13:11)  HR: 98 (18 @ 06:00) (80 - 107)  BP: 90/64 (18 @ 09:13) (90/64 - 128/85)  RR: 18 (18 @ 09:13) (16 - 20)  SpO2: 95% (18 @ 09:13) (95% - 100%)  Wt(kg): --  Daily     Daily Weight in k.8 (2018 09:38)  CAPILLARY BLOOD GLUCOSE        I&O's Summary    2018 07:01  -  2018 07:00  --------------------------------------------------------  IN: 0 mL / OUT: 250 mL / NET: -250 mL        General: Talkative, AAOx3 however seems visibly tired  HEENT: NCAT, no conjunctival pallor, no scleral icterus. Dry mucous membranes  Neck: supple, no jvd  CVS: Normal S1S2, RRR, no murmurs  Resp: CTABL  GI: Visibly distended, pt deferring exam because she states it is painful when palpating    Musc: No C/C/E    Neuro: No focal deficits. N  Psych:  Concerned but with good spirits.  Skin: Xerosis    Preadmit Karnofsky:  %70           Current Karnofsky:     %50  Cachexia (Y/N): N  BMI: 30    Advanced Directives:     Full Code    Decision maker: The patient is able to participate in complex medical decision making conversations  Legal surrogate: No designated HCP, but the patient is thinking of assigning her sister in [place] and her friend in [facility].    GOALS OF CARE DISCUSSION       Palliative care info/counseling provided	           Advanced Directives addressed please see Advance Care Planning Note	           See previous Palliative Medicine Note       Documentation of GOC: Pt would like to have underlying sepsis treated and then make a decision 	          REFERRALS	          Unit SW/Case Mgmt       Patient/Family Support       Massage Therapy       Music Therapy       Nutrition       PT/OT PILLO PEDRAZA          MRN-[id number]            (49)    HPI:  68 yo F w/ metastatic colon ca s/p 1 dose of FOLFOX admitted for sepsis. Her last admission was complicated by acute cholecystitis, severe sepsis and hepatic vein thrombosis s/p percutaneous cholecystomy, and recent partial SBO s/p vent PEG and 2 colonic stents. She presented with fever of 101.7 in the setting of several days of exhaustion and weakness. Pt was sent to the ER by Dr. Thompson after she was seen in his office and was noted to be febrile with temp of 102 and tachycardic as well as having diffuse abdominal pain. She was noted to have leukocytosis and thrombocytosis and CT abdomen/pelvis showed colitis (ischemic vs infectious) and increase in disease burden in colon, liver and peritoneal carcinomatosis. There was ascites noted on imaging as well. She was started on IV antibiotics to cover sepsis possibly secondary to GI source; UA and CXR negative.     She notes pain is 2.5/10 however this is at rest; she notes that with movement it goes up to 8/10. She was taking Oxycodone 5 mg every 6 hours as needed with good effect as outpatient. She is currently NPO and receiving morphine 2 mg IV PRN with good effect. No nausea/vomiting. No chest pain or dyspnea. Last bowel movement was 2 days ago, states she hasn't passed flatus since Monday.     PAST MEDICAL & SURGICAL HISTORY:  Cholecystitis s/p cholecystostomy tube  Colon cancer s/p colonic stent s/p venting PEG  No significant past surgical history    FAMILY HISTORY: No pertinent family history in first degree relatives    SOCIAL HISTORY: Single. Never  no children. Has 2 brother and 2 sisters and her mother living in [place]. 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.    ROS:                    Dyspnea (Bk 0-10): 0                       N/V (Y/N): N                             Secretions (Y/N) : N               Agitation(Y/N): N  Pain (Y/N): Y; at this time pain is 2.5/10 but states when moving around it's about 8/10  -Provocation/Palliation: Moving around such as being transported in gurney or walking exacerbates pain; laying down flat alleviates pain  -Quality/Quantity: Abdominal soreness  -Radiating: No radiation  -Severity: 2.5/10  -Timing/Frequency: Prevents her from walking aroun  -Impact on ADLs: Y    General:  Denied  HEENT:     Dry mouth, thirst.  Neck:  Denied  CVS:  Denied  Resp:  Denied  GI:  Distended, Soreness noted, Abdominal pain and constipation.  :  Denied  Musc:  Denied  Neuro:  Denied  Psych:  Denied  Skin:  Denied  Lymph:  Denied    Allergies: allergic to cats (Rash)  No Known Drug Allergies    Opiate Naive (Y/N): No   -iStop reviewed (Y/N): Yes. Found Rx for Percocet on iStop review (Ref#: [id number] )    Medications:      MEDICATIONS  (STANDING):  edoxaban 60 milliGRAM(s) Oral daily  piperacillin/tazobactam IVPB. 3.375 Gram(s) IV Intermittent every 6 hours  potassium chloride   Powder 40 milliEquivalent(s) Oral once  sodium chloride 0.9%. 1000 milliLiter(s) (100 mL/Hr) IV Continuous <Continuous>  vancomycin  IVPB 1000 milliGRAM(s) IV Intermittent every 12 hours    MEDICATIONS  (PRN):  acetaminophen   Tablet 650 milliGRAM(s) Oral every 6 hours PRN For Temp greater than 38 C (100.4 F)  morphine  - Injectable 2 milliGRAM(s) IV Push every 4 hours PRN Severe Pain (7 - 10)    Labs:    CBC:                        9.2    3.6   )-----------( 534      ( 2018 01:31 )             28.9     CMP:      135  |  99  |  20  ----------------------------<  130<H>  3.6   |  23  |  0.51  Ca    7.9<L>      2018 01:30  Phos  2.2     -18  Mg     1.7     -18    TPro  5.2<L>  /  Alb  2.4<L>  /  TBili  1.0  /  DBili  x   /  AST  20  /  ALT  13  /  AlkPhos  231<H>  18  PT/INR - ( 2018 01:32 )   PT: 28.5 sec;   INR: 2.52     PTT - ( 2018 01:32 )  PTT:32.9 sec    Urinalysis Basic - ( 2018 15:17 )  Color: Yellow / Appearance: Clear / SG: <=1.005 / pH: x  Gluc: x / Ketone: Trace mg/dL  / Bili: Negative / Urobili: 1.0 E.U./dL   Blood: x / Protein: NEGATIVE mg/dL / Nitrite: NEGATIVE   Leuk Esterase: NEGATIVE / RBC: x / WBC x   Sq Epi: x / Non Sq Epi: x / Bacteria: x    Imaging:  Reviewed    EXAM:  XR ABDOMEN PORTABLE URGENT 2V                       PROCEDURE DATE:  2018    INTERPRETATION:  X-ray of the abdomen  Indication: Status post colonic stent placement. Evaluate for free air.  2 frontal views of the abdomen are compared to the x-ray of 2018. Tip   of right chest port overlies the SVC. Tip of percutaneous gastrostomy   tube overlies the gastric antrum. Percutaneous pigtail drain overlies the   right upper quadrant, unchanged. 2 overlapping colorectal stents are   noted in the upper pelvis. There are is mild dilatation of small bowel   loops which are centered in the mid abdomen, suggestive of moderate   ascites. No free air is seen.  Impression: Two colorectal stents in place. Suggestion of ascites. Small   bowel ileus or early obstruction. No free air.    EXAM:  XR CHEST AP OR PA 1V                        PROCEDURE DATE:  2018    INTERPRETATION:  HISTORY: fever; evaluate for pneumonia; history of   metastatic colon carcinoma and cholecystitis  A single AP view of the chest is compared to prior radiograph of   2018.  Right-sided port catheter tip is projected near the junction of the   superior vena cava and right atrium. Heart is normal in size. There is no   evidence of pulmonary vascular congestion, focal infiltrate, mass,   pleural fluid, or pneumothorax. There is the tortuosity and calcification   of the aorta and a slight convex left thoracic spinal curvature. There is   a right upper quadrant pigtail drainage catheter.  IMPRESSION: No evidence of pulmonary infiltrate.    EXAM:  CT ABDOMEN AND PELVIS IC                      PROCEDURE DATE:  2018    INTERPRETATION:  CT of the ABDOMEN and PELVIS with intravenous contrast   dated 2018 2:42 PM  INDICATION: sepsis  r/o intrabd infection   TECHNIQUE: CT of the abdomen and pelvis was performed using intravenous   contrast. Axial, sagittal and coronal images were produced and reviewed.   100 cc of Optiray 350 used 8 cc discarded.  COMPARISON: Multiple prior CTs of the abdomen and pelvis from    and 2017.  FINDINGS: Images of the lower chest demonstrate clear lung bases with no   pulmonary nodules. No cardiomegaly seen further appears to be some   coronary calcification/stent.  The liver has a multiple hypodense liver lesion consistent with   metastatic disease with the largest one measuring 2.1 cm transaxially in   the right lobe. There is also a 3.2 cm hepatic cyst unchanged from prior   CT; with increasing size of metastatic lesions from the most recent   comparison exam. Also noted are multiple venous collaterals in the josh   hepatis region, consistent with cavernous transformation the portal vein.   The previously seen thrombus in SMV is not identified on the current   exam. Cholecystostomy tube is in place in a collapsed gallbladder with a   1.5 cm gallstone. The pancreas is normal in appearance. No splenic   abnormalities are seen. The adrenal glands are unremarkable. The kidneys   are normal in appearance.  No abdominal aortic aneurysm is seen. No lymphadenopathy is seen.  The newly placed PEG tube is in place within the gastric lumen; and a   newly placed stent in the sigmoid colon. Evaluation of the bowel reveals   significant interval change in appearance of the gastrointestinal tract,   with long segment of circumferential mural thickening of the dilated   air-filled distal small bowel. There is also abnormal appearance of the   ascending and transverse colon, with indistinct bowel wall and areas of   luminal narrowing at different locations of the colon. Redemonstrated is   a heterogeneous collection interposed between the descending colon and   adjacent small bowel in the peripheral right mid abdomen. There is also   worsening of peritoneal carcinomatosis in the interim. Increasing amount   of abdominopelvic ascites.  Images of he pelvis demonstrate the uterus and adnexae to be grossly   unremarkable in appearance.  Evaluation of the osseous structures demonstrates degenerative changes of   the spine.  No destructive lesions are seen.  IMPRESSION:  1.  Significant interval change in appearance of the gastrointestinal   tract, with long segment of circumferential bowel wall thickening of the   distal small bowel and the ascending/transverse colon; with several areas   of luminal narrowing involving the mentioned colon; as detailed above.   Findings are either related to progression of carcinomatosis and   metastatic disease or possible ischemic etiology.  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  4.  Increasing amount of abdominopelvic ascites.  5.  Increasing metastatic disease to the liver.  6.  PEG tube is within the stomach lumen. Unchanged position of the   percutanous cholecystotomy tube.    ECG:  Ventricular Rate 101 BPM  Atrial Rate 101 BPM  P-R Interval 122 ms  QRS Duration 80 ms  Q-T Interval 308 ms  QTC Calculation(Bezet) 399 ms  P Axis 46 degrees  R Axis -12 degrees  T Axis 65 degrees  Diagnosis Line Sinus tachycardia  Nonspecific T wave abnormality  Confirmed by AJ MORRISSEY, XENIA ([id number]) on 2018 10:38:48 AM    PEx:  T(C): 37.6 (18 @ 09:13), Max: 38.7 (18 @ 13:11)  HR: 98 (18 @ 06:00) (80 - 107)  BP: 90/64 (18 @ 09:13) (90/64 - 128/85)  RR: 18 (18 @ 09:13) (16 - 20)  SpO2: 95% (18 @ 09:13) (95% - 100%)  Wt(kg): 73.9    I&O's Summary  2018 07:01  -  2018 07:00  --------------------------------------------------------  IN: 0 mL / OUT: 250 mL / NET: -250 mL    General: Talkative, AAOx3 however seems visibly tired and somnolent  HEENT: NCAT, no conjunctival pallor, no scleral icterus. Very dry mucous membranes  Neck: supple, no jvd  CVS: Normal S1S2, RRR, no murmurs  Resp: Unlabored CTABL  GI: Visibly distended, pt deferring exam because she states it is painful when palpating on LUQ and LLQ  Musc: No C/C/E    Neuro: No focal deficits. Following commands.  Psych:  Concerned but with good spirits.  Skin: Xerosis  Lymph: Normal.  Preadmit Karnofsky:  %70           Current Karnofsky:     %50  Cachexia (Y/N): N  BMI: 30    Advanced Directives:     Full Code     MOLST in [place]    Decision maker: The patient is able to participate in complex medical decision making conversations  Legal surrogate: No designated HCP, but the patient was thinking of assigning her sister Rama Fine [phone] from [place] and her friend Robyn Sam [phone] in [facility].    GOALS OF CARE DISCUSSION       Palliative care info/counseling provided	           Advanced Directives addressed please see Advance Care Planning Note 	           See previous Palliative Medicine Note       Documentation of GOC: Pt would like to have underlying sepsis treated and then make a decision 	          REFERRALS	        Unit SW/Case Mgmt       Patient/Family Support       Massage Therapy       Music Therapy       Nutrition / Dietician       PT/OT

## 2022-01-24 NOTE — ED ADULT NURSE NOTE - BREATHING, MLM
Pt tolerated water without nausea or vomiting. Pt agreeable to be discharged.        Mars Pascual RN  01/24/22 [id number] Spontaneous, unlabored and symmetrical
